# Patient Record
Sex: FEMALE | Race: WHITE | NOT HISPANIC OR LATINO | Employment: OTHER | URBAN - METROPOLITAN AREA
[De-identification: names, ages, dates, MRNs, and addresses within clinical notes are randomized per-mention and may not be internally consistent; named-entity substitution may affect disease eponyms.]

---

## 2017-01-31 ENCOUNTER — ALLSCRIPTS OFFICE VISIT (OUTPATIENT)
Dept: OTHER | Facility: OTHER | Age: 82
End: 2017-01-31

## 2017-02-27 ENCOUNTER — ALLSCRIPTS OFFICE VISIT (OUTPATIENT)
Dept: OTHER | Facility: OTHER | Age: 82
End: 2017-02-27

## 2017-02-27 LAB
FLUAV AG SPEC QL IA: NEGATIVE
INFLUENZA B AG (HISTORICAL): NEGATIVE

## 2017-07-24 DIAGNOSIS — R93.89 ABNORMAL FINDINGS ON DIAGNOSTIC IMAGING OF OTHER SPECIFIED BODY STRUCTURES: ICD-10-CM

## 2017-07-24 DIAGNOSIS — E55.9 VITAMIN D DEFICIENCY: ICD-10-CM

## 2017-07-24 DIAGNOSIS — Z12.31 ENCOUNTER FOR SCREENING MAMMOGRAM FOR MALIGNANT NEOPLASM OF BREAST: ICD-10-CM

## 2017-07-24 DIAGNOSIS — E78.5 HYPERLIPIDEMIA: ICD-10-CM

## 2017-07-24 DIAGNOSIS — E03.9 HYPOTHYROIDISM: ICD-10-CM

## 2017-07-24 DIAGNOSIS — N63.0 BREAST LUMP: ICD-10-CM

## 2017-07-24 DIAGNOSIS — R60.9 EDEMA: ICD-10-CM

## 2017-08-03 ENCOUNTER — ALLSCRIPTS OFFICE VISIT (OUTPATIENT)
Dept: OTHER | Facility: OTHER | Age: 82
End: 2017-08-03

## 2017-08-14 ENCOUNTER — HOSPITAL ENCOUNTER (OUTPATIENT)
Dept: ULTRASOUND IMAGING | Facility: CLINIC | Age: 82
Discharge: HOME | End: 2017-08-14
Payer: MEDICARE

## 2017-08-14 ENCOUNTER — HOSPITAL ENCOUNTER (OUTPATIENT)
Dept: NON INVASIVE DIAGNOSTICS | Facility: CLINIC | Age: 82
Discharge: HOME | End: 2017-08-14
Payer: MEDICARE

## 2017-08-14 ENCOUNTER — HOSPITAL ENCOUNTER (OUTPATIENT)
Dept: MAMMOGRAPHY | Facility: CLINIC | Age: 82
Discharge: HOME | End: 2017-08-14
Payer: MEDICARE

## 2017-08-14 VITALS — DIASTOLIC BLOOD PRESSURE: 80 MMHG | SYSTOLIC BLOOD PRESSURE: 120 MMHG | HEART RATE: 72 BPM

## 2017-08-14 DIAGNOSIS — Z12.31 ENCOUNTER FOR SCREENING MAMMOGRAM FOR MALIGNANT NEOPLASM OF BREAST: ICD-10-CM

## 2017-08-14 DIAGNOSIS — R60.9 EDEMA: ICD-10-CM

## 2017-08-14 DIAGNOSIS — R92.8 ABNORMAL FINDINGS ON DIAGNOSTIC IMAGING OF BREAST: ICD-10-CM

## 2017-08-14 DIAGNOSIS — N63.0 LUMP OR MASS IN BREAST: ICD-10-CM

## 2017-08-14 DIAGNOSIS — N63.0 BREAST LUMP: ICD-10-CM

## 2017-08-14 DIAGNOSIS — R93.89 ABNORMAL FINDINGS ON DIAGNOSTIC IMAGING OF OTHER SPECIFIED BODY STRUCTURES: ICD-10-CM

## 2017-08-14 PROCEDURE — 76642 ULTRASOUND BREAST LIMITED: CPT

## 2017-08-14 PROCEDURE — 93971 EXTREMITY STUDY: CPT

## 2017-08-14 PROCEDURE — G0204 DX MAMMO INCL CAD BI: HCPCS

## 2017-08-14 PROCEDURE — 88361 TUMOR IMMUNOHISTOCHEM/COMPUT: CPT | Performed by: INTERNAL MEDICINE

## 2017-08-14 PROCEDURE — G0279 TOMOSYNTHESIS, MAMMO: HCPCS

## 2017-08-14 PROCEDURE — 88305 TISSUE EXAM BY PATHOLOGIST: CPT | Performed by: INTERNAL MEDICINE

## 2017-08-14 PROCEDURE — 19083 BX BREAST 1ST LESION US IMAG: CPT

## 2017-08-14 RX ORDER — LIDOCAINE HYDROCHLORIDE 10 MG/ML
4 INJECTION, SOLUTION INFILTRATION; PERINEURAL ONCE
Status: COMPLETED | OUTPATIENT
Start: 2017-08-14 | End: 2017-08-14

## 2017-08-14 RX ADMIN — LIDOCAINE HYDROCHLORIDE 4 ML: 10 INJECTION, SOLUTION INFILTRATION; PERINEURAL at 11:26

## 2017-08-16 ENCOUNTER — GENERIC CONVERSION - ENCOUNTER (OUTPATIENT)
Dept: OTHER | Facility: OTHER | Age: 82
End: 2017-08-16

## 2017-08-21 ENCOUNTER — APPOINTMENT (OUTPATIENT)
Dept: LAB | Facility: CLINIC | Age: 82
End: 2017-08-21
Payer: MEDICARE

## 2017-08-21 ENCOUNTER — APPOINTMENT (OUTPATIENT)
Dept: RADIOLOGY | Facility: CLINIC | Age: 82
End: 2017-08-21
Payer: MEDICARE

## 2017-08-21 ENCOUNTER — TRANSCRIBE ORDERS (OUTPATIENT)
Dept: LAB | Facility: CLINIC | Age: 82
End: 2017-08-21

## 2017-08-21 ENCOUNTER — ALLSCRIPTS OFFICE VISIT (OUTPATIENT)
Dept: OTHER | Facility: OTHER | Age: 82
End: 2017-08-21

## 2017-08-21 DIAGNOSIS — C50.412 MALIGNANT NEOPLASM OF UPPER-OUTER QUADRANT OF LEFT FEMALE BREAST (HCC): Primary | ICD-10-CM

## 2017-08-21 DIAGNOSIS — C50.412 MALIGNANT NEOPLASM OF UPPER-OUTER QUADRANT OF LEFT FEMALE BREAST (HCC): ICD-10-CM

## 2017-08-21 LAB
ALBUMIN SERPL BCP-MCNC: 3.3 G/DL (ref 3.5–5)
ALP SERPL-CCNC: 86 U/L (ref 46–116)
ALT SERPL W P-5'-P-CCNC: 27 U/L (ref 12–78)
ANION GAP SERPL CALCULATED.3IONS-SCNC: 13 MMOL/L (ref 4–13)
AST SERPL W P-5'-P-CCNC: 14 U/L (ref 5–45)
BASOPHILS # BLD AUTO: 0.03 THOUSANDS/ΜL (ref 0–0.1)
BASOPHILS NFR BLD AUTO: 0 % (ref 0–1)
BILIRUB SERPL-MCNC: 0.4 MG/DL (ref 0.2–1)
BUN SERPL-MCNC: 17 MG/DL (ref 5–25)
CALCIUM SERPL-MCNC: 8.9 MG/DL (ref 8.3–10.1)
CHLORIDE SERPL-SCNC: 102 MMOL/L (ref 100–108)
CO2 SERPL-SCNC: 26 MMOL/L (ref 21–32)
CREAT SERPL-MCNC: 0.8 MG/DL (ref 0.6–1.3)
EOSINOPHIL # BLD AUTO: 0.37 THOUSAND/ΜL (ref 0–0.61)
EOSINOPHIL NFR BLD AUTO: 3 % (ref 0–6)
ERYTHROCYTE [DISTWIDTH] IN BLOOD BY AUTOMATED COUNT: 14.1 % (ref 11.6–15.1)
GFR SERPL CREATININE-BSD FRML MDRD: 64 ML/MIN/1.73SQ M
GLUCOSE SERPL-MCNC: 93 MG/DL (ref 65–140)
HCT VFR BLD AUTO: 40.5 % (ref 34.8–46.1)
HGB BLD-MCNC: 13.2 G/DL (ref 11.5–15.4)
LYMPHOCYTES # BLD AUTO: 3.98 THOUSANDS/ΜL (ref 0.6–4.47)
LYMPHOCYTES NFR BLD AUTO: 31 % (ref 14–44)
MCH RBC QN AUTO: 29.5 PG (ref 26.8–34.3)
MCHC RBC AUTO-ENTMCNC: 32.6 G/DL (ref 31.4–37.4)
MCV RBC AUTO: 90 FL (ref 82–98)
MONOCYTES # BLD AUTO: 0.88 THOUSAND/ΜL (ref 0.17–1.22)
MONOCYTES NFR BLD AUTO: 7 % (ref 4–12)
NEUTROPHILS # BLD AUTO: 7.41 THOUSANDS/ΜL (ref 1.85–7.62)
NEUTS SEG NFR BLD AUTO: 59 % (ref 43–75)
PLATELET # BLD AUTO: 271 THOUSANDS/UL (ref 149–390)
PMV BLD AUTO: 10.7 FL (ref 8.9–12.7)
POTASSIUM SERPL-SCNC: 4 MMOL/L (ref 3.5–5.3)
PROT SERPL-MCNC: 7.7 G/DL (ref 6.4–8.2)
RBC # BLD AUTO: 4.48 MILLION/UL (ref 3.81–5.12)
SODIUM SERPL-SCNC: 141 MMOL/L (ref 136–145)
WBC # BLD AUTO: 12.67 THOUSAND/UL (ref 4.31–10.16)

## 2017-08-21 PROCEDURE — 71020 HB CHEST X-RAY 2VW FRONTAL&LATL: CPT

## 2017-08-21 PROCEDURE — 85025 COMPLETE CBC W/AUTO DIFF WBC: CPT

## 2017-08-21 PROCEDURE — 36415 COLL VENOUS BLD VENIPUNCTURE: CPT

## 2017-08-21 PROCEDURE — 93005 ELECTROCARDIOGRAM TRACING: CPT

## 2017-08-21 PROCEDURE — 80053 COMPREHEN METABOLIC PANEL: CPT

## 2017-08-23 LAB
ATRIAL RATE: 66 BPM
P AXIS: 55 DEGREES
PR INTERVAL: 248 MS
QRS AXIS: -42 DEGREES
QRSD INTERVAL: 100 MS
QT INTERVAL: 444 MS
QTC INTERVAL: 465 MS
T WAVE AXIS: 12 DEGREES
VENTRICULAR RATE: 66 BPM

## 2017-09-05 ENCOUNTER — CONVERSION ENCOUNTER (OUTPATIENT)
Dept: MAMMOGRAPHY | Facility: HOSPITAL | Age: 82
End: 2017-09-05

## 2017-09-05 ENCOUNTER — HOSPITAL ENCOUNTER (OUTPATIENT)
Facility: HOSPITAL | Age: 82
Setting detail: OUTPATIENT SURGERY
Discharge: HOME | End: 2017-09-07
Attending: SURGERY | Admitting: SURGERY
Payer: MEDICARE

## 2017-09-05 ENCOUNTER — ANESTHESIA EVENT (OUTPATIENT)
Dept: PERIOP | Facility: HOSPITAL | Age: 82
End: 2017-09-05
Payer: MEDICARE

## 2017-09-05 ENCOUNTER — ANESTHESIA (OUTPATIENT)
Dept: PERIOP | Facility: HOSPITAL | Age: 82
End: 2017-09-05
Payer: MEDICARE

## 2017-09-05 ENCOUNTER — GENERIC CONVERSION - ENCOUNTER (OUTPATIENT)
Dept: OTHER | Facility: OTHER | Age: 82
End: 2017-09-05

## 2017-09-05 DIAGNOSIS — Z17.1 MALIGNANT NEOPLASM OF UPPER-OUTER QUADRANT OF LEFT BREAST IN FEMALE, ESTROGEN RECEPTOR NEGATIVE (HCC): ICD-10-CM

## 2017-09-05 DIAGNOSIS — I48.91 ATRIAL FIBRILLATION, UNSPECIFIED TYPE (HCC): Primary | ICD-10-CM

## 2017-09-05 DIAGNOSIS — Z90.12 S/P LEFT MASTECTOMY: ICD-10-CM

## 2017-09-05 DIAGNOSIS — C50.412 MALIGNANT NEOPLASM OF UPPER-OUTER QUADRANT OF LEFT BREAST IN FEMALE, ESTROGEN RECEPTOR NEGATIVE (HCC): ICD-10-CM

## 2017-09-05 PROBLEM — J96.01 ACUTE RESPIRATORY FAILURE WITH HYPOXIA (HCC): Status: ACTIVE | Noted: 2017-09-05

## 2017-09-05 PROBLEM — I48.0 PAROXYSMAL ATRIAL FIBRILLATION (HCC): Status: ACTIVE | Noted: 2017-09-05

## 2017-09-05 LAB
ATRIAL RATE: 89 BPM
QRS AXIS: -51 DEGREES
QRSD INTERVAL: 96 MS
QT INTERVAL: 330 MS
QTC INTERVAL: 395 MS
T WAVE AXIS: 78 DEGREES
VENTRICULAR RATE: 86 BPM

## 2017-09-05 PROCEDURE — 88342 IMHCHEM/IMCYTCHM 1ST ANTB: CPT | Performed by: SURGERY

## 2017-09-05 PROCEDURE — 88341 IMHCHEM/IMCYTCHM EA ADD ANTB: CPT | Performed by: SURGERY

## 2017-09-05 PROCEDURE — 93005 ELECTROCARDIOGRAM TRACING: CPT

## 2017-09-05 PROCEDURE — 93005 ELECTROCARDIOGRAM TRACING: CPT | Performed by: PHYSICIAN ASSISTANT

## 2017-09-05 PROCEDURE — 88307 TISSUE EXAM BY PATHOLOGIST: CPT | Performed by: SURGERY

## 2017-09-05 RX ORDER — CEFAZOLIN SODIUM 1 G/3ML
INJECTION, POWDER, FOR SOLUTION INTRAMUSCULAR; INTRAVENOUS AS NEEDED
Status: DISCONTINUED | OUTPATIENT
Start: 2017-09-05 | End: 2017-09-05 | Stop reason: SURG

## 2017-09-05 RX ORDER — FENTANYL CITRATE/PF 50 MCG/ML
25 SYRINGE (ML) INJECTION
Status: DISCONTINUED | OUTPATIENT
Start: 2017-09-05 | End: 2017-09-05

## 2017-09-05 RX ORDER — MAGNESIUM HYDROXIDE/ALUMINUM HYDROXICE/SIMETHICONE 120; 1200; 1200 MG/30ML; MG/30ML; MG/30ML
30 SUSPENSION ORAL EVERY 6 HOURS PRN
Status: DISCONTINUED | OUTPATIENT
Start: 2017-09-05 | End: 2017-09-07 | Stop reason: HOSPADM

## 2017-09-05 RX ORDER — SODIUM CHLORIDE, SODIUM LACTATE, POTASSIUM CHLORIDE, CALCIUM CHLORIDE 600; 310; 30; 20 MG/100ML; MG/100ML; MG/100ML; MG/100ML
INJECTION, SOLUTION INTRAVENOUS CONTINUOUS PRN
Status: DISCONTINUED | OUTPATIENT
Start: 2017-09-05 | End: 2017-09-05

## 2017-09-05 RX ORDER — LIDOCAINE HYDROCHLORIDE 10 MG/ML
INJECTION, SOLUTION INFILTRATION; PERINEURAL AS NEEDED
Status: DISCONTINUED | OUTPATIENT
Start: 2017-09-05 | End: 2017-09-05 | Stop reason: SURG

## 2017-09-05 RX ORDER — OXYCODONE HYDROCHLORIDE 10 MG/1
10 TABLET ORAL EVERY 4 HOURS PRN
Status: DISCONTINUED | OUTPATIENT
Start: 2017-09-05 | End: 2017-09-07 | Stop reason: HOSPADM

## 2017-09-05 RX ORDER — EPHEDRINE SULFATE 50 MG/ML
INJECTION, SOLUTION INTRAVENOUS AS NEEDED
Status: DISCONTINUED | OUTPATIENT
Start: 2017-09-05 | End: 2017-09-05 | Stop reason: SURG

## 2017-09-05 RX ORDER — FENTANYL CITRATE 50 UG/ML
INJECTION, SOLUTION INTRAMUSCULAR; INTRAVENOUS AS NEEDED
Status: DISCONTINUED | OUTPATIENT
Start: 2017-09-05 | End: 2017-09-05 | Stop reason: SURG

## 2017-09-05 RX ORDER — METOPROLOL TARTRATE 5 MG/5ML
INJECTION INTRAVENOUS AS NEEDED
Status: DISCONTINUED | OUTPATIENT
Start: 2017-09-05 | End: 2017-09-05 | Stop reason: SURG

## 2017-09-05 RX ORDER — PANTOPRAZOLE SODIUM 40 MG/1
40 TABLET, DELAYED RELEASE ORAL
Status: DISCONTINUED | OUTPATIENT
Start: 2017-09-06 | End: 2017-09-07 | Stop reason: HOSPADM

## 2017-09-05 RX ORDER — SODIUM CHLORIDE, SODIUM LACTATE, POTASSIUM CHLORIDE, CALCIUM CHLORIDE 600; 310; 30; 20 MG/100ML; MG/100ML; MG/100ML; MG/100ML
100 INJECTION, SOLUTION INTRAVENOUS CONTINUOUS
Status: DISCONTINUED | OUTPATIENT
Start: 2017-09-05 | End: 2017-09-07 | Stop reason: HOSPADM

## 2017-09-05 RX ORDER — OXYCODONE HYDROCHLORIDE 5 MG/1
5 TABLET ORAL EVERY 4 HOURS PRN
Status: DISCONTINUED | OUTPATIENT
Start: 2017-09-05 | End: 2017-09-07 | Stop reason: HOSPADM

## 2017-09-05 RX ORDER — ONDANSETRON 2 MG/ML
4 INJECTION INTRAMUSCULAR; INTRAVENOUS ONCE AS NEEDED
Status: DISCONTINUED | OUTPATIENT
Start: 2017-09-05 | End: 2017-09-05

## 2017-09-05 RX ORDER — MAGNESIUM HYDROXIDE 1200 MG/15ML
LIQUID ORAL AS NEEDED
Status: DISCONTINUED | OUTPATIENT
Start: 2017-09-05 | End: 2017-09-05 | Stop reason: HOSPADM

## 2017-09-05 RX ORDER — PROPOFOL 10 MG/ML
INJECTION, EMULSION INTRAVENOUS AS NEEDED
Status: DISCONTINUED | OUTPATIENT
Start: 2017-09-05 | End: 2017-09-05 | Stop reason: SURG

## 2017-09-05 RX ORDER — BUPIVACAINE HYDROCHLORIDE AND EPINEPHRINE 2.5; 5 MG/ML; UG/ML
INJECTION, SOLUTION EPIDURAL; INFILTRATION; INTRACAUDAL; PERINEURAL AS NEEDED
Status: DISCONTINUED | OUTPATIENT
Start: 2017-09-05 | End: 2017-09-05 | Stop reason: HOSPADM

## 2017-09-05 RX ORDER — LEVOTHYROXINE SODIUM 0.15 MG/1
150 TABLET ORAL WEEKLY
Status: DISCONTINUED | OUTPATIENT
Start: 2017-09-06 | End: 2017-09-07

## 2017-09-05 RX ORDER — AMLODIPINE BESYLATE 10 MG/1
10 TABLET ORAL DAILY
Status: DISCONTINUED | OUTPATIENT
Start: 2017-09-06 | End: 2017-09-07 | Stop reason: HOSPADM

## 2017-09-05 RX ADMIN — EPHEDRINE SULFATE 10 MG: 50 INJECTION, SOLUTION INTRAMUSCULAR; INTRAVENOUS; SUBCUTANEOUS at 14:47

## 2017-09-05 RX ADMIN — FENTANYL CITRATE 50 MCG: 50 INJECTION INTRAMUSCULAR; INTRAVENOUS at 14:15

## 2017-09-05 RX ADMIN — CEFAZOLIN SODIUM 1000 MG: 1 INJECTION, POWDER, FOR SOLUTION INTRAMUSCULAR; INTRAVENOUS at 14:07

## 2017-09-05 RX ADMIN — SODIUM CHLORIDE, SODIUM LACTATE, POTASSIUM CHLORIDE, AND CALCIUM CHLORIDE 100 ML/HR: .6; .31; .03; .02 INJECTION, SOLUTION INTRAVENOUS at 18:38

## 2017-09-05 RX ADMIN — FENTANYL CITRATE 25 MCG: 50 INJECTION INTRAMUSCULAR; INTRAVENOUS at 16:21

## 2017-09-05 RX ADMIN — OXYCODONE HYDROCHLORIDE 10 MG: 10 TABLET ORAL at 20:29

## 2017-09-05 RX ADMIN — HYDROMORPHONE HYDROCHLORIDE 0.5 MG: 1 INJECTION, SOLUTION INTRAMUSCULAR; INTRAVENOUS; SUBCUTANEOUS at 17:35

## 2017-09-05 RX ADMIN — FENTANYL CITRATE 25 MCG: 50 INJECTION INTRAMUSCULAR; INTRAVENOUS at 15:50

## 2017-09-05 RX ADMIN — SODIUM CHLORIDE, SODIUM LACTATE, POTASSIUM CHLORIDE, AND CALCIUM CHLORIDE: .6; .31; .03; .02 INJECTION, SOLUTION INTRAVENOUS at 13:52

## 2017-09-05 RX ADMIN — LIDOCAINE HYDROCHLORIDE 50 MG: 10 INJECTION, SOLUTION INFILTRATION; PERINEURAL at 14:03

## 2017-09-05 RX ADMIN — METOPROLOL TARTRATE 2 MG: 1 INJECTION, SOLUTION INTRAVENOUS at 15:06

## 2017-09-05 RX ADMIN — PROPOFOL 150 MG: 10 INJECTION, EMULSION INTRAVENOUS at 14:03

## 2017-09-05 RX ADMIN — FENTANYL CITRATE 50 MCG: 50 INJECTION INTRAMUSCULAR; INTRAVENOUS at 14:28

## 2017-09-06 PROBLEM — Z90.12 S/P LEFT MASTECTOMY: Status: ACTIVE | Noted: 2017-09-06

## 2017-09-06 LAB
ANION GAP SERPL CALCULATED.3IONS-SCNC: 8 MMOL/L (ref 4–13)
APTT PPP: 41 SECONDS (ref 23–35)
ATRIAL RATE: 71 BPM
BUN SERPL-MCNC: 15 MG/DL (ref 5–25)
CALCIUM SERPL-MCNC: 8.4 MG/DL (ref 8.3–10.1)
CHLORIDE SERPL-SCNC: 103 MMOL/L (ref 100–108)
CO2 SERPL-SCNC: 26 MMOL/L (ref 21–32)
CREAT SERPL-MCNC: 0.77 MG/DL (ref 0.6–1.3)
ERYTHROCYTE [DISTWIDTH] IN BLOOD BY AUTOMATED COUNT: 13.9 % (ref 11.6–15.1)
GFR SERPL CREATININE-BSD FRML MDRD: 67 ML/MIN/1.73SQ M
GLUCOSE P FAST SERPL-MCNC: 112 MG/DL (ref 65–99)
GLUCOSE SERPL-MCNC: 112 MG/DL (ref 65–140)
HCT VFR BLD AUTO: 38 % (ref 34.8–46.1)
HGB BLD-MCNC: 11.9 G/DL (ref 11.5–15.4)
INR PPP: 1.06 (ref 0.86–1.16)
MAGNESIUM SERPL-MCNC: 1.5 MG/DL (ref 1.6–2.6)
MCH RBC QN AUTO: 28.7 PG (ref 26.8–34.3)
MCHC RBC AUTO-ENTMCNC: 31.3 G/DL (ref 31.4–37.4)
MCV RBC AUTO: 92 FL (ref 82–98)
P AXIS: 75 DEGREES
PLATELET # BLD AUTO: 266 THOUSANDS/UL (ref 149–390)
PMV BLD AUTO: 10.8 FL (ref 8.9–12.7)
POTASSIUM SERPL-SCNC: 3.9 MMOL/L (ref 3.5–5.3)
PR INTERVAL: 256 MS
PROTHROMBIN TIME: 14.1 SECONDS (ref 12.1–14.4)
QRS AXIS: -52 DEGREES
QRSD INTERVAL: 100 MS
QT INTERVAL: 444 MS
QTC INTERVAL: 482 MS
RBC # BLD AUTO: 4.15 MILLION/UL (ref 3.81–5.12)
SODIUM SERPL-SCNC: 137 MMOL/L (ref 136–145)
T WAVE AXIS: 6 DEGREES
TSH SERPL DL<=0.05 MIU/L-ACNC: 2.42 UIU/ML (ref 0.36–3.74)
VENTRICULAR RATE: 71 BPM
WBC # BLD AUTO: 17.04 THOUSAND/UL (ref 4.31–10.16)

## 2017-09-06 PROCEDURE — 85730 THROMBOPLASTIN TIME PARTIAL: CPT | Performed by: PHYSICIAN ASSISTANT

## 2017-09-06 PROCEDURE — 83735 ASSAY OF MAGNESIUM: CPT | Performed by: PHYSICIAN ASSISTANT

## 2017-09-06 PROCEDURE — 85610 PROTHROMBIN TIME: CPT | Performed by: PHYSICIAN ASSISTANT

## 2017-09-06 PROCEDURE — 80048 BASIC METABOLIC PNL TOTAL CA: CPT | Performed by: SURGERY

## 2017-09-06 PROCEDURE — 84443 ASSAY THYROID STIM HORMONE: CPT | Performed by: PHYSICIAN ASSISTANT

## 2017-09-06 PROCEDURE — 85027 COMPLETE CBC AUTOMATED: CPT | Performed by: SURGERY

## 2017-09-06 RX ORDER — ACETAMINOPHEN 325 MG/1
650 TABLET ORAL EVERY 6 HOURS PRN
Status: DISCONTINUED | OUTPATIENT
Start: 2017-09-06 | End: 2017-09-07 | Stop reason: HOSPADM

## 2017-09-06 RX ORDER — OMEPRAZOLE 20 MG/1
20 CAPSULE, DELAYED RELEASE ORAL DAILY
Refills: 0
Start: 2017-09-06 | End: 2018-02-13 | Stop reason: SDUPTHER

## 2017-09-06 RX ADMIN — ENOXAPARIN SODIUM 70 MG: 80 INJECTION SUBCUTANEOUS at 00:04

## 2017-09-06 RX ADMIN — LEVOTHYROXINE SODIUM 150 MCG: 150 TABLET ORAL at 06:11

## 2017-09-06 RX ADMIN — ENOXAPARIN SODIUM 70 MG: 80 INJECTION SUBCUTANEOUS at 11:51

## 2017-09-06 RX ADMIN — METOPROLOL TARTRATE 25 MG: 25 TABLET ORAL at 00:02

## 2017-09-06 RX ADMIN — AMLODIPINE BESYLATE 10 MG: 10 TABLET ORAL at 08:15

## 2017-09-06 RX ADMIN — ACETAMINOPHEN 650 MG: 325 TABLET ORAL at 14:30

## 2017-09-06 RX ADMIN — SODIUM CHLORIDE, SODIUM LACTATE, POTASSIUM CHLORIDE, AND CALCIUM CHLORIDE 100 ML/HR: .6; .31; .03; .02 INJECTION, SOLUTION INTRAVENOUS at 04:21

## 2017-09-06 RX ADMIN — SODIUM CHLORIDE, SODIUM LACTATE, POTASSIUM CHLORIDE, AND CALCIUM CHLORIDE 100 ML/HR: .6; .31; .03; .02 INJECTION, SOLUTION INTRAVENOUS at 15:54

## 2017-09-06 RX ADMIN — SODIUM CHLORIDE, SODIUM LACTATE, POTASSIUM CHLORIDE, AND CALCIUM CHLORIDE 100 ML/HR: .6; .31; .03; .02 INJECTION, SOLUTION INTRAVENOUS at 19:24

## 2017-09-06 RX ADMIN — METOPROLOL TARTRATE 25 MG: 25 TABLET ORAL at 17:21

## 2017-09-06 RX ADMIN — PANTOPRAZOLE SODIUM 40 MG: 40 TABLET, DELAYED RELEASE ORAL at 06:11

## 2017-09-06 RX ADMIN — METOPROLOL TARTRATE 25 MG: 25 TABLET ORAL at 08:15

## 2017-09-06 RX ADMIN — ACETAMINOPHEN 650 MG: 325 TABLET ORAL at 08:15

## 2017-09-07 VITALS
WEIGHT: 164.24 LBS | OXYGEN SATURATION: 90 % | RESPIRATION RATE: 18 BRPM | DIASTOLIC BLOOD PRESSURE: 59 MMHG | HEART RATE: 68 BPM | SYSTOLIC BLOOD PRESSURE: 123 MMHG | HEIGHT: 61 IN | TEMPERATURE: 99.1 F | BODY MASS INDEX: 31.01 KG/M2

## 2017-09-07 LAB
HCT VFR BLD AUTO: 36.7 % (ref 34.8–46.1)
HGB BLD-MCNC: 11.5 G/DL (ref 11.5–15.4)
PLATELET # BLD AUTO: 232 THOUSANDS/UL (ref 149–390)
PMV BLD AUTO: 10.3 FL (ref 8.9–12.7)

## 2017-09-07 PROCEDURE — 85049 AUTOMATED PLATELET COUNT: CPT | Performed by: SURGERY

## 2017-09-07 PROCEDURE — 85014 HEMATOCRIT: CPT | Performed by: SURGERY

## 2017-09-07 PROCEDURE — 85018 HEMOGLOBIN: CPT | Performed by: SURGERY

## 2017-09-07 RX ORDER — LEVOTHYROXINE SODIUM 0.15 MG/1
150 TABLET ORAL
Status: DISCONTINUED | OUTPATIENT
Start: 2017-09-07 | End: 2017-09-07 | Stop reason: HOSPADM

## 2017-09-07 RX ADMIN — PANTOPRAZOLE SODIUM 40 MG: 40 TABLET, DELAYED RELEASE ORAL at 06:12

## 2017-09-07 RX ADMIN — SODIUM CHLORIDE, SODIUM LACTATE, POTASSIUM CHLORIDE, AND CALCIUM CHLORIDE 100 ML/HR: .6; .31; .03; .02 INJECTION, SOLUTION INTRAVENOUS at 04:27

## 2017-09-07 RX ADMIN — AMLODIPINE BESYLATE 10 MG: 10 TABLET ORAL at 10:07

## 2017-09-07 RX ADMIN — LEVOTHYROXINE SODIUM 150 MCG: 150 TABLET ORAL at 07:51

## 2017-09-07 RX ADMIN — METOPROLOL TARTRATE 25 MG: 25 TABLET ORAL at 10:07

## 2017-09-14 ENCOUNTER — ALLSCRIPTS OFFICE VISIT (OUTPATIENT)
Dept: OTHER | Facility: OTHER | Age: 82
End: 2017-09-14

## 2017-09-14 DIAGNOSIS — I48.0 PAROXYSMAL ATRIAL FIBRILLATION (HCC): ICD-10-CM

## 2017-09-14 DIAGNOSIS — R11.10 VOMITING: ICD-10-CM

## 2017-09-14 DIAGNOSIS — I10 ESSENTIAL (PRIMARY) HYPERTENSION: ICD-10-CM
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
## 2017-09-14 DIAGNOSIS — D72.829 ELEVATED WHITE BLOOD CELL COUNT: ICD-10-CM

## 2017-09-18 ENCOUNTER — GENERIC CONVERSION - ENCOUNTER (OUTPATIENT)
Dept: OTHER | Facility: OTHER | Age: 82
End: 2017-09-18

## 2017-09-20 ENCOUNTER — GENERIC CONVERSION - ENCOUNTER (OUTPATIENT)
Dept: OTHER | Facility: OTHER | Age: 82
End: 2017-09-20

## 2017-09-25 ENCOUNTER — HOSPITAL ENCOUNTER (OUTPATIENT)
Dept: NON INVASIVE DIAGNOSTICS | Facility: CLINIC | Age: 82
Discharge: HOME | End: 2017-09-25
Payer: MEDICARE

## 2017-09-25 DIAGNOSIS — I48.0 PAROXYSMAL ATRIAL FIBRILLATION (HCC): ICD-10-CM

## 2017-09-25 PROCEDURE — 93226 XTRNL ECG REC<48 HR SCAN A/R: CPT

## 2017-09-25 PROCEDURE — 93225 XTRNL ECG REC<48 HRS REC: CPT

## 2017-10-12 ENCOUNTER — TRANSCRIBE ORDERS (OUTPATIENT)
Dept: LAB | Facility: CLINIC | Age: 82
End: 2017-10-12

## 2017-10-12 ENCOUNTER — GENERIC CONVERSION - ENCOUNTER (OUTPATIENT)
Dept: OTHER | Facility: OTHER | Age: 82
End: 2017-10-12

## 2017-10-12 ENCOUNTER — APPOINTMENT (OUTPATIENT)
Dept: RADIOLOGY | Age: 82
End: 2017-10-12
Payer: MEDICARE

## 2017-10-12 ENCOUNTER — APPOINTMENT (OUTPATIENT)
Dept: LAB | Age: 82
End: 2017-10-12
Payer: MEDICARE

## 2017-10-12 ENCOUNTER — APPOINTMENT (OUTPATIENT)
Dept: LAB | Facility: CLINIC | Age: 82
End: 2017-10-12
Payer: MEDICARE

## 2017-10-12 ENCOUNTER — HOSPITAL ENCOUNTER (OUTPATIENT)
Dept: CT IMAGING | Facility: HOSPITAL | Age: 82
Discharge: HOME | End: 2017-10-12
Payer: MEDICARE

## 2017-10-12 ENCOUNTER — TRANSCRIBE ORDERS (OUTPATIENT)
Dept: ADMINISTRATIVE | Age: 82
End: 2017-10-12

## 2017-10-12 DIAGNOSIS — I10 ESSENTIAL (PRIMARY) HYPERTENSION: ICD-10-CM
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
## 2017-10-12 DIAGNOSIS — R11.10 VOMITING: ICD-10-CM

## 2017-10-12 DIAGNOSIS — D72.829 ELEVATED WHITE BLOOD CELL COUNT: ICD-10-CM

## 2017-10-12 LAB
ALBUMIN SERPL BCP-MCNC: 3 G/DL (ref 3.5–5)
ALP SERPL-CCNC: 85 U/L (ref 46–116)
ALT SERPL W P-5'-P-CCNC: 30 U/L (ref 12–78)
ANION GAP SERPL CALCULATED.3IONS-SCNC: 9 MMOL/L (ref 4–13)
AST SERPL W P-5'-P-CCNC: 24 U/L (ref 5–45)
BACTERIA UR QL AUTO: ABNORMAL /HPF
BASOPHILS # BLD AUTO: 0.03 THOUSANDS/ΜL (ref 0–0.1)
BASOPHILS NFR BLD AUTO: 0 % (ref 0–1)
BILIRUB SERPL-MCNC: 0.5 MG/DL (ref 0.2–1)
BILIRUB UR QL STRIP: NEGATIVE
BUN SERPL-MCNC: 18 MG/DL (ref 5–25)
CALCIUM SERPL-MCNC: 8.3 MG/DL (ref 8.3–10.1)
CHLORIDE SERPL-SCNC: 103 MMOL/L (ref 100–108)
CLARITY UR: ABNORMAL
CO2 SERPL-SCNC: 26 MMOL/L (ref 21–32)
COLOR UR: YELLOW
CREAT SERPL-MCNC: 1.06 MG/DL (ref 0.6–1.3)
EOSINOPHIL # BLD AUTO: 0.02 THOUSAND/ΜL (ref 0–0.61)
EOSINOPHIL NFR BLD AUTO: 0 % (ref 0–6)
ERYTHROCYTE [DISTWIDTH] IN BLOOD BY AUTOMATED COUNT: 14.1 % (ref 11.6–15.1)
GFR SERPL CREATININE-BSD FRML MDRD: 46 ML/MIN/1.73SQ M
GLUCOSE SERPL-MCNC: 148 MG/DL (ref 65–140)
GLUCOSE UR STRIP-MCNC: NEGATIVE MG/DL
HCT VFR BLD AUTO: 40.1 % (ref 34.8–46.1)
HGB BLD-MCNC: 12.9 G/DL (ref 11.5–15.4)
HGB UR QL STRIP.AUTO: NEGATIVE
HYALINE CASTS #/AREA URNS LPF: ABNORMAL /LPF
KETONES UR STRIP-MCNC: ABNORMAL MG/DL
LEUKOCYTE ESTERASE UR QL STRIP: ABNORMAL
LYMPHOCYTES # BLD AUTO: 2.23 THOUSANDS/ΜL (ref 0.6–4.47)
LYMPHOCYTES NFR BLD AUTO: 9 % (ref 14–44)
MCH RBC QN AUTO: 29.8 PG (ref 26.8–34.3)
MCHC RBC AUTO-ENTMCNC: 32.2 G/DL (ref 31.4–37.4)
MCV RBC AUTO: 93 FL (ref 82–98)
MONOCYTES # BLD AUTO: 1.9 THOUSAND/ΜL (ref 0.17–1.22)
MONOCYTES NFR BLD AUTO: 7 % (ref 4–12)
NEUTROPHILS # BLD AUTO: 22.16 THOUSANDS/ΜL (ref 1.85–7.62)
NEUTS SEG NFR BLD AUTO: 84 % (ref 43–75)
NITRITE UR QL STRIP: NEGATIVE
NON-SQ EPI CELLS URNS QL MICRO: ABNORMAL /HPF
PH UR STRIP.AUTO: 6 [PH] (ref 4.5–8)
PLATELET # BLD AUTO: 292 THOUSANDS/UL (ref 149–390)
PMV BLD AUTO: 10.5 FL (ref 8.9–12.7)
POTASSIUM SERPL-SCNC: 4.4 MMOL/L (ref 3.5–5.3)
PROT SERPL-MCNC: 6.9 G/DL (ref 6.4–8.2)
PROT UR STRIP-MCNC: NEGATIVE MG/DL
RBC # BLD AUTO: 4.33 MILLION/UL (ref 3.81–5.12)
RBC #/AREA URNS AUTO: ABNORMAL /HPF
SODIUM SERPL-SCNC: 138 MMOL/L (ref 136–145)
SP GR UR STRIP.AUTO: 1.02 (ref 1–1.03)
UROBILINOGEN UR QL STRIP.AUTO: 0.2 E.U./DL
WBC # BLD AUTO: 26.34 THOUSAND/UL (ref 4.31–10.16)
WBC #/AREA URNS AUTO: ABNORMAL /HPF

## 2017-10-12 PROCEDURE — 87086 URINE CULTURE/COLONY COUNT: CPT

## 2017-10-12 PROCEDURE — 36415 COLL VENOUS BLD VENIPUNCTURE: CPT

## 2017-10-12 PROCEDURE — 70450 CT HEAD/BRAIN W/O DYE: CPT

## 2017-10-12 PROCEDURE — 71020 HB CHEST X-RAY 2VW FRONTAL&LATL: CPT

## 2017-10-12 PROCEDURE — 87040 BLOOD CULTURE FOR BACTERIA: CPT

## 2017-10-12 PROCEDURE — 80053 COMPREHEN METABOLIC PANEL: CPT

## 2017-10-12 PROCEDURE — 85025 COMPLETE CBC W/AUTO DIFF WBC: CPT

## 2017-10-12 PROCEDURE — 81001 URINALYSIS AUTO W/SCOPE: CPT

## 2017-10-13 ENCOUNTER — GENERIC CONVERSION - ENCOUNTER (OUTPATIENT)
Dept: OTHER | Facility: OTHER | Age: 82
End: 2017-10-13

## 2017-10-14 LAB — BACTERIA UR CULT: NORMAL

## 2017-10-17 LAB — BACTERIA BLD CULT: NORMAL

## 2017-10-23 DIAGNOSIS — J18.9 PNEUMONIA: ICD-10-CM

## 2017-10-23 DIAGNOSIS — I10 ESSENTIAL (PRIMARY) HYPERTENSION: ICD-10-CM

## 2017-10-23 DIAGNOSIS — C50.412 MALIGNANT NEOPLASM OF UPPER-OUTER QUADRANT OF LEFT FEMALE BREAST (HCC): ICD-10-CM

## 2017-10-25 ENCOUNTER — ALLSCRIPTS OFFICE VISIT (OUTPATIENT)
Dept: OTHER | Facility: OTHER | Age: 82
End: 2017-10-25

## 2017-10-25 ENCOUNTER — APPOINTMENT (OUTPATIENT)
Dept: LAB | Facility: CLINIC | Age: 82
End: 2017-10-25
Payer: MEDICARE

## 2017-10-25 DIAGNOSIS — J18.9 PNEUMONIA: ICD-10-CM

## 2017-10-25 LAB
BASOPHILS # BLD AUTO: 0.03 THOUSANDS/ΜL (ref 0–0.1)
BASOPHILS NFR BLD AUTO: 0 % (ref 0–1)
EOSINOPHIL # BLD AUTO: 0.19 THOUSAND/ΜL (ref 0–0.61)
EOSINOPHIL NFR BLD AUTO: 2 % (ref 0–6)
ERYTHROCYTE [DISTWIDTH] IN BLOOD BY AUTOMATED COUNT: 13.8 % (ref 11.6–15.1)
HCT VFR BLD AUTO: 39.9 % (ref 34.8–46.1)
HGB BLD-MCNC: 12.8 G/DL (ref 11.5–15.4)
LYMPHOCYTES # BLD AUTO: 3.41 THOUSANDS/ΜL (ref 0.6–4.47)
LYMPHOCYTES NFR BLD AUTO: 32 % (ref 14–44)
MCH RBC QN AUTO: 29.5 PG (ref 26.8–34.3)
MCHC RBC AUTO-ENTMCNC: 32.1 G/DL (ref 31.4–37.4)
MCV RBC AUTO: 92 FL (ref 82–98)
MONOCYTES # BLD AUTO: 1.15 THOUSAND/ΜL (ref 0.17–1.22)
MONOCYTES NFR BLD AUTO: 11 % (ref 4–12)
NEUTROPHILS # BLD AUTO: 5.94 THOUSANDS/ΜL (ref 1.85–7.62)
NEUTS SEG NFR BLD AUTO: 55 % (ref 43–75)
PLATELET # BLD AUTO: 288 THOUSANDS/UL (ref 149–390)
PMV BLD AUTO: 10.4 FL (ref 8.9–12.7)
RBC # BLD AUTO: 4.34 MILLION/UL (ref 3.81–5.12)
WBC # BLD AUTO: 10.72 THOUSAND/UL (ref 4.31–10.16)

## 2017-10-25 PROCEDURE — 85025 COMPLETE CBC W/AUTO DIFF WBC: CPT

## 2017-10-25 PROCEDURE — 36415 COLL VENOUS BLD VENIPUNCTURE: CPT

## 2017-10-26 NOTE — CONSULTS
Assessment  1  Malignant neoplasm of upper-outer quadrant of left breast in female, estrogen receptor   negative (174 4,V86 1) (C50 412,Z17 1)    Discussion/Summary    A 80year old postmenopausal woman with newly diagnosed stage IIA left breast cancer, grade 3, ER/TX negative, HER-2 3+ disease  She underwent left mastectomy with sentinel lymph node biopsy, resulting in [place]  She has otherwise good health  She presented today with her son and daughter to discuss adjuvant treatment options  We had extensive discussion regarding the diagnosis, staging information, tumor phenotype, relatively high risk disease, due to the aggressive nature of disease, prognosis and treatment options  Despite her advanced age, she has good health  Therefore, adjuvant trastuzumab monotherapy can be considered  I would not recommend adjuvant chemotherapy  Side effects of trastuzumab was thoroughly discussed, including but not limited to 5% of cardiac toxicity  We discussed the statistics with or without trastuzumab adjuvant monotherapy  She and her family member would like to have further discussion among them before she made a final decision  She is going to give us a call with her decision within 10 days  If she decided to have trastuzumab, she does not wish to have port placement  Peripheral IV infusion of trastuzumab can be done  If she decided to have trastuzumab, I would order echocardiogram as the initial cardiac monitoring  All the patient and her family members questions were answered to their satisfaction  Chief Complaint  Left breast cancer, stage IIA(pT2, pN0,M0) grade 3, ER/TX negative, HER-2 3+ disease  Diagnosed in September 2017  History of Present Illness  A 77-year-old postmenopausal woman who recently noticed a lump and pain in the left breast which she brought to medical attention  She was found to have abnormality, radiographically in the left breast which was biopsied in August 14, 2017   She had invasive ductal carcinoma, grade 2, ER/AR negative, HER-2 negative disease  She subsequently underwent left mastectomy with sentinel node biopsy by Dr Gerson Waggoner and September 5, 2017  She had 3 0 cm of invasive ductal carcinoma, grade 3  Lymphovascular invasion was indeterminate  2 sentinel lymph nodes were negative for metastatic disease  She did not have reconstruction  She presents today with her son and daughter to discuss adjuvant treatment options  Despite her age, she has relatively good health  She only has hypertension, hypothyroidism and GERD as a past medical history  She currently feels well  She has no complaint of pain  She denied any respiratory symptoms  Her weight has been stable  She has no family history of breast or ovarian cancer  She is a lifetime never smoker  Her performance status is normal       Review of Systems    Constitutional: No fever, no chills, feels well, no tiredness, no recent weight gain or weight loss  Eyes: No complaints of eye pain, no red eyes, no eyesight problems, no discharge, no dry eyes, no itching of eyes  ENT: no complaints of earache, no loss of hearing, no nose bleeds, no nasal discharge, no sore throat, no hoarseness  Cardiovascular: No complaints of slow heart rate, no fast heart rate, no chest pain, no palpitations, no leg claudication, no lower extremity edema  Respiratory: No complaints of shortness of breath, no wheezing, no cough, no SOB on exertion, no orthopnea, no PND  Gastrointestinal: No complaints of abdominal pain, no constipation, no nausea or vomiting, no diarrhea, no bloody stools  Genitourinary: No complaints of dysuria, no incontinence, no pelvic pain, no dysmenorrhea, no vaginal discharge or bleeding  Musculoskeletal: No complaints of arthralgias, no myalgias, no joint swelling or stiffness, no limb pain or swelling  Integumentary: No complaints of skin rash or lesions, no itching, no skin wounds, no breast pain or lump     Neurological: No complaints of headache, no confusion, no convulsions, no numbness, no dizziness or fainting, no tingling, no limb weakness, no difficulty walking  Psychiatric: Not suicidal, no sleep disturbance, no anxiety or depression, no change in personality, no emotional problems  Endocrine: No complaints of proptosis, no hot flashes, no muscle weakness, no deepening of the voice, no feelings of weakness  Hematologic/Lymphatic: No complaints of swollen glands, no swollen glands in the neck, does not bleed easily, does not bruise easily  ROS reviewed  Active Problems  1  Age related osteoporosis (733 01) (M81 0)   2  Aneurysm of ascending aorta (441 2) (I71 2)   3  Benign paroxysmal vertigo, unspecified laterality (386 11) (H81 10)   4  Bilateral carpal tunnel syndrome (354 0) (G56 03)   5  Community acquired pneumonia (5) (J18 9)   6  Constipation (564 00) (K59 00)   7  Dyslipidemia (272 4) (E78 5)   8  Edema (782 3) (R60 9)   9  Fall at home (C926 2,S708 6) (Y84  VAZA,L85 579)   10  Fatigue (780 79) (R53 83)   11  First degree AV block (426 11) (I44 0)   12  GERD (gastroesophageal reflux disease) (530 81) (K21 9)   13  History of aspiration pneumonia (V12 61) (Z87 01)   14  History of chest pain (V13 89) (Z87 898)   15  History of influenza vaccination (V49 89) (Z92 29)   16  Hypertension (401 9) (I10)   17  Hypothyroidism (244 9) (E03 9)   18  Impacted cerumen of right ear (380 4) (H61 21)   19  Leukocytosis (288 60) (D72 829)   20  Lymphadenopathy, mediastinal (785 6) (R59 0)   21  Macular degeneration (362 50) (H35 30)   22  Malignant neoplasm of upper-outer quadrant of left breast in female, estrogen receptor    negative (174 4,V86 1) (C50 412,Z17 1)   23  Paroxysmal atrial fibrillation (427 31) (I48 0)   24  Right ankle pain (719 47) (M25 571)   25  UTI (urinary tract infection) (599 0) (N39 0)   26  Vertigo (780 4) (R42)   27  Vitamin D deficiency (268 9) (E55 9)   28   Vomiting (787 03) (R11 10)    Past Medical History  1  History of Acute Medial Meniscus Tear (836 0)   2  History of Depression (311) (F32 9)   3  History of Encounter for screening for malignant neoplasm of colon (V76 51) (Z12 11)   4  History of Fainting (780 2) (R55)   5  History of aspiration pneumonia (V12 61) (Z87 01)   6  History of bronchitis (V12 69) (Z87 09)   7  History of chest pain (V13 89) (Z87 898)   8  History of cough   9  History of dermatitis (V13 3) (Z87 2)   10  History of influenza vaccination (V49 89) (Z92 29)   11  History of pneumococcal vaccination (V49 89) (Z92 29)   12  History of screening mammography (V15 89) (Z92 89)   13  History of Incomplete defecation (787 61) (R15 0)   14  History of Screening for depression (V79 0) (Z13 89)   15  History of Screening for genitourinary condition (V81 6) (Z13 89)   16  History of Screening for neurological condition (V80 09) (Z13 89)    The active problems and past medical history were reviewed and updated today  Surgical History  1  History of Appendectomy   2  History of Cholecystectomy   3  History of [place] Lymph Node Biopsy   4  History of Simple Mastectomy Left Breast    The surgical history was reviewed and updated today  Family History  Mother    1  Denied: Family history of Alcoholism and drug addiction in family   2  Family history of Angina Pectoris   3  Denied: Family history of Anxiety and depression   4  Family history of Family Health Status Of Mother -   Father    11  Denied: Family history of Alcoholism and drug addiction in family   6  Denied: Family history of Anxiety and depression  Child    7  Denied: Family history of Alcoholism and drug addiction in family   6  Denied: Family history of Anxiety and depression  Sibling    9  Denied: Family history of Alcoholism and drug addiction in family   8  Denied: Family history of Anxiety and depression    The family history was reviewed and updated today         Social History   · Denied: Alcohol use   · Denied: Drug use (305 90) (F19 90)   · Lives independently   ·    · Never A Smoker  The social history was reviewed and updated today  The social history was reviewed and is unchanged  Current Meds   1  AmLODIPine Besylate 5 MG Oral Tablet; TAKE 1 TABLET TWICE DAILY; Therapy: 09SPS9018 to (Evaluate:2018)  Requested for: 44UZO0797 Recorded   2  Centrum Silver Oral Tablet; TAKE 1 TABLET DAILY; Therapy: 56ESA4412 to Recorded   3  Fiber TABS; USE AS DIRECTED; Therapy: (Yesica Giles) to Recorded   4  Levothyroxine Sodium 150 MCG Oral Tablet; Take 1 tablet daily; Therapy: 80YFJ9949 to (Last Rx:33Hmx2828)  Requested for: 50Ard0866 Ordered   5  Metoprolol Tartrate 25 MG Oral Tablet; TAKE 1/2 TABLET TWICE DAILY; Therapy: (Recorded:2017) to  Requested for: 53WRM6539 Recorded   6  Omeprazole 40 MG Oral Capsule Delayed Release; take 1 capsule daily; Therapy: 14QSZ1258 to ([id number])  Requested for: 12Cgk2620; Last   Rx:64Waa7831 Ordered   7  PreserVision AREDS CAPS; TAKE AS DIRECTED; Therapy: (Yesica Giles) to Recorded   8  Vitamin D 2000 UNIT Oral Capsule; TAKE 1 CAPSULE Daily; Therapy: 76WXS3977 to (Evaluate:75Ytd4693); Last Rx:31Rnz2834 Ordered    The medication list was reviewed and updated today  Allergies  1  No Known Drug Allergies    Vitals  Vital Signs    Recorded: 51VIU3031 03:00PM   Temperature 97 3 F   Heart Rate 84   Respiration 16   Systolic 574   Diastolic 72   Height 4 ft 11 5 in   Weight 153 lb    BMI Calculated 30 39   BSA Calculated 1 66   O2 Saturation 99     Physical Exam    Constitutional   General appearance: No acute distress, well appearing and well nourished  Eyes   Conjunctiva and lids: No swelling, erythema or discharge  Pupils and irises: Equal, round and reactive to light      Ears, Nose, Mouth, and Throat   External inspection of ears and nose: Normal     Otoscopic examination: Tympanic membranes translucent with normal light reflex  Canals patent without erythema  Nasal mucosa, septum, and turbinates: Normal without edema or erythema  Oropharynx: Normal with no erythema, edema, exudate or lesions  Pulmonary   Respiratory effort: No increased work of breathing or signs of respiratory distress  Auscultation of lungs: Clear to auscultation  Cardiovascular   Palpation of heart: Normal PMI, no thrills  Auscultation of heart: Normal rate and rhythm, normal S1 and S2, without murmurs  Examination of extremities for edema and/or varicosities: Normal     Carotid pulses: Normal     Abdomen   Abdomen: Non-tender, no masses  Liver and spleen: No hepatomegaly or splenomegaly  Lymphatic   Palpation of lymph nodes in neck: No lymphadenopathy  Musculoskeletal   Gait and station: Normal     Digits and nails: Normal without clubbing or cyanosis  Inspection/palpation of joints, bones, and muscles: Normal     Skin   Skin and subcutaneous tissue: Normal without rashes or lesions  Neurologic   Cranial nerves: Cranial nerves 2-12 intact  Reflexes: 2+ and symmetric  Sensation: No sensory loss  Psychiatric   Orientation to person, place, and time: Normal     Mood and affect: Normal     Additional Exam:  Breast exam; status post left mastectomy without reconstruction  No palpable abnormality in her left chest wall  Right breast exam is negative  ECOG 0       Results/Data    Results   Pathology 3 cm of invasive ductal carcinoma, grade 3  Lymphovascular invasion indeterminate  2 sentinel lymph node were negative for metastatic disease  ER/DC negative, HER-2 3+ disease  IIA)pT2, pN0,M0)  Radiology Chest x-ray showed right lower lobe opacity  scan of head was negative  Lab Results WBC 26  Hemoglobin 12 9, platelet count 937  CMP are within normal limits        Future Appointments    Date/Time Provider Specialty Site   02/08/2018 02:00 PM Portia Berger MD Internal Medicine [address] [place] [address] MED   10/30/2017 01:00 PM ORION Gupta   Cardiology [facility] CARDIOLOGY [place]   12/04/2017 08:30 AM Marlene Caba MD Surgical Oncology [facility] SURGICAL ONCOL[facility]Y   10/25/2017 04:00 PM Giovanny Santamaria, Nurse Schedule  [facility] INTERNAL MED     Signatures   Electronically signed by : ORION Falcon ; Oct 25 2017  3:48PM EST                       (Author)

## 2017-10-30 ENCOUNTER — TRANSCRIBE ORDERS (OUTPATIENT)
Dept: ADMINISTRATIVE | Age: 82
End: 2017-10-30

## 2017-10-30 ENCOUNTER — TRANSCRIBE ORDERS (OUTPATIENT)
Dept: ADMINISTRATIVE | Facility: HOSPITAL | Age: 82
End: 2017-10-30

## 2017-10-30 ENCOUNTER — HOSPITAL ENCOUNTER (OUTPATIENT)
Dept: NON INVASIVE DIAGNOSTICS | Facility: CLINIC | Age: 82
Discharge: HOME | End: 2017-10-30
Payer: MEDICARE

## 2017-10-30 ENCOUNTER — APPOINTMENT (OUTPATIENT)
Dept: RADIOLOGY | Age: 82
End: 2017-10-30
Payer: MEDICARE

## 2017-10-30 ENCOUNTER — GENERIC CONVERSION - ENCOUNTER (OUTPATIENT)
Dept: OTHER | Facility: OTHER | Age: 82
End: 2017-10-30

## 2017-10-30 ENCOUNTER — ALLSCRIPTS OFFICE VISIT (OUTPATIENT)
Dept: OTHER | Facility: OTHER | Age: 82
End: 2017-10-30

## 2017-10-30 DIAGNOSIS — I10 ESSENTIAL (PRIMARY) HYPERTENSION: ICD-10-CM

## 2017-10-30 DIAGNOSIS — C50.412 MALIGNANT NEOPLASM OF UPPER-OUTER QUADRANT OF LEFT FEMALE BREAST (HCC): ICD-10-CM

## 2017-10-30 PROCEDURE — 71020 HB CHEST X-RAY 2VW FRONTAL&LATL: CPT

## 2017-10-30 PROCEDURE — 93306 TTE W/DOPPLER COMPLETE: CPT

## 2017-10-31 NOTE — PROGRESS NOTES
Assessment  Assessed    1  Hypertension (401 9) (I10)    Plan  Hypertension    · (1) CBC/PLT/DIFF; Status:Active; Requested for:30Apr2018; Perform:[facility]; Due:30Apr2019;Ordered;For:Hypertension; Ordered By:Michael Auguste;   · (1) COMPREHENSIVE METABOLIC PANEL; Status:Active; Requested for:30Apr2018; Perform:[facility]; Due:02Rnj7566;Ordered;For:Hypertension; Ordered By:Michael Auguste;   · * XR CHEST PA & LATERAL; Status:Active; Requested LVF:99OUC5664;    Perform:[facility]; [phone]; Ordered;For:Hypertension; Ordered By:Richie Auguste;   · Follow Up After Tests Complete Evaluation and Treatment  Follow-up  Status: Hold For -  Scheduling  Requested for: 45JSI8891   Ordered; For: Hypertension; Ordered By: Mark Mathias Performed:  Due: 34NSY1770    Discussion/Summary  Cardiology Discussion Summary Free Text Note Form [facility]:   Patient seen October 3, 2017  Holter is fine  No evidence of any supraventricular arrhythmia  Echo to be done today  is remarkable 80year-old 1 both physically and mentally  Recently she had a syncopal episode which I am sure was secondary to sepsis  White count was over 26,000 she had pneumonia on chest x-ray  Her blood pressure on present medications fine  [place] on [address] [address] in [place]  Chief Complaint  Chief Complaint Free Text Note Form: Patient presents for a follow up with test results  History of Present Illness  Cardiology HPI Free Text Note Form [facility]: Patient seen September 14, 2017  seeing me because she had atrial fibrillation with breast surgery  She also has significant hypertension  She's gone Metroprolol 50 mg a day and amlodipine 10 mg which she takes once a day  Around noon time she's getting lightheaded  Blood pressures less than 216 systolic recently  would decrease the Norvasc to 5 mg daily  Her going to get 10 blood pressures  would do a 48 hour Holter to see she has any further atrial fib   She is not on any anticoagulation  has a LDL in her record of 104  is 80years old and physically and mentally extraordinarily good  seen October 30, 2000 17  Her Holter counter shows no significant atrial irritability  has syncopal episode with what sounds like sepsis  White count over 26,000  Chest x-ray is abnormal for pneumonia  She did have a repeat chest x-ray today  blood pressure is fine  She is basically asymptomatic  Mentally she is extraordinary  Review of Systems  Cardiology Female ROS:     Cardiac: rhythm problems  Skin: No complaints of nonhealing sores or skin rash  Genitourinary: No complaints of recurrent urinary tract infections, frequent urination at night, difficult urination, blood in urine, kidney stones, loss of bladder control, kidney problems, denies any birth control or hormone replacement, is not post menopausal, not currently pregnant  Respiratory: No complaints of shortness of breath, cough with sputum, or wheezing  Active Problems  Problems    1  Age related osteoporosis (733 01) (M81 0)   2  Aneurysm of ascending aorta (441 2) (I71 2)   3  Benign paroxysmal vertigo, unspecified laterality (386 11) (H81 10)   4  Bilateral carpal tunnel syndrome (354 0) (G56 03)   5  Community acquired pneumonia (5) (J18 9)   6  Constipation (564 00) (K59 00)   7  Dyslipidemia (272 4) (E78 5)   8  Edema (782 3) (R60 9)   9  Fall at home (H192 4,O717 1) (C88  MVSO,J11 617)   10  Fatigue (780 79) (R53 83)   11  First degree AV block (426 11) (I44 0)   12  GERD (gastroesophageal reflux disease) (530 81) (K21 9)   13  History of aspiration pneumonia (V12 61) (Z87 01)   14  History of chest pain (V13 89) (Z87 898)   15  History of influenza vaccination (V49 89) (Z92 29)   16  Hypertension (401 9) (I10)   17  Hypothyroidism (244 9) (E03 9)   18  Impacted cerumen of right ear (380 4) (H61 21)   19  Leukocytosis (288 60) (D72 829)   20  Lymphadenopathy, mediastinal (785 6) (R59 0)   21   Macular degeneration (362 50) (H35 30)   22  Malignant neoplasm of upper-outer quadrant of left breast in female, estrogen receptor    negative (174 4,V86 1) (C50 412,Z17 1)   23  Need for influenza vaccination (V04 81) (Z23)   24  Paroxysmal atrial fibrillation (427 31) (I48 0)   25  Right ankle pain (719 47) (M25 571)   26  UTI (urinary tract infection) (599 0) (N39 0)   27  Vertigo (780 4) (R42)   28  Vitamin D deficiency (268 9) (E55 9)   29  Vomiting (787 03) (R11 10)    Past Medical History  Problems    1  History of Acute Medial Meniscus Tear (836 0)   2  History of Depression (311) (F32 9)   3  History of Encounter for screening for malignant neoplasm of colon (V76 51) (Z12 11)   4  History of Fainting (780 2) (R55)   5  History of aspiration pneumonia (V12 61) (Z87 01)   6  History of bronchitis (V12 69) (Z87 09)   7  History of chest pain (V13 89) (Z87 898)   8  History of cough   9  History of dermatitis (V13 3) (Z87 2)   10  History of influenza vaccination (V49 89) (Z92 29)   11  History of pneumococcal vaccination (V49 89) (Z92 29)   12  History of screening mammography (V15 89) (Z92 89)   13  History of Incomplete defecation (787 61) (R15 0)   14  History of Screening for depression (V79 0) (Z13 89)   15  History of Screening for genitourinary condition (V81 6) (Z13 89)   16  History of Screening for neurological condition (V80 09) (Z13 89)    Surgical History  Problems    1  History of Appendectomy   2  History of Cholecystectomy   3  History of [place] Lymph Node Biopsy   4  History of Simple Mastectomy Left Breast    Family History  Mother    1  Denied: Family history of Alcoholism and drug addiction in family   2  Family history of Angina Pectoris   3  Denied: Family history of Anxiety and depression   4  Family history of Family Health Status Of Mother -   Father    11  Denied: Family history of Alcoholism and drug addiction in family   6  Denied: Family history of Anxiety and depression  Child    7   Denied: Family history of Alcoholism and drug addiction in family   6  Denied: Family history of Anxiety and depression  Sibling    9  Denied: Family history of Alcoholism and drug addiction in family   8  Denied: Family history of Anxiety and depression    Social History  Problems    · Denied: Alcohol use   · Denied: Drug use (305 90) (F19 90)   · Lives independently   ·    · Never A Smoker    Current Meds   1  AmLODIPine Besylate 5 MG Oral Tablet; TAKE 1 TABLET TWICE DAILY; Therapy: 45ORQ1452 to (798 660 361)  Requested for: 12AVL9474; Last   Rx:30Oct2017; Status: ACTIVE - Retrospective Authorization Ordered   2  Centrum Silver Oral Tablet; TAKE 1 TABLET DAILY; Therapy: 63NRL0131 to Recorded   3  Fiber TABS; USE AS DIRECTED; Therapy: (Rachel ) to Recorded   4  Levothyroxine Sodium 150 MCG Oral Tablet; Take 1 tablet daily; Therapy: 58ILS5509 to (Last Rx:49Jwr9565)  Requested for: 10Rup7395 Ordered   5  Metoprolol Tartrate 25 MG Oral Tablet; TAKE 1/2 TABLET TWICE DAILY; Therapy: (Recorded:12Oct2017) to  Requested for: 91GXI4341 Recorded   6  Omeprazole 40 MG Oral Capsule Delayed Release; take 1 capsule daily; Therapy: 13SPN9803 to (YYGVWXGE:76CVS7950)  Requested for: 52Uyx1896; Last   Rx:09Aim8961 Ordered   7  PreserVision AREDS CAPS; TAKE AS DIRECTED; Therapy: ([place] Minshandra) to Recorded   8  Vitamin D 2000 UNIT Oral Capsule; TAKE 1 CAPSULE Daily; Therapy: 70LAJ1687 to (Evaluate:85Rsm2051); Last Rx:52Vlw5455 Ordered  Medication List Reviewed: The medication list was reviewed and updated today  Allergies  Medication    1   No Known Drug Allergies    Vitals  Vital Signs    Recorded: 64FRY3300 01:08PM   Heart Rate 65   Systolic 319, RUE, Sitting   Diastolic 66, RUE, Sitting   Height 4 ft 11 5 in   Weight 153 lb 8 oz   BMI Calculated 30 48   BSA Calculated 1 66   O2 Saturation 97     Physical Exam    Constitutional   General appearance: No acute distress, well appearing and well nourished  Pulmonary   Auscultation of lungs: Clear to auscultation, no rales, no rhonchi, no wheezing, good air movement  Cardiovascular   Auscultation of heart: Normal rate and rhythm, normal S1 and S2, no murmurs      Psychiatric - Orientation to person, place, and time: Normal -- Mood and affect: Normal       Future Appointments    Date/Time Provider Specialty Site   02/08/2018 02:00 PM Protia Berger MD Internal Medicine [address] [place] [address] INTERNAL MED   12/04/2017 08:30 AM Ede Jeffers MD Surgical Oncology [facility] SURG[facility] ONCOL[facility]Y     Signatures   Electronically signed by : ORION Madsen ; Oct 30 2017  1:38PM EST                       (Author)

## 2017-12-05 RX ORDER — SODIUM CHLORIDE 9 MG/ML
20 INJECTION, SOLUTION INTRAVENOUS CONTINUOUS
Status: DISCONTINUED | OUTPATIENT
Start: 2017-12-07 | End: 2017-12-10 | Stop reason: HOSPADM

## 2017-12-07 ENCOUNTER — HOSPITAL ENCOUNTER (OUTPATIENT)
Dept: INFUSION CENTER | Facility: CLINIC | Age: 82
Discharge: HOME | End: 2017-12-07
Payer: MEDICARE

## 2017-12-07 VITALS
HEART RATE: 81 BPM | BODY MASS INDEX: 30.82 KG/M2 | DIASTOLIC BLOOD PRESSURE: 70 MMHG | SYSTOLIC BLOOD PRESSURE: 158 MMHG | WEIGHT: 157 LBS | TEMPERATURE: 98.5 F | OXYGEN SATURATION: 94 % | RESPIRATION RATE: 20 BRPM | HEIGHT: 60 IN

## 2017-12-07 PROCEDURE — 96413 CHEMO IV INFUSION 1 HR: CPT

## 2017-12-07 RX ADMIN — SODIUM CHLORIDE 20 ML/HR: 0.9 INJECTION, SOLUTION INTRAVENOUS at 13:00

## 2017-12-07 RX ADMIN — TRASTUZUMAB 555 MG: 150 INJECTION, POWDER, LYOPHILIZED, FOR SOLUTION INTRAVENOUS at 13:41

## 2017-12-07 NOTE — PLAN OF CARE
Problem: Potential for Falls  Goal: Patient will remain free of falls  INTERVENTIONS:  - Assess patient frequently for physical needs  -  Identify cognitive and physical deficits and behaviors that affect risk of falls    -  [place] fall precautions as indicated by assessment   - Educate patient/family on patient safety including physical limitations  - Instruct patient to call for assistance with activity based on assessment  - Modify environment to reduce risk of injury  - Consider OT/PT consult to assist with strengthening/mobility   Outcome: Progressing

## 2017-12-07 NOTE — PROGRESS NOTES
Nurse spoke with Yaneth Christian RN & informed her that Herceptin stopped 10 min prior to infusion completed secondary to pt  Shivering and reporting chills  Pt  Was in a very cold room in infusion ctr for treatment and then moved to a warmer room  Pt  Was stable and reported not feeling cold and pt  Went to bathroom and when returning from bathroom pt  Was shivering  Herceptin turned off at that time  VSS  Pt  Denied chills and pt  Not shivering after herceptin turned off  Kelly Bustamante stated she would inform Dr Lanie Paul of the same and call nurse back

## 2017-12-07 NOTE — PLAN OF CARE
Problem: Potential for Falls  Goal: Patient will remain free of falls  INTERVENTIONS:  - Assess patient frequently for physical needs  -  Identify cognitive and physical deficits and behaviors that affect risk of falls    -  [place] fall precautions as indicated by assessment   - Educate patient/family on patient safety including physical limitations  - Instruct patient to call for assistance with activity based on assessment  - Modify environment to reduce risk of injury  - Consider OT/PT consult to assist with strengthening/mobility   Outcome: Progressing

## 2017-12-07 NOTE — PROGRESS NOTES
Nurse spoke w/Nanette [address]  RN & she stated she spoke with Dr Misael Hodges and she discussed the same with him regarding shivering and chills  Dr Misael Hodges stated it was o k  To d/c pt  Home with her son at this time  Nurse informed the pt  & pts  Son of the same, both agreeable & verbalized understanding  Confirmed pts  Next appt   Pt  Declined AVS

## 2017-12-07 NOTE — PROGRESS NOTES
Nurse spoke with Joann Conner RN & she stated pt  Does not need labs done & to disregard treatment parameters noted on treatment orders

## 2017-12-26 RX ORDER — SODIUM CHLORIDE 9 MG/ML
20 INJECTION, SOLUTION INTRAVENOUS CONTINUOUS
Status: DISCONTINUED | OUTPATIENT
Start: 2017-12-28 | End: 2017-12-31 | Stop reason: HOSPADM

## 2017-12-28 ENCOUNTER — HOSPITAL ENCOUNTER (OUTPATIENT)
Dept: INFUSION CENTER | Facility: CLINIC | Age: 82
Discharge: HOME | End: 2017-12-30
Payer: MEDICARE

## 2017-12-28 VITALS
WEIGHT: 156.97 LBS | SYSTOLIC BLOOD PRESSURE: 136 MMHG | HEART RATE: 70 BPM | TEMPERATURE: 97.6 F | RESPIRATION RATE: 18 BRPM | HEIGHT: 60 IN | BODY MASS INDEX: 30.82 KG/M2 | DIASTOLIC BLOOD PRESSURE: 63 MMHG

## 2017-12-28 PROCEDURE — 96413 CHEMO IV INFUSION 1 HR: CPT

## 2017-12-28 RX ADMIN — TRASTUZUMAB 418 MG: 150 INJECTION, POWDER, LYOPHILIZED, FOR SOLUTION INTRAVENOUS at 13:09

## 2017-12-28 RX ADMIN — SODIUM CHLORIDE 20 ML/HR: 0.9 INJECTION, SOLUTION INTRAVENOUS at 12:37

## 2017-12-28 NOTE — PROGRESS NOTES
Pt tolerated treatment well without complications  Pt discharged in stable condition with son and aware of next appointment  Information given to patient regarding [facility] transport system  AVS provided

## 2017-12-28 NOTE — PROGRESS NOTES
Pt arrived to infusion center for second herceptin treatment  Pt and son state that towards the end of her previous treatment, she began to get chills  Pt's son states that it was "almost like rigors " Pt was moved to another room (a warmer temperature room), yet still did not receive full dose of herceptin at that time  Currently, pt offers no complaints and is resting comfortably  Call bell within reach

## 2018-01-05 ENCOUNTER — GENERIC CONVERSION - ENCOUNTER (OUTPATIENT)
Dept: OTHER | Facility: OTHER | Age: 83
End: 2018-01-05

## 2018-01-05 ENCOUNTER — TRANSCRIBE ORDERS (OUTPATIENT)
Dept: ADMINISTRATIVE | Facility: HOSPITAL | Age: 83
End: 2018-01-05

## 2018-01-05 DIAGNOSIS — C50.412 MALIGNANT NEOPLASM OF UPPER-OUTER QUADRANT OF LEFT FEMALE BREAST, UNSPECIFIED ESTROGEN RECEPTOR STATUS (HCC): Primary | ICD-10-CM

## 2018-01-10 NOTE — RESULT NOTES
Verified Results  (1) CBC/PLT/DIFF 25Oct2017 04:14PM Ronnie Rivera Order Number: [id number]     Test Name Result Flag Reference   WBC COUNT 10 72 Thousand/uL H 4 31-10 16   RBC COUNT 4 34 Million/uL  3 81-5 12   HEMOGLOBIN 12 8 g/dL  11 5-15 4   HEMATOCRIT 39 9 %  34 8-46  1   MCV 92 fL  82-98   MCH 29 5 pg  26 8-34 3   MCHC 32 1 g/dL  31 4-37 4   RDW 13 8 %  11 6-15 1   MPV 10 4 fL  8 9-12 7   PLATELET COUNT 640 Thousands/uL  149-390   NEUTROPHILS RELATIVE PERCENT 55 %  43-75   LYMPHOCYTES RELATIVE PERCENT 32 %  14-44   MONOCYTES RELATIVE PERCENT 11 %  4-12   EOSINOPHILS RELATIVE PERCENT 2 %  0-6   BASOPHILS RELATIVE PERCENT 0 %  0-1   NEUTROPHILS ABSOLUTE COUNT 5 94 Thousands/? ??L  1 85-7 62   LYMPHOCYTES ABSOLUTE COUNT 3 41 Thousands/? ??L  0 60-4 47   MONOCYTES ABSOLUTE COUNT 1 15 Thousand/? ??L  0 17-1 22   EOSINOPHILS ABSOLUTE COUNT 0 19 Thousand/? ??L  0 00-0 61   BASOPHILS ABSOLUTE COUNT 0 03 Thousands/? ??L  0 00-0 10       Plan  Community acquired pneumonia    · (1) CBC/PLT/DIFF; Status:Complete;   Done: 40OTF2560 04:14PM  Need for influenza vaccination    · Fluzone High-Dose 0 5 ML Intramuscular Suspension Prefilled Syringe

## 2018-01-12 VITALS
WEIGHT: 153.5 LBS | BODY MASS INDEX: 30.14 KG/M2 | HEART RATE: 65 BPM | DIASTOLIC BLOOD PRESSURE: 66 MMHG | OXYGEN SATURATION: 97 % | SYSTOLIC BLOOD PRESSURE: 140 MMHG | HEIGHT: 60 IN

## 2018-01-12 NOTE — RESULT NOTES
Verified Results  (1) CBC/PLT/DIFF [id number] 11:34AM Fredis Replaced by [facility] Order Number: [id number]     Test Name Result Flag Reference   WBC COUNT 26 34 Thousand/uL H 4 31-10 16   RBC COUNT 4 33 Million/uL  3 81-5 12   HEMOGLOBIN 12 9 g/dL  11 5-15 4   HEMATOCRIT 40 1 %  34 8-46  1   MCV 93 fL  82-98   MCH 29 8 pg  26 8-34 3   MCHC 32 2 g/dL  31 4-37 4   RDW 14 1 %  11 6-15 1   MPV 10 5 fL  8 9-12 7   PLATELET COUNT 549 Thousands/uL  149-390   NEUTROPHILS RELATIVE PERCENT 84 % H 43-75   LYMPHOCYTES RELATIVE PERCENT 9 % L 14-44   MONOCYTES RELATIVE PERCENT 7 %  4-12   EOSINOPHILS RELATIVE PERCENT 0 %  0-6   BASOPHILS RELATIVE PERCENT 0 %  0-1   NEUTROPHILS ABSOLUTE COUNT 22 16 Thousands/? ??L H 1 85-7 62   LYMPHOCYTES ABSOLUTE COUNT 2 23 Thousands/? ??L  0 60-4 47   MONOCYTES ABSOLUTE COUNT 1 90 Thousand/? ??L H 0 17-1 22   EOSINOPHILS ABSOLUTE COUNT 0 02 Thousand/? ??L  0 00-0 61   BASOPHILS ABSOLUTE COUNT 0 03 Thousands/? ??L  0 00-0 10     (1) COMPREHENSIVE METABOLIC PANEL 80OET5141 60:55XM Fredis Replaced by [facility] Order Number: [id number]     Test Name Result Flag Reference   GLUCOSE,RANDM 148 mg/dL H    If the patient is fasting, the ADA then defines impaired fasting glucose as > 100 mg/dL and diabetes as > or equal to 123 mg/dL  Specimen collection should occur prior to Sulfasalazine administration due to the potential for falsely depressed results  Specimen collection should occur prior to Sulfapyridine administration due to the potential for falsely elevated results     SODIUM 138 mmol/L  136-145   POTASSIUM 4 4 mmol/L  3 5-5 3   CHLORIDE 103 mmol/L  100-108   CARBON DIOXIDE 26 mmol/L  21-32   ANION GAP (CALC) 9 mmol/L  4-13   BLOOD UREA NITROGEN 18 mg/dL  5-25   CREATININE 1 06 mg/dL  0 60-1 30   Standardized to IDMS reference method   CALCIUM 8 3 mg/dL  8 3-10 1   BILI, TOTAL 0 50 mg/dL  0 20-1 00   ALK PHOSPHATAS 85 U/L     ALT (SGPT) 30 U/L  12-78   Specimen collection should occur prior to Sulfasalazine administration due to the potential for falsely depressed results  AST(SGOT) 24 U/L  5-45   Specimen collection should occur prior to Sulfasalazine administration due to the potential for falsely depressed results  ALBUMIN 3 0 g/dL L 3 5-5 0   TOTAL PROTEIN 6 9 g/dL  6 4-8 2   eGFR 46 ml/min/1 73sq m     [facility] [employer] Disease Education Program recommendations are as follows:  GFR calculation is accurate only with a steady state creatinine  Chronic Kidney disease less than 60 ml/min/1 73 sq  meters  Kidney failure less than 15 ml/min/1 73 sq  meters

## 2018-01-12 NOTE — CONSULTS
Chief Complaint  Left breast cancer, stage IIA(pT2, pN0,M0) grade 3, ER/VT negative, HER-2 3+ disease  Diagnosed in September 2017  History of Present Illness  A 30-year-old postmenopausal woman who recently noticed a lump and pain in the left breast which she brought to medical attention  She was found to have abnormality, radiographically in the left breast which was biopsied in August 14, 2017  She had invasive ductal carcinoma, grade 2, ER/VT negative, HER-2 negative disease  She subsequently underwent left mastectomy with sentinel node biopsy by Dr Ambrocio Oneal and September 5, 2017  She had 3 0 cm of invasive ductal carcinoma, grade 3  Lymphovascular invasion was indeterminate  2 sentinel lymph nodes were negative for metastatic disease  She did not have reconstruction  She presents today with her son and daughter to discuss adjuvant treatment options  Despite her age, she has relatively good health  She only has hypertension, hypothyroidism and GERD as a past medical history  She currently feels well  She has no complaint of pain  She denied any respiratory symptoms  Her weight has been stable  She has no family history of breast or ovarian cancer  She is a lifetime never smoker  Her performance status is normal       Review of Systems    Constitutional: No fever, no chills, feels well, no tiredness, no recent weight gain or weight loss  Eyes: No complaints of eye pain, no red eyes, no eyesight problems, no discharge, no dry eyes, no itching of eyes  ENT: no complaints of earache, no loss of hearing, no nose bleeds, no nasal discharge, no sore throat, no hoarseness  Cardiovascular: No complaints of slow heart rate, no fast heart rate, no chest pain, no palpitations, no leg claudication, no lower extremity edema  Respiratory: No complaints of shortness of breath, no wheezing, no cough, no SOB on exertion, no orthopnea, no PND     Gastrointestinal: No complaints of abdominal pain, no constipation, no nausea or vomiting, no diarrhea, no bloody stools  Genitourinary: No complaints of dysuria, no incontinence, no pelvic pain, no dysmenorrhea, no vaginal discharge or bleeding  Musculoskeletal: No complaints of arthralgias, no myalgias, no joint swelling or stiffness, no limb pain or swelling  Integumentary: No complaints of skin rash or lesions, no itching, no skin wounds, no breast pain or lump  Neurological: No complaints of headache, no confusion, no convulsions, no numbness, no dizziness or fainting, no tingling, no limb weakness, no difficulty walking  Psychiatric: Not suicidal, no sleep disturbance, no anxiety or depression, no change in personality, no emotional problems  Endocrine: No complaints of proptosis, no hot flashes, no muscle weakness, no deepening of the voice, no feelings of weakness  Hematologic/Lymphatic: No complaints of swollen glands, no swollen glands in the neck, does not bleed easily, does not bruise easily  ROS reviewed  Active Problems    1  Age related osteoporosis (733 01) (M81 0)   2  Aneurysm of ascending aorta (441 2) (I71 2)   3  Benign paroxysmal vertigo, unspecified laterality (386 11) (H81 10)   4  Bilateral carpal tunnel syndrome (354 0) (G56 03)   5  Community acquired pneumonia (5) (J18 9)   6  Constipation (564 00) (K59 00)   7  Dyslipidemia (272 4) (E78 5)   8  Edema (782 3) (R60 9)   9  Fall at home (L905 4,N708 0) (Z47  [facility],I14 046)   10  Fatigue (780 79) (R53 83)   11  First degree AV block (426 11) (I44 0)   12  GERD (gastroesophageal reflux disease) (530 81) (K21 9)   13  History of aspiration pneumonia (V12 61) (Z87 01)   14  History of chest pain (V13 89) (Z87 898)   15  History of influenza vaccination (V49 89) (Z92 29)   16  Hypertension (401 9) (I10)   17  Hypothyroidism (244 9) (E03 9)   18  Impacted cerumen of right ear (380 4) (H61 21)   19  Leukocytosis (288 60) (D72 829)   20  Lymphadenopathy, mediastinal (785 6) (R59 0)   21   Macular degeneration (362 50) (H35 30)   22  Malignant neoplasm of upper-outer quadrant of left breast in female, estrogen receptor    negative (174 4,V86 1) (C50 412,Z17 1)   23  Paroxysmal atrial fibrillation (427 31) (I48 0)   24  Right ankle pain (719 47) (M25 571)   25  UTI (urinary tract infection) (599 0) (N39 0)   26  Vertigo (780 4) (R42)   27  Vitamin D deficiency (268 9) (E55 9)   28  Vomiting (787 03) (R11 10)    Past Medical History    · History of Acute Medial Meniscus Tear (836 0)   · History of Depression (311) (F32 9)   · History of Encounter for screening for malignant neoplasm of colon (V76 51) (Z12 11)   · History of Fainting (780 2) (R55)   · History of aspiration pneumonia (V12 61) (Z87 01)   · History of bronchitis (V12 69) (Z87 09)   · History of chest pain (V13 89) (F11 798)   · History of cough   · History of dermatitis (V13 3) (Z87 2)   · History of influenza vaccination (V49 89) (Z92 29)   · History of pneumococcal vaccination (V49 89) (Z92 29)   · History of screening mammography (V15 89) (Z92 89)   · History of Incomplete defecation (787 61) (R15 0)   · History of Screening for depression (V79 0) (Z13 89)   · History of Screening for genitourinary condition (V81 6) (Z13 89)   · History of Screening for neurological condition (V80 09) (Z13 89)    The active problems and past medical history were reviewed and updated today  Surgical History    · History of Appendectomy   · History of Cholecystectomy   · History of [place] Lymph Node Biopsy   · History of Simple Mastectomy Left Breast    The surgical history was reviewed and updated today         Family History    · Denied: Family history of Alcoholism and drug addiction in family   · Family history of Angina Pectoris   · Denied: Family history of Anxiety and depression   · Family history of Family Health Status Of Mother -     · Denied: Family history of Alcoholism and drug addiction in family   · Denied: Family history of Anxiety and depression    · Denied: Family history of Alcoholism and drug addiction in family   · Denied: Family history of Anxiety and depression    · Denied: Family history of Alcoholism and drug addiction in family   · Denied: Family history of Anxiety and depression    The family history was reviewed and updated today  Social History    · Denied: Alcohol use   · Denied: Drug use (305 90) (F19 90)   · Lives independently   · independent/assisted living  [employer]   ·    · Never A Smoker  The social history was reviewed and updated today  The social history was reviewed and is unchanged  Current Meds   1  AmLODIPine Besylate 5 MG Oral Tablet; TAKE 1 TABLET TWICE DAILY; Therapy: 09SGA0881 to (Evaluate:04Oct2018)  Requested for: 37MXX3079 Recorded   2  Centrum Silver Oral Tablet; TAKE 1 TABLET DAILY; Therapy: 15HIV0877 to Recorded   3  Fiber TABS; USE AS DIRECTED; Therapy: (Jasmeet Red) to Recorded   4  Levothyroxine Sodium 150 MCG Oral Tablet; Take 1 tablet daily; Therapy: 13BOP5440 to (Last Rx:92Okd0371)  Requested for: 17Nid1905 Ordered   5  Metoprolol Tartrate 25 MG Oral Tablet; TAKE 1/2 TABLET TWICE DAILY; Therapy: (Recorded:12Oct2017) to  Requested for: 33NHT1950 Recorded   6  Omeprazole 40 MG Oral Capsule Delayed Release; take 1 capsule daily; Therapy: 26SYR8143 to (OVSJKDAN:04JRJ7145)  Requested for: 76Qzi4162; Last   Rx:88Usr8439 Ordered   7  PreserVision AREDS CAPS; TAKE AS DIRECTED; Therapy: (Diana Cowden) to Recorded   8  Vitamin D 2000 UNIT Oral Capsule; TAKE 1 CAPSULE Daily; Therapy: 97TYW3783 to (Evaluate:55Wda4553); Last Rx:51Kre2293 Ordered    The medication list was reviewed and updated today  Allergies    1   No Known Drug Allergies    Vitals   Recorded: 82LMX1396 03:00PM   Temperature 97 3 F   Heart Rate 84   Respiration 16   Systolic 515   Diastolic 72   Height 4 ft 11 5 in   Weight 153 lb    BMI Calculated 30 39   BSA Calculated 1 66   O2 Saturation 99     Physical Exam    Constitutional   General appearance: No acute distress, well appearing and well nourished  Eyes   Conjunctiva and lids: No swelling, erythema or discharge  Pupils and irises: Equal, round and reactive to light  Ears, Nose, Mouth, and Throat   External inspection of ears and nose: Normal     Otoscopic examination: Tympanic membranes translucent with normal light reflex  Canals patent without erythema  Nasal mucosa, septum, and turbinates: Normal without edema or erythema  Oropharynx: Normal with no erythema, edema, exudate or lesions  Pulmonary   Respiratory effort: No increased work of breathing or signs of respiratory distress  Auscultation of lungs: Clear to auscultation  Cardiovascular   Palpation of heart: Normal PMI, no thrills  Auscultation of heart: Normal rate and rhythm, normal S1 and S2, without murmurs  Examination of extremities for edema and/or varicosities: Normal     Carotid pulses: Normal     Abdomen   Abdomen: Non-tender, no masses  Liver and spleen: No hepatomegaly or splenomegaly  Lymphatic   Palpation of lymph nodes in neck: No lymphadenopathy  Musculoskeletal   Gait and station: Normal     Digits and nails: Normal without clubbing or cyanosis  Inspection/palpation of joints, bones, and muscles: Normal     Skin   Skin and subcutaneous tissue: Normal without rashes or lesions  Neurologic   Cranial nerves: Cranial nerves 2-12 intact  Reflexes: 2+ and symmetric  Sensation: No sensory loss  Psychiatric   Orientation to person, place, and time: Normal     Mood and affect: Normal     Additional Exam:  Breast exam; status post left mastectomy without reconstruction  No palpable abnormality in her left chest wall  Right breast exam is negative  ECOG 0       Results/Data    Results   Pathology 3 cm of invasive ductal carcinoma, grade 3  Lymphovascular invasion indeterminate   2 sentinel lymph node were negative for metastatic disease  ER/RI negative, HER-2 3+ disease  Stage IIA)pT2, pN0,M0)  Radiology Chest x-ray showed right lower lobe opacity  CT scan of head was negative  Lab Results WBC 26  Hemoglobin 12 9, platelet count 673  CMP are within normal limits  Assessment    1  Malignant neoplasm of upper-outer quadrant of left breast in female, estrogen receptor   negative (174 4,V86 1) (C50 412,Z17 1)    Discussion/Summary    A 80year old postmenopausal woman with newly diagnosed stage IIA left breast cancer, grade 3, ER/RI negative, HER-2 3+ disease  She underwent left mastectomy with sentinel lymph node biopsy, resulting in [place]  She has otherwise good health  She presented today with her son and daughter to discuss adjuvant treatment options  We had extensive discussion regarding the diagnosis, staging information, tumor phenotype, relatively high risk disease, due to the aggressive nature of disease, prognosis and treatment options  Despite her advanced age, she has good health  Therefore, adjuvant trastuzumab monotherapy can be considered  I would not recommend adjuvant chemotherapy  Side effects of trastuzumab was thoroughly discussed, including but not limited to 5% of cardiac toxicity  We discussed the statistics with or without trastuzumab adjuvant monotherapy  She and her family member would like to have further discussion among them before she made a final decision  She is going to give us a call with her decision within 10 days  If she decided to have trastuzumab, she does not wish to have port placement  Peripheral IV infusion of trastuzumab can be done  If she decided to have trastuzumab, I would order echocardiogram as the initial cardiac monitoring  All the patient and her family members questions were answered to their satisfaction        Signatures   Electronically signed by : ORION Lee ; Oct 25 2017  3:48PM EST                       (Author)

## 2018-01-13 VITALS
DIASTOLIC BLOOD PRESSURE: 80 MMHG | BODY MASS INDEX: 29.66 KG/M2 | RESPIRATION RATE: 16 BRPM | HEART RATE: 80 BPM | HEIGHT: 61 IN | TEMPERATURE: 98.2 F | SYSTOLIC BLOOD PRESSURE: 138 MMHG | WEIGHT: 157.13 LBS

## 2018-01-13 VITALS
BODY MASS INDEX: 28.17 KG/M2 | DIASTOLIC BLOOD PRESSURE: 60 MMHG | WEIGHT: 149.19 LBS | HEART RATE: 76 BPM | HEIGHT: 61 IN | SYSTOLIC BLOOD PRESSURE: 114 MMHG

## 2018-01-13 NOTE — RESULT NOTES
Verified Results  (1) TISSUE EXAM [id number] 11:29AM Howard Shen     Test Name Result Flag Reference   LAB AP CASE REPORT (Report)     Surgical Pathology Report             Case: [id number]                   Authorizing Provider: Radha Skelton MD  Collected:      08/14/2017 1129        Ordering Location:   [address] [place] [address] Breast Received:      08/14/2017 [address] [address] [facility] [address]:      Helen Holman MD                              Specimen:  Breast, Left, US BX Left breast 200 4cmfn 4 passes 12g marquee   LAB AP FINAL DIAGNOSIS (Report)     A  Left breast, 2:00, 4 cm FN, ultrasound guided biopsy:  - Invasive breast carcinoma of no special type (ductal NST/invasive ductal   carcinoma)  * [place] grade 2 of 3 (total score: 7 of 9)   -- tubule formation 10-75%, score 2   -- nuclear grade 3 of 3, score 3   -- mitoses 4-7 mm2, (15 mitoses/10HPF), score 2   * Invasive carcinoma involves 4 of 4 submitted core biopsies, max  dimension = 13 mm   * Estrogen, progesterone and HER2 receptor studies pending, block A2, to   be described in a separate receptor report  * Ductal carcinoma in situ (DCIS): No definitive DCIS identified   * Lymph-vascular invasion: Not identified    * Calcifications: Present, measuring up to 1 3 mm    Electronically signed by Helen Holman MD on 8/15/2017 at 4:39 PM   LAB AP NOTE      Intradepartmental consultation is in agreement  LAB AP SURGICAL ADDITIONAL INFORMATION (Report)     All controls performed with the immunohistochemical stains reported above   reacted appropriately  These tests were developed and their performance   characteristics determined by [facility] or   [address]  They may not be cleared or approved by the [place]  Food and Drug Administration  The FDA has determined that such clearance   or approval is not necessary   These tests are used for clinical purposes  They should not be regarded as investigational or for research  This   laboratory has been approved by CLIA 88, designated as a high-complexity   laboratory and is qualified to perform these tests  - Interpretation performed at [facility]   LAB AP GROSS DESCRIPTION (Report)     A  The specimen is received in formalin, labeled with the patient's name   and hospital number, and is designated left breast ultrasound biopsy, 2   o'clock, 4 cm from nipple, 4 passes  The specimen consists of 2 white tan   yellow-pink fibrofatty tissue cores ranging in diameter from 0 2 cm to 0 3   cm, and having lengths of 1 0 cm, 1 4 cm, 1 7 cm, 1 9 cm  Entirely   submitted  Two cassettes, with 2 cores in each cassette  Note: The estimated total formalin fixation time based upon information   provided by the submitting clinician and the standard processing schedule   is 9 0 hours    MAS   LAB AP CLINICAL INFORMATION Fixed in formalin     Fixed in formalin

## 2018-01-14 VITALS
SYSTOLIC BLOOD PRESSURE: 116 MMHG | RESPIRATION RATE: 16 BRPM | TEMPERATURE: 97.3 F | HEART RATE: 84 BPM | OXYGEN SATURATION: 99 % | HEIGHT: 60 IN | BODY MASS INDEX: 30.04 KG/M2 | DIASTOLIC BLOOD PRESSURE: 72 MMHG | WEIGHT: 153 LBS

## 2018-01-14 VITALS
HEIGHT: 61 IN | TEMPERATURE: 97.6 F | RESPIRATION RATE: 18 BRPM | DIASTOLIC BLOOD PRESSURE: 78 MMHG | OXYGEN SATURATION: 96 % | SYSTOLIC BLOOD PRESSURE: 126 MMHG | HEART RATE: 96 BPM | BODY MASS INDEX: 31.06 KG/M2 | WEIGHT: 164.5 LBS

## 2018-01-14 VITALS
HEIGHT: 61 IN | DIASTOLIC BLOOD PRESSURE: 78 MMHG | RESPIRATION RATE: 18 BRPM | OXYGEN SATURATION: 99 % | BODY MASS INDEX: 31.53 KG/M2 | SYSTOLIC BLOOD PRESSURE: 148 MMHG | WEIGHT: 167 LBS | HEART RATE: 98 BPM

## 2018-01-14 VITALS
DIASTOLIC BLOOD PRESSURE: 80 MMHG | HEART RATE: 82 BPM | BODY MASS INDEX: 29.86 KG/M2 | RESPIRATION RATE: 18 BRPM | TEMPERATURE: 97.4 F | WEIGHT: 158.13 LBS | HEIGHT: 61 IN | SYSTOLIC BLOOD PRESSURE: 134 MMHG | OXYGEN SATURATION: 96 %

## 2018-01-16 NOTE — PROGRESS NOTES
Chief Complaint  Patient is here with her daughter to have one post surgical GLADYS drain removed  Drain #1 draining less than 30 cc's over the last several days  Verbal instructions regarding removal given to patient and daughter  Drain removed without difficulty  Post GLADYS drain removal instructions given to patient  Drain insertion site covered with DSD  Appointment with Dr Annette Conner on 9/20  Active Problems    1  Age related osteoporosis (733 01) (M81 0)   2  Aneurysm of ascending aorta (441 2) (I71 2)   3  Benign paroxysmal vertigo, unspecified laterality (386 11) (H81 10)   4  Bilateral carpal tunnel syndrome (354 0) (G56 03)   5  Constipation (564 00) (K59 00)   6  Dyslipidemia (272 4) (E78 5)   7  Edema (782 3) (R60 9)   8  Fatigue (780 79) (R53 83)   9  First degree AV block (426 11) (I44 0)   10  GERD (gastroesophageal reflux disease) (530 81) (K21 9)   11  History of aspiration pneumonia (V12 61) (Z87 01)   12  History of chest pain (V13 89) (Z87 898)   13  History of influenza vaccination (V49 89) (Z92 29)   14  Hypertension (401 9) (I10)   15  Hypothyroidism (244 9) (E03 9)   16  Impacted cerumen of right ear (380 4) (H61 21)   17  Lymphadenopathy, mediastinal (785 6) (R59 0)   18  Macular degeneration (362 50) (H35 30)   19  Malignant neoplasm of upper-outer quadrant of left breast in female, estrogen receptor    negative (174 4,V86 1) (C50 412,Z17 1)   20  Paroxysmal atrial fibrillation (427 31) (I48 0)   21  Right ankle pain (719 47) (M25 571)   22  Vertigo (780 4) (R42)   23  Vitamin D deficiency (268 9) (E55 9)    Current Meds   1  AmLODIPine Besylate 10 MG Oral Tablet; take 1/2 tablet daily; Therapy: 62ROX7592 to (Evaluate:13Mar2018)  Requested for: 25Old4709 Recorded   2  Centrum Silver Oral Tablet; TAKE 1 TABLET DAILY; Therapy: 57CPD2796 to Recorded   3  Fiber TABS; USE AS DIRECTED; Therapy: (Warner Davidson) to Recorded   4  Levothyroxine Sodium 150 MCG Oral Tablet;  Take 1 tablet daily; Therapy: 65HFV4465 to (Last Rx:43Jqx2887)  Requested for: 57Rcz5627 Ordered   5  Metoprolol Tartrate 25 MG Oral Tablet; TAKE 1 TABLET TWICE DAILY; Therapy: (Recorded:77Qyv9468) to Recorded   6  Omeprazole 40 MG Oral Capsule Delayed Release; take 1 capsule daily; Therapy: 60MYI2922 to (FUUHMDTE:49XKL9382)  Requested for: 14Bar0562; Last   Rx:31Bhy8479 Ordered   7  PreserVision AREDS CAPS; TAKE AS DIRECTED; Therapy: (Denice Nigh) to Recorded   8  Vitamin D 2000 UNIT Oral Capsule; TAKE 1 CAPSULE Daily; Therapy: 67NWO5735 to (Evaluate:10Ssy4753); Last Rx:72Pdu2251 Ordered    Allergies    1  No Known Drug Allergies    Future Appointments    Date/Time Provider Specialty Site   02/08/2018 02:00 PM Mona Jordan MD Internal Medicine [address] [place] [address] INTERNAL MED   10/30/2017 01:00 PM ORION Culver   Cardiology [facility]   09/20/2017 09:15 AM Domenic Denise MD Surgical Oncology [facility]     Signatures   Electronically signed by : Easton Orozco, ; Sep 18 2017 12:04PM EST                       (Author)    Electronically signed by : Yoana Frost MD; Sep 21 2017  9:03AM EST

## 2018-01-16 NOTE — CONSULTS
Chief Complaint  Patient presents for a follow up with test results  History of Present Illness  Patient seen September 14, 2017  Patient seeing me because she had atrial fibrillation with breast surgery  She also has significant hypertension  She's gone Metroprolol 50 mg a day and amlodipine 10 mg which she takes once a day  Around noon time she's getting lightheaded  Blood pressures less than 250 systolic recently  I would decrease the Norvasc to 5 mg daily  Her going to get 10 blood pressures  I would do a 48 hour Holter to see she has any further atrial fib  She is not on any anticoagulation  She has a LDL in her record of 104  She is 80years old and physically and mentally extraordinarily good  Patient seen October 30, 2000 17  Her Holter counter shows no significant atrial irritability  She has syncopal episode with what sounds like sepsis  White count over 26,000  Chest x-ray is abnormal for pneumonia  She did have a repeat chest x-ray today  Her blood pressure is fine  She is basically asymptomatic  Mentally she is extraordinary  Review of Systems      Cardiac: rhythm problems  Skin: No complaints of nonhealing sores or skin rash  Genitourinary: No complaints of recurrent urinary tract infections, frequent urination at night, difficult urination, blood in urine, kidney stones, loss of bladder control, kidney problems, denies any birth control or hormone replacement, is not post menopausal, not currently pregnant  Respiratory: No complaints of shortness of breath, cough with sputum, or wheezing  Active Problems    1  Age related osteoporosis (733 01) (M81 0)   2  Aneurysm of ascending aorta (441 2) (I71 2)   3  Benign paroxysmal vertigo, unspecified laterality (386 11) (H81 10)   4  Bilateral carpal tunnel syndrome (354 0) (G56 03)   5  Community acquired pneumonia (5) (J18 9)   6  Constipation (564 00) (K59 00)   7  Dyslipidemia (272 4) (E78 5)   8   Edema (782 3) (R60 9) 9  Fall at home (J917 8,B020 0) ([address]  BHKV,W69 628)   10  Fatigue (780 79) (R53 83)   11  First degree AV block (426 11) (I44 0)   12  GERD (gastroesophageal reflux disease) (530 81) (K21 9)   13  History of aspiration pneumonia (V12 61) (Z87 01)   14  History of chest pain (V13 89) (Z87 898)   15  History of influenza vaccination (V49 89) (Z92 29)   16  Hypertension (401 9) (I10)   17  Hypothyroidism (244 9) (E03 9)   18  Impacted cerumen of right ear (380 4) (H61 21)   19  Leukocytosis (288 60) (D72 829)   20  Lymphadenopathy, mediastinal (785 6) (R59 0)   21  Macular degeneration (362 50) (H35 30)   22  Malignant neoplasm of upper-outer quadrant of left breast in female, estrogen receptor    negative (174 4,V86 1) (C50 412,Z17 1)   23  Need for influenza vaccination (V04 81) (Z23)   24  Paroxysmal atrial fibrillation (427 31) (I48 0)   25  Right ankle pain (719 47) (M25 571)   26  UTI (urinary tract infection) (599 0) (N39 0)   27  Vertigo (780 4) (R42)   28  Vitamin D deficiency (268 9) (E55 9)   29   Vomiting (787 03) (R11 10)    Past Medical History    · History of Acute Medial Meniscus Tear (836 0)   · History of Depression (311) (F32 9)   · History of Encounter for screening for malignant neoplasm of colon (V76 51) (Z12 11)   · History of Fainting (780 2) (R55)   · History of aspiration pneumonia (V12 61) (Z87 01)   · History of bronchitis (V12 69) (Z87 09)   · History of chest pain (V13 89) (L48 294)   · History of cough   · History of dermatitis (V13 3) (Z87 2)   · History of influenza vaccination (V49 89) (Z92 29)   · History of pneumococcal vaccination (V49 89) (Z92 29)   · History of screening mammography (V15 89) (Z92 89)   · History of Incomplete defecation (787 61) (R15 0)   · History of Screening for depression (V79 0) (Z13 89)   · History of Screening for genitourinary condition (V81 6) (Z13 89)   · History of Screening for neurological condition (V80 09) (Z13 89)    Surgical History    · History of Appendectomy   · History of Cholecystectomy   · History of [place] Lymph Node Biopsy   · History of Simple Mastectomy Left Breast    Family History    · Denied: Family history of Alcoholism and drug addiction in family   · Family history of Angina Pectoris   · Denied: Family history of Anxiety and depression   · Family history of Family Health Status Of Mother -     · Denied: Family history of Alcoholism and drug addiction in family   · Denied: Family history of Anxiety and depression    · Denied: Family history of Alcoholism and drug addiction in family   · Denied: Family history of Anxiety and depression    · Denied: Family history of Alcoholism and drug addiction in family   · Denied: Family history of Anxiety and depression    Social History    · Denied: Alcohol use   · Denied: Drug use (305 90) (F19 90)   · Lives independently   ·    · Never A Smoker    Current Meds   1  AmLODIPine Besylate 5 MG Oral Tablet; TAKE 1 TABLET TWICE DAILY; Therapy: 84UIY2239 to (Jaja Hu)  Requested for: 02KCO8430; Last   Rx:2017; Status: ACTIVE - Retrospective Authorization Ordered   2  Centrum Silver Oral Tablet; TAKE 1 TABLET DAILY; Therapy: 73BVD4530 to Recorded   3  Fiber TABS; USE AS DIRECTED; Therapy: (Pooja Jackson) to Recorded   4  Levothyroxine Sodium 150 MCG Oral Tablet; Take 1 tablet daily; Therapy: 53GMT4351 to (Last Rx:11Izj1260)  Requested for: 27Joy1191 Ordered   5  Metoprolol Tartrate 25 MG Oral Tablet; TAKE 1/2 TABLET TWICE DAILY; Therapy: (Recorded:2017) to  Requested for: 70KXP8777 Recorded   6  Omeprazole 40 MG Oral Capsule Delayed Release; take 1 capsule daily; Therapy: 11LNJ0043 to (CHRBXEEZ:96UNE8723)  Requested for: 74Rdr7125; Last   Rx:15Zdw1955 Ordered   7  PreserVision AREDS CAPS; TAKE AS DIRECTED; Therapy: (Pooja Jackson) to Recorded   8  Vitamin D 2000 UNIT Oral Capsule; TAKE 1 CAPSULE Daily; Therapy: 11MBI2067 to (Evaluate:57Rxd0316);  Last Rx: 79GGY9132 Ordered    The medication list was reviewed and updated today  Allergies    1  No Known Drug Allergies    Vitals   Recorded: 77XRG8260 01:08PM   Heart Rate 65   Systolic 349, RUE, Sitting   Diastolic 66, RUE, Sitting   Height 4 ft 11 5 in   Weight 153 lb 8 oz   BMI Calculated 30 48   BSA Calculated 1 66   O2 Saturation 97     Physical Exam    Constitutional   General appearance: No acute distress, well appearing and well nourished  Pulmonary   Auscultation of lungs: Clear to auscultation, no rales, no rhonchi, no wheezing, good air movement  Cardiovascular   Auscultation of heart: Normal rate and rhythm, normal S1 and S2, no murmurs  Psychiatric - Orientation to person, place, and time: Normal  Mood and affect: Normal       Assessment    1  Hypertension (401 9) (I10)    Plan  Hypertension    · (1) CBC/PLT/DIFF; Status:Active; Requested for:30Apr2018; Perform:[facility]; Due:30Apr2019;Ordered;  For:Hypertension; Ordered By:Cassi Auguste;   · (1) COMPREHENSIVE METABOLIC PANEL; Status:Active; Requested for:30Apr2018; Perform:[facility]; Due:30Apr2019;Ordered;  For:Hypertension; Ordered By:Cassi Auguste;   · * XR CHEST PA & LATERAL; Status:Active; Requested EDC:64FCN7209;    Perform:[facility]; [phone]; Ordered;  For:Hypertension; Ordered By:Richie Auguste;   · Follow Up After Tests Complete Evaluation and Treatment  Follow-up  Status: Hold For -  Scheduling  Requested for: 13JHU8224   Ordered; For: Hypertension; Ordered By: Kait Baird Performed:  Due: 12PJG9873    Discussion/Summary    Patient seen October 3, 2017  Holter is fine  No evidence of any supraventricular arrhythmia  Echo to be done today  She is remarkable 80year-old 1 both physically and mentally  Recently she had a syncopal episode which I am sure was secondary to sepsis  White count was over 26,000 she had pneumonia on chest x-ray   Her blood pressure on present medications fine  She lives on [address] [place]        Future Appointments    Signatures   Electronically signed by : ORION Mahajan ; Oct 30 2017  1:38PM EST                       (Author)

## 2018-01-17 RX ORDER — SODIUM CHLORIDE 9 MG/ML
20 INJECTION, SOLUTION INTRAVENOUS CONTINUOUS
Status: DISCONTINUED | OUTPATIENT
Start: 2018-01-18 | End: 2018-01-21 | Stop reason: HOSPADM

## 2018-01-17 NOTE — RESULT NOTES
Verified Results  (1) URINALYSIS w URINE C/S REFLEX (will reflex a microscopy if leukocytes, occult blood, or nitrites are not within normal limits) 12Oct2017 04:07PM Aislinn Cardona    Order Number: [id number]     Test Name Result Flag Reference   COLOR Yellow     CLARITY Cloudy     PH UA 6 0  4 5-8 0   LEUKOCYTE ESTERASE UA Large A Negative   NITRITE UA Negative  Negative   PROTEIN UA Negative mg/dl  Negative   GLUCOSE UA Negative mg/dl  Negative   KETONES UA Trace mg/dl A Negative   UROBILINOGEN UA 0 2 E U /dl  0 2, 1 0 E U /dl   BILIRUBIN UA Negative  Negative   BLOOD UA Negative  Negative   SPECIFIC GRAVITY UA 1 019  1 003-1 030   BACTERIA Occasional /hpf  None Seen, Occasional   EPITHELIAL CELLS None Seen /hpf  None Seen, Occasional   HYALINE CASTS None Seen /lpf  None Seen   RBC UA 2-4 /hpf A None Seen, 0-5   WBC UA Innumerable /hpf A None Seen, 0-5, 5-55, 5-65     * CT HEAD WO CONTRAST 12Oct2017 10:54AM Aislinn Cardona    Order Number: [id number]    - Patient Instructions: To schedule this appointment, please contact Central Scheduling at [phone]  Test Name Result Flag Reference   CT HEAD WO CONTRAST (Report)     CT BRAIN - WITHOUT CONTRAST     INDICATION: Weakness     COMPARISON: February 26, 2013     TECHNIQUE: CT examination of the brain was performed  In addition to axial images, coronal reformatted images were created and submitted for interpretation  Radiation dose length product (DLP) for this visit: 21  mGy-cm   This examination, like all CT scans performed in the [facility], was performed utilizing techniques to minimize radiation dose exposure, including the use of iterative    reconstruction and automated exposure control  IMAGE QUALITY: Diagnostic  FINDINGS:      PARENCHYMA: Mild central atrophy   Decreased attenuation is noted in the supratentorial white matter demonstrating an appearance most consistent with mild microangiopathic change  Small lacunar infarct in the periventricular white matter right frontal    lobe, new from prior exam  Stable lacunar infarcts in the basal ganglia  No intracranial mass, mass effect or midline shift  No CT signs of acute infarction  There is no parenchymal hemorrhage  VENTRICLES AND EXTRA-AXIAL SPACES: Normal for patient's age  VISUALIZED ORBITS AND PARANASAL SINUSES: Orbits appear normal  Mild scattered sinus mucosal thickening is noted  No fluid levels are seen  CALVARIUM AND EXTRACRANIAL SOFT TISSUES:  Normal          IMPRESSION:   No acute intracranial abnormality  There is a lacunar infarct in the periventricular white matter of right frontal lobe which is new from 2013 but appears chronic  Workstation performed: [id number]     Signed by:    Olga Lidia Dempsey DO   10/12/17

## 2018-01-17 NOTE — PROGRESS NOTES
Chief Complaint  pt is here for flu vaccine      Active Problems    1  Age related osteoporosis (733 01) (M81 0)   2  Aneurysm of ascending aorta (441 2) (I71 2)   3  Benign paroxysmal vertigo, unspecified laterality (386 11) (H81 10)   4  Bilateral carpal tunnel syndrome (354 0) (G56 03)   5  Community acquired pneumonia (5) (J18 9)   6  Constipation (564 00) (K59 00)   7  Dyslipidemia (272 4) (E78 5)   8  Edema (782 3) (R60 9)   9  Fall at home (K349 8,Q676 9) (I36  [facility],Q62 806)   10  Fatigue (780 79) (R53 83)   11  First degree AV block (426 11) (I44 0)   12  GERD (gastroesophageal reflux disease) (530 81) (K21 9)   13  History of aspiration pneumonia (V12 61) (Z87 01)   14  History of chest pain (V13 89) (Z87 898)   15  History of influenza vaccination (V49 89) (Z92 29)   16  Hypertension (401 9) (I10)   17  Hypothyroidism (244 9) (E03 9)   18  Impacted cerumen of right ear (380 4) (H61 21)   19  Leukocytosis (288 60) (D72 829)   20  Lymphadenopathy, mediastinal (785 6) (R59 0)   21  Macular degeneration (362 50) (H35 30)   22  Malignant neoplasm of upper-outer quadrant of left breast in female, estrogen receptor    negative (174 4,V86 1) (C50 412,Z17 1)   23  Paroxysmal atrial fibrillation (427 31) (I48 0)   24  Right ankle pain (719 47) (M25 571)   25  UTI (urinary tract infection) (599 0) (N39 0)   26  Vertigo (780 4) (R42)   27  Vitamin D deficiency (268 9) (E55 9)   28  Vomiting (787 03) (R11 10)    Current Meds   1  AmLODIPine Besylate 5 MG Oral Tablet; TAKE 1 TABLET TWICE DAILY; Therapy: 64LNX1068 to (Evaluate:04Oct2018)  Requested for: 76WET6497 Recorded   2  Centrum Silver Oral Tablet; TAKE 1 TABLET DAILY; Therapy: 44GQW0145 to Recorded   3  Fiber TABS; USE AS DIRECTED; Therapy: (Daniel Henry) to Recorded   4  Levothyroxine Sodium 150 MCG Oral Tablet; Take 1 tablet daily; Therapy: 62IZE2809 to (Last Rx:03Aug2017)  Requested for: 03Aug2017 Ordered   5   Metoprolol Tartrate 25 MG Oral Tablet; TAKE 1/2 TABLET TWICE DAILY; Therapy: (Recorded:12Oct2017) to  Requested for: 30TRJ2952 Recorded   6  Omeprazole 40 MG Oral Capsule Delayed Release; take 1 capsule daily; Therapy: 75APX4511 to (THVSNVBQ:21YGO9240)  Requested for: 00Tzl7506; Last   Rx:28Evj1158 Ordered   7  PreserVision AREDS CAPS; TAKE AS DIRECTED; Therapy: (Patti Weir) to Recorded   8  Vitamin D 2000 UNIT Oral Capsule; TAKE 1 CAPSULE Daily; Therapy: 21RDJ9160 to (Evaluate:57Bty6438); Last Rx:87Yvk0363 Ordered    Allergies    1  No Known Drug Allergies    Results/Data  (1) CBC/PLT/DIFF 25Oct2017 04:14PM Deysi Malcolm Order Number: [id number]     Test Name Result Flag Reference   WBC COUNT 10 72 Thousand/uL H 4 31-10 16   RBC COUNT 4 34 Million/uL  3 81-5 12   HEMOGLOBIN 12 8 g/dL  11 5-15 4   HEMATOCRIT 39 9 %  34 8-46  1   MCV 92 fL  82-98   MCH 29 5 pg  26 8-34 3   MCHC 32 1 g/dL  31 4-37 4   RDW 13 8 %  11 6-15 1   MPV 10 4 fL  8 9-12 7   PLATELET COUNT 151 Thousands/uL  149-390   NEUTROPHILS RELATIVE PERCENT 55 %  43-75   LYMPHOCYTES RELATIVE PERCENT 32 %  14-44   MONOCYTES RELATIVE PERCENT 11 %  4-12   EOSINOPHILS RELATIVE PERCENT 2 %  0-6   BASOPHILS RELATIVE PERCENT 0 %  0-1   NEUTROPHILS ABSOLUTE COUNT 5 94 Thousands/? ??L  1 85-7 62   LYMPHOCYTES ABSOLUTE COUNT 3 41 Thousands/? ??L  0 60-4 47   MONOCYTES ABSOLUTE COUNT 1 15 Thousand/? ??L  0 17-1 22   EOSINOPHILS ABSOLUTE COUNT 0 19 Thousand/? ??L  0 00-0 61   BASOPHILS ABSOLUTE COUNT 0 03 Thousands/? ??L  0 00-0 10       Plan  Community acquired pneumonia    · (1) CBC/PLT/DIFF; Status:Resulted - Requires Verification;   Done: 04CCZ2283 04:14PM  Need for influenza vaccination    · Fluzone High-Dose 0 5 ML Intramuscular Suspension Prefilled Syringe    Future Appointments    Date/Time Provider Specialty Site   02/08/2018 02:00 PM Gómez Dawn MD Internal Medicine [facility] INTERNAL MED   10/30/2017 01:00 PM ORION Banks  Cardiology [facility] CARDIOLOGY [place]   12/04/2017 08:30 AM Jagdish Malone MD Surgical Oncology CANCER [facility] SURGICAL ONCOLOGY     Signatures   Electronically signed by : Bárbara Montero, ; Oct 25 2017  4:01PM EST                       (Co-author)    Electronically signed by : Vinita Bolden MD; Oct 25 2017  6:13PM EST                       (Author)

## 2018-01-18 ENCOUNTER — HOSPITAL ENCOUNTER (OUTPATIENT)
Dept: INFUSION CENTER | Facility: CLINIC | Age: 83
Discharge: HOME | End: 2018-01-18
Payer: MEDICARE

## 2018-01-18 VITALS
SYSTOLIC BLOOD PRESSURE: 128 MMHG | OXYGEN SATURATION: 96 % | DIASTOLIC BLOOD PRESSURE: 76 MMHG | WEIGHT: 159.5 LBS | TEMPERATURE: 97.7 F | HEART RATE: 78 BPM | RESPIRATION RATE: 20 BRPM | BODY MASS INDEX: 31.11 KG/M2

## 2018-01-18 PROCEDURE — 96413 CHEMO IV INFUSION 1 HR: CPT

## 2018-01-18 RX ADMIN — SODIUM CHLORIDE 20 ML/HR: 0.9 INJECTION, SOLUTION INTRAVENOUS at 11:16

## 2018-01-18 RX ADMIN — TRASTUZUMAB 418 MG: 150 INJECTION, POWDER, LYOPHILIZED, FOR SOLUTION INTRAVENOUS at 11:40

## 2018-01-18 NOTE — PROGRESS NOTES
Pt to clinic for cycle #3 of single agent herceptin treatment for breast cancer  She has been tolerating treatment well and offers no c/o today  She specifically denies any issues related to fluid build up or edema  No lab parameters  MUGA results from 10/30/17 reviewed

## 2018-01-18 NOTE — MISCELLANEOUS
Message   Recorded as Task   Date: 10/30/2017 09:17 AM, Created By: Annabelle Cosby   Task Name: Call Back   Assigned To: Milka Dial   Regarding Patient: Gery Gottron, Status: Active   CommentElsworth Mandril - 30 Oct 2017 9:17 AM     TASK CREATED  Caller: Dexter Smith, Adult Child; Other; [phone] (Mobile Phone)  WOULD LIKE TO DISCUSS HER MOM'S TREATMENT   ALSO, SHE IS TRYING TO SCHEDULE AN ECHO AND WOULD NEED AN ORDER PUT IN ALLSCRIPTS FOR THIS  PLEASE CALL TO DISCUSS (HER BROTHER IS A DOCTOR AND IS GIVING HER INSTRUCTIONS)  Natasha Gomez - 30 Oct 2017 10:00 AM     TASK EDITED  Left message for patient's daughter Luca Heller  Milka Dial - 30 Oct 2017 10:57 AM     TASK EDITED  Spoke with patient's daughter  She is going to call to schedule an echo for her and speak with the infusion center regarding a schedule for the herceptin  Active Problems    1  Age related osteoporosis (733 01) (M81 0)   2  Aneurysm of ascending aorta (441 2) (I71 2)   3  Benign paroxysmal vertigo, unspecified laterality (386 11) (H81 10)   4  Bilateral carpal tunnel syndrome (354 0) (G56 03)   5  Community acquired pneumonia (5) (J18 9)   6  Constipation (564 00) (K59 00)   7  Dyslipidemia (272 4) (E78 5)   8  Edema (782 3) (R60 9)   9  Fall at home (R141 0,S509 4) (P00  RHUX,W56 297)   10  Fatigue (780 79) (R53 83)   11  First degree AV block (426 11) (I44 0)   12  GERD (gastroesophageal reflux disease) (530 81) (K21 9)   13  History of aspiration pneumonia (V12 61) (Z87 01)   14  History of chest pain (V13 89) (Z87 898)   15  History of influenza vaccination (V49 89) (Z92 29)   16  Hypertension (401 9) (I10)   17  Hypothyroidism (244 9) (E03 9)   18  Impacted cerumen of right ear (380 4) (H61 21)   19  Leukocytosis (288 60) (D72 829)   20  Lymphadenopathy, mediastinal (785 6) (R59 0)   21  Macular degeneration (362 50) (H35 30)   22   Malignant neoplasm of upper-outer quadrant of left breast in female, estrogen receptor    negative (174 4,V86 1) (C50 412,Z17 1)   23  Need for influenza vaccination (V04 81) (Z23)   24  Paroxysmal atrial fibrillation (427 31) (I48 0)   25  Right ankle pain (719 47) (M25 571)   26  UTI (urinary tract infection) (599 0) (N39 0)   27  Vertigo (780 4) (R42)   28  Vitamin D deficiency (268 9) (E55 9)   29  Vomiting (787 03) (R11 10)    Current Meds   1  AmLODIPine Besylate 5 MG Oral Tablet; TAKE 1 TABLET TWICE DAILY; Therapy: 18DNQ1934 to (Evaluate:04Oct2018)  Requested for: 66XAQ9991 Recorded   2  Centrum Silver Oral Tablet; TAKE 1 TABLET DAILY; Therapy: 65EVK7297 to Recorded   3  Fiber TABS; USE AS DIRECTED; Therapy: (Marci Eddy) to Recorded   4  Levothyroxine Sodium 150 MCG Oral Tablet; Take 1 tablet daily; Therapy: 50AIS9045 to (Last Rx:70Jik4487)  Requested for: 20Phe1185 Ordered   5  Metoprolol Tartrate 25 MG Oral Tablet; TAKE 1/2 TABLET TWICE DAILY; Therapy: (Recorded:12Oct2017) to  Requested for: 09SAI7828 Recorded   6  Omeprazole 40 MG Oral Capsule Delayed Release; take 1 capsule daily; Therapy: 26DMG7359 to (JDAIFDHQ:65SYN8138)  Requested for: 93Ugt0640; Last   Rx:82Cdn5229 Ordered   7  PreserVision AREDS CAPS; TAKE AS DIRECTED; Therapy: (Pat Melgoza) to Recorded   8  Vitamin D 2000 UNIT Oral Capsule; TAKE 1 CAPSULE Daily; Therapy: 62GRG6527 to (Evaluate:95Usy8802); Last Rx:59Vkn7252 Ordered    Allergies    1   No Known Drug Allergies    Signatures   Electronically signed by : Honey Alatorre RN; Oct 30 2017 10:57AM EST                       (Author)

## 2018-01-18 NOTE — PLAN OF CARE
Problem: Potential for Falls  Goal: Patient will remain free of falls  INTERVENTIONS:  - Assess patient frequently for physical needs  -  Identify cognitive and physical deficits and behaviors that affect risk of falls    -  [place] fall precautions as indicated by assessment   - Educate patient/family on patient safety including physical limitations  - Instruct patient to call for assistance with activity based on assessment  - Modify environment to reduce risk of injury  - Consider OT/PT consult to assist with strengthening/mobility   Outcome: Progressing

## 2018-01-22 VITALS
BODY MASS INDEX: 29.11 KG/M2 | DIASTOLIC BLOOD PRESSURE: 70 MMHG | SYSTOLIC BLOOD PRESSURE: 142 MMHG | RESPIRATION RATE: 14 BRPM | TEMPERATURE: 98.2 F | WEIGHT: 154.19 LBS | HEART RATE: 70 BPM | HEIGHT: 61 IN

## 2018-01-22 VITALS
WEIGHT: 152.5 LBS | OXYGEN SATURATION: 95 % | HEIGHT: 61 IN | BODY MASS INDEX: 28.79 KG/M2 | DIASTOLIC BLOOD PRESSURE: 66 MMHG | SYSTOLIC BLOOD PRESSURE: 128 MMHG | HEART RATE: 74 BPM | TEMPERATURE: 97.7 F | RESPIRATION RATE: 16 BRPM

## 2018-01-24 VITALS
DIASTOLIC BLOOD PRESSURE: 70 MMHG | HEART RATE: 82 BPM | BODY MASS INDEX: 31.12 KG/M2 | HEIGHT: 60 IN | WEIGHT: 158.5 LBS | TEMPERATURE: 97.3 F | OXYGEN SATURATION: 95 % | SYSTOLIC BLOOD PRESSURE: 110 MMHG | RESPIRATION RATE: 16 BRPM

## 2018-01-29 DIAGNOSIS — E55.9 VITAMIN D DEFICIENCY: ICD-10-CM

## 2018-01-29 DIAGNOSIS — E03.9 HYPOTHYROIDISM: ICD-10-CM

## 2018-01-29 DIAGNOSIS — E78.5 HYPERLIPIDEMIA: ICD-10-CM

## 2018-01-30 ENCOUNTER — OFFICE VISIT (OUTPATIENT)
Dept: HEMATOLOGY ONCOLOGY | Facility: CLINIC | Age: 83
End: 2018-01-30
Payer: MEDICARE

## 2018-01-30 VITALS
WEIGHT: 161 LBS | DIASTOLIC BLOOD PRESSURE: 82 MMHG | HEART RATE: 83 BPM | RESPIRATION RATE: 18 BRPM | SYSTOLIC BLOOD PRESSURE: 122 MMHG | BODY MASS INDEX: 31.61 KG/M2 | TEMPERATURE: 96.9 F | HEIGHT: 60 IN

## 2018-01-30 DIAGNOSIS — Z17.1 MALIGNANT NEOPLASM OF LEFT BREAST IN FEMALE, ESTROGEN RECEPTOR NEGATIVE, UNSPECIFIED SITE OF BREAST (HCC): Primary | ICD-10-CM

## 2018-01-30 DIAGNOSIS — C50.912 MALIGNANT NEOPLASM OF LEFT BREAST IN FEMALE, ESTROGEN RECEPTOR NEGATIVE, UNSPECIFIED SITE OF BREAST (HCC): Primary | ICD-10-CM

## 2018-01-30 PROCEDURE — 99214 OFFICE O/P EST MOD 30 MIN: CPT | Performed by: INTERNAL MEDICINE

## 2018-01-30 NOTE — PROGRESS NOTES
Hematology / Oncology Outpatient Follow Up Note  Deisy Styles 80 y o  female MRN: @ Encounter: [id number]        Date:  1/30/2018        Assessment / Plan:    A 80year old postmenopausal woman with stage IIA left breast cancer, grade 3, ER/LA negative, HER-2 3+ disease  She underwent left mastectomy with sentinel lymph node biopsy, resulting in DAKOTA  She has otherwise good health  She is currently on adjuvant trastuzumab monotherapy with no toxicity  Clinically, she has no evidence recurrent disease  I recommended her to continue with trastuzumab 6 milligram/kilogram every 3 weeks  I am going to schedule echocardiogram in the beginning of March 2018 for cardiac monitoring  I will see her again in 3 months for routine follow-up  She and her daughter are in agreement with my recommendations  Subjective:         HPI:  A 19-year-old postmenopausal woman who recently noticed a lump and pain in the left breast which she brought to medical attention  She was found to have abnormality, radiographically in the left breast which was biopsied in August 14, 2017  She had invasive ductal carcinoma, grade 2, ER/LA negative, HER-2 negative disease  She subsequently underwent left mastectomy with sentinel node biopsy by Dr Talib Ortiz and September 5, 2017  She had 3 0 cm of invasive ductal carcinoma, grade 3  Lymphovascular invasion was indeterminate  2 sentinel lymph nodes were negative for metastatic disease  She did not have reconstruction  She presents today with her son and daughter to discuss adjuvant treatment options  Despite her age, she has relatively good health  She only has hypertension, hypothyroidism and GERD as a past medical history  She currently feels well  She has no complaint of pain  She denied any respiratory symptoms  Her weight has been stable  She has no family history of breast or ovarian cancer  She is a lifetime never smoker   Her performance status is normal      Interval History:  A 80year old postmenopausal woman with stage IIA left breast cancer, grade 3, ER/OR negative, HER-2 3+ disease  She underwent left mastectomy with sentinel lymph node biopsy, resulting in DAKOTA  She has good health  We previously had extensive discussion regarding adjuvant trastuzumab monotherapy  She was agreeable to be on trastuzumab  Her echocardiogram showed ejection fraction 65%  Since the beginning of December 2017, she has been on trastuzumab every 3 weeks with no side effects  She presents today for follow-up  She feels well  She has no respiratory symptoms such as cough, sputum production or shortness of breast   She has minor rash in the bilateral lower extremities  She has no complaint of pain  She has good appetite resulting in stable weight  Her performance status is normal       ROS: Review of Systems   All other systems reviewed and are negative  Objective:     Primary Diagnosis:    Left breast cancer, stage IIA(pT2, pN0,M0) grade 3, ER/OR negative, HER-2 3+ disease  Diagnosed in September 2017  Cancer Staging:  No matching staging information was found for the patient  Previous Hematologic/ Oncologic Treatment:         Current Hematologic/ Oncologic Treatment:      Adjuvant trastuzumab monotherapy since December 2017  Disease Status:     DAKOTA status post mastectomy with sentinel lymph node biopsy  Test Results:    Pathology:    3 cm of invasive ductal carcinoma, grade 3  Lymphovascular invasion indeterminate  2 sentinel lymph node were negative for metastatic disease  ER/OR negative, HER-2 3+ disease  IIA)pT2, pN0,M0)  Radiology:    Echocardiogram in October 2017 showed ejection fraction 65%  Imaging: No results found      Labs:   Lab Results   Component Value Date    WBC 10 72 (H) 10/25/2017    HGB 12 8 10/25/2017    HCT 39 9 10/25/2017    MCV 92 10/25/2017     10/25/2017     Lab Results   Component Value Date     10/12/2017    K 4 4 10/12/2017     10/12/2017 CO2 26 10/12/2017    ANIONGAP 9 10/12/2017    BUN 18 10/12/2017    CREATININE 1 06 10/12/2017    GLUCOSE 148 (H) 10/12/2017    GLUF 112 (H) 09/06/2017    CALCIUM 8 3 10/12/2017    AST 24 10/12/2017    ALT 30 10/12/2017    ALKPHOS 85 10/12/2017    PROT 6 9 10/12/2017    BILITOT 0 50 10/12/2017    EGFR 46 10/12/2017           Current Medications: Reviewed  Allergies: Reviewed  PMH/FH/SH:  Reviewed      Physical Exam:        Body surface area is 1 69 meters squared  Wt Readings from Last 3 Encounters:   01/30/18 73 kg (161 lb)   01/18/18 72 3 kg (159 lb 8 oz)   01/05/18 71 9 kg (158 lb 8 oz)        Temp Readings from Last 3 Encounters:   01/30/18 (!) 96 9 °F (36 1 °C) (Tympanic)   01/18/18 97 7 °F (36 5 °C) (Oral)   01/05/18 (!) 97 3 °F (36 3 °C)        BP Readings from Last 3 Encounters:   01/30/18 122/82   01/18/18 128/76   01/05/18 110/70         Pulse Readings from Last 3 Encounters:   01/30/18 83   01/18/18 78   01/05/18 82     @LASTSAO2(3)@    General Appearance:    Alert, oriented        Eyes:    PERRL   Ears:    Normal external ear canals, both ears   Nose:   Nares normal, septum midline   Throat:   Mucosa moist  Pharynx without injection  Neck:   Supple       Lungs:     Clear to auscultation bilaterally   Chest Wall:    No tenderness or deformity    Heart:    Regular rate and rhythm       Abdomen:     Soft, non-tender, bowel sounds +, no organomegaly           Extremities:   Extremities no cyanosis or edema       Skin:   no rash or icterus  Lymph nodes:   Cervical, supraclavicular, and axillary nodes normal   Neurologic:   CNII-XII intact, normal strength, sensation and reflexes     Throughout          Breast exam:   status post left mastectomy without reconstruction  No palpable abnormality in her left chest wall  Right breast exam is negative  Goals and Barriers:  Current Goal:  Cure  Barriers: None  Patient's Capacity to Self Care:  Patient is able to self care      Code Status: [unfilled]  Advance Directive and Living Will: Yes    Power of :

## 2018-02-06 RX ORDER — SODIUM CHLORIDE 9 MG/ML
20 INJECTION, SOLUTION INTRAVENOUS CONTINUOUS
Status: DISCONTINUED | OUTPATIENT
Start: 2018-02-08 | End: 2018-02-11 | Stop reason: HOSPADM

## 2018-02-08 ENCOUNTER — HOSPITAL ENCOUNTER (OUTPATIENT)
Dept: INFUSION CENTER | Facility: CLINIC | Age: 83
Discharge: HOME | End: 2018-02-08
Payer: MEDICARE

## 2018-02-08 VITALS
WEIGHT: 161 LBS | HEART RATE: 73 BPM | RESPIRATION RATE: 20 BRPM | SYSTOLIC BLOOD PRESSURE: 130 MMHG | DIASTOLIC BLOOD PRESSURE: 68 MMHG | BODY MASS INDEX: 31.97 KG/M2 | TEMPERATURE: 97.7 F | OXYGEN SATURATION: 97 %

## 2018-02-08 PROCEDURE — 96413 CHEMO IV INFUSION 1 HR: CPT

## 2018-02-08 RX ADMIN — TRASTUZUMAB 438 MG: 150 INJECTION, POWDER, LYOPHILIZED, FOR SOLUTION INTRAVENOUS at 11:37

## 2018-02-08 RX ADMIN — SODIUM CHLORIDE 20 ML/HR: 0.9 INJECTION, SOLUTION INTRAVENOUS at 10:57

## 2018-02-08 NOTE — PLAN OF CARE
Problem: Potential for Falls  Goal: Patient will remain free of falls  INTERVENTIONS:  - Assess patient frequently for physical needs  -  Identify cognitive and physical deficits and behaviors that affect risk of falls    -  [place] fall precautions as indicated by assessment   - Educate patient/family on patient safety including physical limitations  - Instruct patient to call for assistance with activity based on assessment  - Modify environment to reduce risk of injury  - Consider OT/PT consult to assist with strengthening/mobility   Outcome: Progressing

## 2018-02-08 NOTE — PROGRESS NOTES
Mrs Knight Jose Alejandro presents to clinic for single agent herceptin for breast cancer  She offers no c/o and has tolerated previous therapy well

## 2018-02-13 ENCOUNTER — OFFICE VISIT (OUTPATIENT)
Dept: INTERNAL MEDICINE CLINIC | Facility: CLINIC | Age: 83
End: 2018-02-13
Payer: MEDICARE

## 2018-02-13 VITALS
DIASTOLIC BLOOD PRESSURE: 70 MMHG | HEART RATE: 68 BPM | SYSTOLIC BLOOD PRESSURE: 122 MMHG | WEIGHT: 160.8 LBS | BODY MASS INDEX: 31.57 KG/M2 | OXYGEN SATURATION: 97 % | HEIGHT: 60 IN | RESPIRATION RATE: 18 BRPM | TEMPERATURE: 97.9 F

## 2018-02-13 DIAGNOSIS — E03.9 ACQUIRED HYPOTHYROIDISM: ICD-10-CM

## 2018-02-13 DIAGNOSIS — Z17.1 MALIGNANT NEOPLASM OF LEFT BREAST IN FEMALE, ESTROGEN RECEPTOR NEGATIVE, UNSPECIFIED SITE OF BREAST (HCC): ICD-10-CM

## 2018-02-13 DIAGNOSIS — I10 ESSENTIAL HYPERTENSION: ICD-10-CM

## 2018-02-13 DIAGNOSIS — K21.9 GASTROESOPHAGEAL REFLUX DISEASE WITHOUT ESOPHAGITIS: Primary | ICD-10-CM

## 2018-02-13 DIAGNOSIS — I48.0 PAROXYSMAL ATRIAL FIBRILLATION (HCC): ICD-10-CM

## 2018-02-13 DIAGNOSIS — C50.912 MALIGNANT NEOPLASM OF LEFT BREAST IN FEMALE, ESTROGEN RECEPTOR NEGATIVE, UNSPECIFIED SITE OF BREAST (HCC): ICD-10-CM

## 2018-02-13 PROCEDURE — 99214 OFFICE O/P EST MOD 30 MIN: CPT | Performed by: INTERNAL MEDICINE

## 2018-02-13 RX ORDER — AMLODIPINE BESYLATE 5 MG/1
5 TABLET ORAL 2 TIMES DAILY
Qty: 180 TABLET | Refills: 1
Start: 2018-02-13 | End: 2018-04-24

## 2018-02-13 RX ORDER — OMEPRAZOLE 20 MG/1
40 CAPSULE, DELAYED RELEASE ORAL DAILY
Qty: 90 CAPSULE | Refills: 1 | Status: SHIPPED | OUTPATIENT
Start: 2018-02-13 | End: 2018-04-09 | Stop reason: SDUPTHER

## 2018-02-13 RX ORDER — ANTIOX #8/OM3/DHA/EPA/LUT/ZEAX 250-2.5 MG
1 CAPSULE ORAL DAILY
Qty: 90 CAPSULE | Refills: 0
Start: 2018-02-13

## 2018-02-13 RX ORDER — LEVOTHYROXINE SODIUM 0.15 MG/1
150 TABLET ORAL DAILY
Qty: 90 TABLET | Refills: 1 | Status: SHIPPED | OUTPATIENT
Start: 2018-02-13 | End: 2018-05-16 | Stop reason: SDUPTHER
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
## 2018-02-20 ENCOUNTER — TELEPHONE (OUTPATIENT)
Dept: INTERNAL MEDICINE CLINIC | Facility: CLINIC | Age: 83
End: 2018-02-20

## 2018-02-20 NOTE — TELEPHONE ENCOUNTER
----- Message from Christo Berger MD sent at 2/20/2018  5:09 PM EST -----  Labs ok - thyroid and vitamin D normal, rest of labs ok

## 2018-02-28 RX ORDER — SODIUM CHLORIDE 9 MG/ML
20 INJECTION, SOLUTION INTRAVENOUS CONTINUOUS
Status: DISCONTINUED | OUTPATIENT
Start: 2018-03-01 | End: 2018-03-04 | Stop reason: HOSPADM

## 2018-03-01 ENCOUNTER — HOSPITAL ENCOUNTER (OUTPATIENT)
Dept: INFUSION CENTER | Facility: CLINIC | Age: 83
Discharge: HOME | End: 2018-03-01
Payer: MEDICARE

## 2018-03-01 VITALS
TEMPERATURE: 97 F | BODY MASS INDEX: 31.41 KG/M2 | DIASTOLIC BLOOD PRESSURE: 79 MMHG | HEIGHT: 60 IN | HEART RATE: 76 BPM | WEIGHT: 160 LBS | RESPIRATION RATE: 20 BRPM | SYSTOLIC BLOOD PRESSURE: 152 MMHG

## 2018-03-01 PROCEDURE — 96413 CHEMO IV INFUSION 1 HR: CPT

## 2018-03-01 RX ADMIN — TRASTUZUMAB 438 MG: 150 INJECTION, POWDER, LYOPHILIZED, FOR SOLUTION INTRAVENOUS at 11:41

## 2018-03-01 RX ADMIN — SODIUM CHLORIDE 20 ML/HR: 0.9 INJECTION, SOLUTION INTRAVENOUS at 11:20

## 2018-03-14 ENCOUNTER — HOSPITAL ENCOUNTER (OUTPATIENT)
Dept: NON INVASIVE DIAGNOSTICS | Facility: CLINIC | Age: 83
Discharge: HOME | End: 2018-03-14
Attending: INTERNAL MEDICINE
Payer: MEDICARE

## 2018-03-14 DIAGNOSIS — Z17.1 MALIGNANT NEOPLASM OF LEFT BREAST IN FEMALE, ESTROGEN RECEPTOR NEGATIVE, UNSPECIFIED SITE OF BREAST (HCC): ICD-10-CM

## 2018-03-14 DIAGNOSIS — C50.912 MALIGNANT NEOPLASM OF LEFT BREAST IN FEMALE, ESTROGEN RECEPTOR NEGATIVE, UNSPECIFIED SITE OF BREAST (HCC): ICD-10-CM

## 2018-03-14 DIAGNOSIS — I48.0 PAROXYSMAL ATRIAL FIBRILLATION (HCC): Primary | ICD-10-CM

## 2018-03-14 PROCEDURE — 93306 TTE W/DOPPLER COMPLETE: CPT

## 2018-03-14 PROCEDURE — 93306 TTE W/DOPPLER COMPLETE: CPT | Performed by: INTERNAL MEDICINE

## 2018-03-20 RX ORDER — SODIUM CHLORIDE 9 MG/ML
20 INJECTION, SOLUTION INTRAVENOUS CONTINUOUS
Status: DISCONTINUED | OUTPATIENT
Start: 2018-03-22 | End: 2018-03-25 | Stop reason: HOSPADM

## 2018-03-22 ENCOUNTER — HOSPITAL ENCOUNTER (OUTPATIENT)
Dept: INFUSION CENTER | Facility: CLINIC | Age: 83
Discharge: HOME | End: 2018-03-22
Payer: MEDICARE

## 2018-03-22 VITALS
BODY MASS INDEX: 30.11 KG/M2 | OXYGEN SATURATION: 96 % | HEIGHT: 61 IN | RESPIRATION RATE: 18 BRPM | WEIGHT: 159.5 LBS | TEMPERATURE: 97.7 F | SYSTOLIC BLOOD PRESSURE: 130 MMHG | HEART RATE: 77 BPM | DIASTOLIC BLOOD PRESSURE: 70 MMHG

## 2018-03-22 PROCEDURE — 96413 CHEMO IV INFUSION 1 HR: CPT

## 2018-03-22 RX ADMIN — TRASTUZUMAB 438 MG: 150 INJECTION, POWDER, LYOPHILIZED, FOR SOLUTION INTRAVENOUS at 11:58

## 2018-03-22 RX ADMIN — SODIUM CHLORIDE 20 ML/HR: 0.9 INJECTION, SOLUTION INTRAVENOUS at 11:22

## 2018-03-22 NOTE — PROGRESS NOTES
Pt to clinic for herceptin, pt offers no complaints t this time, aware of next appointment, declines avs

## 2018-04-05 ENCOUNTER — TELEPHONE (OUTPATIENT)
Dept: HEMATOLOGY ONCOLOGY | Facility: CLINIC | Age: 83
End: 2018-04-05

## 2018-04-05 NOTE — TELEPHONE ENCOUNTER
Pts daughter, Chloe Martinez called and would like a call back because she wants to talk to you about her mothers scheduled infusions   Please call her back

## 2018-04-05 NOTE — TELEPHONE ENCOUNTER
Spoke with patient's daughter   I gave her the number for the infusion center to get her infusions scheduled through the end of the summer  (has them scheduled until end of May)

## 2018-04-09 DIAGNOSIS — K21.9 GASTROESOPHAGEAL REFLUX DISEASE WITHOUT ESOPHAGITIS: ICD-10-CM

## 2018-04-09 RX ORDER — OMEPRAZOLE 40 MG/1
40 CAPSULE, DELAYED RELEASE ORAL DAILY
Qty: 90 CAPSULE | Refills: 1 | Status: SHIPPED | OUTPATIENT
Start: 2018-04-09 | End: 2018-04-10 | Stop reason: SDUPTHER

## 2018-04-09 NOTE — TELEPHONE ENCOUNTER
Pt daughter called and request for Omeprazole refill states this is Ordered at 20mg 2x a day, but it use to be 40mg daily  States it was easier to take at 40mg daily  Also states it is notrmally ordered as a 90 day supply but this previous time it was not   Please advise

## 2018-04-10 RX ORDER — OMEPRAZOLE 40 MG/1
40 CAPSULE, DELAYED RELEASE ORAL DAILY
Qty: 90 CAPSULE | Refills: 1 | Status: SHIPPED | OUTPATIENT
Start: 2018-04-10 | End: 2018-08-14 | Stop reason: SDUPTHER

## 2018-04-11 RX ORDER — SODIUM CHLORIDE 9 MG/ML
20 INJECTION, SOLUTION INTRAVENOUS CONTINUOUS
Status: DISCONTINUED | OUTPATIENT
Start: 2018-04-12 | End: 2018-04-15 | Stop reason: HOSPADM

## 2018-04-12 ENCOUNTER — HOSPITAL ENCOUNTER (OUTPATIENT)
Dept: INFUSION CENTER | Facility: CLINIC | Age: 83
Discharge: HOME | End: 2018-04-12
Payer: MEDICARE

## 2018-04-12 VITALS
BODY MASS INDEX: 30.42 KG/M2 | DIASTOLIC BLOOD PRESSURE: 60 MMHG | SYSTOLIC BLOOD PRESSURE: 132 MMHG | TEMPERATURE: 97.6 F | HEART RATE: 76 BPM | RESPIRATION RATE: 18 BRPM | WEIGHT: 161 LBS | OXYGEN SATURATION: 96 %

## 2018-04-12 PROCEDURE — 96413 CHEMO IV INFUSION 1 HR: CPT

## 2018-04-12 RX ADMIN — TRASTUZUMAB 436 MG: 150 INJECTION, POWDER, LYOPHILIZED, FOR SOLUTION INTRAVENOUS at 11:30

## 2018-04-12 RX ADMIN — SODIUM CHLORIDE 20 ML/HR: 0.9 INJECTION, SOLUTION INTRAVENOUS at 11:05

## 2018-04-24 ENCOUNTER — OFFICE VISIT (OUTPATIENT)
Dept: CARDIOLOGY CLINIC | Facility: CLINIC | Age: 83
End: 2018-04-24
Payer: MEDICARE

## 2018-04-24 VITALS
OXYGEN SATURATION: 95 % | SYSTOLIC BLOOD PRESSURE: 124 MMHG | HEART RATE: 73 BPM | HEIGHT: 60 IN | DIASTOLIC BLOOD PRESSURE: 60 MMHG | BODY MASS INDEX: 31.71 KG/M2 | WEIGHT: 161.5 LBS

## 2018-04-24 DIAGNOSIS — I10 ESSENTIAL HYPERTENSION: Primary | ICD-10-CM

## 2018-04-24 DIAGNOSIS — E78.5 DYSLIPIDEMIA: ICD-10-CM

## 2018-04-24 DIAGNOSIS — I44.0 FIRST DEGREE AV BLOCK: ICD-10-CM

## 2018-04-24 PROCEDURE — 99214 OFFICE O/P EST MOD 30 MIN: CPT | Performed by: INTERNAL MEDICINE

## 2018-04-24 RX ORDER — AMLODIPINE BESYLATE 5 MG/1
TABLET ORAL
COMMUNITY
End: 2018-08-14 | Stop reason: SDUPTHER
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
## 2018-04-30 DIAGNOSIS — I10 ESSENTIAL (PRIMARY) HYPERTENSION: ICD-10-CM

## 2018-05-01 ENCOUNTER — OFFICE VISIT (OUTPATIENT)
Dept: HEMATOLOGY ONCOLOGY | Facility: CLINIC | Age: 83
End: 2018-05-01
Payer: MEDICARE

## 2018-05-01 VITALS
WEIGHT: 158 LBS | HEART RATE: 74 BPM | BODY MASS INDEX: 31.02 KG/M2 | DIASTOLIC BLOOD PRESSURE: 58 MMHG | TEMPERATURE: 97.4 F | SYSTOLIC BLOOD PRESSURE: 138 MMHG | RESPIRATION RATE: 18 BRPM | OXYGEN SATURATION: 97 % | HEIGHT: 60 IN

## 2018-05-01 DIAGNOSIS — C50.912 MALIGNANT NEOPLASM OF LEFT BREAST IN FEMALE, ESTROGEN RECEPTOR NEGATIVE, UNSPECIFIED SITE OF BREAST (HCC): Primary | ICD-10-CM

## 2018-05-01 DIAGNOSIS — Z17.1 MALIGNANT NEOPLASM OF LEFT BREAST IN FEMALE, ESTROGEN RECEPTOR NEGATIVE, UNSPECIFIED SITE OF BREAST (HCC): Primary | ICD-10-CM

## 2018-05-01 PROCEDURE — 99214 OFFICE O/P EST MOD 30 MIN: CPT | Performed by: INTERNAL MEDICINE

## 2018-05-01 NOTE — PROGRESS NOTES
Hematology / Oncology Outpatient Follow Up Note    Gin Norwood 80 y o  female :1925 [id number]         Date:  2018    Assessment / Plan:  A 80year old postmenopausal woman with stage IIA left breast cancer, grade 3, ER/MT negative, HER-2 3+ disease  She underwent left mastectomy with sentinel lymph node biopsy, resulting in DAKOTA  She has otherwise good health  She is currently on adjuvant trastuzumab monotherapy with no toxicity  she has no cardiac toxicity  Clinically, she has no evidence recurrent disease  I recommended her to continue with trastuzumab every 3 weeks until 2018  I am going to schedule echocardiogram in late 2018 for cardiac monitoring  I will see her again in 3 months for routine follow-up  She is in agreement with my recommendations  Subjective:     HPI:  A 75-year-old postmenopausal woman who recently noticed a lump and pain in the left breast which she brought to medical attention  She was found to have abnormality, radiographically in the left breast which was biopsied in 2017  She had invasive ductal carcinoma, grade 2, ER/MT negative, HER-2 negative disease  She subsequently underwent left mastectomy with sentinel node biopsy by Dr Regina Pulliam and 2017  She had 3 0 cm of invasive ductal carcinoma, grade 3  Lymphovascular invasion was indeterminate  2 sentinel lymph nodes were negative for metastatic disease  She did not have reconstruction  She presents today with her son and daughter to discuss adjuvant treatment options  Despite her age, she has relatively good health  She only has hypertension, hypothyroidism and GERD as a past medical history  She currently feels well  She has no complaint of pain  She denied any respiratory symptoms  Her weight has been stable  She has no family history of breast or ovarian cancer  She is a lifetime never smoker   Her performance status is normal          Interval History:  A 80year old postmenopausal woman with stage IIA left breast cancer, grade 3, ER/DC negative, HER-2 3+ disease  She underwent left mastectomy with sentinel lymph node biopsy, resulting in DAKOTA  She has good health  We previously had extensive discussion regarding adjuvant trastuzumab monotherapy  She was agreeable to be on trastuzumab  Her echocardiogram showed ejection fraction 65%  Since the beginning of December 2017, she has been on trastuzumab every 3 weeks with no side effects  her recent echocardiogram showed ejection fraction 65%  Last week, she had a episode of indigestion, lightheadedness as well as loose stool which has resolved  Currently, she feels well  She is afebrile  She denied any pain  She is maintaining her weight  She has no respiratory symptoms  Objective:     Primary Diagnosis:    Left breast cancer, stage IIA(pT2, pN0,M0) grade 3, ER/DC negative, HER-2 3+ disease  Diagnosed in September 2017  Cancer Staging:  Cancer Staging  No matching staging information was found for the patient  Previous Hematologic/ Oncologic Treatment:         Current Hematologic/ Oncologic Treatment:      Adjuvant trastuzumab monotherapy since December 2017  Disease Status:     DAKOTA status post mastectomy with sentinel lymph node biopsy  Test Results:    Pathology:    3 cm of invasive ductal carcinoma, grade 3  Lymphovascular invasion indeterminate  2 sentinel lymph node were negative for metastatic disease  ER/DC negative, HER-2 3+ disease  IIA)pT2, pN0,M0)  Radiology:    Echocardiogram in March 2018 showed ejection fraction 65%  Laboratory:        Physical Exam:      General Appearance:    Alert, oriented        Eyes:    PERRL   Ears:    Normal external ear canals, both ears   Nose:   Nares normal, septum midline   Throat:   Mucosa moist  Pharynx without injection      Neck:   Supple       Lungs:     Clear to auscultation bilaterally   Chest Wall:    No tenderness or deformity    Heart: Regular rate and rhythm       Abdomen:     Soft, non-tender, bowel sounds +, no organomegaly           Extremities:   Extremities no cyanosis or edema       Skin:   no rash or icterus  Lymph nodes:   Cervical, supraclavicular, and axillary nodes normal   Neurologic:   CNII-XII intact, normal strength, sensation and reflexes     Throughout          Breast exam:    status post left mastectomy without reconstruction  No palpable abnormality in her left chest wall  Right breast exam is negative  ROS: Review of Systems   All other systems reviewed and are negative  Imaging: No results found  Labs:   Lab Results   Component Value Date    WBC 10 72 (H) 10/25/2017    HGB 12 8 10/25/2017    HCT 39 9 10/25/2017    MCV 92 10/25/2017     10/25/2017     Lab Results   Component Value Date     10/12/2017    K 4 4 10/12/2017     10/12/2017    CO2 26 10/12/2017    ANIONGAP 9 10/12/2017    BUN 18 10/12/2017    CREATININE 1 06 10/12/2017    GLUCOSE 148 (H) 10/12/2017    GLUF 112 (H) 09/06/2017    CALCIUM 8 3 10/12/2017    AST 24 10/12/2017    ALT 30 10/12/2017    ALKPHOS 85 10/12/2017    PROT 6 9 10/12/2017    BILITOT 0 50 10/12/2017    EGFR 46 10/12/2017         Current Medications: Reviewed  Allergies: Reviewed  PMH/FH/SH:  Reviewed      Vital Sign:    Body surface area is 1 68 meters squared      Wt Readings from Last 3 Encounters:   05/01/18 71 7 kg (158 lb)   04/24/18 73 3 kg (161 lb 8 oz)   04/12/18 73 kg (161 lb)        Temp Readings from Last 3 Encounters:   05/01/18 (!) 97 4 °F (36 3 °C) (Tympanic)   04/12/18 97 6 °F (36 4 °C) (Oral)   03/22/18 97 7 °F (36 5 °C) (Oral)        BP Readings from Last 3 Encounters:   05/01/18 138/58   04/24/18 124/60   04/12/18 132/60         Pulse Readings from Last 3 Encounters:   05/01/18 74   04/24/18 73   04/12/18 76     @LASTSAO2(3)@

## 2018-05-02 RX ORDER — SODIUM CHLORIDE 9 MG/ML
20 INJECTION, SOLUTION INTRAVENOUS CONTINUOUS
Status: DISCONTINUED | OUTPATIENT
Start: 2018-05-03 | End: 2018-05-06 | Stop reason: HOSPADM

## 2018-05-03 ENCOUNTER — HOSPITAL ENCOUNTER (OUTPATIENT)
Dept: INFUSION CENTER | Facility: CLINIC | Age: 83
Discharge: HOME | End: 2018-05-03
Payer: MEDICARE

## 2018-05-03 VITALS
HEART RATE: 74 BPM | RESPIRATION RATE: 20 BRPM | TEMPERATURE: 97.6 F | WEIGHT: 160 LBS | DIASTOLIC BLOOD PRESSURE: 75 MMHG | OXYGEN SATURATION: 98 % | BODY MASS INDEX: 31.78 KG/M2 | SYSTOLIC BLOOD PRESSURE: 118 MMHG

## 2018-05-03 PROCEDURE — 96413 CHEMO IV INFUSION 1 HR: CPT

## 2018-05-03 RX ADMIN — TRASTUZUMAB 450 MG: 150 INJECTION, POWDER, LYOPHILIZED, FOR SOLUTION INTRAVENOUS at 11:41

## 2018-05-03 RX ADMIN — SODIUM CHLORIDE 20 ML/HR: 0.9 INJECTION, SOLUTION INTRAVENOUS at 10:40

## 2018-05-16 DIAGNOSIS — I48.0 PAROXYSMAL ATRIAL FIBRILLATION (HCC): ICD-10-CM

## 2018-05-16 DIAGNOSIS — I10 ESSENTIAL HYPERTENSION: ICD-10-CM

## 2018-05-16 DIAGNOSIS — E03.9 ACQUIRED HYPOTHYROIDISM: ICD-10-CM

## 2018-05-16 RX ORDER — LEVOTHYROXINE SODIUM 0.15 MG/1
150 TABLET ORAL DAILY
Qty: 90 TABLET | Refills: 1 | Status: SHIPPED | OUTPATIENT
Start: 2018-05-16 | End: 2018-11-02 | Stop reason: SDUPTHER

## 2018-05-23 RX ORDER — SODIUM CHLORIDE 9 MG/ML
20 INJECTION, SOLUTION INTRAVENOUS CONTINUOUS
Status: DISCONTINUED | OUTPATIENT
Start: 2018-05-24 | End: 2018-05-27 | Stop reason: HOSPADM

## 2018-05-24 ENCOUNTER — HOSPITAL ENCOUNTER (OUTPATIENT)
Dept: INFUSION CENTER | Facility: CLINIC | Age: 83
Discharge: HOME | End: 2018-05-24
Payer: MEDICARE

## 2018-05-24 VITALS
SYSTOLIC BLOOD PRESSURE: 132 MMHG | DIASTOLIC BLOOD PRESSURE: 70 MMHG | RESPIRATION RATE: 18 BRPM | WEIGHT: 156.97 LBS | BODY MASS INDEX: 31.17 KG/M2 | TEMPERATURE: 98 F | OXYGEN SATURATION: 93 % | HEART RATE: 68 BPM

## 2018-05-24 PROCEDURE — 96413 CHEMO IV INFUSION 1 HR: CPT

## 2018-05-24 RX ADMIN — TRASTUZUMAB 450 MG: 150 INJECTION, POWDER, LYOPHILIZED, FOR SOLUTION INTRAVENOUS at 12:01

## 2018-05-24 RX ADMIN — SODIUM CHLORIDE 20 ML/HR: 0.9 INJECTION, SOLUTION INTRAVENOUS at 11:25

## 2018-05-24 NOTE — PLAN OF CARE
Problem: Potential for Falls  Goal: Patient will remain free of falls  INTERVENTIONS:  - Assess patient frequently for physical needs  -  Identify cognitive and physical deficits and behaviors that affect risk of falls    -  [place] fall precautions as indicated by assessment   - Educate patient/family on patient safety including physical limitations  - Instruct patient to call for assistance with activity based on assessment  - Modify environment to reduce risk of injury  - Consider OT/PT consult to assist with strengthening/mobility   Outcome: Progressing

## 2018-06-13 RX ORDER — SODIUM CHLORIDE 9 MG/ML
20 INJECTION, SOLUTION INTRAVENOUS CONTINUOUS
Status: DISCONTINUED | OUTPATIENT
Start: 2018-06-14 | End: 2018-06-17 | Stop reason: HOSPADM

## 2018-06-14 ENCOUNTER — HOSPITAL ENCOUNTER (OUTPATIENT)
Dept: INFUSION CENTER | Facility: CLINIC | Age: 83
Discharge: HOME | End: 2018-06-14
Payer: MEDICARE

## 2018-06-14 VITALS
RESPIRATION RATE: 18 BRPM | WEIGHT: 157.41 LBS | BODY MASS INDEX: 31.26 KG/M2 | DIASTOLIC BLOOD PRESSURE: 60 MMHG | OXYGEN SATURATION: 93 % | SYSTOLIC BLOOD PRESSURE: 130 MMHG | HEART RATE: 64 BPM | TEMPERATURE: 97.8 F

## 2018-06-14 PROCEDURE — 96413 CHEMO IV INFUSION 1 HR: CPT

## 2018-06-14 RX ADMIN — SODIUM CHLORIDE 20 ML/HR: 0.9 INJECTION, SOLUTION INTRAVENOUS at 10:40

## 2018-06-14 RX ADMIN — TRASTUZUMAB 427 MG: 150 INJECTION, POWDER, LYOPHILIZED, FOR SOLUTION INTRAVENOUS at 11:21

## 2018-06-14 NOTE — PLAN OF CARE
Problem: Potential for Falls  Goal: Patient will remain free of falls  INTERVENTIONS:  - Assess patient frequently for physical needs  -  Identify cognitive and physical deficits and behaviors that affect risk of falls    -  [place] fall precautions as indicated by assessment   - Educate patient/family on patient safety including physical limitations  - Instruct patient to call for assistance with activity based on assessment  - Modify environment to reduce risk of injury  - Consider OT/PT consult to assist with strengthening/mobility   Outcome: Progressing

## 2018-06-29 ENCOUNTER — HOSPITAL ENCOUNTER (OUTPATIENT)
Dept: NON INVASIVE DIAGNOSTICS | Facility: CLINIC | Age: 83
Discharge: HOME | End: 2018-06-29
Payer: MEDICARE

## 2018-06-29 DIAGNOSIS — Z17.1 MALIGNANT NEOPLASM OF LEFT BREAST IN FEMALE, ESTROGEN RECEPTOR NEGATIVE, UNSPECIFIED SITE OF BREAST (HCC): ICD-10-CM

## 2018-06-29 DIAGNOSIS — C50.912 MALIGNANT NEOPLASM OF LEFT BREAST IN FEMALE, ESTROGEN RECEPTOR NEGATIVE, UNSPECIFIED SITE OF BREAST (HCC): ICD-10-CM

## 2018-06-29 PROCEDURE — 93308 TTE F-UP OR LMTD: CPT | Performed by: INTERNAL MEDICINE

## 2018-06-29 PROCEDURE — 93321 DOPPLER ECHO F-UP/LMTD STD: CPT | Performed by: INTERNAL MEDICINE

## 2018-06-29 PROCEDURE — 93325 DOPPLER ECHO COLOR FLOW MAPG: CPT | Performed by: INTERNAL MEDICINE

## 2018-06-29 PROCEDURE — 93308 TTE F-UP OR LMTD: CPT

## 2018-07-03 RX ORDER — SODIUM CHLORIDE 9 MG/ML
20 INJECTION, SOLUTION INTRAVENOUS CONTINUOUS
Status: DISCONTINUED | OUTPATIENT
Start: 2018-07-05 | End: 2018-07-08 | Stop reason: HOSPADM

## 2018-07-05 ENCOUNTER — HOSPITAL ENCOUNTER (OUTPATIENT)
Dept: INFUSION CENTER | Facility: CLINIC | Age: 83
Discharge: HOME | End: 2018-07-05
Payer: MEDICARE

## 2018-07-05 VITALS
DIASTOLIC BLOOD PRESSURE: 68 MMHG | TEMPERATURE: 97.6 F | BODY MASS INDEX: 31.08 KG/M2 | SYSTOLIC BLOOD PRESSURE: 132 MMHG | RESPIRATION RATE: 16 BRPM | HEART RATE: 68 BPM | WEIGHT: 156.5 LBS

## 2018-07-05 PROCEDURE — 96413 CHEMO IV INFUSION 1 HR: CPT

## 2018-07-05 RX ADMIN — TRASTUZUMAB 427 MG: 150 INJECTION, POWDER, LYOPHILIZED, FOR SOLUTION INTRAVENOUS at 11:38

## 2018-07-05 RX ADMIN — SODIUM CHLORIDE 20 ML/HR: 0.9 INJECTION, SOLUTION INTRAVENOUS at 11:06

## 2018-07-05 NOTE — PLAN OF CARE
Problem: Potential for Falls  Goal: Patient will remain free of falls  INTERVENTIONS:  - Assess patient frequently for physical needs  -  Identify cognitive and physical deficits and behaviors that affect risk of falls    -  [place] fall precautions as indicated by assessment   - Educate patient/family on patient safety including physical limitations  - Instruct patient to call for assistance with activity based on assessment  - Modify environment to reduce risk of injury  - Consider OT/PT consult to assist with strengthening/mobility   Outcome: Progressing

## 2018-07-05 NOTE — PROGRESS NOTES
To clinic for single agent herceptin  Pt tolerating therapy well and offers no complaints today  Recent ECHO shows LVEF of 65% at pt denies any s/s of CHF

## 2018-07-25 RX ORDER — SODIUM CHLORIDE 9 MG/ML
20 INJECTION, SOLUTION INTRAVENOUS CONTINUOUS
Status: DISCONTINUED | OUTPATIENT
Start: 2018-07-26 | End: 2018-07-29 | Stop reason: HOSPADM

## 2018-07-26 ENCOUNTER — HOSPITAL ENCOUNTER (OUTPATIENT)
Dept: INFUSION CENTER | Facility: CLINIC | Age: 83
Discharge: HOME | End: 2018-07-26
Payer: MEDICARE

## 2018-07-26 VITALS
BODY MASS INDEX: 31.38 KG/M2 | RESPIRATION RATE: 18 BRPM | OXYGEN SATURATION: 94 % | HEART RATE: 66 BPM | TEMPERATURE: 97.8 F | SYSTOLIC BLOOD PRESSURE: 141 MMHG | DIASTOLIC BLOOD PRESSURE: 71 MMHG | WEIGHT: 158 LBS

## 2018-07-26 PROCEDURE — 96413 CHEMO IV INFUSION 1 HR: CPT

## 2018-07-26 RX ADMIN — TRASTUZUMAB 426 MG: 150 INJECTION, POWDER, LYOPHILIZED, FOR SOLUTION INTRAVENOUS at 12:05

## 2018-07-26 RX ADMIN — SODIUM CHLORIDE 20 ML/HR: 900 INJECTION, SOLUTION INTRAVENOUS at 11:42

## 2018-07-26 NOTE — PROGRESS NOTES
Pt is here for chemotherapy infusion  Pt c/o abdominal fold rash, pt made aware of need to contact medical doctor if worsens and to keep area dry  Also need for antifungal for area  Pt and son who is a medical doctor verbalized understanding

## 2018-08-14 ENCOUNTER — OFFICE VISIT (OUTPATIENT)
Dept: INTERNAL MEDICINE CLINIC | Facility: CLINIC | Age: 83
End: 2018-08-14
Payer: MEDICARE

## 2018-08-14 ENCOUNTER — OFFICE VISIT (OUTPATIENT)
Dept: HEMATOLOGY ONCOLOGY | Facility: CLINIC | Age: 83
End: 2018-08-14
Payer: MEDICARE

## 2018-08-14 VITALS
SYSTOLIC BLOOD PRESSURE: 124 MMHG | OXYGEN SATURATION: 95 % | WEIGHT: 158 LBS | DIASTOLIC BLOOD PRESSURE: 70 MMHG | HEART RATE: 68 BPM | RESPIRATION RATE: 18 BRPM | BODY MASS INDEX: 29.83 KG/M2 | HEIGHT: 61 IN | TEMPERATURE: 98.6 F

## 2018-08-14 VITALS
TEMPERATURE: 97.9 F | HEIGHT: 61 IN | WEIGHT: 158 LBS | DIASTOLIC BLOOD PRESSURE: 72 MMHG | HEART RATE: 71 BPM | OXYGEN SATURATION: 96 % | RESPIRATION RATE: 18 BRPM | BODY MASS INDEX: 29.83 KG/M2 | SYSTOLIC BLOOD PRESSURE: 138 MMHG

## 2018-08-14 DIAGNOSIS — E78.5 DYSLIPIDEMIA: ICD-10-CM

## 2018-08-14 DIAGNOSIS — Z17.1 MALIGNANT NEOPLASM OF LEFT BREAST IN FEMALE, ESTROGEN RECEPTOR NEGATIVE, UNSPECIFIED SITE OF BREAST (HCC): ICD-10-CM

## 2018-08-14 DIAGNOSIS — I10 ESSENTIAL HYPERTENSION: ICD-10-CM

## 2018-08-14 DIAGNOSIS — Z17.1 MALIGNANT NEOPLASM OF LEFT BREAST IN FEMALE, ESTROGEN RECEPTOR NEGATIVE, UNSPECIFIED SITE OF BREAST (HCC): Primary | ICD-10-CM

## 2018-08-14 DIAGNOSIS — C50.912 MALIGNANT NEOPLASM OF LEFT BREAST IN FEMALE, ESTROGEN RECEPTOR NEGATIVE, UNSPECIFIED SITE OF BREAST (HCC): ICD-10-CM

## 2018-08-14 DIAGNOSIS — Z23 NEED FOR SHINGLES VACCINE: ICD-10-CM

## 2018-08-14 DIAGNOSIS — E03.9 ACQUIRED HYPOTHYROIDISM: ICD-10-CM

## 2018-08-14 DIAGNOSIS — E55.9 VITAMIN D DEFICIENCY: ICD-10-CM

## 2018-08-14 DIAGNOSIS — Z00.00 HEALTH MAINTENANCE EXAMINATION: ICD-10-CM

## 2018-08-14 DIAGNOSIS — K21.9 GASTROESOPHAGEAL REFLUX DISEASE WITHOUT ESOPHAGITIS: Primary | ICD-10-CM

## 2018-08-14 DIAGNOSIS — C50.912 MALIGNANT NEOPLASM OF LEFT BREAST IN FEMALE, ESTROGEN RECEPTOR NEGATIVE, UNSPECIFIED SITE OF BREAST (HCC): Primary | ICD-10-CM

## 2018-08-14 PROBLEM — K29.70 GASTRITIS AND GASTRODUODENITIS: Status: ACTIVE | Noted: 2018-08-14

## 2018-08-14 PROBLEM — J96.01 ACUTE RESPIRATORY FAILURE WITH HYPOXIA (HCC): Status: RESOLVED | Noted: 2017-09-05 | Resolved: 2018-08-14

## 2018-08-14 PROBLEM — J18.9 COMMUNITY ACQUIRED PNEUMONIA: Status: ACTIVE | Noted: 2017-10-13

## 2018-08-14 PROBLEM — K29.90 GASTRITIS AND GASTRODUODENITIS: Status: ACTIVE | Noted: 2018-08-14

## 2018-08-14 PROBLEM — K64.9 HEMORRHOIDS: Status: ACTIVE | Noted: 2018-08-14

## 2018-08-14 PROBLEM — J18.9 COMMUNITY ACQUIRED PNEUMONIA: Status: RESOLVED | Noted: 2017-10-13 | Resolved: 2018-08-14

## 2018-08-14 PROCEDURE — 99214 OFFICE O/P EST MOD 30 MIN: CPT | Performed by: INTERNAL MEDICINE

## 2018-08-14 PROCEDURE — G0439 PPPS, SUBSEQ VISIT: HCPCS | Performed by: INTERNAL MEDICINE

## 2018-08-14 RX ORDER — OMEPRAZOLE 40 MG/1
40 CAPSULE, DELAYED RELEASE ORAL
Qty: 90 CAPSULE | Refills: 0
Start: 2018-08-14 | End: 2018-08-14 | Stop reason: SDUPTHER

## 2018-08-14 RX ORDER — AMLODIPINE BESYLATE 2.5 MG/1
2.5 TABLET ORAL 2 TIMES DAILY
Qty: 180 TABLET | Refills: 1 | Status: SHIPPED | OUTPATIENT
Start: 2018-08-14 | End: 2018-11-15 | Stop reason: SDUPTHER

## 2018-08-14 RX ORDER — OMEPRAZOLE 40 MG/1
40 CAPSULE, DELAYED RELEASE ORAL
Qty: 90 CAPSULE | Refills: 1 | Status: SHIPPED | OUTPATIENT
Start: 2018-08-14 | End: 2018-12-20 | Stop reason: SDUPTHER

## 2018-08-14 NOTE — ASSESSMENT & PLAN NOTE
Instructed to take omeprazole in the evening  Discussed diet, already using 2 pillows for sleep  May take antacid as needed
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
## 2018-08-14 NOTE — PATIENT INSTRUCTIONS
Diet for Stomach Ulcers and Gastritis   WHAT YOU NEED TO KNOW:   A diet for stomach ulcers and gastritis is a meal plan that limits foods that irritate your stomach  Certain foods may worsen symptoms such as stomach pain, bloating, heartburn, or indigestion  DISCHARGE INSTRUCTIONS:   Foods to limit or avoid:  You may need to avoid acidic, spicy, or high-fat foods  Not all foods affect everyone the same way  You will need to learn which foods worsen your symptoms and limit those foods  The following are some foods that may worsen ulcer or gastritis symptoms:  · Beverages:      ¨ Whole milk and chocolate milk    ¨ Hot cocoa and cola    ¨ Any beverage with caffeine    ¨ Regular and decaffeinated coffee    ¨ Peppermint and spearmint tea    ¨ Green and black tea, with or without caffeine    ¨ Orange and grapefruit juices    ¨ Drinks that contain alcohol    · Spices and seasonings:      ¨ Black and red pepper    ¨ Chili powder    ¨ Mustard seed and nutmeg    · Other foods:      ¨ Dairy foods made from whole milk or cream    ¨ Chocolate    ¨ Spicy or strongly flavored cheeses, such as jalapeno or black pepper    ¨ Highly seasoned, high-fat meats, such as sausage, salami, ulloa, ham, and cold cuts    ¨ Hot chiles and peppers    ¨ Tomato products, such as tomato paste, tomato sauce, or tomato juice  Foods to include:  Eat a variety of healthy foods from all the food groups  Eat fruits, vegetables, whole grains, and fat-free or low-fat dairy foods  Whole grains include whole-wheat breads, cereals, pasta, and brown rice  Choose lean meats, poultry (chicken and turkey), fish, beans, eggs, and nuts  A healthy meal plan is low in unhealthy fats, salt, and added sugar  Healthy fats include olive oil and canola oil  Ask your dietitian for more information about a healthy diet  Other helpful guidelines:   · Do not eat right before bedtime  Stop eating at least 2 hours before bedtime  · Eat small, frequent meals    Your stomach may tolerate small, frequent meals better than large meals  © 2017 [facility] Information is for End User's use only and may not be sold, redistributed or otherwise used for commercial purposes  All illustrations and images included in CareNotes® are the copyrighted property of A D [employer] M [employer]  or Fredi Desai  The above information is an  only  It is not intended as medical advice for individual conditions or treatments  Talk to your doctor, nurse or pharmacist before following any medical regimen to see if it is safe and effective for you

## 2018-08-14 NOTE — PROGRESS NOTES
Hematology / Oncology Outpatient Follow Up Note    Odalys Robles 80 y o  female :1925 [facility]:[id number]         Date:  2018    Assessment / Plan:  A 80year old postmenopausal woman with stage IIA left breast cancer, grade 3, ER/VT negative, HER-2 3+ disease  She underwent left mastectomy with sentinel lymph node biopsy, resulting in DAKOTA  She has otherwise good health  Carol Morales is currently on adjuvant trastuzumab monotherapy with no cardiac toxicity  Clinically, she has no evidence recurrent disease  I recommended her to continue with trastuzumab 6 milligram/kilograms every 3 weeks until 2018 to complete 1 years of adjuvant therapy  I am going to obtain echocardiogram in the end of 2018 for cardiac monitoring  She is in agreement with my recommendation  I will see her again in 6 months for routine follow-up         Subjective:      HPI:  A 77-year-old postmenopausal woman who recently noticed a lump and pain in the left breast which she brought to medical attention  She was found to have abnormality, radiographically in the left breast which was biopsied in 2017  She had invasive ductal carcinoma, grade 2, ER/VT negative, HER-2 negative disease  She subsequently underwent left mastectomy with sentinel node biopsy by Dr Gerson Waggoner and 2017  She had 3 0 cm of invasive ductal carcinoma, grade 3  Lymphovascular invasion was indeterminate  2 sentinel lymph nodes were negative for metastatic disease  She did not have reconstruction  She presents today with her son and daughter to discuss adjuvant treatment options  Despite her age, she has relatively good health  She only has hypertension, hypothyroidism and GERD as a past medical history  She currently feels well  She has no complaint of pain  She denied any respiratory symptoms  Her weight has been stable  She has no family history of breast or ovarian cancer  She is a lifetime never smoker   Her performance status is normal             Interval History:  A 80year old postmenopausal woman with stage IIA left breast cancer, grade 3, ER/ID negative, HER-2 3+ disease  She underwent left mastectomy with sentinel lymph node biopsy, resulting in DAKOTA   She has good health   We previously had extensive discussion regarding adjuvant trastuzumab monotherapy   She was agreeable to be on trastuzumab   Since the beginning of December 2017, she has been on trastuzumab every 3 weeks with no cardiac toxicity  She presents today for follow-up  She feels well  She denied any pain  She has no shortness of breath, cough or sputum production  Her weight is stable  Her most recent echocardiogram in the end of June 2018 showed ejection fraction 65%  Her performance status is normal        Objective:      Primary Diagnosis:     Left breast cancer, stage IIA(pT2, pN0,M0) grade 3, ER/ID negative, HER-2 3+ disease  Diagnosed in September 2017       Cancer Staging:  Cancer Staging  No matching staging information was found for the patient         Previous Hematologic/ Oncologic Treatment:            Current Hematologic/ Oncologic Treatment:       Adjuvant trastuzumab monotherapy since December 2017      Disease Status:      DAKOTA status post mastectomy with sentinel lymph node biopsy      Test Results:     Pathology:     3 cm of invasive ductal carcinoma, grade 3  Lymphovascular invasion indeterminate  2 sentinel lymph node were negative for metastatic disease  ER/ID negative, HER-2 3+ disease  Stage IIA(pT2, pN0,M0)     Radiology:     Echocardiogram in June 2018 showed ejection fraction 65%      Laboratory:           Physical Exam:        General Appearance:    Alert, oriented          Eyes:    PERRL   Ears:    Normal external ear canals, both ears   Nose:   Nares normal, septum midline   Throat:   Mucosa moist  Pharynx without injection      Neck:   Supple         Lungs:     Clear to auscultation bilaterally   Chest Wall:    No tenderness or deformity    Heart:    Regular rate and rhythm         Abdomen:     Soft, non-tender, bowel sounds +, no organomegaly               Extremities:   Extremities no cyanosis or edema         Skin:   no rash or icterus  Lymph nodes:   Cervical, supraclavicular, and axillary nodes normal   Neurologic:   CNII-XII intact, normal strength, sensation and reflexes     Throughout             Breast exam:    status post left mastectomy without reconstruction  No palpable abnormality in her left chest wall  Right breast exam is negative  ROS: Review of Systems   All other systems reviewed and are negative  Imaging: No results found  Labs:   Lab Results   Component Value Date    WBC 10 72 (H) 10/25/2017    HGB 12 8 10/25/2017    HCT 39 9 10/25/2017    MCV 92 10/25/2017     10/25/2017     Lab Results   Component Value Date     10/12/2017    K 4 4 10/12/2017     10/12/2017    CO2 26 10/12/2017    ANIONGAP 9 10/12/2017    BUN 18 10/12/2017    CREATININE 1 06 10/12/2017    GLUCOSE 148 (H) 10/12/2017    GLUF 112 (H) 09/06/2017    CALCIUM 8 3 10/12/2017    AST 24 10/12/2017    ALT 30 10/12/2017    ALKPHOS 85 10/12/2017    PROT 6 9 10/12/2017    BILITOT 0 50 10/12/2017    EGFR 46 10/12/2017         Current Medications: Reviewed  Allergies: Reviewed  PMH/FH/SH:  Reviewed      Vital Sign:    Body surface area is 1 71 meters squared      Wt Readings from Last 3 Encounters:   08/14/18 71 7 kg (158 lb)   08/14/18 71 7 kg (158 lb)   07/26/18 71 7 kg (158 lb)        Temp Readings from Last 3 Encounters:   08/14/18 98 6 °F (37 °C) (Tympanic)   08/14/18 97 9 °F (36 6 °C)   07/26/18 97 8 °F (36 6 °C) (Oral)        BP Readings from Last 3 Encounters:   08/14/18 124/70   08/14/18 138/72   07/26/18 141/71         Pulse Readings from Last 3 Encounters:   08/14/18 68   08/14/18 71   07/26/18 66     @LASTSAO2(3)@

## 2018-08-14 NOTE — PROGRESS NOTES
Assessment/Plan:    Essential hypertension  Stable, on amlodipine and metoprolol bid  Followed by cardiology  Acquired hypothyroidism  Due for TFTs  Constipation  May start taking fiber supplement every other day  Benign paroxysmal vertigo  Intermittent, no falls  Malignant neoplasm of left breast in female, estrogen receptor negative ([facility] [address] )  Follow up with Oncology later today  GERD (gastroesophageal reflux disease)  Instructed to take omeprazole in the evening  Discussed diet, already using 2 pillows for sleep  May take antacid as needed  Diagnoses and all orders for this visit:    Gastroesophageal reflux disease without esophagitis  -     Discontinue: omeprazole (PriLOSEC) 40 MG capsule; Take 1 capsule (40 mg total) by mouth daily at bedtime  -     omeprazole (PriLOSEC) 40 MG capsule; Take 1 capsule (40 mg total) by mouth daily at bedtime    Malignant neoplasm of left breast in female, estrogen receptor negative, unspecified site of breast (HCC)    Essential hypertension  -     amLODIPine (NORVASC) 2 5 mg tablet; Take 1 tablet (2 5 mg total) by mouth 2 (two) times a day Take 1/2 tablet twice daily  -     Magnesium; Future    Vitamin D deficiency  -     Vitamin D 25 hydroxy; Future    Acquired hypothyroidism  -     TSH, 3rd generation with Free T4 reflex; Future    Dyslipidemia    Need for shingles vaccine  -     Zoster Vac Recomb Adjuvanted 50 MCG SUSR; Inject 1 mL into a muscle once for 1 dose Repeat in 2-6 months IM    Health maintenance examination  Comments:  Mammogram scheduled  Follow up in 6 months or as needed  Subjective:      Patient ID: Te Mathias is a 80 y o  female  Ms Teddy Sidhu is here with her daughter  She reports feeling lightheaded sometimes, feels this occurs if she does not drink enough water that day  Denies any blurring of vision, chest pain, shortness breasts breath or other symptoms  No syncope      She also complains of more frequent reflux symptoms in the evening, before sleep  She feels symptoms worse when she lays down at night  She uses 2 pillows  She usually takes her omeprazole first thing in the morning  She also complains of more frequent postnasal drip and occasional cough, symptoms worse at night  Her blood pressure has been better controlled since she has been taking amlodipine twice a day instead of once in the morning  The following portions of the patient's history were reviewed and updated as appropriate: allergies, current medications, past medical history, past social history, past surgical history and problem list     Review of Systems   Constitutional: Negative for appetite change and fatigue  HENT: Negative for congestion, ear pain, hearing loss and postnasal drip  Eyes: Negative for visual disturbance  Respiratory: Negative for cough and shortness of breath  Cardiovascular: Negative for chest pain and leg swelling  Gastrointestinal: Positive for constipation  Negative for abdominal pain and diarrhea  Genitourinary: Negative for dysuria, frequency and urgency  Musculoskeletal: Negative for arthralgias and myalgias  Skin: Negative for rash and wound  Neurological: Positive for light-headedness  Negative for dizziness, syncope, weakness, numbness and headaches  Hematological: Does not bruise/bleed easily  Psychiatric/Behavioral: Negative for confusion  The patient is not nervous/anxious  Objective:      /72   Pulse 71   Temp 97 9 °F (36 6 °C)   Resp 18   Ht 5' 1" (1 549 m)   Wt 71 7 kg (158 lb)   LMP  (LMP Unknown)   SpO2 96%   BMI 29 85 kg/m²          Physical Exam   Constitutional: She is oriented to person, place, and time  She appears well-developed and well-nourished  HENT:   Head: Normocephalic and atraumatic  Right Ear: Tympanic membrane, external ear and ear canal normal  No decreased hearing is noted     Left Ear: Tympanic membrane, external ear and ear canal normal  No decreased hearing is noted  Nose: Nose normal    Mouth/Throat: Mucous membranes are normal    Eyes: Conjunctivae are normal  Pupils are equal, round, and reactive to light  Neck: Neck supple  Cardiovascular: Normal rate, regular rhythm and normal heart sounds  No edema  Pulmonary/Chest: Effort normal and breath sounds normal  She has no wheezes  She has no rales  Left breast prosthesis in place   Abdominal: Soft  Bowel sounds are normal    Neurological: She is alert and oriented to person, place, and time  Skin: Skin is warm  No rash noted  Psychiatric: She has a normal mood and affect  Her behavior is normal    Nursing note and vitals reviewed  Lab results reviewed with patient

## 2018-08-15 ENCOUNTER — HOSPITAL ENCOUNTER (OUTPATIENT)
Dept: MAMMOGRAPHY | Facility: CLINIC | Age: 83
Discharge: HOME | End: 2018-08-15
Payer: MEDICARE

## 2018-08-15 DIAGNOSIS — C50.412 MALIGNANT NEOPLASM OF UPPER-OUTER QUADRANT OF LEFT FEMALE BREAST (HCC): ICD-10-CM

## 2018-08-15 PROCEDURE — 77065 DX MAMMO INCL CAD UNI: CPT

## 2018-08-15 PROCEDURE — G0279 TOMOSYNTHESIS, MAMMO: HCPCS

## 2018-08-15 RX ORDER — SODIUM CHLORIDE 9 MG/ML
20 INJECTION, SOLUTION INTRAVENOUS CONTINUOUS
Status: DISCONTINUED | OUTPATIENT
Start: 2018-08-16 | End: 2018-08-19 | Stop reason: HOSPADM

## 2018-08-16 ENCOUNTER — HOSPITAL ENCOUNTER (OUTPATIENT)
Dept: INFUSION CENTER | Facility: CLINIC | Age: 83
Discharge: HOME | End: 2018-08-16
Payer: MEDICARE

## 2018-08-16 VITALS
DIASTOLIC BLOOD PRESSURE: 72 MMHG | HEART RATE: 62 BPM | HEIGHT: 59 IN | WEIGHT: 158 LBS | TEMPERATURE: 98.1 F | SYSTOLIC BLOOD PRESSURE: 126 MMHG | RESPIRATION RATE: 18 BRPM | BODY MASS INDEX: 31.85 KG/M2

## 2018-08-16 PROCEDURE — 96413 CHEMO IV INFUSION 1 HR: CPT

## 2018-08-16 RX ADMIN — TRASTUZUMAB 426 MG: 150 INJECTION, POWDER, LYOPHILIZED, FOR SOLUTION INTRAVENOUS at 11:58

## 2018-08-16 RX ADMIN — SODIUM CHLORIDE 20 ML/HR: 0.9 INJECTION, SOLUTION INTRAVENOUS at 11:10

## 2018-08-16 NOTE — PROGRESS NOTES
Patient here for Herceptin infusion  Patient presents with no complaints  EF 65% June 2018  VSS  Peripheral IV inserted without incident

## 2018-08-16 NOTE — PROGRESS NOTES
Patient tolerated Herceptin infusion without incident  Peripheral IV removed  Patient aware of next appointment  AVS printed and given to patient

## 2018-08-21 ENCOUNTER — OFFICE VISIT (OUTPATIENT)
Dept: SURGICAL ONCOLOGY | Facility: CLINIC | Age: 83
End: 2018-08-21
Payer: MEDICARE

## 2018-08-21 VITALS
TEMPERATURE: 97.8 F | BODY MASS INDEX: 31.85 KG/M2 | HEART RATE: 70 BPM | RESPIRATION RATE: 12 BRPM | DIASTOLIC BLOOD PRESSURE: 74 MMHG | SYSTOLIC BLOOD PRESSURE: 160 MMHG | HEIGHT: 59 IN | WEIGHT: 158 LBS

## 2018-08-21 DIAGNOSIS — C50.912 MALIGNANT NEOPLASM OF LEFT BREAST IN FEMALE, ESTROGEN RECEPTOR NEGATIVE, UNSPECIFIED SITE OF BREAST (HCC): Primary | ICD-10-CM

## 2018-08-21 DIAGNOSIS — Z17.1 MALIGNANT NEOPLASM OF LEFT BREAST IN FEMALE, ESTROGEN RECEPTOR NEGATIVE, UNSPECIFIED SITE OF BREAST (HCC): Primary | ICD-10-CM

## 2018-08-21 PROCEDURE — 99213 OFFICE O/P EST LOW 20 MIN: CPT | Performed by: NURSE PRACTITIONER
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
## 2018-09-05 RX ORDER — SODIUM CHLORIDE 9 MG/ML
20 INJECTION, SOLUTION INTRAVENOUS CONTINUOUS
Status: DISCONTINUED | OUTPATIENT
Start: 2018-09-06 | End: 2018-09-09 | Stop reason: HOSPADM

## 2018-09-06 ENCOUNTER — HOSPITAL ENCOUNTER (OUTPATIENT)
Dept: INFUSION CENTER | Facility: CLINIC | Age: 83
Discharge: HOME | End: 2018-09-06
Payer: MEDICARE

## 2018-09-06 VITALS
RESPIRATION RATE: 18 BRPM | HEART RATE: 64 BPM | OXYGEN SATURATION: 95 % | BODY MASS INDEX: 31.09 KG/M2 | WEIGHT: 156.5 LBS | DIASTOLIC BLOOD PRESSURE: 72 MMHG | SYSTOLIC BLOOD PRESSURE: 116 MMHG | TEMPERATURE: 97.5 F

## 2018-09-06 PROCEDURE — 96413 CHEMO IV INFUSION 1 HR: CPT

## 2018-09-06 RX ADMIN — SODIUM CHLORIDE 20 ML/HR: 0.9 INJECTION, SOLUTION INTRAVENOUS at 11:09

## 2018-09-06 RX ADMIN — TRASTUZUMAB 450 MG: 150 INJECTION, POWDER, LYOPHILIZED, FOR SOLUTION INTRAVENOUS at 12:07

## 2018-09-26 RX ORDER — SODIUM CHLORIDE 9 MG/ML
20 INJECTION, SOLUTION INTRAVENOUS CONTINUOUS
Status: DISCONTINUED | OUTPATIENT
Start: 2018-09-27 | End: 2018-09-30 | Stop reason: HOSPADM

## 2018-09-27 ENCOUNTER — HOSPITAL ENCOUNTER (OUTPATIENT)
Dept: INFUSION CENTER | Facility: CLINIC | Age: 83
Discharge: HOME | End: 2018-09-27
Payer: MEDICARE

## 2018-09-27 VITALS
RESPIRATION RATE: 18 BRPM | OXYGEN SATURATION: 96 % | BODY MASS INDEX: 32.05 KG/M2 | DIASTOLIC BLOOD PRESSURE: 72 MMHG | TEMPERATURE: 97.2 F | HEART RATE: 74 BPM | WEIGHT: 159 LBS | HEIGHT: 59 IN | SYSTOLIC BLOOD PRESSURE: 132 MMHG

## 2018-09-27 PROCEDURE — 96413 CHEMO IV INFUSION 1 HR: CPT

## 2018-09-27 RX ADMIN — SODIUM CHLORIDE 20 ML/HR: 0.9 INJECTION, SOLUTION INTRAVENOUS at 11:00

## 2018-09-27 RX ADMIN — TRASTUZUMAB 450 MG: 150 INJECTION, POWDER, LYOPHILIZED, FOR SOLUTION INTRAVENOUS at 11:38

## 2018-09-27 NOTE — PLAN OF CARE
Problem: Potential for Falls  Goal: Patient will remain free of falls  INTERVENTIONS:  - Assess patient frequently for physical needs  -  Identify cognitive and physical deficits and behaviors that affect risk of falls    -  [place] fall precautions as indicated by assessment   - Educate patient/family on patient safety including physical limitations  - Instruct patient to call for assistance with activity based on assessment  - Modify environment to reduce risk of injury  - Consider OT/PT consult to assist with strengthening/mobility   Outcome: Progressing

## 2018-10-01 ENCOUNTER — HOSPITAL ENCOUNTER (OUTPATIENT)
Dept: NON INVASIVE DIAGNOSTICS | Facility: CLINIC | Age: 83
Discharge: HOME | End: 2018-10-01
Payer: MEDICARE

## 2018-10-01 DIAGNOSIS — C50.912 MALIGNANT NEOPLASM OF LEFT BREAST IN FEMALE, ESTROGEN RECEPTOR NEGATIVE, UNSPECIFIED SITE OF BREAST (HCC): ICD-10-CM

## 2018-10-01 DIAGNOSIS — Z17.1 MALIGNANT NEOPLASM OF LEFT BREAST IN FEMALE, ESTROGEN RECEPTOR NEGATIVE, UNSPECIFIED SITE OF BREAST (HCC): ICD-10-CM

## 2018-10-01 PROCEDURE — 93306 TTE W/DOPPLER COMPLETE: CPT

## 2018-10-02 PROCEDURE — 93306 TTE W/DOPPLER COMPLETE: CPT | Performed by: INTERNAL MEDICINE

## 2018-10-17 RX ORDER — SODIUM CHLORIDE 9 MG/ML
20 INJECTION, SOLUTION INTRAVENOUS CONTINUOUS
Status: DISCONTINUED | OUTPATIENT
Start: 2018-10-18 | End: 2018-10-21 | Stop reason: HOSPADM

## 2018-10-18 ENCOUNTER — HOSPITAL ENCOUNTER (OUTPATIENT)
Dept: INFUSION CENTER | Facility: CLINIC | Age: 83
Discharge: HOME | End: 2018-10-18
Payer: MEDICARE

## 2018-10-18 VITALS
BODY MASS INDEX: 30.02 KG/M2 | TEMPERATURE: 97.5 F | WEIGHT: 159 LBS | HEART RATE: 68 BPM | DIASTOLIC BLOOD PRESSURE: 70 MMHG | HEIGHT: 61 IN | RESPIRATION RATE: 20 BRPM | SYSTOLIC BLOOD PRESSURE: 152 MMHG

## 2018-10-18 PROCEDURE — 96413 CHEMO IV INFUSION 1 HR: CPT

## 2018-10-18 RX ADMIN — TRASTUZUMAB 450 MG: 150 INJECTION, POWDER, LYOPHILIZED, FOR SOLUTION INTRAVENOUS at 11:28

## 2018-10-18 RX ADMIN — SODIUM CHLORIDE 20 ML/HR: 0.9 INJECTION, SOLUTION INTRAVENOUS at 11:05

## 2018-10-18 NOTE — PLAN OF CARE
Problem: Potential for Falls  Goal: Patient will remain free of falls  INTERVENTIONS:  - Assess patient frequently for physical needs  -  Identify cognitive and physical deficits and behaviors that affect risk of falls    -  [place] fall precautions as indicated by assessment   - Educate patient/family on patient safety including physical limitations  - Instruct patient to call for assistance with activity based on assessment  - Modify environment to reduce risk of injury  - Consider OT/PT consult to assist with strengthening/mobility   Outcome: Progressing

## 2018-10-22 ENCOUNTER — TELEPHONE (OUTPATIENT)
Dept: INTERNAL MEDICINE CLINIC | Facility: CLINIC | Age: 83
End: 2018-10-22

## 2018-10-29 ENCOUNTER — OFFICE VISIT (OUTPATIENT)
Dept: CARDIOLOGY CLINIC | Facility: CLINIC | Age: 83
End: 2018-10-29
Payer: MEDICARE

## 2018-10-29 VITALS
DIASTOLIC BLOOD PRESSURE: 74 MMHG | WEIGHT: 159.2 LBS | HEIGHT: 60 IN | BODY MASS INDEX: 31.25 KG/M2 | HEART RATE: 69 BPM | OXYGEN SATURATION: 95 % | SYSTOLIC BLOOD PRESSURE: 132 MMHG

## 2018-10-29 DIAGNOSIS — I10 ESSENTIAL HYPERTENSION: Primary | ICD-10-CM

## 2018-10-29 DIAGNOSIS — I48.0 PAROXYSMAL ATRIAL FIBRILLATION (HCC): ICD-10-CM

## 2018-10-29 DIAGNOSIS — I44.0 FIRST DEGREE AV BLOCK: ICD-10-CM

## 2018-10-29 PROCEDURE — 99214 OFFICE O/P EST MOD 30 MIN: CPT | Performed by: INTERNAL MEDICINE
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
## 2018-11-02 DIAGNOSIS — I48.0 PAROXYSMAL ATRIAL FIBRILLATION (HCC): ICD-10-CM

## 2018-11-02 DIAGNOSIS — I10 ESSENTIAL HYPERTENSION: ICD-10-CM

## 2018-11-02 DIAGNOSIS — E03.9 ACQUIRED HYPOTHYROIDISM: ICD-10-CM

## 2018-11-02 RX ORDER — LEVOTHYROXINE SODIUM 0.15 MG/1
150 TABLET ORAL DAILY
Qty: 90 TABLET | Refills: 1 | Status: SHIPPED | OUTPATIENT
Start: 2018-11-02 | End: 2019-04-18 | Stop reason: SDUPTHER

## 2018-11-07 RX ORDER — SODIUM CHLORIDE 9 MG/ML
20 INJECTION, SOLUTION INTRAVENOUS CONTINUOUS
Status: DISCONTINUED | OUTPATIENT
Start: 2018-11-08 | End: 2018-11-11 | Stop reason: HOSPADM

## 2018-11-08 ENCOUNTER — HOSPITAL ENCOUNTER (OUTPATIENT)
Dept: INFUSION CENTER | Facility: CLINIC | Age: 83
Discharge: HOME | End: 2018-11-08
Payer: MEDICARE

## 2018-11-08 VITALS
TEMPERATURE: 97.2 F | DIASTOLIC BLOOD PRESSURE: 70 MMHG | RESPIRATION RATE: 18 BRPM | HEART RATE: 78 BPM | BODY MASS INDEX: 31.38 KG/M2 | SYSTOLIC BLOOD PRESSURE: 140 MMHG | WEIGHT: 158 LBS

## 2018-11-08 PROCEDURE — 96413 CHEMO IV INFUSION 1 HR: CPT

## 2018-11-08 RX ADMIN — TRASTUZUMAB 450 MG: 150 INJECTION, POWDER, LYOPHILIZED, FOR SOLUTION INTRAVENOUS at 11:47

## 2018-11-08 RX ADMIN — SODIUM CHLORIDE 20 ML/HR: 0.9 INJECTION, SOLUTION INTRAVENOUS at 11:20

## 2018-11-08 RX ADMIN — HEPARIN 300 UNITS: 100 SYRINGE at 12:50

## 2018-11-08 NOTE — PROGRESS NOTES
Patient tolerated treatment today without complications  Verified patients upcoming appointments   Patient declined AVS

## 2018-11-08 NOTE — PLAN OF CARE
Problem: Potential for Falls  Goal: Patient will remain free of falls  INTERVENTIONS:  - Assess patient frequently for physical needs  -  Identify cognitive and physical deficits and behaviors that affect risk of falls  -  [place] fall precautions as indicated by assessment   - Educate patient/family on patient safety including physical limitations  - Instruct patient to call for assistance with activity based on assessment  - Modify environment to reduce risk of injury  - Consider OT/PT consult to assist with strengthening/mobility   Outcome: Progressing      Problem: SAFETY ADULT  Goal: Patient will remain free of falls  INTERVENTIONS:  - Assess patient frequently for physical needs  -  Identify cognitive and physical deficits and behaviors that affect risk of falls  -  [place] fall precautions as indicated by assessment   - Educate patient/family on patient safety including physical limitations  - Instruct patient to call for assistance with activity based on assessment  - Modify environment to reduce risk of injury  - Consider OT/PT consult to assist with strengthening/mobility   Outcome: Progressing      Problem: Knowledge Deficit  Goal: Patient/family/caregiver demonstrates understanding of disease process, treatment plan, medications, and discharge instructions  Complete learning assessment and assess knowledge base    Interventions:  - Provide teaching at level of understanding  - Provide teaching via preferred learning methods  Outcome: Progressing

## 2018-11-15 DIAGNOSIS — I10 ESSENTIAL HYPERTENSION: ICD-10-CM

## 2018-11-15 RX ORDER — AMLODIPINE BESYLATE 2.5 MG/1
2.5 TABLET ORAL 2 TIMES DAILY
Qty: 180 TABLET | Refills: 1 | Status: SHIPPED | OUTPATIENT
Start: 2018-11-15 | End: 2019-04-28 | Stop reason: SDUPTHER

## 2018-11-15 NOTE — TELEPHONE ENCOUNTER
From: Ann Marie Lopez  Sent: 11/15/2018 3:04 PM EST  Subject: Medication Renewal Request    Ann Marie Lopez would like a refill of the following medications:     amLODIPine (NORVASC) 2 5 mg tablet Ruy Khan MD]   Patient Comment: [facility] has no refills remaining  Please fax a refill  Thank you  Preferred pharmacy: [facility]  : [id number]    This message is being sent by Rehan Bailon on behalf of Ann Marie Lopez

## 2018-12-20 DIAGNOSIS — K21.9 GASTROESOPHAGEAL REFLUX DISEASE WITHOUT ESOPHAGITIS: ICD-10-CM

## 2018-12-20 RX ORDER — OMEPRAZOLE 40 MG/1
40 CAPSULE, DELAYED RELEASE ORAL DAILY
Qty: 90 CAPSULE | Refills: 1 | Status: SHIPPED | OUTPATIENT
Start: 2018-12-20 | End: 2019-06-10 | Stop reason: SDUPTHER

## 2019-03-12 ENCOUNTER — OFFICE VISIT (OUTPATIENT)
Dept: SURGICAL ONCOLOGY | Facility: CLINIC | Age: 84
End: 2019-03-12
Payer: MEDICARE

## 2019-03-12 ENCOUNTER — OFFICE VISIT (OUTPATIENT)
Dept: HEMATOLOGY ONCOLOGY | Facility: CLINIC | Age: 84
End: 2019-03-12
Payer: MEDICARE

## 2019-03-12 VITALS
HEIGHT: 60 IN | BODY MASS INDEX: 30.63 KG/M2 | WEIGHT: 156 LBS | TEMPERATURE: 98.4 F | DIASTOLIC BLOOD PRESSURE: 68 MMHG | SYSTOLIC BLOOD PRESSURE: 132 MMHG | HEART RATE: 81 BPM | OXYGEN SATURATION: 95 % | RESPIRATION RATE: 18 BRPM

## 2019-03-12 VITALS
WEIGHT: 158 LBS | HEART RATE: 81 BPM | SYSTOLIC BLOOD PRESSURE: 130 MMHG | RESPIRATION RATE: 14 BRPM | DIASTOLIC BLOOD PRESSURE: 70 MMHG | BODY MASS INDEX: 31.02 KG/M2 | HEIGHT: 60 IN | TEMPERATURE: 98.3 F

## 2019-03-12 DIAGNOSIS — C50.912 MALIGNANT NEOPLASM OF LEFT BREAST IN FEMALE, ESTROGEN RECEPTOR NEGATIVE, UNSPECIFIED SITE OF BREAST (HCC): Primary | ICD-10-CM

## 2019-03-12 DIAGNOSIS — Z08 ENCOUNTER FOR FOLLOW-UP EXAMINATION AFTER COMPLETED TREATMENT FOR MALIGNANT NEOPLASM: Primary | ICD-10-CM

## 2019-03-12 DIAGNOSIS — Z17.1 MALIGNANT NEOPLASM OF LEFT BREAST IN FEMALE, ESTROGEN RECEPTOR NEGATIVE, UNSPECIFIED SITE OF BREAST (HCC): Primary | ICD-10-CM

## 2019-03-12 DIAGNOSIS — Z85.3 HISTORY OF LEFT BREAST CANCER: ICD-10-CM

## 2019-03-12 PROCEDURE — 99213 OFFICE O/P EST LOW 20 MIN: CPT | Performed by: NURSE PRACTITIONER

## 2019-03-12 PROCEDURE — 99214 OFFICE O/P EST MOD 30 MIN: CPT | Performed by: INTERNAL MEDICINE

## 2019-03-12 NOTE — PROGRESS NOTES
Hematology / Oncology Outpatient Follow Up Note    Angela Bhatia 80 y o  female :1925 [id number]         Date:  3/12/2019    Assessment / Plan:  A 80year old postmenopausal woman with stage IIA left breast cancer, grade 3, ER/LA negative, HER-2 3+ disease  She underwent left mastectomy with sentinel lymph node biopsy, resulting in DAKOTA  She has otherwise good health  She completed 1 year of adjuvant trastuzumab monotherapy without cardiac toxicity in 2018  She is currently on observation  Clinically, she has no evidence recurrent disease  I recommended her to continue with surveillance  Will see her again in a year for routine follow-up  She and her daughter are in agreement with my recommendations                             Subjective:      HPI:  A 77-year-old postmenopausal woman who recently noticed a lump and pain in the left breast which she brought to medical attention  She was found to have abnormality, radiographically in the left breast which was biopsied in 2017  She had invasive ductal carcinoma, grade 2, ER/LA negative, HER-2 negative disease  She subsequently underwent left mastectomy with sentinel node biopsy by Dr Sharona Epps and 2017  She had 3 0 cm of invasive ductal carcinoma, grade 3  Lymphovascular invasion was indeterminate  2 sentinel lymph nodes were negative for metastatic disease  She did not have reconstruction  She presents today with her son and daughter to discuss adjuvant treatment options  Despite her age, she has relatively good health  She only has hypertension, hypothyroidism and GERD as a past medical history  She currently feels well  She has no complaint of pain  She denied any respiratory symptoms  Her weight has been stable  She has no family history of breast or ovarian cancer  She is a lifetime never smoker   Her performance status is normal             Interval History:  A 80year old postmenopausal woman with stage IIA left breast cancer, grade 3, ER/UT negative, HER-2 3+ disease  She underwent left mastectomy with sentinel lymph node biopsy, resulting in DAKOTA  She was treated with 1 year of adjuvant trastuzumab monotherapy which was completed in November 2018 without cardiac toxicity  She is currently on observation  She presents today with her daughter for routine follow-up  She has absolutely no new complaint  Her last echocardiogram in October 2018 showed ejection fraction 60 %  She has no cough, sputum production or shortness of breast   Her weight is stable  She has no complaint of pain  Her performance status is normal                                       Objective:      Primary Diagnosis:     Left breast cancer, stage IIA(pT2, pN0,M0) grade 3, ER/UT negative, HER-2 3+ disease  Diagnosed in September 2017       Cancer Staging:  Cancer Staging  No matching staging information was found for the patient         Previous Hematologic/ Oncologic Treatment:       Adjuvant trastuzumab monotherapy for 1 year completed in November 2018      Current Hematologic/ Oncologic Treatment:       Observation      Disease Status:      DAKOTA status post mastectomy with sentinel lymph node biopsy      Test Results:     Pathology:     3 cm of invasive ductal carcinoma, grade 3  Lymphovascular invasion indeterminate  2 sentinel lymph node were negative for metastatic disease  ER/UT negative, HER-2 3+ disease  Stage IIA(pT2, pN0,M0)     Radiology:     Echocardiogram in  October 2018 showed ejection fraction 60%     Laboratory:           Physical Exam:        General Appearance:    Alert, oriented          Eyes:    PERRL   Ears:    Normal external ear canals, both ears   Nose:   Nares normal, septum midline   Throat:   Mucosa moist  Pharynx without injection      Neck:   Supple         Lungs:     Clear to auscultation bilaterally   Chest Wall:    No tenderness or deformity    Heart:    Regular rate and rhythm         Abdomen:     Soft, non-tender, bowel sounds +, no organomegaly               Extremities:   Extremities no cyanosis or edema         Skin:   no rash or icterus  Lymph nodes:   Cervical, supraclavicular, and axillary nodes normal   Neurologic:   CNII-XII intact, normal strength, sensation and reflexes     Throughout             Breast exam:    status post left mastectomy without reconstruction  No palpable abnormality in her left chest wall  Right breast exam is negative  ROS: Review of Systems   All other systems reviewed and are negative  Imaging: No results found  Labs:   Lab Results   Component Value Date    WBC 10 72 (H) 10/25/2017    HGB 12 8 10/25/2017    HCT 39 9 10/25/2017    MCV 92 10/25/2017     10/25/2017     Lab Results   Component Value Date     07/27/2015    K 4 4 10/12/2017     10/12/2017    CO2 26 10/12/2017    ANIONGAP 10 07/27/2015    BUN 18 10/12/2017    CREATININE 1 06 10/12/2017    GLUCOSE 186 (H) 07/27/2015    GLUF 112 (H) 09/06/2017    CALCIUM 8 3 10/12/2017    AST 24 10/12/2017    ALT 30 10/12/2017    ALKPHOS 85 10/12/2017    PROT 7 2 07/27/2015    BILITOT 0 54 07/27/2015    EGFR 46 10/12/2017         Current Medications: Reviewed  Allergies: Reviewed  PMH/FH/SH:  Reviewed      Vital Sign:    Body surface area is 1 67 meters squared      Wt Readings from Last 3 Encounters:   03/12/19 70 8 kg (156 lb)   11/08/18 71 7 kg (158 lb)   10/29/18 72 2 kg (159 lb 3 2 oz)        Temp Readings from Last 3 Encounters:   03/12/19 98 4 °F (36 9 °C) (Tympanic)   11/08/18 (!) 97 2 °F (36 2 °C) (Oral)   10/18/18 97 5 °F (36 4 °C) (Oral)        BP Readings from Last 3 Encounters:   03/12/19 132/68   11/08/18 140/70   10/29/18 132/74         Pulse Readings from Last 3 Encounters:   03/12/19 81   11/08/18 78   10/29/18 69     @LASTSAO2(3)@
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
## 2019-04-11 ENCOUNTER — APPOINTMENT (OUTPATIENT)
Dept: LAB | Facility: CLINIC | Age: 84
End: 2019-04-11
Payer: MEDICARE

## 2019-04-11 ENCOUNTER — OFFICE VISIT (OUTPATIENT)
Dept: INTERNAL MEDICINE CLINIC | Facility: CLINIC | Age: 84
End: 2019-04-11
Payer: MEDICARE

## 2019-04-11 VITALS
OXYGEN SATURATION: 98 % | HEART RATE: 106 BPM | TEMPERATURE: 98 F | DIASTOLIC BLOOD PRESSURE: 72 MMHG | SYSTOLIC BLOOD PRESSURE: 134 MMHG | WEIGHT: 158.6 LBS | RESPIRATION RATE: 18 BRPM | HEIGHT: 60 IN | BODY MASS INDEX: 31.14 KG/M2

## 2019-04-11 DIAGNOSIS — R00.2 PALPITATIONS: ICD-10-CM

## 2019-04-11 DIAGNOSIS — I82.622 DEEP VEIN THROMBOSIS (DVT) OF LEFT UPPER EXTREMITY, UNSPECIFIED CHRONICITY, UNSPECIFIED VEIN (HCC): ICD-10-CM

## 2019-04-11 DIAGNOSIS — I44.0 FIRST DEGREE AV BLOCK: ICD-10-CM

## 2019-04-11 DIAGNOSIS — H61.21 IMPACTED CERUMEN OF RIGHT EAR: ICD-10-CM

## 2019-04-11 DIAGNOSIS — I48.0 PAROXYSMAL ATRIAL FIBRILLATION (HCC): ICD-10-CM

## 2019-04-11 DIAGNOSIS — E03.9 ACQUIRED HYPOTHYROIDISM: ICD-10-CM

## 2019-04-11 DIAGNOSIS — E55.9 VITAMIN D DEFICIENCY: ICD-10-CM

## 2019-04-11 DIAGNOSIS — R26.2 AMBULATORY DYSFUNCTION: ICD-10-CM

## 2019-04-11 DIAGNOSIS — I48.0 PAROXYSMAL ATRIAL FIBRILLATION (HCC): Primary | ICD-10-CM

## 2019-04-11 DIAGNOSIS — Z79.899 ENCOUNTER FOR LONG-TERM CURRENT USE OF MEDICATION: ICD-10-CM

## 2019-04-11 DIAGNOSIS — K59.09 OTHER CONSTIPATION: ICD-10-CM

## 2019-04-11 DIAGNOSIS — I10 ESSENTIAL HYPERTENSION: ICD-10-CM

## 2019-04-11 DIAGNOSIS — K21.9 GASTROESOPHAGEAL REFLUX DISEASE WITHOUT ESOPHAGITIS: ICD-10-CM

## 2019-04-11 PROBLEM — K29.70 GASTRITIS AND GASTRODUODENITIS: Status: RESOLVED | Noted: 2018-08-14 | Resolved: 2019-04-11

## 2019-04-11 PROBLEM — K29.90 GASTRITIS AND GASTRODUODENITIS: Status: RESOLVED | Noted: 2018-08-14 | Resolved: 2019-04-11

## 2019-04-11 LAB
25(OH)D3 SERPL-MCNC: 41.9 NG/ML (ref 30–100)
ALBUMIN SERPL BCP-MCNC: 3.3 G/DL (ref 3.5–5)
ALP SERPL-CCNC: 79 U/L (ref 46–116)
ALT SERPL W P-5'-P-CCNC: 20 U/L (ref 12–78)
ANION GAP SERPL CALCULATED.3IONS-SCNC: 13 MMOL/L (ref 4–13)
AST SERPL W P-5'-P-CCNC: 32 U/L (ref 5–45)
BASOPHILS # BLD AUTO: 0.05 THOUSANDS/ΜL (ref 0–0.1)
BASOPHILS NFR BLD AUTO: 1 % (ref 0–1)
BILIRUB SERPL-MCNC: 0.3 MG/DL (ref 0.2–1)
BUN SERPL-MCNC: 18 MG/DL (ref 5–25)
CALCIUM SERPL-MCNC: 8.7 MG/DL (ref 8.3–10.1)
CHLORIDE SERPL-SCNC: 103 MMOL/L (ref 100–108)
CK SERPL-CCNC: 123 U/L (ref 26–192)
CO2 SERPL-SCNC: 23 MMOL/L (ref 21–32)
CREAT SERPL-MCNC: 1.12 MG/DL (ref 0.6–1.3)
ECG INTERP DURING EX: NORMAL MS
EOSINOPHIL # BLD AUTO: 0.17 THOUSAND/ΜL (ref 0–0.61)
EOSINOPHIL NFR BLD AUTO: 2 % (ref 0–6)
ERYTHROCYTE [DISTWIDTH] IN BLOOD BY AUTOMATED COUNT: 13.2 % (ref 11.6–15.1)
GFR SERPL CREATININE-BSD FRML MDRD: 42 ML/MIN/1.73SQ M
GLUCOSE SERPL-MCNC: 149 MG/DL (ref 65–140)
HCT VFR BLD AUTO: 41.7 % (ref 34.8–46.1)
HGB BLD-MCNC: 13.5 G/DL (ref 11.5–15.4)
IMM GRANULOCYTES # BLD AUTO: 0.04 THOUSAND/UL (ref 0–0.2)
IMM GRANULOCYTES NFR BLD AUTO: 0 % (ref 0–2)
LYMPHOCYTES # BLD AUTO: 3.42 THOUSANDS/ΜL (ref 0.6–4.47)
LYMPHOCYTES NFR BLD AUTO: 35 % (ref 14–44)
MCH RBC QN AUTO: 30.6 PG (ref 26.8–34.3)
MCHC RBC AUTO-ENTMCNC: 32.4 G/DL (ref 31.4–37.4)
MCV RBC AUTO: 95 FL (ref 82–98)
MONOCYTES # BLD AUTO: 1.05 THOUSAND/ΜL (ref 0.17–1.22)
MONOCYTES NFR BLD AUTO: 11 % (ref 4–12)
NEUTROPHILS # BLD AUTO: 5.18 THOUSANDS/ΜL (ref 1.85–7.62)
NEUTS SEG NFR BLD AUTO: 51 % (ref 43–75)
NRBC BLD AUTO-RTO: 0 /100 WBCS
PLATELET # BLD AUTO: 244 THOUSANDS/UL (ref 149–390)
PMV BLD AUTO: 10.6 FL (ref 8.9–12.7)
POTASSIUM SERPL-SCNC: 3.8 MMOL/L (ref 3.5–5.3)
PROT SERPL-MCNC: 7.6 G/DL (ref 6.4–8.2)
RBC # BLD AUTO: 4.41 MILLION/UL (ref 3.81–5.12)
SODIUM SERPL-SCNC: 139 MMOL/L (ref 136–145)
TSH SERPL DL<=0.05 MIU/L-ACNC: 2.59 UIU/ML (ref 0.36–3.74)
VIT B12 SERPL-MCNC: 479 PG/ML (ref 100–900)
WBC # BLD AUTO: 9.91 THOUSAND/UL (ref 4.31–10.16)

## 2019-04-11 PROCEDURE — 99214 OFFICE O/P EST MOD 30 MIN: CPT | Performed by: INTERNAL MEDICINE

## 2019-04-11 PROCEDURE — 82607 VITAMIN B-12: CPT | Performed by: INTERNAL MEDICINE

## 2019-04-11 PROCEDURE — 69210 REMOVE IMPACTED EAR WAX UNI: CPT | Performed by: INTERNAL MEDICINE

## 2019-04-11 PROCEDURE — 85025 COMPLETE CBC W/AUTO DIFF WBC: CPT

## 2019-04-11 PROCEDURE — 82550 ASSAY OF CK (CPK): CPT

## 2019-04-11 PROCEDURE — 82306 VITAMIN D 25 HYDROXY: CPT | Performed by: INTERNAL MEDICINE

## 2019-04-11 PROCEDURE — 80053 COMPREHEN METABOLIC PANEL: CPT

## 2019-04-11 PROCEDURE — 36415 COLL VENOUS BLD VENIPUNCTURE: CPT

## 2019-04-11 PROCEDURE — 93000 ELECTROCARDIOGRAM COMPLETE: CPT | Performed by: INTERNAL MEDICINE

## 2019-04-11 PROCEDURE — 84443 ASSAY THYROID STIM HORMONE: CPT | Performed by: INTERNAL MEDICINE

## 2019-04-12 ENCOUNTER — TELEPHONE (OUTPATIENT)
Dept: INTERNAL MEDICINE CLINIC | Facility: CLINIC | Age: 84
End: 2019-04-12

## 2019-04-18 DIAGNOSIS — E03.9 ACQUIRED HYPOTHYROIDISM: ICD-10-CM

## 2019-04-18 RX ORDER — LEVOTHYROXINE SODIUM 0.15 MG/1
150 TABLET ORAL DAILY
Qty: 90 TABLET | Refills: 1 | Status: SHIPPED | OUTPATIENT
Start: 2019-04-18 | End: 2019-09-23 | Stop reason: SDUPTHER

## 2019-04-23 ENCOUNTER — HOSPITAL ENCOUNTER (EMERGENCY)
Facility: HOSPITAL | Age: 84
Discharge: HOME | End: 2019-04-23
Attending: EMERGENCY MEDICINE
Payer: MEDICARE

## 2019-04-23 ENCOUNTER — APPOINTMENT (EMERGENCY)
Dept: CT IMAGING | Facility: HOSPITAL | Age: 84
End: 2019-04-23
Payer: MEDICARE

## 2019-04-23 VITALS
RESPIRATION RATE: 18 BRPM | HEART RATE: 72 BPM | SYSTOLIC BLOOD PRESSURE: 131 MMHG | DIASTOLIC BLOOD PRESSURE: 62 MMHG | TEMPERATURE: 98.4 F | HEIGHT: 60 IN | WEIGHT: 157.19 LBS | OXYGEN SATURATION: 94 % | BODY MASS INDEX: 30.86 KG/M2

## 2019-04-23 DIAGNOSIS — K52.9 ENTERITIS: ICD-10-CM

## 2019-04-23 DIAGNOSIS — R19.7 DIARRHEA: Primary | ICD-10-CM

## 2019-04-23 LAB
ALBUMIN SERPL BCP-MCNC: 3.1 G/DL (ref 3.5–5)
ALP SERPL-CCNC: 72 U/L (ref 46–116)
ALT SERPL W P-5'-P-CCNC: 19 U/L (ref 12–78)
ANION GAP SERPL CALCULATED.3IONS-SCNC: 13 MMOL/L (ref 4–13)
AST SERPL W P-5'-P-CCNC: 23 U/L (ref 5–45)
BASOPHILS # BLD AUTO: 0.02 THOUSANDS/ΜL (ref 0–0.1)
BASOPHILS NFR BLD AUTO: 0 % (ref 0–1)
BILIRUB SERPL-MCNC: 0.8 MG/DL (ref 0.2–1)
BUN SERPL-MCNC: 26 MG/DL (ref 5–25)
CALCIUM SERPL-MCNC: 8.4 MG/DL (ref 8.3–10.1)
CHLORIDE SERPL-SCNC: 103 MMOL/L (ref 100–108)
CO2 SERPL-SCNC: 22 MMOL/L (ref 21–32)
CREAT SERPL-MCNC: 0.93 MG/DL (ref 0.6–1.3)
EOSINOPHIL # BLD AUTO: 0.04 THOUSAND/ΜL (ref 0–0.61)
EOSINOPHIL NFR BLD AUTO: 0 % (ref 0–6)
ERYTHROCYTE [DISTWIDTH] IN BLOOD BY AUTOMATED COUNT: 13.7 % (ref 11.6–15.1)
GFR SERPL CREATININE-BSD FRML MDRD: 53 ML/MIN/1.73SQ M
GLUCOSE SERPL-MCNC: 104 MG/DL (ref 65–140)
HCT VFR BLD AUTO: 42.1 % (ref 34.8–46.1)
HGB BLD-MCNC: 13.7 G/DL (ref 11.5–15.4)
IMM GRANULOCYTES # BLD AUTO: 0.04 THOUSAND/UL (ref 0–0.2)
IMM GRANULOCYTES NFR BLD AUTO: 0 % (ref 0–2)
LYMPHOCYTES # BLD AUTO: 1.64 THOUSANDS/ΜL (ref 0.6–4.47)
LYMPHOCYTES NFR BLD AUTO: 16 % (ref 14–44)
MCH RBC QN AUTO: 30.6 PG (ref 26.8–34.3)
MCHC RBC AUTO-ENTMCNC: 32.5 G/DL (ref 31.4–37.4)
MCV RBC AUTO: 94 FL (ref 82–98)
MONOCYTES # BLD AUTO: 0.5 THOUSAND/ΜL (ref 0.17–1.22)
MONOCYTES NFR BLD AUTO: 5 % (ref 4–12)
NEUTROPHILS # BLD AUTO: 8.15 THOUSANDS/ΜL (ref 1.85–7.62)
NEUTS SEG NFR BLD AUTO: 79 % (ref 43–75)
NRBC BLD AUTO-RTO: 0 /100 WBCS
PLATELET # BLD AUTO: 249 THOUSANDS/UL (ref 149–390)
PMV BLD AUTO: 11 FL (ref 8.9–12.7)
POTASSIUM SERPL-SCNC: 4 MMOL/L (ref 3.5–5.3)
PROT SERPL-MCNC: 7.1 G/DL (ref 6.4–8.2)
RBC # BLD AUTO: 4.47 MILLION/UL (ref 3.81–5.12)
SODIUM SERPL-SCNC: 138 MMOL/L (ref 136–145)
WBC # BLD AUTO: 10.39 THOUSAND/UL (ref 4.31–10.16)

## 2019-04-23 PROCEDURE — 36415 COLL VENOUS BLD VENIPUNCTURE: CPT | Performed by: EMERGENCY MEDICINE

## 2019-04-23 PROCEDURE — 80053 COMPREHEN METABOLIC PANEL: CPT | Performed by: EMERGENCY MEDICINE

## 2019-04-23 PROCEDURE — 85025 COMPLETE CBC W/AUTO DIFF WBC: CPT | Performed by: EMERGENCY MEDICINE

## 2019-04-23 PROCEDURE — 99284 EMERGENCY DEPT VISIT MOD MDM: CPT

## 2019-04-23 PROCEDURE — 99283 EMERGENCY DEPT VISIT LOW MDM: CPT | Performed by: EMERGENCY MEDICINE

## 2019-04-23 PROCEDURE — 96361 HYDRATE IV INFUSION ADD-ON: CPT

## 2019-04-23 PROCEDURE — 74177 CT ABD & PELVIS W/CONTRAST: CPT

## 2019-04-23 PROCEDURE — 96360 HYDRATION IV INFUSION INIT: CPT

## 2019-04-23 RX ORDER — ASPIRIN 325 MG
325 TABLET ORAL DAILY
COMMUNITY
End: 2019-09-10 | Stop reason: ALTCHOICE

## 2019-04-23 RX ADMIN — IOHEXOL 50 ML: 240 INJECTION, SOLUTION INTRATHECAL; INTRAVASCULAR; INTRAVENOUS; ORAL at 13:16

## 2019-04-23 RX ADMIN — SODIUM CHLORIDE 1000 ML: 0.9 INJECTION, SOLUTION INTRAVENOUS at 13:16

## 2019-04-23 RX ADMIN — IOHEXOL 100 ML: 350 INJECTION, SOLUTION INTRAVENOUS at 15:04

## 2019-04-24 ENCOUNTER — TELEPHONE (OUTPATIENT)
Dept: INTERNAL MEDICINE CLINIC | Facility: CLINIC | Age: 84
End: 2019-04-24

## 2019-04-24 DIAGNOSIS — K59.09 OTHER CONSTIPATION: Primary | ICD-10-CM

## 2019-04-24 DIAGNOSIS — A04.8 H. PYLORI INFECTION: ICD-10-CM

## 2019-04-28 DIAGNOSIS — I10 ESSENTIAL HYPERTENSION: ICD-10-CM

## 2019-04-29 ENCOUNTER — APPOINTMENT (EMERGENCY)
Dept: CT IMAGING | Facility: HOSPITAL | Age: 84
DRG: 394 | End: 2019-04-29
Payer: MEDICARE

## 2019-04-29 ENCOUNTER — HOSPITAL ENCOUNTER (INPATIENT)
Facility: HOSPITAL | Age: 84
LOS: 1 days | Discharge: HOME | DRG: 394 | End: 2019-05-01
Attending: EMERGENCY MEDICINE | Admitting: INTERNAL MEDICINE
Payer: MEDICARE

## 2019-04-29 DIAGNOSIS — K62.5 RECTAL BLEEDING: Primary | ICD-10-CM

## 2019-04-29 DIAGNOSIS — K59.09 OTHER CONSTIPATION: ICD-10-CM

## 2019-04-29 LAB
ALBUMIN SERPL BCP-MCNC: 3 G/DL (ref 3.5–5)
ALP SERPL-CCNC: 60 U/L (ref 46–116)
ALT SERPL W P-5'-P-CCNC: 26 U/L (ref 12–78)
ANION GAP SERPL CALCULATED.3IONS-SCNC: 11 MMOL/L (ref 4–13)
AST SERPL W P-5'-P-CCNC: 24 U/L (ref 5–45)
BASOPHILS # BLD AUTO: 0.03 THOUSANDS/ΜL (ref 0–0.1)
BASOPHILS NFR BLD AUTO: 0 % (ref 0–1)
BILIRUB SERPL-MCNC: 0.4 MG/DL (ref 0.2–1)
BUN SERPL-MCNC: 20 MG/DL (ref 5–25)
CALCIUM SERPL-MCNC: 9 MG/DL (ref 8.3–10.1)
CHLORIDE SERPL-SCNC: 108 MMOL/L (ref 100–108)
CO2 SERPL-SCNC: 24 MMOL/L (ref 21–32)
CREAT SERPL-MCNC: 0.89 MG/DL (ref 0.6–1.3)
EOSINOPHIL # BLD AUTO: 0.13 THOUSAND/ΜL (ref 0–0.61)
EOSINOPHIL NFR BLD AUTO: 1 % (ref 0–6)
ERYTHROCYTE [DISTWIDTH] IN BLOOD BY AUTOMATED COUNT: 13.5 % (ref 11.6–15.1)
GFR SERPL CREATININE-BSD FRML MDRD: 56 ML/MIN/1.73SQ M
GLUCOSE SERPL-MCNC: 114 MG/DL (ref 65–140)
HCT VFR BLD AUTO: 37.1 % (ref 34.8–46.1)
HGB BLD-MCNC: 12.2 G/DL (ref 11.5–15.4)
IMM GRANULOCYTES # BLD AUTO: 0.03 THOUSAND/UL (ref 0–0.2)
IMM GRANULOCYTES NFR BLD AUTO: 0 % (ref 0–2)
INR PPP: 1.07 (ref 0.86–1.17)
LYMPHOCYTES # BLD AUTO: 3.18 THOUSANDS/ΜL (ref 0.6–4.47)
LYMPHOCYTES NFR BLD AUTO: 34 % (ref 14–44)
MCH RBC QN AUTO: 30.7 PG (ref 26.8–34.3)
MCHC RBC AUTO-ENTMCNC: 32.9 G/DL (ref 31.4–37.4)
MCV RBC AUTO: 93 FL (ref 82–98)
MONOCYTES # BLD AUTO: 0.95 THOUSAND/ΜL (ref 0.17–1.22)
MONOCYTES NFR BLD AUTO: 10 % (ref 4–12)
NEUTROPHILS # BLD AUTO: 4.92 THOUSANDS/ΜL (ref 1.85–7.62)
NEUTS SEG NFR BLD AUTO: 55 % (ref 43–75)
NRBC BLD AUTO-RTO: 0 /100 WBCS
PLATELET # BLD AUTO: 249 THOUSANDS/UL (ref 149–390)
PMV BLD AUTO: 10.5 FL (ref 8.9–12.7)
POTASSIUM SERPL-SCNC: 3.9 MMOL/L (ref 3.5–5.3)
PROT SERPL-MCNC: 6.6 G/DL (ref 6.4–8.2)
PROTHROMBIN TIME: 13.6 SECONDS (ref 11.8–14.2)
RBC # BLD AUTO: 3.98 MILLION/UL (ref 3.81–5.12)
SODIUM SERPL-SCNC: 143 MMOL/L (ref 136–145)
WBC # BLD AUTO: 9.24 THOUSAND/UL (ref 4.31–10.16)

## 2019-04-29 PROCEDURE — 85025 COMPLETE CBC W/AUTO DIFF WBC: CPT | Performed by: EMERGENCY MEDICINE

## 2019-04-29 PROCEDURE — 1123F ACP DISCUSS/DSCN MKR DOCD: CPT | Performed by: INTERNAL MEDICINE

## 2019-04-29 PROCEDURE — 86850 RBC ANTIBODY SCREEN: CPT | Performed by: EMERGENCY MEDICINE

## 2019-04-29 PROCEDURE — 99285 EMERGENCY DEPT VISIT HI MDM: CPT

## 2019-04-29 PROCEDURE — 83605 ASSAY OF LACTIC ACID: CPT | Performed by: EMERGENCY MEDICINE

## 2019-04-29 PROCEDURE — 86900 BLOOD TYPING SEROLOGIC ABO: CPT | Performed by: EMERGENCY MEDICINE

## 2019-04-29 PROCEDURE — 74174 CTA ABD&PLVS W/CONTRAST: CPT

## 2019-04-29 PROCEDURE — 80053 COMPREHEN METABOLIC PANEL: CPT | Performed by: EMERGENCY MEDICINE

## 2019-04-29 PROCEDURE — 36415 COLL VENOUS BLD VENIPUNCTURE: CPT | Performed by: EMERGENCY MEDICINE

## 2019-04-29 PROCEDURE — 99284 EMERGENCY DEPT VISIT MOD MDM: CPT | Performed by: EMERGENCY MEDICINE

## 2019-04-29 PROCEDURE — 86901 BLOOD TYPING SEROLOGIC RH(D): CPT | Performed by: EMERGENCY MEDICINE

## 2019-04-29 PROCEDURE — 85610 PROTHROMBIN TIME: CPT | Performed by: EMERGENCY MEDICINE

## 2019-04-29 RX ORDER — AMLODIPINE BESYLATE 2.5 MG/1
2.5 TABLET ORAL 2 TIMES DAILY
Qty: 180 TABLET | Refills: 1 | Status: SHIPPED | OUTPATIENT
Start: 2019-04-29 | End: 2019-06-10 | Stop reason: SDUPTHER

## 2019-04-30 PROBLEM — I50.32 CHRONIC DIASTOLIC HEART FAILURE (HCC): Chronic | Status: ACTIVE | Noted: 2019-04-30

## 2019-04-30 PROBLEM — E87.20 LACTIC ACIDOSIS: Status: ACTIVE | Noted: 2019-04-30

## 2019-04-30 PROBLEM — E87.2 LACTIC ACIDOSIS: Status: ACTIVE | Noted: 2019-04-30

## 2019-04-30 PROBLEM — K62.5 RECTAL BLEEDING: Status: ACTIVE | Noted: 2019-04-30

## 2019-04-30 LAB
ABO GROUP BLD: NORMAL
ANION GAP SERPL CALCULATED.3IONS-SCNC: 10 MMOL/L (ref 4–13)
BLD GP AB SCN SERPL QL: NEGATIVE
BUN SERPL-MCNC: 16 MG/DL (ref 5–25)
CALCIUM SERPL-MCNC: 8.9 MG/DL (ref 8.3–10.1)
CHLORIDE SERPL-SCNC: 107 MMOL/L (ref 100–108)
CO2 SERPL-SCNC: 24 MMOL/L (ref 21–32)
CREAT SERPL-MCNC: 0.76 MG/DL (ref 0.6–1.3)
GFR SERPL CREATININE-BSD FRML MDRD: 68 ML/MIN/1.73SQ M
GLUCOSE P FAST SERPL-MCNC: 96 MG/DL (ref 65–99)
GLUCOSE SERPL-MCNC: 96 MG/DL (ref 65–140)
HGB BLD-MCNC: 12.9 G/DL (ref 11.5–15.4)
LACTATE SERPL-SCNC: 1.2 MMOL/L (ref 0.5–2)
LACTATE SERPL-SCNC: 2.4 MMOL/L (ref 0.5–2)
POTASSIUM SERPL-SCNC: 4.1 MMOL/L (ref 3.5–5.3)
RH BLD: POSITIVE
SODIUM SERPL-SCNC: 141 MMOL/L (ref 136–145)
SPECIMEN EXPIRATION DATE: NORMAL

## 2019-04-30 PROCEDURE — 36415 COLL VENOUS BLD VENIPUNCTURE: CPT | Performed by: EMERGENCY MEDICINE

## 2019-04-30 PROCEDURE — 96360 HYDRATION IV INFUSION INIT: CPT

## 2019-04-30 PROCEDURE — 99220 PR INITIAL OBSERVATION CARE/DAY 70 MINUTES: CPT | Performed by: INTERNAL MEDICINE

## 2019-04-30 PROCEDURE — 80048 BASIC METABOLIC PNL TOTAL CA: CPT | Performed by: PHYSICIAN ASSISTANT

## 2019-04-30 PROCEDURE — 85018 HEMOGLOBIN: CPT | Performed by: PHYSICIAN ASSISTANT

## 2019-04-30 PROCEDURE — 99214 OFFICE O/P EST MOD 30 MIN: CPT | Performed by: INTERNAL MEDICINE

## 2019-04-30 PROCEDURE — 83605 ASSAY OF LACTIC ACID: CPT | Performed by: EMERGENCY MEDICINE

## 2019-04-30 RX ORDER — LEVOTHYROXINE SODIUM 0.15 MG/1
150 TABLET ORAL
Status: DISCONTINUED | OUTPATIENT
Start: 2019-04-30 | End: 2019-05-01 | Stop reason: HOSPADM

## 2019-04-30 RX ORDER — ACETAMINOPHEN 325 MG/1
650 TABLET ORAL EVERY 6 HOURS PRN
Status: DISCONTINUED | OUTPATIENT
Start: 2019-04-30 | End: 2019-05-01 | Stop reason: HOSPADM

## 2019-04-30 RX ORDER — AMLODIPINE BESYLATE 2.5 MG/1
2.5 TABLET ORAL 2 TIMES DAILY
Status: DISCONTINUED | OUTPATIENT
Start: 2019-04-30 | End: 2019-05-01 | Stop reason: HOSPADM

## 2019-04-30 RX ORDER — PANTOPRAZOLE SODIUM 40 MG/1
40 TABLET, DELAYED RELEASE ORAL
Status: DISCONTINUED | OUTPATIENT
Start: 2019-04-30 | End: 2019-05-01 | Stop reason: HOSPADM

## 2019-04-30 RX ORDER — SODIUM CHLORIDE 9 MG/ML
50 INJECTION, SOLUTION INTRAVENOUS CONTINUOUS
Status: DISCONTINUED | OUTPATIENT
Start: 2019-04-30 | End: 2019-05-01

## 2019-04-30 RX ORDER — ONDANSETRON 2 MG/ML
4 INJECTION INTRAMUSCULAR; INTRAVENOUS EVERY 6 HOURS PRN
Status: DISCONTINUED | OUTPATIENT
Start: 2019-04-30 | End: 2019-05-01 | Stop reason: HOSPADM

## 2019-04-30 RX ORDER — DOCUSATE SODIUM 100 MG/1
100 CAPSULE, LIQUID FILLED ORAL 2 TIMES DAILY
Status: DISCONTINUED | OUTPATIENT
Start: 2019-04-30 | End: 2019-05-01 | Stop reason: HOSPADM

## 2019-04-30 RX ORDER — POLYVINYL ALCOHOL 14 MG/ML
1 SOLUTION/ DROPS OPHTHALMIC AS NEEDED
Status: DISCONTINUED | OUTPATIENT
Start: 2019-04-30 | End: 2019-05-01 | Stop reason: HOSPADM

## 2019-04-30 RX ORDER — MELATONIN
2000 DAILY
Status: DISCONTINUED | OUTPATIENT
Start: 2019-04-30 | End: 2019-05-01 | Stop reason: HOSPADM

## 2019-04-30 RX ADMIN — PANTOPRAZOLE SODIUM 40 MG: 40 TABLET, DELAYED RELEASE ORAL at 17:30

## 2019-04-30 RX ADMIN — AMLODIPINE BESYLATE 2.5 MG: 2.5 TABLET ORAL at 08:01

## 2019-04-30 RX ADMIN — METOPROLOL TARTRATE 12.5 MG: 25 TABLET ORAL at 08:01

## 2019-04-30 RX ADMIN — AMLODIPINE BESYLATE 2.5 MG: 2.5 TABLET ORAL at 17:31

## 2019-04-30 RX ADMIN — DOCUSATE SODIUM 100 MG: 100 CAPSULE, LIQUID FILLED ORAL at 08:01

## 2019-04-30 RX ADMIN — PANTOPRAZOLE SODIUM 40 MG: 40 TABLET, DELAYED RELEASE ORAL at 06:03

## 2019-04-30 RX ADMIN — SODIUM CHLORIDE 500 ML: 0.9 INJECTION, SOLUTION INTRAVENOUS at 00:03

## 2019-04-30 RX ADMIN — SODIUM CHLORIDE 50 ML/HR: 0.9 INJECTION, SOLUTION INTRAVENOUS at 19:50

## 2019-04-30 RX ADMIN — LEVOTHYROXINE SODIUM 150 MCG: 150 TABLET ORAL at 06:03

## 2019-04-30 RX ADMIN — SODIUM CHLORIDE 50 ML/HR: 0.9 INJECTION, SOLUTION INTRAVENOUS at 05:38

## 2019-04-30 RX ADMIN — VITAMIN D, TAB 1000IU (100/BT) 2000 UNITS: 25 TAB at 08:01

## 2019-04-30 RX ADMIN — METOPROLOL TARTRATE 12.5 MG: 25 TABLET ORAL at 17:31

## 2019-04-30 RX ADMIN — IOHEXOL 100 ML: 350 INJECTION, SOLUTION INTRAVENOUS at 00:38

## 2019-05-01 VITALS
OXYGEN SATURATION: 95 % | SYSTOLIC BLOOD PRESSURE: 149 MMHG | DIASTOLIC BLOOD PRESSURE: 98 MMHG | WEIGHT: 166.67 LBS | RESPIRATION RATE: 18 BRPM | HEART RATE: 69 BPM | TEMPERATURE: 98 F | HEIGHT: 61 IN | BODY MASS INDEX: 31.47 KG/M2

## 2019-05-01 PROBLEM — E87.2 LACTIC ACIDOSIS: Status: RESOLVED | Noted: 2019-04-30 | Resolved: 2019-05-01

## 2019-05-01 PROBLEM — E87.20 LACTIC ACIDOSIS: Status: RESOLVED | Noted: 2019-04-30 | Resolved: 2019-05-01

## 2019-05-01 LAB
HCT VFR BLD AUTO: 35.7 % (ref 34.8–46.1)
HGB BLD-MCNC: 11.4 G/DL (ref 11.5–15.4)

## 2019-05-01 PROCEDURE — 99239 HOSP IP/OBS DSCHRG MGMT >30: CPT | Performed by: PHYSICIAN ASSISTANT

## 2019-05-01 PROCEDURE — 85018 HEMOGLOBIN: CPT | Performed by: PHYSICIAN ASSISTANT

## 2019-05-01 PROCEDURE — 85014 HEMATOCRIT: CPT | Performed by: PHYSICIAN ASSISTANT

## 2019-05-01 RX ORDER — SENNOSIDES 8.6 MG
2 TABLET ORAL 2 TIMES DAILY
Status: DISCONTINUED | OUTPATIENT
Start: 2019-05-01 | End: 2019-05-01 | Stop reason: HOSPADM

## 2019-05-01 RX ORDER — DOCUSATE SODIUM 100 MG/1
100 CAPSULE, LIQUID FILLED ORAL 2 TIMES DAILY
Qty: 10 CAPSULE | Refills: 0 | Status: SHIPPED | OUTPATIENT
Start: 2019-05-01 | End: 2019-05-09 | Stop reason: SDUPTHER

## 2019-05-01 RX ORDER — SENNOSIDES 8.6 MG
2 TABLET ORAL 2 TIMES DAILY
Qty: 120 EACH | Refills: 0 | Status: SHIPPED | OUTPATIENT
Start: 2019-05-01 | End: 2019-05-09

## 2019-05-01 RX ADMIN — METOPROLOL TARTRATE 12.5 MG: 25 TABLET ORAL at 08:25

## 2019-05-01 RX ADMIN — LEVOTHYROXINE SODIUM 150 MCG: 150 TABLET ORAL at 06:08

## 2019-05-01 RX ADMIN — DOCUSATE SODIUM 100 MG: 100 CAPSULE, LIQUID FILLED ORAL at 08:25

## 2019-05-01 RX ADMIN — PANTOPRAZOLE SODIUM 40 MG: 40 TABLET, DELAYED RELEASE ORAL at 06:08

## 2019-05-01 RX ADMIN — AMLODIPINE BESYLATE 2.5 MG: 2.5 TABLET ORAL at 08:25

## 2019-05-01 RX ADMIN — VITAMIN D, TAB 1000IU (100/BT) 2000 UNITS: 25 TAB at 08:25

## 2019-05-02 ENCOUNTER — TRANSITIONAL CARE MANAGEMENT (OUTPATIENT)
Dept: INTERNAL MEDICINE CLINIC | Facility: CLINIC | Age: 84
End: 2019-05-02

## 2019-05-09 ENCOUNTER — APPOINTMENT (OUTPATIENT)
Dept: LAB | Facility: CLINIC | Age: 84
End: 2019-05-09
Payer: MEDICARE

## 2019-05-09 ENCOUNTER — OFFICE VISIT (OUTPATIENT)
Dept: INTERNAL MEDICINE CLINIC | Facility: CLINIC | Age: 84
End: 2019-05-09
Payer: MEDICARE

## 2019-05-09 VITALS
WEIGHT: 156.6 LBS | OXYGEN SATURATION: 98 % | SYSTOLIC BLOOD PRESSURE: 132 MMHG | HEIGHT: 61 IN | HEART RATE: 64 BPM | TEMPERATURE: 98.1 F | DIASTOLIC BLOOD PRESSURE: 76 MMHG | RESPIRATION RATE: 18 BRPM | BODY MASS INDEX: 29.57 KG/M2

## 2019-05-09 DIAGNOSIS — I48.0 PAROXYSMAL ATRIAL FIBRILLATION (HCC): ICD-10-CM

## 2019-05-09 DIAGNOSIS — K44.9 HIATAL HERNIA: ICD-10-CM

## 2019-05-09 DIAGNOSIS — K59.09 OTHER CONSTIPATION: Primary | ICD-10-CM

## 2019-05-09 DIAGNOSIS — K62.5 RECTAL BLEEDING: ICD-10-CM

## 2019-05-09 LAB
HCT VFR BLD AUTO: 40.8 % (ref 34.8–46.1)
HGB BLD-MCNC: 13.3 G/DL (ref 11.5–15.4)

## 2019-05-09 PROCEDURE — 85018 HEMOGLOBIN: CPT

## 2019-05-09 PROCEDURE — 36415 COLL VENOUS BLD VENIPUNCTURE: CPT

## 2019-05-09 PROCEDURE — 85014 HEMATOCRIT: CPT

## 2019-05-09 PROCEDURE — 99495 TRANSJ CARE MGMT MOD F2F 14D: CPT | Performed by: INTERNAL MEDICINE

## 2019-05-09 RX ORDER — DOCUSATE SODIUM 100 MG/1
100 CAPSULE, LIQUID FILLED ORAL DAILY
Qty: 90 CAPSULE | Refills: 1 | Status: SHIPPED | OUTPATIENT
Start: 2019-05-09 | End: 2019-09-26

## 2019-05-13 ENCOUNTER — OFFICE VISIT (OUTPATIENT)
Dept: CARDIOLOGY CLINIC | Facility: CLINIC | Age: 84
End: 2019-05-13
Payer: MEDICARE

## 2019-05-13 VITALS
SYSTOLIC BLOOD PRESSURE: 130 MMHG | DIASTOLIC BLOOD PRESSURE: 70 MMHG | WEIGHT: 158.2 LBS | BODY MASS INDEX: 31.06 KG/M2 | HEIGHT: 60 IN | HEART RATE: 64 BPM | OXYGEN SATURATION: 96 %

## 2019-05-13 DIAGNOSIS — I48.0 PAROXYSMAL ATRIAL FIBRILLATION (HCC): ICD-10-CM

## 2019-05-13 DIAGNOSIS — E78.5 DYSLIPIDEMIA: ICD-10-CM

## 2019-05-13 DIAGNOSIS — I44.0 FIRST DEGREE AV BLOCK: ICD-10-CM

## 2019-05-13 DIAGNOSIS — I10 ESSENTIAL HYPERTENSION: Primary | ICD-10-CM

## 2019-05-13 PROCEDURE — 99214 OFFICE O/P EST MOD 30 MIN: CPT | Performed by: INTERNAL MEDICINE

## 2019-06-10 DIAGNOSIS — I10 ESSENTIAL HYPERTENSION: ICD-10-CM

## 2019-06-10 DIAGNOSIS — K21.9 GASTROESOPHAGEAL REFLUX DISEASE WITHOUT ESOPHAGITIS: ICD-10-CM

## 2019-06-10 RX ORDER — AMLODIPINE BESYLATE 2.5 MG/1
2.5 TABLET ORAL 2 TIMES DAILY
Qty: 180 TABLET | Refills: 1 | Status: SHIPPED | OUTPATIENT
Start: 2019-06-10 | End: 2019-09-23 | Stop reason: SDUPTHER

## 2019-06-10 RX ORDER — OMEPRAZOLE 40 MG/1
40 CAPSULE, DELAYED RELEASE ORAL DAILY
Qty: 90 CAPSULE | Refills: 1 | Status: SHIPPED | OUTPATIENT
Start: 2019-06-10 | End: 2019-12-05 | Stop reason: SDUPTHER

## 2019-07-17 ENCOUNTER — OFFICE VISIT (OUTPATIENT)
Dept: GASTROENTEROLOGY | Facility: CLINIC | Age: 84
End: 2019-07-17
Payer: MEDICARE

## 2019-07-17 VITALS
BODY MASS INDEX: 30.82 KG/M2 | DIASTOLIC BLOOD PRESSURE: 62 MMHG | TEMPERATURE: 97.4 F | HEIGHT: 60 IN | RESPIRATION RATE: 16 BRPM | WEIGHT: 157 LBS | SYSTOLIC BLOOD PRESSURE: 120 MMHG | HEART RATE: 72 BPM

## 2019-07-17 DIAGNOSIS — K21.9 GASTROESOPHAGEAL REFLUX DISEASE, ESOPHAGITIS PRESENCE NOT SPECIFIED: ICD-10-CM

## 2019-07-17 DIAGNOSIS — K62.5 RECTAL BLEEDING: Primary | ICD-10-CM

## 2019-07-17 PROCEDURE — 99213 OFFICE O/P EST LOW 20 MIN: CPT | Performed by: INTERNAL MEDICINE

## 2019-07-17 PROCEDURE — 1124F ACP DISCUSS-NO DSCNMKR DOCD: CPT | Performed by: INTERNAL MEDICINE

## 2019-07-17 NOTE — PROGRESS NOTES
[facility]  Alex Bloom 80 y o  female MRN: [id number]            Assessment & Plan:    Very pleasant 80-year-old female here for follow-up from recent hospital today with episode of rectal bleeding, history of reflux  1  Rectal bleeding:  Self-limited in the setting of constipation, no further evidence of bleeding  -patient would like to avoid any invasive procedures at this time  -given her age and relative stability, stable hemoglobin and resolution of symptoms with a daily stool softener I think this is reasonable  -she was instructed to give us a call for any recurrent rectal bleeding which would then prompt colonoscopy    2  GERD with belching:  Relatively well controlled symptoms with daily PPI therapy  -patient does not want to pursue any invasive testing unless absolutely necessary  -offered her the option of switching to an H2 blocker, she prefers to remain on PPI therapy given that her symptoms have been very well controlled with this  -discussed with a very small and unlikely potential side effects of these medications and I am in agreement plan to continue with daily PPI therapy  -patient was instructed to give us call with any changes symptoms                _____________________________________________________________        CC: Follow-up    HPI:  Alex Bloom is a 80 y  o female who is here for follow-up  As you know this is a pleasant 80year-old female, she just turned 80 today here for follow-up of recent hospital stay  At that time she presented with rectal bleeding setting of constipation  She notes that since discharge she has had no further rectal bleeding, she reports that as long she takes a stool softener at least every other day she has regular, formed soft bowel movements with no further bleeding  Her appetite is good, denies any abdominal pain, nausea, vomiting  She does have some belching    She has been on PPI therapy for many years and has a known large hiatal hernia seen on her CT scan  Denies any dysphagia  She presents today with her daughter  Overall reports she is doing very well      ROS:  The remainder of the ROS was negative except for the pertinent positives mentioned in HPI  Allergies: Atorvastatin and Bisphosphonates    Medications:   Current Outpatient Medications:     amLODIPine (NORVASC) 2 5 mg tablet, Take 1 tablet (2 5 mg total) by mouth 2 (two) times a day, Disp: 180 tablet, Rfl: 1    Carboxymethylcellul-Glycerin (REFRESH OPTIVE) 0 5-0 9 % SOLN, Apply to eye Drops to b/l eyes, Disp: , Rfl:     Cholecalciferol (D 1000) 1000 units capsule, Take by mouth, Disp: , Rfl:     Cholecalciferol (VITAMIN D-3) 1000 UNITS CAPS, Take 2,000 Units by mouth daily  , Disp: , Rfl:     docusate sodium (COLACE) 100 mg capsule, Take 1 capsule (100 mg total) by mouth daily, Disp: 90 capsule, Rfl: 1    levothyroxine (SYNTHROID) 150 mcg tablet, Take 1 tablet (150 mcg total) by mouth daily, Disp: 90 tablet, Rfl: 1    metoprolol tartrate (LOPRESSOR) 25 mg tablet, Take 0 5 tablets (12 5 mg total) by mouth 2 (two) times a day, Disp: 90 tablet, Rfl: 1    Multiple Vitamins-Minerals (CENTRUM SILVER PO), Take 1 tablet by mouth daily  , Disp: , Rfl:     Multiple Vitamins-Minerals (PRESERVISION AREDS 2) CAPS, Take 1 capsule by mouth daily, Disp: 90 capsule, Rfl: 0    omeprazole (PriLOSEC) 40 MG capsule, Take 1 capsule (40 mg total) by mouth daily, Disp: 90 capsule, Rfl: 1    aspirin 325 mg tablet, Take 325 mg by mouth daily, Disp: , Rfl:     Past Medical History:   Diagnosis Date    AAA (abdominal aortic aneurysm) (HCC)     Acute medial meniscus tear     Aspiration pneumonia (HCC)     LAST ASSESSED: 7/30/14    Breast CA ([facility] [address] )     Chest pain     RESOLVED: 1/31/17    CTS (carpal tunnel syndrome)     Depression     Dermatitis     LAST ASSESSED: 7/25/13    Disease of thyroid gland     Fainting     LAST ASSESSED: 3/15/13    GERD (gastroesophageal reflux disease)     H  pylori infection 3/17/2009    Hypertension     Incomplete defecation     LAST ASSESSED: 7/25/13    Macular degeneration     Osteoporosis     Pneumonia     Vertigo 2012       Past Surgical History:   Procedure Laterality Date    APPENDECTOMY      CHOLECYSTECTOMY      COLONOSCOPY      MASTECTOMY Left     SIMPLE    [place] INTRAOPERATIVE SENTINEL LYMPH NODE ID W DYE INJECTION Left 9/5/2017    Procedure: INTRAOPERATIVE LYMPHATIC MAPPING; BLUE DYE ONLY; [address] LYMPH NODE BIOPSY;  Surgeon: Rola Santo MD;  [facility];  Service: Surgical Oncology    [place] MASTECTOMY, SIMPLE, COMPLETE Left 9/5/2017    Procedure: BREAST MASTECTOMY;  Surgeon: Rola Santo MD;  [facility];  Service: Surgical Oncology    SENTINEL LYMPH NODE BIOPSY      US GUIDED BREAST BIOPSY LEFT COMPLETE Left 8/14/2017       Family History   Problem Relation Age of Onset    Angina Mother         PECTORIS    Alcohol abuse Neg Hx     Depression Neg Hx     Drug abuse Neg Hx     Substance Abuse Neg Hx         reports that she has never smoked  She has never used smokeless tobacco  She reports that she does not drink alcohol or use drugs        Physical Exam:    /62 (BP Location: Left arm, Patient Position: Sitting, Cuff Size: Standard)   Pulse 72   Temp (!) 97 4 °F (36 3 °C) (Tympanic)   Resp 16   Ht 4' 11 5" (1 511 m)   Wt 71 2 kg (157 lb)   LMP  (LMP Unknown)   BMI 31 18 kg/m²     Gen: wn/wd, NAD, very pleasant elderly female  HEENT: anicteric, MMM, no cervical LAD  CVS: RRR, no m/r/g  CHEST: CTA b/l  ABD: +BS, soft, NT,ND, no hepatosplenomegaly  EXT: no c/c/e  NEURO: aaox3  SKIN: NO rashes

## 2019-07-17 NOTE — LETTER
July 17, 2019     Ralph Rice MD  [address]  [id number] [place] [address]    Patient: Guillermo Choudhary   YOB: 1925   Date of Visit: 7/17/2019       Dear Dr Rowdy Hollingsworth: Thank you for referring Guillermo Choudhary to me for evaluation  Below are my notes for this consultation  If you have questions, please do not hesitate to call me  I look forward to following your patient along with you  Sincerely,        Ethel Dunham MD        CC: No Recipients  Ethel Dunham MD  7/17/2019 12:55 PM  Sign at close encounter  [facility]  Guillermo Choudhary 80 y o  female MRN: [id number]            Assessment & Plan:    Very pleasant 49-year-old female here for follow-up from recent hospital today with episode of rectal bleeding, history of reflux  1  Rectal bleeding:  Self-limited in the setting of constipation, no further evidence of bleeding  -patient would like to avoid any invasive procedures at this time  -given her age and relative stability, stable hemoglobin and resolution of symptoms with a daily stool softener I think this is reasonable  -she was instructed to give us a call for any recurrent rectal bleeding which would then prompt colonoscopy    2  GERD with belching:  Relatively well controlled symptoms with daily PPI therapy  -patient does not want to pursue any invasive testing unless absolutely necessary  -offered her the option of switching to an H2 blocker, she prefers to remain on PPI therapy given that her symptoms have been very well controlled with this  -discussed with a very small and unlikely potential side effects of these medications and I am in agreement plan to continue with daily PPI therapy  -patient was instructed to give us call with any changes symptoms                _____________________________________________________________        CC: Follow-up    HPI:  Guillermo Choudhary is a 80 y  o female who is here for follow-up    As you know this is a pleasant 80year-old female, she just turned 80 today here for follow-up of recent hospital stay  At that time she presented with rectal bleeding setting of constipation  She notes that since discharge she has had no further rectal bleeding, she reports that as long she takes a stool softener at least every other day she has regular, formed soft bowel movements with no further bleeding  Her appetite is good, denies any abdominal pain, nausea, vomiting  She does have some belching  She has been on PPI therapy for many years and has a known large hiatal hernia seen on her CT scan  Denies any dysphagia  She presents today with her daughter  Overall reports she is doing very well      ROS:  The remainder of the ROS was negative except for the pertinent positives mentioned in HPI  Allergies: Atorvastatin and Bisphosphonates    Medications:   Current Outpatient Medications:     amLODIPine (NORVASC) 2 5 mg tablet, Take 1 tablet (2 5 mg total) by mouth 2 (two) times a day, Disp: 180 tablet, Rfl: 1    Carboxymethylcellul-Glycerin (REFRESH OPTIVE) 0 5-0 9 % SOLN, Apply to eye Drops to b/l eyes, Disp: , Rfl:     Cholecalciferol (D 1000) 1000 units capsule, Take by mouth, Disp: , Rfl:     Cholecalciferol (VITAMIN D-3) 1000 UNITS CAPS, Take 2,000 Units by mouth daily  , Disp: , Rfl:     docusate sodium (COLACE) 100 mg capsule, Take 1 capsule (100 mg total) by mouth daily, Disp: 90 capsule, Rfl: 1    levothyroxine (SYNTHROID) 150 mcg tablet, Take 1 tablet (150 mcg total) by mouth daily, Disp: 90 tablet, Rfl: 1    metoprolol tartrate (LOPRESSOR) 25 mg tablet, Take 0 5 tablets (12 5 mg total) by mouth 2 (two) times a day, Disp: 90 tablet, Rfl: 1    Multiple Vitamins-Minerals (CENTRUM SILVER PO), Take 1 tablet by mouth daily  , Disp: , Rfl:     Multiple Vitamins-Minerals (PRESERVISION AREDS 2) CAPS, Take 1 capsule by mouth daily, Disp: 90 capsule, Rfl: 0    omeprazole (PriLOSEC) 40 MG capsule, Take 1 capsule (40 mg total) by mouth daily, Disp: 90 capsule, Rfl: 1    aspirin 325 mg tablet, Take 325 mg by mouth daily, Disp: , Rfl:     Past Medical History:   Diagnosis Date    AAA (abdominal aortic aneurysm) (HCC)     Acute medial meniscus tear     Aspiration pneumonia ([facility] [address] )     LAST ASSESSED: 7/30/14    Breast CA ([facility] [address] )     Chest pain     RESOLVED: 1/31/17    CTS (carpal tunnel syndrome)     Depression     Dermatitis     LAST ASSESSED: 7/25/13    Disease of thyroid gland     Fainting     LAST ASSESSED: 3/15/13    GERD (gastroesophageal reflux disease)     H  pylori infection 3/17/2009    Hypertension     Incomplete defecation     LAST ASSESSED: 7/25/13    Macular degeneration     Osteoporosis     Pneumonia     Vertigo 2012       Past Surgical History:   Procedure Laterality Date    APPENDECTOMY      CHOLECYSTECTOMY      COLONOSCOPY      MASTECTOMY Left     SIMPLE    [place] INTRAOPERATIVE SENTINEL LYMPH NODE ID W DYE INJECTION Left 9/5/2017    Procedure: INTRAOPERATIVE LYMPHATIC MAPPING; BLUE DYE ONLY; [address] LYMPH NODE BIOPSY;  Surgeon: Lorri Marsh MD;  [facility];  Service: Surgical Oncology    [place] MASTECTOMY, SIMPLE, COMPLETE Left 9/5/2017    Procedure: BREAST MASTECTOMY;  Surgeon: Lorri Marsh MD;  [facility];  Service: Surgical Oncology    SENTINEL LYMPH NODE BIOPSY      US GUIDED BREAST BIOPSY LEFT COMPLETE Left 8/14/2017       Family History   Problem Relation Age of Onset    Angina Mother         PECTORIS    Alcohol abuse Neg Hx     Depression Neg Hx     Drug abuse Neg Hx     Substance Abuse Neg Hx         reports that she has never smoked  She has never used smokeless tobacco  She reports that she does not drink alcohol or use drugs        Physical Exam:    /62 (BP Location: Left arm, Patient Position: Sitting, Cuff Size: Standard)   Pulse 72   Temp (!) 97 4 °F (36 3 °C) (Tympanic)   Resp 16   Ht 4' 11 5" (1 511 m)   Wt 71 2 kg (157 lb)   LMP  (LMP Unknown) BMI 31 18 kg/m²      Gen: wn/wd, NAD, very pleasant elderly female  HEENT: anicteric, MMM, no cervical LAD  CVS: RRR, no m/r/g  CHEST: CTA b/l  ABD: +BS, soft, NT,ND, no hepatosplenomegaly  EXT: no c/c/e  NEURO: aaox3  SKIN: NO rashes

## 2019-08-26 ENCOUNTER — HOSPITAL ENCOUNTER (OUTPATIENT)
Dept: MAMMOGRAPHY | Facility: CLINIC | Age: 84
Discharge: HOME | End: 2019-08-26
Payer: MEDICARE

## 2019-08-26 VITALS — HEIGHT: 60 IN | WEIGHT: 157 LBS | BODY MASS INDEX: 30.82 KG/M2

## 2019-08-26 DIAGNOSIS — Z85.3 HISTORY OF LEFT BREAST CANCER: ICD-10-CM

## 2019-08-26 PROCEDURE — 77065 DX MAMMO INCL CAD UNI: CPT

## 2019-08-26 PROCEDURE — G0279 TOMOSYNTHESIS, MAMMO: HCPCS

## 2019-09-10 ENCOUNTER — OFFICE VISIT (OUTPATIENT)
Dept: INTERNAL MEDICINE CLINIC | Facility: CLINIC | Age: 84
End: 2019-09-10
Payer: MEDICARE

## 2019-09-10 VITALS
HEIGHT: 60 IN | OXYGEN SATURATION: 96 % | BODY MASS INDEX: 31.22 KG/M2 | WEIGHT: 159 LBS | TEMPERATURE: 97.8 F | HEART RATE: 74 BPM | SYSTOLIC BLOOD PRESSURE: 128 MMHG | RESPIRATION RATE: 18 BRPM | DIASTOLIC BLOOD PRESSURE: 70 MMHG

## 2019-09-10 DIAGNOSIS — E53.8 VITAMIN B12 DEFICIENCY: ICD-10-CM

## 2019-09-10 DIAGNOSIS — Z90.12 S/P LEFT MASTECTOMY: ICD-10-CM

## 2019-09-10 DIAGNOSIS — R42 VERTIGO: Primary | ICD-10-CM

## 2019-09-10 DIAGNOSIS — K59.09 OTHER CONSTIPATION: ICD-10-CM

## 2019-09-10 DIAGNOSIS — E55.9 VITAMIN D DEFICIENCY: ICD-10-CM

## 2019-09-10 DIAGNOSIS — R60.0 LOCALIZED EDEMA: ICD-10-CM

## 2019-09-10 DIAGNOSIS — Z00.00 HEALTH MAINTENANCE EXAMINATION: ICD-10-CM

## 2019-09-10 DIAGNOSIS — I48.0 PAROXYSMAL ATRIAL FIBRILLATION (HCC): ICD-10-CM

## 2019-09-10 DIAGNOSIS — K21.9 GASTROESOPHAGEAL REFLUX DISEASE, ESOPHAGITIS PRESENCE NOT SPECIFIED: ICD-10-CM

## 2019-09-10 DIAGNOSIS — E03.9 ACQUIRED HYPOTHYROIDISM: ICD-10-CM

## 2019-09-10 PROBLEM — N18.2 STAGE 2 CHRONIC KIDNEY DISEASE: Status: ACTIVE | Noted: 2019-09-10

## 2019-09-10 PROCEDURE — G0439 PPPS, SUBSEQ VISIT: HCPCS | Performed by: INTERNAL MEDICINE

## 2019-09-10 PROCEDURE — 99214 OFFICE O/P EST MOD 30 MIN: CPT | Performed by: INTERNAL MEDICINE

## 2019-09-10 RX ORDER — MECLIZINE HYDROCHLORIDE 25 MG/1
25 TABLET ORAL DAILY PRN
Qty: 90 TABLET | Refills: 0 | Status: SHIPPED | OUTPATIENT
Start: 2019-09-10 | End: 2021-03-25 | Stop reason: SDUPTHER

## 2019-09-10 NOTE — PROGRESS NOTES
Assessment/Plan:    Localized edema  Intermittent ankle edema R>L  Elevated, increase daily activity  Symptoms may be due to amlodipine  Acquired hypothyroidism  Adequately replaced  Constipation  Improved, taking stool softeners prn only  Stage 2 chronic kidney disease  Increase daily fluid intake  Avoid NSAIDs  Benign paroxysmal vertigo  Takes meclizine prn  Paroxysmal atrial fibrillation (HCC)  No symptoms, not taking ASA  On metoprolol  Sees cardiology  Essential hypertension  BP stable, takes amlodipine and metoprolol bid  GERD (gastroesophageal reflux disease)  Takes PPI daily  History of left breast cancer  Mammogram updated  Follow up with oncology later this month  Diagnoses and all orders for this visit:    Vertigo  -     meclizine (ANTIVERT) 25 mg tablet; Take 1 tablet (25 mg total) by mouth daily as needed for dizziness    Paroxysmal atrial fibrillation (HCC)    Gastroesophageal reflux disease, esophagitis presence not specified    S/P left mastectomy    Vitamin D deficiency    Vitamin B12 deficiency  -     Vitamin B12    Localized edema    Acquired hypothyroidism    Other constipation    Health maintenance examination  Comments:  Updated  Follow up in 6 months or as needed  Subjective:      Patient ID: Monica Lara is a 80 y o  female  Ms Reji Noel is here with her daughter  She reports feeling a bit dizzy when she got out of bed this morning  This would occur occasionally, no syncope or falls  She reports waking around 4:00 a m  to go to the bathroom  She would wake up a few hours later to take her thyroid medication  She admits not drinking for about 12 hours until she takes her thyroid medication  She took  meclizine this morning  She reports that she has not been taking the stool softener for the past 5 days  Stools are soft, she moved her bowels about 3 or 4 times today  She reports occasional pain at her incision site    This would usually occur with certain activities  She also complains of intermittent right ankle pain  Denies any injury or falls  She reports right knee pain as well when this happens  She saw the foot doctor, was told she had a benign mass on her left ankle  The following portions of the patient's history were reviewed and updated as appropriate: allergies, current medications, past medical history, past social history and problem list     Review of Systems   Constitutional: Negative for appetite change and fatigue  HENT: Negative for congestion and ear pain  Eyes: Negative for visual disturbance  Respiratory: Negative for cough and shortness of breath  Cardiovascular: Positive for leg swelling  Negative for chest pain and palpitations  Gastrointestinal: Negative for abdominal pain, blood in stool, constipation and diarrhea  Genitourinary: Negative for dysuria, frequency and urgency  Musculoskeletal: Positive for gait problem  Negative for arthralgias and myalgias  Skin: Negative for rash and wound  Neurological: Negative for dizziness, weakness and headaches  Hematological: Does not bruise/bleed easily  Psychiatric/Behavioral: Negative for sleep disturbance  The patient is not nervous/anxious  Objective:      /70   Pulse 74   Temp 97 8 °F (36 6 °C)   Resp 18   Ht 4' 11 5" (1 511 m)   Wt 72 1 kg (159 lb)   LMP  (LMP Unknown)   SpO2 96%   BMI 31 58 kg/m²          Physical Exam   Constitutional: She is oriented to person, place, and time  She appears well-developed and well-nourished  HENT:   Head: Normocephalic and atraumatic  Nose: Nose normal    Eyes: Pupils are equal, round, and reactive to light  Conjunctivae are normal    Neck: Neck supple  Cardiovascular: Normal rate, regular rhythm and normal heart sounds  Mild ankle edema R>L, non pitting   Pulmonary/Chest: Effort normal and breath sounds normal  She has no wheezes  She has no rales  Abdominal: Soft  Bowel sounds are normal    Musculoskeletal: She exhibits no edema  Neurological: She is alert and oriented to person, place, and time  Skin: Skin is warm  No rash noted  Psychiatric: She has a normal mood and affect  Her behavior is normal    Nursing note and vitals reviewed  Lab &/or imaging results reviewed with patient  BMI Counseling: Body mass index is 31 58 kg/m²  The BMI is above normal  No BMI follow-up plan is appropriate  Patient is 72 years old and weight reduction/weight gain would further complicate their underlying physical disability

## 2019-09-10 NOTE — PATIENT INSTRUCTIONS
Try to drink more fluids during the day  Take Tylenol for pain, avoid NSAIDs such as ibuprofen, naproxen, Advil, Aleve or Motrin
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
## 2019-09-11 ENCOUNTER — APPOINTMENT (OUTPATIENT)
Dept: LAB | Age: 84
End: 2019-09-11
Payer: MEDICARE

## 2019-09-11 ENCOUNTER — TELEPHONE (OUTPATIENT)
Dept: INTERNAL MEDICINE CLINIC | Facility: CLINIC | Age: 84
End: 2019-09-11

## 2019-09-11 DIAGNOSIS — I10 ESSENTIAL HYPERTENSION: ICD-10-CM

## 2019-09-11 LAB
ALBUMIN SERPL BCP-MCNC: 3.3 G/DL (ref 3.5–5)
ALP SERPL-CCNC: 67 U/L (ref 46–116)
ALT SERPL W P-5'-P-CCNC: 22 U/L (ref 12–78)
ANION GAP SERPL CALCULATED.3IONS-SCNC: 6 MMOL/L (ref 4–13)
AST SERPL W P-5'-P-CCNC: 24 U/L (ref 5–45)
BASOPHILS # BLD AUTO: 0.04 THOUSANDS/ΜL (ref 0–0.1)
BASOPHILS NFR BLD AUTO: 1 % (ref 0–1)
BILIRUB SERPL-MCNC: 0.66 MG/DL (ref 0.2–1)
BUN SERPL-MCNC: 18 MG/DL (ref 5–25)
CALCIUM SERPL-MCNC: 8.7 MG/DL (ref 8.3–10.1)
CHLORIDE SERPL-SCNC: 104 MMOL/L (ref 100–108)
CO2 SERPL-SCNC: 27 MMOL/L (ref 21–32)
CREAT SERPL-MCNC: 0.83 MG/DL (ref 0.6–1.3)
EOSINOPHIL # BLD AUTO: 0.17 THOUSAND/ΜL (ref 0–0.61)
EOSINOPHIL NFR BLD AUTO: 2 % (ref 0–6)
ERYTHROCYTE [DISTWIDTH] IN BLOOD BY AUTOMATED COUNT: 13.3 % (ref 11.6–15.1)
GFR SERPL CREATININE-BSD FRML MDRD: 61 ML/MIN/1.73SQ M
GLUCOSE P FAST SERPL-MCNC: 104 MG/DL (ref 65–99)
HCT VFR BLD AUTO: 40.1 % (ref 34.8–46.1)
HGB BLD-MCNC: 12.5 G/DL (ref 11.5–15.4)
IMM GRANULOCYTES # BLD AUTO: 0.05 THOUSAND/UL (ref 0–0.2)
IMM GRANULOCYTES NFR BLD AUTO: 1 % (ref 0–2)
LYMPHOCYTES # BLD AUTO: 2.55 THOUSANDS/ΜL (ref 0.6–4.47)
LYMPHOCYTES NFR BLD AUTO: 30 % (ref 14–44)
MCH RBC QN AUTO: 30.6 PG (ref 26.8–34.3)
MCHC RBC AUTO-ENTMCNC: 31.2 G/DL (ref 31.4–37.4)
MCV RBC AUTO: 98 FL (ref 82–98)
MONOCYTES # BLD AUTO: 0.85 THOUSAND/ΜL (ref 0.17–1.22)
MONOCYTES NFR BLD AUTO: 10 % (ref 4–12)
NEUTROPHILS # BLD AUTO: 4.86 THOUSANDS/ΜL (ref 1.85–7.62)
NEUTS SEG NFR BLD AUTO: 56 % (ref 43–75)
NRBC BLD AUTO-RTO: 0 /100 WBCS
PLATELET # BLD AUTO: 222 THOUSANDS/UL (ref 149–390)
PMV BLD AUTO: 11.1 FL (ref 8.9–12.7)
POTASSIUM SERPL-SCNC: 3.9 MMOL/L (ref 3.5–5.3)
PROT SERPL-MCNC: 7.3 G/DL (ref 6.4–8.2)
RBC # BLD AUTO: 4.08 MILLION/UL (ref 3.81–5.12)
SODIUM SERPL-SCNC: 137 MMOL/L (ref 136–145)
VIT B12 SERPL-MCNC: 403 PG/ML (ref 100–900)
WBC # BLD AUTO: 8.52 THOUSAND/UL (ref 4.31–10.16)

## 2019-09-11 PROCEDURE — 36415 COLL VENOUS BLD VENIPUNCTURE: CPT | Performed by: INTERNAL MEDICINE

## 2019-09-11 PROCEDURE — 85025 COMPLETE CBC W/AUTO DIFF WBC: CPT

## 2019-09-11 PROCEDURE — 80053 COMPREHEN METABOLIC PANEL: CPT

## 2019-09-11 PROCEDURE — 82607 VITAMIN B-12: CPT | Performed by: INTERNAL MEDICINE

## 2019-09-12 ENCOUNTER — OFFICE VISIT (OUTPATIENT)
Dept: SURGICAL ONCOLOGY | Facility: CLINIC | Age: 84
End: 2019-09-12
Payer: MEDICARE

## 2019-09-12 VITALS
BODY MASS INDEX: 31.04 KG/M2 | HEART RATE: 88 BPM | WEIGHT: 154 LBS | HEIGHT: 59 IN | RESPIRATION RATE: 16 BRPM | SYSTOLIC BLOOD PRESSURE: 140 MMHG | DIASTOLIC BLOOD PRESSURE: 80 MMHG | TEMPERATURE: 98.1 F

## 2019-09-12 DIAGNOSIS — Z85.3 HISTORY OF LEFT BREAST CANCER: ICD-10-CM

## 2019-09-12 DIAGNOSIS — Z08 ENCOUNTER FOR FOLLOW-UP EXAMINATION AFTER COMPLETED TREATMENT FOR MALIGNANT NEOPLASM: Primary | ICD-10-CM

## 2019-09-12 DIAGNOSIS — Z90.12 STATUS POST LEFT MASTECTOMY: ICD-10-CM

## 2019-09-12 PROCEDURE — 99213 OFFICE O/P EST LOW 20 MIN: CPT | Performed by: NURSE PRACTITIONER
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
## 2019-09-23 DIAGNOSIS — I10 ESSENTIAL HYPERTENSION: ICD-10-CM

## 2019-09-23 DIAGNOSIS — E03.9 ACQUIRED HYPOTHYROIDISM: ICD-10-CM

## 2019-09-23 DIAGNOSIS — I48.0 PAROXYSMAL ATRIAL FIBRILLATION (HCC): ICD-10-CM

## 2019-09-23 RX ORDER — LEVOTHYROXINE SODIUM 0.15 MG/1
150 TABLET ORAL DAILY
Qty: 90 TABLET | Refills: 1 | Status: SHIPPED | OUTPATIENT
Start: 2019-09-23 | End: 2020-03-11 | Stop reason: SDUPTHER

## 2019-09-23 RX ORDER — AMLODIPINE BESYLATE 2.5 MG/1
2.5 TABLET ORAL 2 TIMES DAILY
Qty: 180 TABLET | Refills: 1 | Status: SHIPPED | OUTPATIENT
Start: 2019-09-23 | End: 2020-01-08 | Stop reason: SDUPTHER

## 2019-09-26 ENCOUNTER — OFFICE VISIT (OUTPATIENT)
Dept: CARDIOLOGY CLINIC | Facility: CLINIC | Age: 84
End: 2019-09-26
Payer: MEDICARE

## 2019-09-26 VITALS
OXYGEN SATURATION: 96 % | HEART RATE: 70 BPM | HEIGHT: 60 IN | BODY MASS INDEX: 31.53 KG/M2 | SYSTOLIC BLOOD PRESSURE: 160 MMHG | WEIGHT: 160.6 LBS | DIASTOLIC BLOOD PRESSURE: 72 MMHG

## 2019-09-26 DIAGNOSIS — I10 ESSENTIAL HYPERTENSION: Primary | ICD-10-CM

## 2019-09-26 DIAGNOSIS — I44.0 FIRST DEGREE AV BLOCK: ICD-10-CM

## 2019-09-26 DIAGNOSIS — I48.0 PAROXYSMAL ATRIAL FIBRILLATION (HCC): ICD-10-CM

## 2019-09-26 PROCEDURE — 99213 OFFICE O/P EST LOW 20 MIN: CPT | Performed by: INTERNAL MEDICINE
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
## 2019-12-05 DIAGNOSIS — K21.9 GASTROESOPHAGEAL REFLUX DISEASE WITHOUT ESOPHAGITIS: ICD-10-CM

## 2019-12-05 RX ORDER — OMEPRAZOLE 40 MG/1
40 CAPSULE, DELAYED RELEASE ORAL DAILY
Qty: 90 CAPSULE | Refills: 1 | Status: SHIPPED | OUTPATIENT
Start: 2019-12-05 | End: 2020-05-06 | Stop reason: SDUPTHER

## 2019-12-13 DIAGNOSIS — I48.0 PAROXYSMAL ATRIAL FIBRILLATION (HCC): ICD-10-CM

## 2019-12-13 DIAGNOSIS — I10 ESSENTIAL HYPERTENSION: ICD-10-CM

## 2020-01-08 DIAGNOSIS — I10 ESSENTIAL HYPERTENSION: ICD-10-CM

## 2020-01-08 RX ORDER — AMLODIPINE BESYLATE 2.5 MG/1
2.5 TABLET ORAL 2 TIMES DAILY
Qty: 180 TABLET | Refills: 1 | Status: SHIPPED | OUTPATIENT
Start: 2020-01-08 | End: 2020-06-08 | Stop reason: SDUPTHER

## 2020-03-11 DIAGNOSIS — I48.0 PAROXYSMAL ATRIAL FIBRILLATION (HCC): ICD-10-CM

## 2020-03-11 DIAGNOSIS — I10 ESSENTIAL HYPERTENSION: ICD-10-CM

## 2020-03-11 DIAGNOSIS — E03.9 ACQUIRED HYPOTHYROIDISM: ICD-10-CM

## 2020-03-11 RX ORDER — LEVOTHYROXINE SODIUM 0.15 MG/1
150 TABLET ORAL DAILY
Qty: 90 TABLET | Refills: 1 | Status: SHIPPED | OUTPATIENT
Start: 2020-03-11 | End: 2020-09-25 | Stop reason: SDUPTHER

## 2020-05-06 DIAGNOSIS — K21.9 GASTROESOPHAGEAL REFLUX DISEASE WITHOUT ESOPHAGITIS: ICD-10-CM

## 2020-05-06 RX ORDER — OMEPRAZOLE 40 MG/1
40 CAPSULE, DELAYED RELEASE ORAL DAILY
Qty: 90 CAPSULE | Refills: 1 | Status: SHIPPED | OUTPATIENT
Start: 2020-05-06 | End: 2020-11-01 | Stop reason: SDUPTHER

## 2020-06-03 ENCOUNTER — TELEPHONE (OUTPATIENT)
Dept: HEMATOLOGY ONCOLOGY | Facility: CLINIC | Age: 85
End: 2020-06-03

## 2020-06-03 DIAGNOSIS — Z90.12 S/P LEFT MASTECTOMY: Primary | ICD-10-CM

## 2020-06-03 DIAGNOSIS — Z85.3 HISTORY OF LEFT BREAST CANCER: ICD-10-CM

## 2020-06-08 DIAGNOSIS — I48.0 PAROXYSMAL ATRIAL FIBRILLATION (HCC): ICD-10-CM

## 2020-06-08 DIAGNOSIS — I10 ESSENTIAL HYPERTENSION: ICD-10-CM

## 2020-06-08 RX ORDER — AMLODIPINE BESYLATE 2.5 MG/1
2.5 TABLET ORAL 2 TIMES DAILY
Qty: 180 TABLET | Refills: 0 | Status: SHIPPED | OUTPATIENT
Start: 2020-06-08 | End: 2020-09-25 | Stop reason: SDUPTHER

## 2020-08-27 ENCOUNTER — HOSPITAL ENCOUNTER (OUTPATIENT)
Dept: MAMMOGRAPHY | Facility: CLINIC | Age: 85
Discharge: HOME | End: 2020-08-27
Payer: MEDICARE

## 2020-08-27 VITALS — TEMPERATURE: 96.6 F | HEIGHT: 60 IN | BODY MASS INDEX: 31.41 KG/M2 | WEIGHT: 160 LBS

## 2020-08-27 DIAGNOSIS — Z85.3 HISTORY OF LEFT BREAST CANCER: ICD-10-CM

## 2020-08-27 PROCEDURE — 77065 DX MAMMO INCL CAD UNI: CPT

## 2020-08-27 PROCEDURE — G0279 TOMOSYNTHESIS, MAMMO: HCPCS

## 2020-09-08 ENCOUNTER — APPOINTMENT (OUTPATIENT)
Dept: LAB | Age: 85
End: 2020-09-08
Payer: MEDICARE

## 2020-09-08 DIAGNOSIS — I48.0 PAROXYSMAL ATRIAL FIBRILLATION (HCC): ICD-10-CM

## 2020-09-08 DIAGNOSIS — I10 ESSENTIAL HYPERTENSION: ICD-10-CM

## 2020-09-08 LAB
ALBUMIN SERPL BCP-MCNC: 3.4 G/DL (ref 3.5–5)
ALP SERPL-CCNC: 63 U/L (ref 46–116)
ALT SERPL W P-5'-P-CCNC: 23 U/L (ref 12–78)
ANION GAP SERPL CALCULATED.3IONS-SCNC: 8 MMOL/L (ref 4–13)
AST SERPL W P-5'-P-CCNC: 20 U/L (ref 5–45)
BASOPHILS # BLD AUTO: 0.03 THOUSANDS/ΜL (ref 0–0.1)
BASOPHILS NFR BLD AUTO: 0 % (ref 0–1)
BILIRUB SERPL-MCNC: 0.57 MG/DL (ref 0.2–1)
BUN SERPL-MCNC: 23 MG/DL (ref 5–25)
CALCIUM SERPL-MCNC: 8.7 MG/DL (ref 8.3–10.1)
CHLORIDE SERPL-SCNC: 109 MMOL/L (ref 100–108)
CHOLEST SERPL-MCNC: 148 MG/DL (ref 50–200)
CO2 SERPL-SCNC: 24 MMOL/L (ref 21–32)
CREAT SERPL-MCNC: 0.79 MG/DL (ref 0.6–1.3)
EOSINOPHIL # BLD AUTO: 0.11 THOUSAND/ΜL (ref 0–0.61)
EOSINOPHIL NFR BLD AUTO: 1 % (ref 0–6)
ERYTHROCYTE [DISTWIDTH] IN BLOOD BY AUTOMATED COUNT: 13.7 % (ref 11.6–15.1)
GFR SERPL CREATININE-BSD FRML MDRD: 64 ML/MIN/1.73SQ M
GLUCOSE P FAST SERPL-MCNC: 108 MG/DL (ref 65–99)
HCT VFR BLD AUTO: 40.7 % (ref 34.8–46.1)
HDLC SERPL-MCNC: 32 MG/DL
HGB BLD-MCNC: 13.1 G/DL (ref 11.5–15.4)
IMM GRANULOCYTES # BLD AUTO: 0.05 THOUSAND/UL (ref 0–0.2)
IMM GRANULOCYTES NFR BLD AUTO: 1 % (ref 0–2)
LDLC SERPL CALC-MCNC: 85 MG/DL (ref 0–100)
LYMPHOCYTES # BLD AUTO: 2.39 THOUSANDS/ΜL (ref 0.6–4.47)
LYMPHOCYTES NFR BLD AUTO: 27 % (ref 14–44)
MCH RBC QN AUTO: 30.6 PG (ref 26.8–34.3)
MCHC RBC AUTO-ENTMCNC: 32.2 G/DL (ref 31.4–37.4)
MCV RBC AUTO: 95 FL (ref 82–98)
MONOCYTES # BLD AUTO: 0.76 THOUSAND/ΜL (ref 0.17–1.22)
MONOCYTES NFR BLD AUTO: 9 % (ref 4–12)
NEUTROPHILS # BLD AUTO: 5.56 THOUSANDS/ΜL (ref 1.85–7.62)
NEUTS SEG NFR BLD AUTO: 62 % (ref 43–75)
NONHDLC SERPL-MCNC: 116 MG/DL
NRBC BLD AUTO-RTO: 0 /100 WBCS
PLATELET # BLD AUTO: 241 THOUSANDS/UL (ref 149–390)
PMV BLD AUTO: 11.1 FL (ref 8.9–12.7)
POTASSIUM SERPL-SCNC: 4.2 MMOL/L (ref 3.5–5.3)
PROT SERPL-MCNC: 7.7 G/DL (ref 6.4–8.2)
RBC # BLD AUTO: 4.28 MILLION/UL (ref 3.81–5.12)
SODIUM SERPL-SCNC: 141 MMOL/L (ref 136–145)
TRIGL SERPL-MCNC: 156 MG/DL
WBC # BLD AUTO: 8.9 THOUSAND/UL (ref 4.31–10.16)

## 2020-09-08 PROCEDURE — 80053 COMPREHEN METABOLIC PANEL: CPT

## 2020-09-08 PROCEDURE — 85025 COMPLETE CBC W/AUTO DIFF WBC: CPT

## 2020-09-08 PROCEDURE — 36415 COLL VENOUS BLD VENIPUNCTURE: CPT

## 2020-09-08 PROCEDURE — 80061 LIPID PANEL: CPT

## 2020-09-14 ENCOUNTER — OFFICE VISIT (OUTPATIENT)
Dept: CARDIOLOGY CLINIC | Facility: CLINIC | Age: 85
End: 2020-09-14
Payer: MEDICARE

## 2020-09-14 VITALS
BODY MASS INDEX: 30.61 KG/M2 | WEIGHT: 155.9 LBS | DIASTOLIC BLOOD PRESSURE: 60 MMHG | HEART RATE: 75 BPM | HEIGHT: 60 IN | SYSTOLIC BLOOD PRESSURE: 126 MMHG | TEMPERATURE: 97.3 F | OXYGEN SATURATION: 95 %

## 2020-09-14 DIAGNOSIS — I10 ESSENTIAL HYPERTENSION: Primary | ICD-10-CM

## 2020-09-14 DIAGNOSIS — I44.0 FIRST DEGREE AV BLOCK: ICD-10-CM

## 2020-09-14 PROCEDURE — 99213 OFFICE O/P EST LOW 20 MIN: CPT | Performed by: INTERNAL MEDICINE
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
## 2020-09-25 ENCOUNTER — APPOINTMENT (OUTPATIENT)
Dept: LAB | Facility: CLINIC | Age: 85
End: 2020-09-25
Payer: MEDICARE

## 2020-09-25 ENCOUNTER — OFFICE VISIT (OUTPATIENT)
Dept: INTERNAL MEDICINE CLINIC | Facility: CLINIC | Age: 85
End: 2020-09-25
Payer: MEDICARE

## 2020-09-25 VITALS
WEIGHT: 155 LBS | BODY MASS INDEX: 30.43 KG/M2 | TEMPERATURE: 97.4 F | OXYGEN SATURATION: 96 % | HEART RATE: 78 BPM | HEIGHT: 60 IN | SYSTOLIC BLOOD PRESSURE: 136 MMHG | DIASTOLIC BLOOD PRESSURE: 64 MMHG

## 2020-09-25 DIAGNOSIS — I10 ESSENTIAL HYPERTENSION: ICD-10-CM

## 2020-09-25 DIAGNOSIS — Z00.00 HEALTH MAINTENANCE EXAMINATION: ICD-10-CM

## 2020-09-25 DIAGNOSIS — E53.8 VITAMIN B12 DEFICIENCY: ICD-10-CM

## 2020-09-25 DIAGNOSIS — Z23 NEED FOR INFLUENZA VACCINATION: ICD-10-CM

## 2020-09-25 DIAGNOSIS — I10 ESSENTIAL HYPERTENSION: Primary | ICD-10-CM

## 2020-09-25 DIAGNOSIS — R53.83 OTHER FATIGUE: ICD-10-CM

## 2020-09-25 DIAGNOSIS — H61.21 IMPACTED CERUMEN, RIGHT EAR: ICD-10-CM

## 2020-09-25 DIAGNOSIS — R73.01 ABNORMAL FASTING GLUCOSE: ICD-10-CM

## 2020-09-25 DIAGNOSIS — E55.9 VITAMIN D DEFICIENCY: ICD-10-CM

## 2020-09-25 DIAGNOSIS — E78.5 DYSLIPIDEMIA: ICD-10-CM

## 2020-09-25 DIAGNOSIS — I48.0 PAROXYSMAL ATRIAL FIBRILLATION (HCC): ICD-10-CM

## 2020-09-25 DIAGNOSIS — E03.9 ACQUIRED HYPOTHYROIDISM: ICD-10-CM

## 2020-09-25 DIAGNOSIS — N18.2 STAGE 2 CHRONIC KIDNEY DISEASE: ICD-10-CM

## 2020-09-25 LAB
25(OH)D3 SERPL-MCNC: 37.6 NG/ML (ref 30–100)
ALBUMIN SERPL BCP-MCNC: 3.4 G/DL (ref 3.5–5)
ALP SERPL-CCNC: 67 U/L (ref 46–116)
ALT SERPL W P-5'-P-CCNC: 25 U/L (ref 12–78)
ANION GAP SERPL CALCULATED.3IONS-SCNC: 12 MMOL/L (ref 4–13)
AST SERPL W P-5'-P-CCNC: 20 U/L (ref 5–45)
BILIRUB SERPL-MCNC: 0.44 MG/DL (ref 0.2–1)
BUN SERPL-MCNC: 21 MG/DL (ref 5–25)
CALCIUM ALBUM COR SERPL-MCNC: 9 MG/DL (ref 8.3–10.1)
CALCIUM SERPL-MCNC: 8.5 MG/DL (ref 8.3–10.1)
CHLORIDE SERPL-SCNC: 106 MMOL/L (ref 100–108)
CO2 SERPL-SCNC: 26 MMOL/L (ref 21–32)
CREAT SERPL-MCNC: 0.94 MG/DL (ref 0.6–1.3)
EST. AVERAGE GLUCOSE BLD GHB EST-MCNC: 111 MG/DL
GFR SERPL CREATININE-BSD FRML MDRD: 52 ML/MIN/1.73SQ M
GLUCOSE SERPL-MCNC: 107 MG/DL (ref 65–140)
HBA1C MFR BLD: 5.5 %
POTASSIUM SERPL-SCNC: 4.9 MMOL/L (ref 3.5–5.3)
PROT SERPL-MCNC: 7.1 G/DL (ref 6.4–8.2)
SODIUM SERPL-SCNC: 144 MMOL/L (ref 136–145)
TSH SERPL DL<=0.05 MIU/L-ACNC: 2.46 UIU/ML (ref 0.36–3.74)
VIT B12 SERPL-MCNC: 2722 PG/ML (ref 100–900)

## 2020-09-25 PROCEDURE — 80053 COMPREHEN METABOLIC PANEL: CPT

## 2020-09-25 PROCEDURE — 69210 REMOVE IMPACTED EAR WAX UNI: CPT | Performed by: INTERNAL MEDICINE

## 2020-09-25 PROCEDURE — 82306 VITAMIN D 25 HYDROXY: CPT | Performed by: INTERNAL MEDICINE

## 2020-09-25 PROCEDURE — 83036 HEMOGLOBIN GLYCOSYLATED A1C: CPT | Performed by: INTERNAL MEDICINE

## 2020-09-25 PROCEDURE — G0008 ADMIN INFLUENZA VIRUS VAC: HCPCS | Performed by: INTERNAL MEDICINE

## 2020-09-25 PROCEDURE — G0438 PPPS, INITIAL VISIT: HCPCS | Performed by: INTERNAL MEDICINE

## 2020-09-25 PROCEDURE — 99214 OFFICE O/P EST MOD 30 MIN: CPT | Performed by: INTERNAL MEDICINE

## 2020-09-25 PROCEDURE — 36415 COLL VENOUS BLD VENIPUNCTURE: CPT | Performed by: INTERNAL MEDICINE

## 2020-09-25 PROCEDURE — 90662 IIV NO PRSV INCREASED AG IM: CPT | Performed by: INTERNAL MEDICINE

## 2020-09-25 PROCEDURE — 84443 ASSAY THYROID STIM HORMONE: CPT | Performed by: INTERNAL MEDICINE

## 2020-09-25 PROCEDURE — 82607 VITAMIN B-12: CPT | Performed by: INTERNAL MEDICINE

## 2020-09-25 RX ORDER — AMLODIPINE BESYLATE 2.5 MG/1
2.5 TABLET ORAL 2 TIMES DAILY
Qty: 180 TABLET | Refills: 1 | Status: SHIPPED | OUTPATIENT
Start: 2020-09-25 | End: 2021-03-25 | Stop reason: SDUPTHER

## 2020-09-25 RX ORDER — LEVOTHYROXINE SODIUM 0.15 MG/1
150 TABLET ORAL DAILY
Qty: 90 TABLET | Refills: 1 | Status: SHIPPED | OUTPATIENT
Start: 2020-09-25 | End: 2021-03-25 | Stop reason: SDUPTHER

## 2020-09-25 NOTE — PROGRESS NOTES
Assessment/Plan:    Acquired hypothyroidism  Due for TFTs  Stage 2 chronic kidney disease  Renal function stable  Encouraged to increase daily fluid intake  Essential hypertension  BP stable, on amlodipine and metoprolol  GERD (gastroesophageal reflux disease)  Takes PPI daily  History of left breast cancer  Mammogram updated  Prefers no further screening in the future  Constipation  Improved, stopped taking stool softeners  Localized edema  Stable  Benign paroxysmal vertigo  Instructed to increase fluids first before taking meclizine  Paroxysmal atrial fibrillation (HCC)  On metoprolol only  Sees cardiology yearly  Vitamin B12 deficiency  Takes B12 daily  Vitamin D deficiency  Takes D3 daily  Dyslipidemia  TG slightly elevated  Not on medication  Diagnoses and all orders for this visit:    Essential hypertension  -     metoprolol tartrate (LOPRESSOR) 25 mg tablet; Take 0 5 tablets (12 5 mg total) by mouth 2 (two) times a day  -     amLODIPine (NORVASC) 2 5 mg tablet; Take 1 tablet (2 5 mg total) by mouth 2 (two) times a day  -     TSH, 3rd generation with Free T4 reflex  -     Comprehensive metabolic panel; Future    Acquired hypothyroidism  -     levothyroxine (Synthroid) 150 mcg tablet; Take 1 tablet (150 mcg total) by mouth daily    Paroxysmal atrial fibrillation (HCC)  -     metoprolol tartrate (LOPRESSOR) 25 mg tablet; Take 0 5 tablets (12 5 mg total) by mouth 2 (two) times a day    Need for influenza vaccination  -     influenza vaccine, high-dose, PF 0 7 mL (FLUZONE HIGH-DOSE)    Dyslipidemia    Other fatigue    Impacted cerumen, right ear  Comments:  Successful removal   Orders:  -     Ear cerumen removal    Vitamin D deficiency  -     Vitamin D 25 hydroxy    Vitamin B12 deficiency  -     Vitamin B12    Abnormal fasting glucose  -     Hemoglobin A1C    Health maintenance examination  Comments:  Flu vaccine today      Stage 2 chronic kidney disease      Follow up in 6 months or as needed  Subjective:      Patient ID: Gerardo Herron is a 80 y o  female, here for a follow up  She does not feel well today, feels slightly dizzy and tired  She has episodes of nausea, no vomiting or abdominal pain  Her daughter started to give her Gatorade to drink every morning again, he still does not drink enough fluids on a daily basis  She denies any chest pain, shortness of breath, palpitations  She still experiences occasional leg swelling, especially the left one  She denies any falls, uses her rolling walker daily  She is hard of hearing, daughter is concerned about wax buildup  The following portions of the patient's history were reviewed and updated as appropriate: allergies, current medications, past medical history, past social history and problem list     Review of Systems   Constitutional: Positive for fatigue  Negative for activity change and appetite change  HENT: Positive for hearing loss  Negative for congestion and ear pain  Eyes: Negative for visual disturbance  Respiratory: Negative for cough and shortness of breath  Cardiovascular: Positive for leg swelling  Negative for chest pain and palpitations  Gastrointestinal: Negative for abdominal pain, constipation and diarrhea  Genitourinary: Negative for dysuria and frequency  Musculoskeletal: Negative for arthralgias and myalgias  Skin: Negative for rash and wound  Neurological: Positive for dizziness  Negative for numbness and headaches  Psychiatric/Behavioral: Negative for confusion  The patient is not nervous/anxious  Objective:      /64   Pulse 78   Temp (!) 97 4 °F (36 3 °C)   Ht 4' 11 5" (1 511 m)   Wt 70 3 kg (155 lb)   LMP  (LMP Unknown)   SpO2 96%   BMI 30 78 kg/m²            Physical Exam  Vitals signs and nursing note reviewed  Constitutional:       General: She is not in acute distress  Appearance: She is well-developed     HENT:      Head: Normocephalic and atraumatic  Right Ear: Ear canal and external ear normal  There is impacted cerumen  Left Ear: Tympanic membrane, ear canal and external ear normal    Eyes:      Pupils: Pupils are equal, round, and reactive to light  Cardiovascular:      Rate and Rhythm: Normal rate and regular rhythm  Heart sounds: Normal heart sounds  Pulmonary:      Effort: Pulmonary effort is normal       Breath sounds: Normal breath sounds  No wheezing  Abdominal:      General: Bowel sounds are normal       Palpations: Abdomen is soft  Skin:     General: Skin is warm  Findings: No rash  Neurological:      Mental Status: She is alert and oriented to person, place, and time  Psychiatric:         Behavior: Behavior normal            Lab results reviewed with patient  Ear cerumen removal    Date/Time: 9/25/2020 3:32 PM  Performed by: Pablo Eddy MD  Authorized by: Pablo Eddy MD     Patient location:  Clinic  Consent:     Consent obtained:  Verbal  Procedure details:     Local anesthetic:  None    Location:  R ear    Procedure type: irrigation with instrumentation      Instrumentation: curette      Approach:  External  Post-procedure details:     Complication:  None    Hearing quality:  Normal    Patient tolerance of procedure:   Tolerated well, no immediate complications
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
## 2020-09-28 ENCOUNTER — TELEPHONE (OUTPATIENT)
Dept: INTERNAL MEDICINE CLINIC | Facility: CLINIC | Age: 85
End: 2020-09-28

## 2020-09-28 NOTE — TELEPHONE ENCOUNTER
May speak to daughter  Kidney function slight abnormal, remind to drink more water / fluids daily  May take vitamin B12, 3 days a week only, levels are high  Take 500 mcg daily  Rest of labs were normal including thyroid

## 2020-09-29 ENCOUNTER — TELEPHONE (OUTPATIENT)
Dept: SURGICAL ONCOLOGY | Facility: CLINIC | Age: 85
End: 2020-09-29

## 2020-09-30 ENCOUNTER — TELEPHONE (OUTPATIENT)
Dept: INTERNAL MEDICINE CLINIC | Facility: CLINIC | Age: 85
End: 2020-09-30

## 2020-09-30 ENCOUNTER — OFFICE VISIT (OUTPATIENT)
Dept: HEMATOLOGY ONCOLOGY | Facility: CLINIC | Age: 85
End: 2020-09-30
Payer: MEDICARE

## 2020-09-30 ENCOUNTER — OFFICE VISIT (OUTPATIENT)
Dept: SURGICAL ONCOLOGY | Facility: CLINIC | Age: 85
End: 2020-09-30
Payer: MEDICARE

## 2020-09-30 VITALS
TEMPERATURE: 98.8 F | RESPIRATION RATE: 18 BRPM | DIASTOLIC BLOOD PRESSURE: 78 MMHG | SYSTOLIC BLOOD PRESSURE: 126 MMHG | WEIGHT: 158 LBS | HEART RATE: 82 BPM | OXYGEN SATURATION: 95 % | BODY MASS INDEX: 31.38 KG/M2

## 2020-09-30 DIAGNOSIS — Z85.3 HISTORY OF LEFT BREAST CANCER: ICD-10-CM

## 2020-09-30 DIAGNOSIS — Z85.3 HISTORY OF LEFT BREAST CANCER: Primary | ICD-10-CM

## 2020-09-30 DIAGNOSIS — Z08 ENCOUNTER FOR FOLLOW-UP EXAMINATION AFTER COMPLETED TREATMENT FOR MALIGNANT NEOPLASM: Primary | ICD-10-CM

## 2020-09-30 PROCEDURE — 99214 OFFICE O/P EST MOD 30 MIN: CPT | Performed by: INTERNAL MEDICINE

## 2020-09-30 PROCEDURE — 99213 OFFICE O/P EST LOW 20 MIN: CPT | Performed by: NURSE PRACTITIONER
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
## 2020-09-30 NOTE — TELEPHONE ENCOUNTER
Patients daughter states she is unable to find Vitamin B12 500 MCG   She found 1,000 mcg and 5, 000 mcg but no 500    There was a Vitamin B Complex that was 250 mcg      Please advise what she should get and be taking

## 2020-09-30 NOTE — PROGRESS NOTES
Hematology / Oncology Outpatient Follow Up Note    Sam Found 80 y o  female :1925 [facility]:[id number]         Date:  2020    Assessment / Plan:  A 80 year old postmenopausal woman with stage IIA left breast cancer, grade 3, ER/RI negative, HER-2 3+ disease  She underwent left mastectomy with sentinel lymph node biopsy, resulting in DAKOTA  She has otherwise good health  She completed 1 year of adjuvant trastuzumab monotherapy without cardiac toxicity in 2018  She is currently on observation  She has no evidence recurrent disease, based on her symptomatology and physical examination  I recommended her to continue with 2 more years of surveillance  I will see her again in a year for routine follow-up  She and her daughter are in agreement with my recommendations                             Subjective:      HPI:  A 27-year-old postmenopausal woman who recently noticed a lump and pain in the left breast which she brought to medical attention  She was found to have abnormality, radiographically in the left breast which was biopsied in 2017  She had invasive ductal carcinoma, grade 2, ER/RI negative, HER-2 negative disease  She subsequently underwent left mastectomy with sentinel node biopsy by Dr Vernon Blackmon and 2017  She had 3 0 cm of invasive ductal carcinoma, grade 3  Lymphovascular invasion was indeterminate  2 sentinel lymph nodes were negative for metastatic disease  She did not have reconstruction  She presents today with her son and daughter to discuss adjuvant treatment options  Despite her age, she has relatively good health  She only has hypertension, hypothyroidism and GERD as a past medical history  She currently feels well  She has no complaint of pain  She denied any respiratory symptoms  Her weight has been stable  She has no family history of breast or ovarian cancer  She is a lifetime never smoker   Her performance status is normal             Interval History:  A 80 year old postmenopausal woman with stage IIA left breast cancer, grade 3, ER/AR negative, HER-2 3+ disease  She underwent left mastectomy with sentinel lymph node biopsy, resulting in DAKOTA  She was treated with 1 year of adjuvant trastuzumab monotherapy which was completed in November 2018 without cardiac toxicity  She is currently on observation  She presents today with her daughter for routine follow-up  She continued to do well with no new complaint  She has very stable ambulatory dysfunction  She denied any pain  Her weight is stable  She has no respiratory symptoms                                       Objective:      Primary Diagnosis:     Left breast cancer, stage IIA(pT2, pN0,M0) grade 3, ER/AR negative, HER-2 3+ disease  Diagnosed in September 2017       Cancer Staging:  Cancer Staging  No matching staging information was found for the patient         Previous Hematologic/ Oncologic Treatment:       Adjuvant trastuzumab monotherapy for 1 year completed in November 2018      Current Hematologic/ Oncologic Treatment:       Observation      Disease Status:      DAKOTA status post mastectomy with sentinel lymph node biopsy      Test Results:     Pathology:     3 cm of invasive ductal carcinoma, grade 3  Lymphovascular invasion indeterminate  2 sentinel lymph node were negative for metastatic disease  ER/AR negative, HER-2 3+ disease   Stage IIA(pT2, pN0,M0)     Radiology:     Right mammography on August 2020 was benign  BI-RADS 2       Laboratory:           Physical Exam:        General Appearance:    Alert, oriented          Eyes:    PERRL   Ears:    Normal external ear canals, both ears   Nose:   Nares normal, septum midline   Throat:   Mucosa moist  Pharynx without injection      Neck:   Supple         Lungs:     Clear to auscultation bilaterally   Chest Wall:    No tenderness or deformity    Heart:    Regular rate and rhythm         Abdomen:     Soft, non-tender, bowel sounds +, no organomegaly               Extremities:   Extremities no cyanosis or edema         Skin:   no rash or icterus  Lymph nodes:   Cervical, supraclavicular, and axillary nodes normal   Neurologic:   CNII-XII intact, normal strength, sensation and reflexes     Throughout             Breast exam:    status post left mastectomy without reconstruction  No palpable abnormality in her left chest wall  Right breast exam is negative  ROS: Review of Systems   All other systems reviewed and are negative  Imaging: No results found  Labs:   Lab Results   Component Value Date    WBC 8 90 09/08/2020    HGB 13 1 09/08/2020    HCT 40 7 09/08/2020    MCV 95 09/08/2020     09/08/2020     Lab Results   Component Value Date     07/27/2015    K 4 9 09/25/2020     09/25/2020    CO2 26 09/25/2020    ANIONGAP 10 07/27/2015    BUN 21 09/25/2020    CREATININE 0 94 09/25/2020    GLUCOSE 186 (H) 07/27/2015    GLUF 108 (H) 09/08/2020    CALCIUM 8 5 09/25/2020    CORRECTEDCA 9 0 09/25/2020    AST 20 09/25/2020    ALT 25 09/25/2020    ALKPHOS 67 09/25/2020    PROT 7 2 07/27/2015    BILITOT 0 54 07/27/2015    EGFR 52 09/25/2020         Lab Results   Component Value Date    BLNGELPJ71 2,722 (H) 09/25/2020           Current Medications: Reviewed  Allergies: Reviewed  PMH/FH/SH:  Reviewed      Vital Sign:    Body surface area is 1 68 meters squared      Wt Readings from Last 3 Encounters:   09/30/20 71 7 kg (158 lb)   09/25/20 70 3 kg (155 lb)   09/14/20 70 7 kg (155 lb 14 4 oz)        Temp Readings from Last 3 Encounters:   09/30/20 98 8 °F (37 1 °C) (Tympanic Core)   09/25/20 (!) 97 4 °F (36 3 °C)   09/14/20 (!) 97 3 °F (36 3 °C)        BP Readings from Last 3 Encounters:   09/30/20 126/78   09/25/20 136/64   09/14/20 126/60         Pulse Readings from Last 3 Encounters:   09/30/20 82   09/25/20 78   09/14/20 75     @LASTSAO2(3)@

## 2020-11-01 DIAGNOSIS — K21.9 GASTROESOPHAGEAL REFLUX DISEASE WITHOUT ESOPHAGITIS: ICD-10-CM

## 2020-11-02 RX ORDER — OMEPRAZOLE 40 MG/1
40 CAPSULE, DELAYED RELEASE ORAL DAILY
Qty: 90 CAPSULE | Refills: 1 | Status: SHIPPED | OUTPATIENT
Start: 2020-11-02 | End: 2021-04-07 | Stop reason: SDUPTHER

## 2020-12-18 ENCOUNTER — HOSPITAL ENCOUNTER (EMERGENCY)
Facility: HOSPITAL | Age: 85
Discharge: HOME | End: 2020-12-18
Attending: EMERGENCY MEDICINE
Payer: MEDICARE

## 2020-12-18 ENCOUNTER — APPOINTMENT (EMERGENCY)
Dept: CT IMAGING | Facility: HOSPITAL | Age: 85
End: 2020-12-18
Payer: MEDICARE

## 2020-12-18 VITALS
OXYGEN SATURATION: 95 % | HEART RATE: 74 BPM | SYSTOLIC BLOOD PRESSURE: 196 MMHG | DIASTOLIC BLOOD PRESSURE: 98 MMHG | TEMPERATURE: 98 F | RESPIRATION RATE: 20 BRPM

## 2020-12-18 DIAGNOSIS — R42 LIGHTHEADEDNESS: ICD-10-CM
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
## 2020-12-18 DIAGNOSIS — I10 HTN (HYPERTENSION): ICD-10-CM

## 2020-12-18 DIAGNOSIS — R55 SYNCOPE: Primary | ICD-10-CM

## 2020-12-18 LAB
ANION GAP SERPL CALCULATED.3IONS-SCNC: 11 MMOL/L (ref 4–13)
ATRIAL RATE: 76 BPM
BACTERIA UR QL AUTO: NORMAL /HPF
BASOPHILS # BLD AUTO: 0.02 THOUSANDS/ΜL (ref 0–0.1)
BASOPHILS NFR BLD AUTO: 0 % (ref 0–1)
BILIRUB UR QL STRIP: NEGATIVE
BUN SERPL-MCNC: 13 MG/DL (ref 5–25)
CALCIUM SERPL-MCNC: 8.6 MG/DL (ref 8.3–10.1)
CHLORIDE SERPL-SCNC: 106 MMOL/L (ref 100–108)
CLARITY UR: CLEAR
CO2 SERPL-SCNC: 27 MMOL/L (ref 21–32)
COLOR UR: YELLOW
CREAT SERPL-MCNC: 0.79 MG/DL (ref 0.6–1.3)
EOSINOPHIL # BLD AUTO: 0.13 THOUSAND/ΜL (ref 0–0.61)
EOSINOPHIL NFR BLD AUTO: 2 % (ref 0–6)
ERYTHROCYTE [DISTWIDTH] IN BLOOD BY AUTOMATED COUNT: 13.4 % (ref 11.6–15.1)
GFR SERPL CREATININE-BSD FRML MDRD: 64 ML/MIN/1.73SQ M
GLUCOSE SERPL-MCNC: 107 MG/DL (ref 65–140)
GLUCOSE SERPL-MCNC: 107 MG/DL (ref 65–140)
GLUCOSE UR STRIP-MCNC: NEGATIVE MG/DL
HCT VFR BLD AUTO: 41.7 % (ref 34.8–46.1)
HGB BLD-MCNC: 13.3 G/DL (ref 11.5–15.4)
HGB UR QL STRIP.AUTO: ABNORMAL
IMM GRANULOCYTES # BLD AUTO: 0.03 THOUSAND/UL (ref 0–0.2)
IMM GRANULOCYTES NFR BLD AUTO: 0 % (ref 0–2)
KETONES UR STRIP-MCNC: NEGATIVE MG/DL
LEUKOCYTE ESTERASE UR QL STRIP: NEGATIVE
LYMPHOCYTES # BLD AUTO: 2.16 THOUSANDS/ΜL (ref 0.6–4.47)
LYMPHOCYTES NFR BLD AUTO: 27 % (ref 14–44)
MCH RBC QN AUTO: 30.1 PG (ref 26.8–34.3)
MCHC RBC AUTO-ENTMCNC: 31.9 G/DL (ref 31.4–37.4)
MCV RBC AUTO: 94 FL (ref 82–98)
MONOCYTES # BLD AUTO: 0.88 THOUSAND/ΜL (ref 0.17–1.22)
MONOCYTES NFR BLD AUTO: 11 % (ref 4–12)
NEUTROPHILS # BLD AUTO: 4.68 THOUSANDS/ΜL (ref 1.85–7.62)
NEUTS SEG NFR BLD AUTO: 60 % (ref 43–75)
NITRITE UR QL STRIP: NEGATIVE
NON-SQ EPI CELLS URNS QL MICRO: NORMAL /HPF
NRBC BLD AUTO-RTO: 0 /100 WBCS
P AXIS: 86 DEGREES
PH UR STRIP.AUTO: 7 [PH] (ref 4.5–8)
PLATELET # BLD AUTO: 221 THOUSANDS/UL (ref 149–390)
PMV BLD AUTO: 10.4 FL (ref 8.9–12.7)
POTASSIUM SERPL-SCNC: 3.8 MMOL/L (ref 3.5–5.3)
PR INTERVAL: 228 MS
PROT UR STRIP-MCNC: NEGATIVE MG/DL
QRS AXIS: -54 DEGREES
QRSD INTERVAL: 106 MS
QT INTERVAL: 422 MS
QTC INTERVAL: 465 MS
RBC # BLD AUTO: 4.42 MILLION/UL (ref 3.81–5.12)
RBC #/AREA URNS AUTO: NORMAL /HPF
SODIUM SERPL-SCNC: 144 MMOL/L (ref 136–145)
SP GR UR STRIP.AUTO: 1.01 (ref 1–1.03)
T WAVE AXIS: 66 DEGREES
TROPONIN I SERPL-MCNC: <0.02 NG/ML
UROBILINOGEN UR QL STRIP.AUTO: 0.2 E.U./DL
VENTRICULAR RATE: 73 BPM
WBC # BLD AUTO: 7.9 THOUSAND/UL (ref 4.31–10.16)
WBC #/AREA URNS AUTO: NORMAL /HPF

## 2020-12-18 PROCEDURE — G1004 CDSM NDSC: HCPCS

## 2020-12-18 PROCEDURE — 85025 COMPLETE CBC W/AUTO DIFF WBC: CPT | Performed by: INTERNAL MEDICINE

## 2020-12-18 PROCEDURE — 96361 HYDRATE IV INFUSION ADD-ON: CPT

## 2020-12-18 PROCEDURE — 93005 ELECTROCARDIOGRAM TRACING: CPT

## 2020-12-18 PROCEDURE — 84484 ASSAY OF TROPONIN QUANT: CPT | Performed by: INTERNAL MEDICINE

## 2020-12-18 PROCEDURE — 81001 URINALYSIS AUTO W/SCOPE: CPT

## 2020-12-18 PROCEDURE — 36415 COLL VENOUS BLD VENIPUNCTURE: CPT | Performed by: INTERNAL MEDICINE

## 2020-12-18 PROCEDURE — 99285 EMERGENCY DEPT VISIT HI MDM: CPT

## 2020-12-18 PROCEDURE — 82948 REAGENT STRIP/BLOOD GLUCOSE: CPT

## 2020-12-18 PROCEDURE — 96360 HYDRATION IV INFUSION INIT: CPT

## 2020-12-18 PROCEDURE — 80048 BASIC METABOLIC PNL TOTAL CA: CPT | Performed by: INTERNAL MEDICINE

## 2020-12-18 PROCEDURE — 99285 EMERGENCY DEPT VISIT HI MDM: CPT | Performed by: EMERGENCY MEDICINE

## 2020-12-18 PROCEDURE — 70450 CT HEAD/BRAIN W/O DYE: CPT

## 2020-12-18 PROCEDURE — 93010 ELECTROCARDIOGRAM REPORT: CPT | Performed by: INTERNAL MEDICINE

## 2020-12-18 RX ADMIN — SODIUM CHLORIDE 500 ML: 0.9 INJECTION, SOLUTION INTRAVENOUS at 09:52

## 2020-12-24 ENCOUNTER — TELEPHONE (OUTPATIENT)
Dept: INTERNAL MEDICINE CLINIC | Facility: CLINIC | Age: 85
End: 2020-12-24

## 2021-01-16 ENCOUNTER — IMMUNIZATIONS (OUTPATIENT)
Dept: FAMILY MEDICINE CLINIC | Facility: HOSPITAL | Age: 86
End: 2021-01-16

## 2021-01-16 DIAGNOSIS — Z23 ENCOUNTER FOR IMMUNIZATION: Primary | ICD-10-CM

## 2021-01-16 PROCEDURE — 91300 SARS-COV-2 / COVID-19 MRNA VACCINE (PFIZER-BIONTECH) 30 MCG: CPT

## 2021-01-16 PROCEDURE — 0001A SARS-COV-2 / COVID-19 MRNA VACCINE (PFIZER-BIONTECH) 30 MCG: CPT

## 2021-02-05 ENCOUNTER — IMMUNIZATIONS (OUTPATIENT)
Dept: FAMILY MEDICINE CLINIC | Facility: HOSPITAL | Age: 86
End: 2021-02-05

## 2021-02-05 DIAGNOSIS — Z23 ENCOUNTER FOR IMMUNIZATION: Primary | ICD-10-CM

## 2021-02-05 PROCEDURE — 91300 SARS-COV-2 / COVID-19 MRNA VACCINE (PFIZER-BIONTECH) 30 MCG: CPT

## 2021-02-05 PROCEDURE — 0002A SARS-COV-2 / COVID-19 MRNA VACCINE (PFIZER-BIONTECH) 30 MCG: CPT

## 2021-03-23 ENCOUNTER — APPOINTMENT (OUTPATIENT)
Dept: LAB | Facility: AMBULARY SURGERY CENTER | Age: 86
End: 2021-03-23
Payer: MEDICARE

## 2021-03-23 DIAGNOSIS — I10 ESSENTIAL HYPERTENSION: ICD-10-CM

## 2021-03-23 DIAGNOSIS — I44.0 FIRST DEGREE AV BLOCK: ICD-10-CM

## 2021-03-23 LAB
ALBUMIN SERPL BCP-MCNC: 3.2 G/DL (ref 3.5–5)
ALP SERPL-CCNC: 69 U/L (ref 46–116)
ALT SERPL W P-5'-P-CCNC: 21 U/L (ref 12–78)
ANION GAP SERPL CALCULATED.3IONS-SCNC: 4 MMOL/L (ref 4–13)
AST SERPL W P-5'-P-CCNC: 15 U/L (ref 5–45)
BASOPHILS # BLD AUTO: 0.04 THOUSANDS/ΜL (ref 0–0.1)
BASOPHILS NFR BLD AUTO: 0 % (ref 0–1)
BILIRUB SERPL-MCNC: 0.72 MG/DL (ref 0.2–1)
BUN SERPL-MCNC: 19 MG/DL (ref 5–25)
CALCIUM ALBUM COR SERPL-MCNC: 9.2 MG/DL (ref 8.3–10.1)
CALCIUM SERPL-MCNC: 8.6 MG/DL (ref 8.3–10.1)
CHLORIDE SERPL-SCNC: 107 MMOL/L (ref 100–108)
CHOLEST SERPL-MCNC: 143 MG/DL (ref 50–200)
CO2 SERPL-SCNC: 27 MMOL/L (ref 21–32)
CREAT SERPL-MCNC: 0.85 MG/DL (ref 0.6–1.3)
EOSINOPHIL # BLD AUTO: 0.07 THOUSAND/ΜL (ref 0–0.61)
EOSINOPHIL NFR BLD AUTO: 1 % (ref 0–6)
ERYTHROCYTE [DISTWIDTH] IN BLOOD BY AUTOMATED COUNT: 14.2 % (ref 11.6–15.1)
GFR SERPL CREATININE-BSD FRML MDRD: 58 ML/MIN/1.73SQ M
GLUCOSE P FAST SERPL-MCNC: 103 MG/DL (ref 65–99)
HCT VFR BLD AUTO: 41.5 % (ref 34.8–46.1)
HDLC SERPL-MCNC: 37 MG/DL
HGB BLD-MCNC: 13.1 G/DL (ref 11.5–15.4)
IMM GRANULOCYTES # BLD AUTO: 0.03 THOUSAND/UL (ref 0–0.2)
IMM GRANULOCYTES NFR BLD AUTO: 0 % (ref 0–2)
LDLC SERPL CALC-MCNC: 70 MG/DL (ref 0–100)
LYMPHOCYTES # BLD AUTO: 2.5 THOUSANDS/ΜL (ref 0.6–4.47)
LYMPHOCYTES NFR BLD AUTO: 27 % (ref 14–44)
MCH RBC QN AUTO: 29.6 PG (ref 26.8–34.3)
MCHC RBC AUTO-ENTMCNC: 31.6 G/DL (ref 31.4–37.4)
MCV RBC AUTO: 94 FL (ref 82–98)
MONOCYTES # BLD AUTO: 0.92 THOUSAND/ΜL (ref 0.17–1.22)
MONOCYTES NFR BLD AUTO: 10 % (ref 4–12)
NEUTROPHILS # BLD AUTO: 5.76 THOUSANDS/ΜL (ref 1.85–7.62)
NEUTS SEG NFR BLD AUTO: 62 % (ref 43–75)
NONHDLC SERPL-MCNC: 106 MG/DL
NRBC BLD AUTO-RTO: 0 /100 WBCS
PLATELET # BLD AUTO: 254 THOUSANDS/UL (ref 149–390)
PMV BLD AUTO: 10.9 FL (ref 8.9–12.7)
POTASSIUM SERPL-SCNC: 4.1 MMOL/L (ref 3.5–5.3)
PROT SERPL-MCNC: 7.2 G/DL (ref 6.4–8.2)
RBC # BLD AUTO: 4.42 MILLION/UL (ref 3.81–5.12)
SODIUM SERPL-SCNC: 138 MMOL/L (ref 136–145)
TRIGL SERPL-MCNC: 182 MG/DL
WBC # BLD AUTO: 9.32 THOUSAND/UL (ref 4.31–10.16)

## 2021-03-23 PROCEDURE — 36415 COLL VENOUS BLD VENIPUNCTURE: CPT

## 2021-03-23 PROCEDURE — 80061 LIPID PANEL: CPT

## 2021-03-23 PROCEDURE — 80053 COMPREHEN METABOLIC PANEL: CPT

## 2021-03-23 PROCEDURE — 85025 COMPLETE CBC W/AUTO DIFF WBC: CPT

## 2021-03-25 ENCOUNTER — OFFICE VISIT (OUTPATIENT)
Dept: INTERNAL MEDICINE CLINIC | Facility: CLINIC | Age: 86
End: 2021-03-25
Payer: MEDICARE

## 2021-03-25 VITALS
TEMPERATURE: 97.3 F | BODY MASS INDEX: 30.43 KG/M2 | SYSTOLIC BLOOD PRESSURE: 144 MMHG | OXYGEN SATURATION: 98 % | HEIGHT: 60 IN | DIASTOLIC BLOOD PRESSURE: 86 MMHG | WEIGHT: 155 LBS | HEART RATE: 85 BPM

## 2021-03-25 DIAGNOSIS — I48.0 PAROXYSMAL ATRIAL FIBRILLATION (HCC): ICD-10-CM

## 2021-03-25 DIAGNOSIS — R42 VERTIGO: ICD-10-CM

## 2021-03-25 DIAGNOSIS — Z71.9 HEALTH COUNSELING: ICD-10-CM

## 2021-03-25 DIAGNOSIS — E03.9 ACQUIRED HYPOTHYROIDISM: ICD-10-CM

## 2021-03-25 DIAGNOSIS — I10 ESSENTIAL HYPERTENSION: ICD-10-CM

## 2021-03-25 DIAGNOSIS — N18.2 STAGE 2 CHRONIC KIDNEY DISEASE: Primary | ICD-10-CM

## 2021-03-25 DIAGNOSIS — H81.10 BENIGN PAROXYSMAL VERTIGO, UNSPECIFIED LATERALITY: ICD-10-CM

## 2021-03-25 DIAGNOSIS — E78.5 DYSLIPIDEMIA: ICD-10-CM

## 2021-03-25 DIAGNOSIS — E53.8 VITAMIN B12 DEFICIENCY: ICD-10-CM

## 2021-03-25 DIAGNOSIS — E55.9 VITAMIN D DEFICIENCY: ICD-10-CM

## 2021-03-25 PROCEDURE — 99214 OFFICE O/P EST MOD 30 MIN: CPT | Performed by: INTERNAL MEDICINE

## 2021-03-25 RX ORDER — LEVOTHYROXINE SODIUM 0.15 MG/1
150 TABLET ORAL DAILY
Qty: 90 TABLET | Refills: 1 | Status: SHIPPED | OUTPATIENT
Start: 2021-03-25 | End: 2021-08-02 | Stop reason: SDUPTHER

## 2021-03-25 RX ORDER — MECLIZINE HYDROCHLORIDE 25 MG/1
25 TABLET ORAL DAILY PRN
Qty: 90 TABLET | Refills: 0 | Status: SHIPPED | OUTPATIENT
Start: 2021-03-25 | End: 2021-09-04

## 2021-03-25 RX ORDER — AMLODIPINE BESYLATE 2.5 MG/1
2.5 TABLET ORAL 2 TIMES DAILY
Qty: 180 TABLET | Refills: 1 | Status: SHIPPED | OUTPATIENT
Start: 2021-03-25 | End: 2021-08-08 | Stop reason: SDUPTHER

## 2021-03-25 NOTE — ASSESSMENT & PLAN NOTE
Lab Results   Component Value Date    EGFR 58 03/23/2021    EGFR 64 12/18/2020    EGFR 52 09/25/2020    CREATININE 0 85 03/23/2021    CREATININE 0 79 12/18/2020    CREATININE 0 94 09/25/2020     Stable

## 2021-03-25 NOTE — PROGRESS NOTES
Assessment/Plan:    Acquired hypothyroidism  Adequately replaced  Stage 2 chronic kidney disease  Lab Results   Component Value Date    EGFR 58 03/23/2021    EGFR 64 12/18/2020    EGFR 52 09/25/2020    CREATININE 0 85 03/23/2021    CREATININE 0 79 12/18/2020    CREATININE 0 94 09/25/2020     Stable  Paroxysmal atrial fibrillation (HCC)  No symptoms  On metoprolol  Follow up with cardiology in the fall  Essential hypertension  BP stable, on amlodipine and metoprolol  Localized edema  If leg edema worsens, may need to stop amlodipine  Benign paroxysmal vertigo  Again, reminded to keep hydrated  Has meclizine to take prn  Ambulatory dysfunction  Uses walker at nursing home  No recent falls  Dyslipidemia  TG remains slightly elevated  Not on medication  Vitamin D deficiency  Takes D3 daily  Vitamin B12 deficiency  On supplements 3x a week  S/P left mastectomy  Declined further imaging  Osteoporosis  Declined further imaging  Continue daily supplements  Diagnoses and all orders for this visit:    Stage 2 chronic kidney disease    Essential hypertension  -     amLODIPine (NORVASC) 2 5 mg tablet; Take 1 tablet (2 5 mg total) by mouth 2 (two) times a day  -     metoprolol tartrate (LOPRESSOR) 25 mg tablet; Take 0 5 tablets (12 5 mg total) by mouth 2 (two) times a day  -     Comprehensive metabolic panel; Future    Paroxysmal atrial fibrillation (HCC)  -     metoprolol tartrate (LOPRESSOR) 25 mg tablet; Take 0 5 tablets (12 5 mg total) by mouth 2 (two) times a day    Acquired hypothyroidism  -     levothyroxine (Synthroid) 150 mcg tablet; Take 1 tablet (150 mcg total) by mouth daily  -     TSH, 3rd generation with Free T4 reflex; Future    Vertigo  -     meclizine (ANTIVERT) 25 mg tablet; Take 1 tablet (25 mg total) by mouth daily as needed for dizziness    Vitamin D deficiency  -     Vitamin D 25 hydroxy;  Future    Vitamin B12 deficiency  -     Vitamin B12; Future    Dyslipidemia  -     Lipid panel; Future  -     Comprehensive metabolic panel; Future    Benign paroxysmal vertigo, unspecified laterality    Health counseling  Comments:  Recevied COVID booster  Follow up in 6 months or as needed  Subjective:      Patient ID: Maria Ines Enamorado is a 80 y o  female here for follow up, with her daughter  She feels well, no new complaints  She denies any falls, uses her walker all the time  She does get dizzy occasionally, previously taking meclizine daily  She now takes it as needed  She admits she does not drink a lot of fluids regularly  No recent constipation  She denies any chest pain, palpitations or shortness of breath  Daughter is asking if they can stop doing mammograms and bone densities at her age  The following portions of the patient's history were reviewed and updated as appropriate: allergies, current medications, past medical history, past social history and problem list     Review of Systems   Constitutional: Negative for activity change, appetite change and fatigue  HENT: Negative for congestion, ear pain and postnasal drip  Eyes: Negative for visual disturbance  Respiratory: Negative for cough and shortness of breath  Cardiovascular: Negative for chest pain and leg swelling  Gastrointestinal: Negative for abdominal pain, constipation and diarrhea  Genitourinary: Negative for dysuria, frequency and urgency  Musculoskeletal: Positive for arthralgias and gait problem  Negative for myalgias  Skin: Negative for rash and wound  Neurological: Negative for dizziness and headaches  Hematological: Does not bruise/bleed easily  Psychiatric/Behavioral: Negative for confusion  The patient is not nervous/anxious            Objective:      /86   Pulse 85   Temp (!) 97 3 °F (36 3 °C)   Ht 4' 11 5" (1 511 m)   Wt 70 3 kg (155 lb)   LMP  (LMP Unknown)   SpO2 98%   BMI 30 78 kg/m²          Physical Exam  Vitals signs and nursing note reviewed  Constitutional:       General: She is not in acute distress  Appearance: She is well-developed  HENT:      Head: Normocephalic and atraumatic  Right Ear: Tympanic membrane, ear canal and external ear normal       Left Ear: Tympanic membrane, ear canal and external ear normal    Eyes:      Pupils: Pupils are equal, round, and reactive to light  Cardiovascular:      Rate and Rhythm: Normal rate and regular rhythm  Heart sounds: Normal heart sounds  Pulmonary:      Effort: Pulmonary effort is normal       Breath sounds: Normal breath sounds  No wheezing  Abdominal:      General: Bowel sounds are normal       Palpations: Abdomen is soft  Musculoskeletal:      Right lower le+ Edema present  Left lower le+ Edema present  Skin:     General: Skin is warm  Findings: No rash  Neurological:      General: No focal deficit present  Mental Status: She is alert  Psychiatric:         Mood and Affect: Mood normal          Behavior: Behavior normal            Labs & imaging results reviewed with patient

## 2021-04-07 DIAGNOSIS — K21.9 GASTROESOPHAGEAL REFLUX DISEASE WITHOUT ESOPHAGITIS: ICD-10-CM

## 2021-04-07 RX ORDER — OMEPRAZOLE 40 MG/1
40 CAPSULE, DELAYED RELEASE ORAL DAILY
Qty: 90 CAPSULE | Refills: 1 | Status: SHIPPED | OUTPATIENT
Start: 2021-04-07 | End: 2021-07-12 | Stop reason: HOSPADM

## 2021-04-08 ENCOUNTER — TELEPHONE (OUTPATIENT)
Dept: INTERNAL MEDICINE CLINIC | Facility: CLINIC | Age: 86
End: 2021-04-08

## 2021-04-08 ENCOUNTER — APPOINTMENT (OUTPATIENT)
Dept: LAB | Age: 86
End: 2021-04-08
Payer: MEDICARE

## 2021-04-08 DIAGNOSIS — R39.9 URINARY SYMPTOM OR SIGN: Primary | ICD-10-CM

## 2021-04-08 LAB
BACTERIA UR QL AUTO: NORMAL /HPF
BILIRUB UR QL STRIP: NEGATIVE
CLARITY UR: ABNORMAL
COLOR UR: YELLOW
GLUCOSE UR STRIP-MCNC: NEGATIVE MG/DL
HGB UR QL STRIP.AUTO: NEGATIVE
KETONES UR STRIP-MCNC: NEGATIVE MG/DL
LEUKOCYTE ESTERASE UR QL STRIP: ABNORMAL
NITRITE UR QL STRIP: NEGATIVE
NON-SQ EPI CELLS URNS QL MICRO: NORMAL /HPF
PH UR STRIP.AUTO: 6 [PH]
PROT UR STRIP-MCNC: NEGATIVE MG/DL
RBC #/AREA URNS AUTO: NORMAL /HPF
SP GR UR STRIP.AUTO: 1.01 (ref 1–1.03)
UROBILINOGEN UR QL STRIP.AUTO: 1 E.U./DL
WBC #/AREA URNS AUTO: NORMAL /HPF

## 2021-04-08 PROCEDURE — 81001 URINALYSIS AUTO W/SCOPE: CPT | Performed by: INTERNAL MEDICINE

## 2021-04-08 NOTE — TELEPHONE ENCOUNTER
Urine test was normal     You can try applying petroleum jelly thinly around area  Would recommend to see gynecology

## 2021-04-08 NOTE — TELEPHONE ENCOUNTER
Patient has vaginal itching  She just mentioned it today  Patient would like someone to look at it  She did take some tylenol

## 2021-04-08 NOTE — TELEPHONE ENCOUNTER
Any vaginal discharge or bleeding? Any urinary symptoms? I can check the urine to make sure she has no infection  If it is normal, vaginal itching may be due to dryness  We can try some creams on it or I can refer you to gynecology

## 2021-05-06 ENCOUNTER — HOSPITAL ENCOUNTER (OUTPATIENT)
Facility: HOSPITAL | Age: 86
Setting detail: OBSERVATION
Discharge: HOME | End: 2021-05-07
Attending: EMERGENCY MEDICINE | Admitting: INTERNAL MEDICINE
Payer: MEDICARE

## 2021-05-06 ENCOUNTER — APPOINTMENT (EMERGENCY)
Dept: RADIOLOGY | Facility: HOSPITAL | Age: 86
End: 2021-05-06
Payer: MEDICARE

## 2021-05-06 DIAGNOSIS — I48.92 ATRIAL FLUTTER (HCC): ICD-10-CM

## 2021-05-06 DIAGNOSIS — R07.9 CHEST PAIN, UNSPECIFIED TYPE: Primary | ICD-10-CM

## 2021-05-06 LAB
ALBUMIN SERPL BCP-MCNC: 3.5 G/DL (ref 3.5–5)
ALP SERPL-CCNC: 75 U/L (ref 46–116)
ALT SERPL W P-5'-P-CCNC: 22 U/L (ref 12–78)
ANION GAP SERPL CALCULATED.3IONS-SCNC: 10 MMOL/L (ref 4–13)
AST SERPL W P-5'-P-CCNC: 22 U/L (ref 5–45)
ATRIAL RATE: 288 BPM
ATRIAL RATE: 70 BPM
BASOPHILS # BLD AUTO: 0.03 THOUSANDS/ΜL (ref 0–0.1)
BASOPHILS NFR BLD AUTO: 0 % (ref 0–1)
BILIRUB SERPL-MCNC: 0.53 MG/DL (ref 0.2–1)
BUN SERPL-MCNC: 12 MG/DL (ref 5–25)
CALCIUM SERPL-MCNC: 8.5 MG/DL (ref 8.3–10.1)
CHLORIDE SERPL-SCNC: 106 MMOL/L (ref 100–108)
CO2 SERPL-SCNC: 28 MMOL/L (ref 21–32)
CREAT SERPL-MCNC: 0.81 MG/DL (ref 0.6–1.3)
EOSINOPHIL # BLD AUTO: 0.11 THOUSAND/ΜL (ref 0–0.61)
EOSINOPHIL NFR BLD AUTO: 1 % (ref 0–6)
ERYTHROCYTE [DISTWIDTH] IN BLOOD BY AUTOMATED COUNT: 14.2 % (ref 11.6–15.1)
GFR SERPL CREATININE-BSD FRML MDRD: 62 ML/MIN/1.73SQ M
GLUCOSE SERPL-MCNC: 97 MG/DL (ref 65–140)
HCT VFR BLD AUTO: 44 % (ref 34.8–46.1)
HGB BLD-MCNC: 14 G/DL (ref 11.5–15.4)
IMM GRANULOCYTES # BLD AUTO: 0.02 THOUSAND/UL (ref 0–0.2)
IMM GRANULOCYTES NFR BLD AUTO: 0 % (ref 0–2)
LIPASE SERPL-CCNC: 48 U/L (ref 73–393)
LYMPHOCYTES # BLD AUTO: 2.72 THOUSANDS/ΜL (ref 0.6–4.47)
LYMPHOCYTES NFR BLD AUTO: 33 % (ref 14–44)
MCH RBC QN AUTO: 29.6 PG (ref 26.8–34.3)
MCHC RBC AUTO-ENTMCNC: 31.8 G/DL (ref 31.4–37.4)
MCV RBC AUTO: 93 FL (ref 82–98)
MONOCYTES # BLD AUTO: 1.04 THOUSAND/ΜL (ref 0.17–1.22)
MONOCYTES NFR BLD AUTO: 13 % (ref 4–12)
NEUTROPHILS # BLD AUTO: 4.39 THOUSANDS/ΜL (ref 1.85–7.62)
NEUTS SEG NFR BLD AUTO: 53 % (ref 43–75)
NRBC BLD AUTO-RTO: 0 /100 WBCS
P AXIS: 97 DEGREES
P AXIS: 99 DEGREES
PLATELET # BLD AUTO: 249 THOUSANDS/UL (ref 149–390)
PMV BLD AUTO: 10.6 FL (ref 8.9–12.7)
POTASSIUM SERPL-SCNC: 3.7 MMOL/L (ref 3.5–5.3)
PR INTERVAL: 192 MS
PROT SERPL-MCNC: 7.7 G/DL (ref 6.4–8.2)
QRS AXIS: -54 DEGREES
QRS AXIS: -61 DEGREES
QRSD INTERVAL: 104 MS
QRSD INTERVAL: 104 MS
QT INTERVAL: 408 MS
QT INTERVAL: 436 MS
QTC INTERVAL: 440 MS
QTC INTERVAL: 467 MS
RBC # BLD AUTO: 4.73 MILLION/UL (ref 3.81–5.12)
SODIUM SERPL-SCNC: 144 MMOL/L (ref 136–145)
T WAVE AXIS: 31 DEGREES
T WAVE AXIS: 50 DEGREES
TROPONIN I SERPL-MCNC: <0.02 NG/ML
TROPONIN I SERPL-MCNC: <0.02 NG/ML
TSH SERPL DL<=0.05 MIU/L-ACNC: 4.3 UIU/ML (ref 0.36–3.74)
VENTRICULAR RATE: 69 BPM
VENTRICULAR RATE: 70 BPM
WBC # BLD AUTO: 8.31 THOUSAND/UL (ref 4.31–10.16)

## 2021-05-06 PROCEDURE — 71045 X-RAY EXAM CHEST 1 VIEW: CPT

## 2021-05-06 PROCEDURE — 80053 COMPREHEN METABOLIC PANEL: CPT | Performed by: EMERGENCY MEDICINE

## 2021-05-06 PROCEDURE — 36415 COLL VENOUS BLD VENIPUNCTURE: CPT | Performed by: EMERGENCY MEDICINE

## 2021-05-06 PROCEDURE — 99285 EMERGENCY DEPT VISIT HI MDM: CPT | Performed by: EMERGENCY MEDICINE

## 2021-05-06 PROCEDURE — 99220 PR INITIAL OBSERVATION CARE/DAY 70 MINUTES: CPT | Performed by: PHYSICIAN ASSISTANT

## 2021-05-06 PROCEDURE — 99285 EMERGENCY DEPT VISIT HI MDM: CPT

## 2021-05-06 PROCEDURE — 1123F ACP DISCUSS/DSCN MKR DOCD: CPT | Performed by: EMERGENCY MEDICINE

## 2021-05-06 PROCEDURE — 83735 ASSAY OF MAGNESIUM: CPT | Performed by: INTERNAL MEDICINE

## 2021-05-06 PROCEDURE — 93010 ELECTROCARDIOGRAM REPORT: CPT | Performed by: INTERNAL MEDICINE

## 2021-05-06 PROCEDURE — 93005 ELECTROCARDIOGRAM TRACING: CPT

## 2021-05-06 PROCEDURE — 83690 ASSAY OF LIPASE: CPT | Performed by: EMERGENCY MEDICINE

## 2021-05-06 PROCEDURE — 84484 ASSAY OF TROPONIN QUANT: CPT | Performed by: EMERGENCY MEDICINE

## 2021-05-06 PROCEDURE — 84484 ASSAY OF TROPONIN QUANT: CPT | Performed by: PHYSICIAN ASSISTANT

## 2021-05-06 PROCEDURE — 84443 ASSAY THYROID STIM HORMONE: CPT | Performed by: EMERGENCY MEDICINE

## 2021-05-06 PROCEDURE — 85025 COMPLETE CBC W/AUTO DIFF WBC: CPT | Performed by: EMERGENCY MEDICINE

## 2021-05-06 RX ORDER — LEVOTHYROXINE SODIUM 0.15 MG/1
150 TABLET ORAL
Status: DISCONTINUED | OUTPATIENT
Start: 2021-05-07 | End: 2021-05-07 | Stop reason: HOSPADM

## 2021-05-06 RX ORDER — HEPARIN SODIUM 5000 [USP'U]/ML
5000 INJECTION, SOLUTION INTRAVENOUS; SUBCUTANEOUS EVERY 8 HOURS SCHEDULED
Status: DISCONTINUED | OUTPATIENT
Start: 2021-05-06 | End: 2021-05-07 | Stop reason: HOSPADM

## 2021-05-06 RX ORDER — ASPIRIN 81 MG/1
162 TABLET, CHEWABLE ORAL ONCE
Status: COMPLETED | OUTPATIENT
Start: 2021-05-06 | End: 2021-05-06

## 2021-05-06 RX ORDER — SODIUM CHLORIDE 9 MG/ML
75 INJECTION, SOLUTION INTRAVENOUS CONTINUOUS
Status: DISCONTINUED | OUTPATIENT
Start: 2021-05-06 | End: 2021-05-07

## 2021-05-06 RX ORDER — ONDANSETRON 2 MG/ML
4 INJECTION INTRAMUSCULAR; INTRAVENOUS EVERY 6 HOURS PRN
Status: DISCONTINUED | OUTPATIENT
Start: 2021-05-06 | End: 2021-05-07 | Stop reason: HOSPADM

## 2021-05-06 RX ORDER — METOPROLOL TARTRATE 5 MG/5ML
5 INJECTION INTRAVENOUS EVERY 6 HOURS PRN
Status: DISCONTINUED | OUTPATIENT
Start: 2021-05-06 | End: 2021-05-07 | Stop reason: HOSPADM

## 2021-05-06 RX ORDER — AMLODIPINE BESYLATE 2.5 MG/1
2.5 TABLET ORAL 2 TIMES DAILY
Status: DISCONTINUED | OUTPATIENT
Start: 2021-05-07 | End: 2021-05-07

## 2021-05-06 RX ORDER — AMLODIPINE BESYLATE 5 MG/1
5 TABLET ORAL ONCE
Status: COMPLETED | OUTPATIENT
Start: 2021-05-06 | End: 2021-05-06

## 2021-05-06 RX ORDER — MAGNESIUM SULFATE 1 G/100ML
1 INJECTION INTRAVENOUS ONCE
Status: COMPLETED | OUTPATIENT
Start: 2021-05-06 | End: 2021-05-06

## 2021-05-06 RX ORDER — ACETAMINOPHEN 325 MG/1
650 TABLET ORAL EVERY 6 HOURS PRN
Status: DISCONTINUED | OUTPATIENT
Start: 2021-05-06 | End: 2021-05-07 | Stop reason: HOSPADM

## 2021-05-06 RX ORDER — PANTOPRAZOLE SODIUM 20 MG/1
20 TABLET, DELAYED RELEASE ORAL
Status: DISCONTINUED | OUTPATIENT
Start: 2021-05-07 | End: 2021-05-07 | Stop reason: HOSPADM

## 2021-05-06 RX ADMIN — SODIUM CHLORIDE 75 ML/HR: 0.9 INJECTION, SOLUTION INTRAVENOUS at 21:47

## 2021-05-06 RX ADMIN — MAGNESIUM SULFATE HEPTAHYDRATE 1 G: 1 INJECTION, SOLUTION INTRAVENOUS at 21:50

## 2021-05-06 RX ADMIN — AMLODIPINE BESYLATE 5 MG: 5 TABLET ORAL at 20:44

## 2021-05-06 RX ADMIN — ASPIRIN 162 MG: 81 TABLET, CHEWABLE ORAL at 20:44

## 2021-05-06 RX ADMIN — HEPARIN SODIUM 5000 UNITS: 5000 INJECTION INTRAVENOUS; SUBCUTANEOUS at 21:52

## 2021-05-06 RX ADMIN — METOPROLOL TARTRATE 12.5 MG: 25 TABLET, FILM COATED ORAL at 21:52

## 2021-05-06 NOTE — ED PROVIDER NOTES
History  Chief Complaint   Patient presents with    Chest Pain     pt presents via EMS, coming [facility]  Pt reports having indigestion that turned into l sided chest pain with tremors  Pt reports no current CP  81 y/o F w/ PMH of PAF, GERD, HTN, Hypothyroidism, and breast cancer s/p traztuzamab and mastectomy 2019 p/w non-radiating substernal chest pain  Earlier this morning patient started to have some mild abdominal pain and diarrhea, which is typical for her every few weeks  Patient was at rest when she had sudden onset of chest pain associated with diaphoresis and tremors  Patient denies any SOB or RIVERA  Patient's CP lasted until EMS arrived ( 15-30 minutes)  CP had resolved prior to arrival  Patient did not take anything for her CP, no ASA given prior to arrival         Chest Pain  Pain location:  Substernal area  Pain quality: pressure    Pain radiates to:  Does not radiate  Pain radiates to the back: no    Pain severity:  Severe  Onset quality:  Sudden  Timing:  Constant  Progression:  Resolved  Chronicity:  New  Relieved by:  Nothing  Worsened by:  Nothing tried  Ineffective treatments:  None tried  Associated symptoms: abdominal pain, diaphoresis, dizziness and palpitations    Associated symptoms: no shortness of breath, no syncope and not vomiting        Prior to Admission Medications   Prescriptions Last Dose Informant Patient Reported? Taking? Carboxymethylcellul-Glycerin (REFRESH OPTIVE) 0 5-0 9 % SOLN 5/6/2021 at Unknown time Family Member Yes Yes   Sig: Apply to eye Drops to b/l eyes   Cholecalciferol (VITAMIN D-3) 1000 UNITS CAPS 5/6/2021 at Unknown time Family Member Yes Yes   Sig: Take 2,000 Units by mouth daily     Multiple Vitamins-Minerals (CENTRUM SILVER PO) 5/6/2021 at Unknown time Family Member Yes Yes   Sig: Take 1 tablet by mouth daily     Multiple Vitamins-Minerals (PRESERVISION AREDS 2) CAPS 5/6/2021 at Unknown time Family Member No Yes   Sig: Take 1 capsule by mouth daily amLODIPine (NORVASC) 2 5 mg tablet 5/6/2021 at Unknown time  No Yes   Sig: Take 1 tablet (2 5 mg total) by mouth 2 (two) times a day   cyanocobalamin (VITAMIN B-12) 100 MCG tablet Not Taking at Unknown time Family Member Yes No   Sig: Take 100 mcg by mouth daily    levothyroxine (Synthroid) 150 mcg tablet 5/6/2021 at Unknown time  No Yes   Sig: Take 1 tablet (150 mcg total) by mouth daily   meclizine (ANTIVERT) 25 mg tablet Past Week at Unknown time  No Yes   Sig: Take 1 tablet (25 mg total) by mouth daily as needed for dizziness   metoprolol tartrate (LOPRESSOR) 25 mg tablet 5/6/2021 at Unknown time  No Yes   Sig: Take 0 5 tablets (12 5 mg total) by mouth 2 (two) times a day   omeprazole (PriLOSEC) 40 MG capsule 5/6/2021 at Unknown time  No Yes   Sig: Take 1 capsule (40 mg total) by mouth daily      Facility-Administered Medications: None       Past Medical History:   Diagnosis Date    AAA (abdominal aortic aneurysm) (HCC)     Acute medial meniscus tear     Aspiration pneumonia ([facility] [address] )     LAST ASSESSED: 7/30/14    Breast CA ([facility] [address] )     left    Chest pain     RESOLVED: 1/31/17    CTS (carpal tunnel syndrome)     Depression     Dermatitis     LAST ASSESSED: 7/25/13    Disease of thyroid gland     Fainting     LAST ASSESSED: 3/15/13    GERD (gastroesophageal reflux disease)     H  pylori infection 3/17/2009    Hypertension     Incomplete defecation     LAST ASSESSED: 7/25/13    Macular degeneration     Osteoporosis     Pneumonia     Vertigo 2012       Past Surgical History:   Procedure Laterality Date    APPENDECTOMY      CHOLECYSTECTOMY      COLONOSCOPY      MASTECTOMY Left 09/2017    SIMPLE    OH INTRAOPERATIVE SENTINEL LYMPH NODE ID W DYE INJECTION Left 9/5/2017    Procedure: INTRAOPERATIVE LYMPHATIC MAPPING; BLUE DYE ONLY; [facility] 9938 LYMPH NODE BIOPSY;  Surgeon: Sheila Ford MD;  Location: [facility];  Service: Surgical Oncology    OH MASTECTOMY, SIMPLE, COMPLETE Left 9/5/2017    Procedure: BREAST MASTECTOMY;  Surgeon: Hoa Tijerina MD;  [facility];  Service: Surgical Oncology    SENTINEL LYMPH NODE BIOPSY      US GUIDED BREAST BIOPSY LEFT COMPLETE Left 8/14/2017       Family History   Problem Relation Age of Onset    Angina Mother         PECTORIS    Alcohol abuse Neg Hx     Depression Neg Hx     Drug abuse Neg Hx     Substance Abuse Neg Hx      I have reviewed and agree with the history as documented  E-Cigarette/Vaping     E-Cigarette/Vaping Substances     Social History     Tobacco Use    Smoking status: Never Smoker    Smokeless tobacco: Never Used   Substance Use Topics    Alcohol use: No    Drug use: No        Review of Systems   Constitutional: Positive for diaphoresis  Respiratory: Negative for shortness of breath  Cardiovascular: Positive for chest pain and palpitations  Negative for syncope  Gastrointestinal: Positive for abdominal pain  Negative for vomiting  Neurological: Positive for dizziness  All other systems reviewed and are negative  Physical Exam  ED Triage Vitals [05/06/21 1634]   Temperature Pulse Respirations Blood Pressure SpO2   98 3 °F (36 8 °C) 71 18 (!) 205/95 97 %      Temp Source Heart Rate Source Patient Position - Orthostatic VS BP Location FiO2 (%)   Oral Monitor Lying Right arm --      Pain Score       --             Orthostatic Vital Signs  Vitals:    05/06/21 1730 05/06/21 1815 05/06/21 1900 05/06/21 1930   BP: (!) 183/100 (!) 189/83 169/91 168/91   Pulse: 68 70 68 68   Patient Position - Orthostatic VS: Lying Lying Lying Lying       Physical Exam  Vitals signs and nursing note reviewed  Constitutional:       General: She is not in acute distress  Appearance: She is well-developed  HENT:      Head: Normocephalic and atraumatic  Mouth/Throat:      Mouth: Mucous membranes are moist       Pharynx: No oropharyngeal exudate  Eyes:      Extraocular Movements: Extraocular movements intact        Conjunctiva/sclera: Conjunctivae normal       Pupils: Pupils are equal, round, and reactive to light  Neck:      Musculoskeletal: Normal range of motion and neck supple  Cardiovascular:      Rate and Rhythm: Normal rate and regular rhythm  Pulses: Normal pulses  Heart sounds: No murmur  Pulmonary:      Effort: Pulmonary effort is normal  No respiratory distress  Breath sounds: Normal breath sounds  Chest:      Chest wall: No tenderness  Abdominal:      Palpations: Abdomen is soft  Tenderness: There is abdominal tenderness  Musculoskeletal: Normal range of motion  Right lower leg: No edema  Left lower leg: No edema  Skin:     General: Skin is warm and dry  Neurological:      General: No focal deficit present  Mental Status: She is alert and oriented to person, place, and time  Psychiatric:         Mood and Affect: Mood normal          Behavior: Behavior normal          ED Medications  Medications   aspirin chewable tablet 162 mg (has no administration in time range)   amLODIPine (NORVASC) tablet 5 mg (has no administration in time range)       Diagnostic Studies  Results Reviewed     Procedure Component Value Units Date/Time    Lipase [[id number]]  (Abnormal) Collected: 05/06/21 1802    Lab Status: Final result Specimen: Blood from Arm, Right Updated: 05/06/21 1834     Lipase 48 u/L     TSH [[id number]]  (Abnormal) Collected: 05/06/21 1802    Lab Status: Final result Specimen: Blood from Arm, Right Updated: 05/06/21 1834     TSH 3RD GENERATON 4 304 uIU/mL     Narrative:      Patients undergoing fluorescein dye angiography may retain small amounts of fluorescein in the body for 48-72 hours post procedure  Samples containing fluorescein can produce falsely depressed TSH values  If the patient had this procedure,a specimen should be resubmitted post fluorescein clearance        Troponin I [[id number]]  (Normal) Collected: 05/06/21 1802    Lab Status: Final result Specimen: Blood from Arm, Right Updated: 05/06/21 1826     Troponin I <0 02 ng/mL     Comprehensive metabolic panel [[id number]] Collected: 05/06/21 1802    Lab Status: Final result Specimen: Blood from Arm, Right Updated: 05/06/21 1824     Sodium 144 mmol/L      Potassium 3 7 mmol/L      Chloride 106 mmol/L      CO2 28 mmol/L      ANION GAP 10 mmol/L      BUN 12 mg/dL      Creatinine 0 81 mg/dL      Glucose 97 mg/dL      Calcium 8 5 mg/dL      AST 22 U/L      ALT 22 U/L      Alkaline Phosphatase 75 U/L      Total Protein 7 7 g/dL      Albumin 3 5 g/dL      Total Bilirubin 0 53 mg/dL      eGFR 62 ml/min/1 73sq m     Narrative:      [place] guidelines for Chronic Kidney Disease (CKD):     Stage 1 with normal or high GFR (GFR > 90 mL/min/1 73 square meters)    Stage 2 Mild CKD (GFR = 60-89 mL/min/1 73 square meters)    Stage 3A Moderate CKD (GFR = 45-59 mL/min/1 73 square meters)    Stage 3B Moderate CKD (GFR = 30-44 mL/min/1 73 square meters)    Stage 4 Severe CKD (GFR = 15-29 mL/min/1 73 square meters)    Stage 5 End Stage CKD (GFR <15 mL/min/1 73 square meters)  Note: GFR calculation is accurate only with a steady state creatinine    CBC and differential [[id number]]  (Abnormal) Collected: 05/06/21 1802    Lab Status: Final result Specimen: Blood from Arm, Right Updated: 05/06/21 1814     WBC 8 31 Thousand/uL      RBC 4 73 Million/uL      Hemoglobin 14 0 g/dL      Hematocrit 44 0 %      MCV 93 fL      MCH 29 6 pg      MCHC 31 8 g/dL      RDW 14 2 %      MPV 10 6 fL      Platelets 271 Thousands/uL      nRBC 0 /100 WBCs      Neutrophils Relative 53 %      Immat GRANS % 0 %      Lymphocytes Relative 33 %      Monocytes Relative 13 %      Eosinophils Relative 1 %      Basophils Relative 0 %      Neutrophils Absolute 4 39 Thousands/µL      Immature Grans Absolute 0 02 Thousand/uL      Lymphocytes Absolute 2 72 Thousands/µL      Monocytes Absolute 1 04 Thousand/µL      Eosinophils Absolute 0 11 Thousand/µL Basophils Absolute 0 03 Thousands/µL                  XR chest 1 view portable   ED Interpretation by Marissa Cisneros MD (05/06 2226)   NAD            Procedures  Procedures      ED Course             HEART Risk Score      Most Recent Value   Heart Score Risk Calculator   History  1 Filed at: 05/06/2021 2028   ECG  0 Filed at: 05/06/2021 2028   Age  2 Filed at: 05/06/2021 2028   Risk Factors  1 Filed at: 05/06/2021 2028   Troponin  0 Filed at: 05/06/2021 2028   HEART Score  4 Filed at: 05/06/2021 2028                                MDM      Disposition  Final diagnoses:   Chest pain, unspecified type   Atrial flutter ([address] )     Time reflects when diagnosis was documented in both MDM as applicable and the Disposition within this note     Time User Action Codes Description Comment    5/6/2021  8:26 PM Bazzi Racer Add [R07 9] Chest pain, unspecified type     5/6/2021  8:26 PM Bazzi Racer Add [I48 92] Atrial flutter [facility])       ED Disposition     ED Disposition Condition Date/Time Comment    Admit Stable Thu May 6, 2021  8:26 PM Case was discussed with DR Lucrecia Ward and the patient's admission status was agreed to be Admission Status: observation status to the service of Dr Justen Ely   Follow-up Information    None         Patient's Medications   Discharge Prescriptions    No medications on file     No discharge procedures on file  PDMP Review     None           ED Provider  Attending physically available and evaluated Sam Lujan  BRAYAN managed the patient along with the ED Attending      Electronically Signed by         Sharath Vizcaino MD  05/09/21 1376

## 2021-05-06 NOTE — ED PROCEDURE NOTE
PROCEDURE  ECG 12 Lead Documentation Only    Date/Time: 5/6/2021 6:00 PM  Performed by: Pratima Plata MD  Authorized by: Pratima Plata MD     Indications / Diagnosis:  CP  ECG reviewed by me, the ED Provider: yes    Patient location:  ED and bedside  Previous ECG:     Previous ECG:  Compared to current    Comparison ECG info:  12/18/20    Similarity:  No change    Comparison to cardiac monitor: Yes    Interpretation:     Interpretation: non-specific    Rate:     ECG rate:  70    ECG rate assessment: normal    Rhythm:     Rhythm: sinus rhythm    Ectopy:     Ectopy: none    QRS:     QRS axis:  Left    QRS intervals:   Wide  Conduction:     Conduction: abnormal      Abnormal conduction: non-specific intraventricular conduction delay    ST segments:     ST segments:  Normal  T waves:     T waves: flattening      Flattening:  I, aVL, V1 and V2  Q waves:     Q waves:  V1 and V2  Other findings:     Other findings: U wave    Comments:      NO ECG SIGNS OF ISCHEMIA/ INJURY          Pratima Plata MD  05/06/21 4760

## 2021-05-07 VITALS
HEART RATE: 75 BPM | WEIGHT: 148.9 LBS | SYSTOLIC BLOOD PRESSURE: 166 MMHG | DIASTOLIC BLOOD PRESSURE: 76 MMHG | BODY MASS INDEX: 28.11 KG/M2 | RESPIRATION RATE: 18 BRPM | HEIGHT: 61 IN | TEMPERATURE: 97.8 F | OXYGEN SATURATION: 91 %

## 2021-05-07 LAB
ANION GAP SERPL CALCULATED.3IONS-SCNC: 11 MMOL/L (ref 4–13)
BASOPHILS # BLD AUTO: 0.03 THOUSANDS/ΜL (ref 0–0.1)
BASOPHILS NFR BLD AUTO: 0 % (ref 0–1)
BUN SERPL-MCNC: 10 MG/DL (ref 5–25)
CALCIUM SERPL-MCNC: 8.1 MG/DL (ref 8.3–10.1)
CHLORIDE SERPL-SCNC: 107 MMOL/L (ref 100–108)
CO2 SERPL-SCNC: 26 MMOL/L (ref 21–32)
CREAT SERPL-MCNC: 0.75 MG/DL (ref 0.6–1.3)
EOSINOPHIL # BLD AUTO: 0.11 THOUSAND/ΜL (ref 0–0.61)
EOSINOPHIL NFR BLD AUTO: 2 % (ref 0–6)
ERYTHROCYTE [DISTWIDTH] IN BLOOD BY AUTOMATED COUNT: 14.3 % (ref 11.6–15.1)
GFR SERPL CREATININE-BSD FRML MDRD: 68 ML/MIN/1.73SQ M
GLUCOSE P FAST SERPL-MCNC: 113 MG/DL (ref 65–99)
GLUCOSE SERPL-MCNC: 113 MG/DL (ref 65–140)
HCT VFR BLD AUTO: 39.1 % (ref 34.8–46.1)
HGB BLD-MCNC: 12.4 G/DL (ref 11.5–15.4)
IMM GRANULOCYTES # BLD AUTO: 0.02 THOUSAND/UL (ref 0–0.2)
IMM GRANULOCYTES NFR BLD AUTO: 0 % (ref 0–2)
LYMPHOCYTES # BLD AUTO: 2.57 THOUSANDS/ΜL (ref 0.6–4.47)
LYMPHOCYTES NFR BLD AUTO: 35 % (ref 14–44)
MAGNESIUM SERPL-MCNC: 1.6 MG/DL (ref 1.6–2.6)
MCH RBC QN AUTO: 29.5 PG (ref 26.8–34.3)
MCHC RBC AUTO-ENTMCNC: 31.7 G/DL (ref 31.4–37.4)
MCV RBC AUTO: 93 FL (ref 82–98)
MONOCYTES # BLD AUTO: 0.71 THOUSAND/ΜL (ref 0.17–1.22)
MONOCYTES NFR BLD AUTO: 10 % (ref 4–12)
NEUTROPHILS # BLD AUTO: 3.96 THOUSANDS/ΜL (ref 1.85–7.62)
NEUTS SEG NFR BLD AUTO: 53 % (ref 43–75)
NRBC BLD AUTO-RTO: 0 /100 WBCS
PLATELET # BLD AUTO: 212 THOUSANDS/UL (ref 149–390)
PMV BLD AUTO: 10.3 FL (ref 8.9–12.7)
POTASSIUM SERPL-SCNC: 3.2 MMOL/L (ref 3.5–5.3)
RBC # BLD AUTO: 4.21 MILLION/UL (ref 3.81–5.12)
SODIUM SERPL-SCNC: 144 MMOL/L (ref 136–145)
TROPONIN I SERPL-MCNC: <0.02 NG/ML
TROPONIN I SERPL-MCNC: <0.02 NG/ML
TSH SERPL DL<=0.05 MIU/L-ACNC: 2.6 UIU/ML (ref 0.36–3.74)
WBC # BLD AUTO: 7.4 THOUSAND/UL (ref 4.31–10.16)

## 2021-05-07 PROCEDURE — 84484 ASSAY OF TROPONIN QUANT: CPT | Performed by: PHYSICIAN ASSISTANT

## 2021-05-07 PROCEDURE — 84443 ASSAY THYROID STIM HORMONE: CPT | Performed by: INTERNAL MEDICINE

## 2021-05-07 PROCEDURE — 36415 COLL VENOUS BLD VENIPUNCTURE: CPT | Performed by: PHYSICIAN ASSISTANT

## 2021-05-07 PROCEDURE — 85025 COMPLETE CBC W/AUTO DIFF WBC: CPT | Performed by: INTERNAL MEDICINE

## 2021-05-07 PROCEDURE — 80048 BASIC METABOLIC PNL TOTAL CA: CPT | Performed by: INTERNAL MEDICINE

## 2021-05-07 PROCEDURE — 99217 PR OBSERVATION CARE DISCHARGE MANAGEMENT: CPT | Performed by: INTERNAL MEDICINE

## 2021-05-07 RX ORDER — AMLODIPINE BESYLATE 2.5 MG/1
2.5 TABLET ORAL 2 TIMES DAILY
Status: DISCONTINUED | OUTPATIENT
Start: 2021-05-07 | End: 2021-05-07 | Stop reason: HOSPADM

## 2021-05-07 RX ORDER — POTASSIUM CHLORIDE 20 MEQ/1
40 TABLET, EXTENDED RELEASE ORAL 2 TIMES DAILY
Status: DISCONTINUED | OUTPATIENT
Start: 2021-05-07 | End: 2021-05-07

## 2021-05-07 RX ORDER — AMLODIPINE BESYLATE 5 MG/1
5 TABLET ORAL 2 TIMES DAILY
Status: DISCONTINUED | OUTPATIENT
Start: 2021-05-07 | End: 2021-05-07

## 2021-05-07 RX ORDER — POTASSIUM CHLORIDE 20 MEQ/1
40 TABLET, EXTENDED RELEASE ORAL 2 TIMES DAILY
Status: COMPLETED | OUTPATIENT
Start: 2021-05-07 | End: 2021-05-07

## 2021-05-07 RX ADMIN — METOPROLOL TARTRATE 12.5 MG: 25 TABLET, FILM COATED ORAL at 09:44

## 2021-05-07 RX ADMIN — POTASSIUM CHLORIDE 40 MEQ: 1500 TABLET, EXTENDED RELEASE ORAL at 13:55

## 2021-05-07 RX ADMIN — HEPARIN SODIUM 5000 UNITS: 5000 INJECTION INTRAVENOUS; SUBCUTANEOUS at 06:38

## 2021-05-07 RX ADMIN — AMLODIPINE BESYLATE 2.5 MG: 2.5 TABLET ORAL at 09:44

## 2021-05-07 RX ADMIN — PANTOPRAZOLE SODIUM 20 MG: 20 TABLET, DELAYED RELEASE ORAL at 06:38

## 2021-05-07 RX ADMIN — POTASSIUM CHLORIDE 40 MEQ: 1500 TABLET, EXTENDED RELEASE ORAL at 09:44

## 2021-05-07 RX ADMIN — LEVOTHYROXINE SODIUM 150 MCG: 150 TABLET ORAL at 06:38

## 2021-05-07 NOTE — ASSESSMENT & PLAN NOTE
Patient developed sudden left-sided chest pain 5/6 while at rest   Resolved spontaneously  EMILEE score 2  Initial troponin negative  EKG nonischemic, a flutter  ACS rule out protocol  Monitor on telemetry

## 2021-05-07 NOTE — ASSESSMENT & PLAN NOTE
Patient has not received her p m  Dose of metoprolol or Norvasc however she is hypertensive in ED  Resume home medications and reassess    Will also provide IV agent for refractory hypertension

## 2021-05-07 NOTE — ED PROCEDURE NOTE
PROCEDURE  ECG 12 Lead Documentation Only    Date/Time: 5/6/2021 8:21 PM  Performed by: Kenneth Yancey MD  Authorized by: Kenneth Yancey MD     Indications / Diagnosis:  CP  ECG reviewed by me, the ED Provider: yes    Patient location:  ED and bedside  Previous ECG:     Previous ECG:  Compared to current    Comparison ECG info:  COMPARED TO INITAL ER ECG-     Similarity:  No change    Comparison to cardiac monitor: Yes    Interpretation:     Interpretation: non-specific    Rate:     ECG rate:  69    ECG rate assessment: normal    Rhythm:     Rhythm: atrial flutter    Ectopy:     Ectopy: none    QRS:     QRS axis:  Left    QRS intervals:   Wide  Conduction:     Conduction: abnormal      Abnormal conduction: non-specific intraventricular conduction delay    ST segments:     ST segments:  Normal  T waves:     T waves: flattening      Flattening:  AVL and V1  Q waves:     Q waves:  V1 and V2  Other findings:     Other findings: poor R wave progression    Comments:      NO ECG SIGNS OD ISCHEMIA/ INJURY         Kenneth Yancey MD  05/06/21 2025

## 2021-05-07 NOTE — ASSESSMENT & PLAN NOTE
Patient developed sudden left-sided chest pain 5/6 while at rest   Resolved spontaneously  EMILEE score 2  Initial troponin negative  EKG nonischemic, a flutter  Telemetry was benign  Plan:  Chest pain resolved  Follow-up with cardiologist as planned
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
## 2021-05-07 NOTE — ASSESSMENT & PLAN NOTE
Blood Pressure: 166/76    · Continue home meds without any changes  · Follow-up with PCP in 1 week for BP check

## 2021-05-07 NOTE — DISCHARGE INSTR - AVS FIRST PAGE
Dear Daina Alford,     It was our pleasure to care for you here at [facility]  It is our hope that we were always able to exceed the expected standards for your care during your stay  You were hospitalized due to Chest Pain  You were cared for on the [facility] 4th floor by Marinda Kocher, MD under the service of Brayan José MD with the [facility] who covers for your primary care physician (PCP), Tracy Watt MD, while you were hospitalized  If you have any questions or concerns related to this hospitalization, you may contact us at [phone]  For follow up as well as any medication refills, we recommend that you follow up with your primary care physician  A registered nurse will reach out to you by phone within a few days after your discharge to answer any additional questions that you may have after going home  However, at this time we provide for you here, the most important instructions / recommendations at discharge:     · Notable Medication Adjustments -   · None  · Testing Required after Discharge -   · None  · Important follow up information -   · Please follow up with the primary care doctor in 1 week to re-evaluate blood pressure  · Other Instructions -   Practice social distancing  Adequate hand washing hygiene  Wear mask in public at all times  · Please review this entire after visit summary as additional general instructions including medication list, appointments, activity, diet, any pertinent wound care, and other additional recommendations from your care team that may be provided for you        Sincerely,     Marinda Kocher, MD

## 2021-05-07 NOTE — ASSESSMENT & PLAN NOTE
EKG showing atrial flutter  Patient has past medical history of remote AFib which was thought provoked in the setting of mastectomy surgery  Follows yearly with Dr Myesha Moulton electrolytes  Check TSH/T4 reflex  Give gentle rehydration  Patient has not received her p m  dose of metoprolol tartrate; continue this and reassess    Follow telemetry overnight

## 2021-05-07 NOTE — ASSESSMENT & PLAN NOTE
EKG showing atrial flutter  Patient has past medical history of remote AFib which was thought provoked in the setting of mastectomy surgery  Follows yearly with Dr Stacia Davis:  Continue follow-up with Dr Claudia Guo

## 2021-05-07 NOTE — DISCHARGE INSTRUCTIONS
Chest Pain   WHAT YOU NEED TO KNOW:   Chest pain can be caused by a range of conditions, from not serious to life-threatening  Chest pain can be a symptom of a digestive problem, such as acid reflux or a stomach ulcer  An anxiety attack or a strong emotion, such as anger, can also cause chest pain  Infection, inflammation, or a fracture in the bones or cartilage in your chest can cause pain or discomfort  Sometimes chest pain or pressure is caused by poor blood flow to your heart (angina)  Chest pain may also be caused by life-threatening conditions such as a heart attack or blood clot in your lungs  DISCHARGE INSTRUCTIONS:   Call your local emergency number (911 in the [address] [facility]) or have someone call if:   · You have any of the following signs of a heart attack:      ? Squeezing, pressure, or pain in your chest    ? You may  also have any of the following:     § Discomfort or pain in your back, neck, jaw, stomach, or arm    § Shortness of breath    § Nausea or vomiting    § Lightheadedness or a sudden cold sweat      Return to the emergency department if:   · You have chest discomfort that gets worse, even with medicine  · You cough or vomit blood  · Your bowel movements are black or bloody  · You cannot stop vomiting, or it hurts to swallow  Call your doctor if:   · You have questions or concerns about your condition or care  Medicines:   · Medicines  may be given to treat the cause of your chest pain  Examples include pain medicine, anxiety medicine, or medicines to increase blood flow to your heart  · Do not take certain medicines without asking your healthcare provider first   These include NSAIDs, herbal or vitamin supplements, or hormones (estrogen or progestin)  · Take your medicine as directed  Contact your healthcare provider if you think your medicine is not helping or if you have side effects  Tell him or her if you are allergic to any medicine   Keep a list of the medicines, vitamins, and herbs you take  Include the amounts, and when and why you take them  Bring the list or the pill bottles to follow-up visits  Carry your medicine list with you in case of an emergency  Healthy living tips: The following are general healthy guidelines  If the cause of your chest pain is known, your healthcare provider will give you specific guidelines to follow  · Do not smoke  Nicotine and other chemicals in cigarettes and cigars can cause lung and heart damage  Ask your healthcare provider for information if you currently smoke and need help to quit  E-cigarettes or smokeless tobacco still contain nicotine  Talk to your healthcare provider before you use these products  · Choose a variety of healthy foods as often as possible  Include fresh, frozen, or canned fruits and vegetables  Also include low-fat dairy products, fish, chicken (without skin), and lean meats  Your healthcare provider or a dietitian can help you create meal plans  You may need to avoid certain foods or drinks if your pain is caused by a digestion problem  · Lower your sodium (salt) intake  Limit foods that are high in sodium, such as canned foods, salty snacks, and cold cuts  If you add salt when you cook food, do not add more at the table  Choose low-sodium canned foods as much as possible  · Drink plenty of water every day  Water helps your body to control your temperature and blood pressure  Ask your healthcare provider how much water you should drink every day  · Ask about activity  Your healthcare provider will tell you which activities to limit or avoid  Ask when you can drive, return to work, and have sex  Ask about the best exercise plan for you  · Maintain a healthy weight  Ask your healthcare provider what a healthy weight is for you  Ask him or her to help you create a safe weight loss plan if you are overweight  · Ask about vaccines you may need    Get the influenza (flu) vaccine every year as soon as recommended, usually in September or October  You may also need a pneumococcal vaccine to prevent pneumonia  The vaccine is usually given every 5 years, starting at age 72  Your healthcare provider can tell you if should get other vaccines, and when to get them  Follow up with your healthcare provider within 72 hours, or as directed: You may need to return for more tests to find the cause of your chest pain  You may be referred to a specialist, such as a cardiologist or gastroenterologist  Write down your questions so you remember to ask them during your visits  © Copyright [employer] 2021 Information is for End User's use only and may not be sold, redistributed or otherwise used for commercial purposes  All illustrations and images included in CareNotes® are the copyrighted property of [employer]  or Cristhian Natarajan  The above information is an  only  It is not intended as medical advice for individual conditions or treatments  Talk to your doctor, nurse or pharmacist before following any medical regimen to see if it is safe and effective for you

## 2021-05-10 ENCOUNTER — TELEPHONE (OUTPATIENT)
Dept: INTERNAL MEDICINE CLINIC | Facility: CLINIC | Age: 86
End: 2021-05-10

## 2021-05-10 ENCOUNTER — TRANSITIONAL CARE MANAGEMENT (OUTPATIENT)
Dept: INTERNAL MEDICINE CLINIC | Facility: CLINIC | Age: 86
End: 2021-05-10

## 2021-05-10 NOTE — ED ATTENDING ATTESTATION
5/6/2021  I, Teresita Green MD, saw and evaluated the patient  I have discussed the patient with the resident/non-physician practitioner and agree with the resident's/non-physician practitioner's findings, Plan of Care, and MDM as documented in the resident's/non-physician practitioner's note, except where noted  All available labs and Radiology studies were reviewed  I was present for key portions of any procedure(s) performed by the resident/non-physician practitioner and I was immediately available to provide assistance  At this point I agree with the current assessment done in the Emergency Department    I have conducted an independent evaluation of this patient a history and physical is as follows:    ED Course  ED Course as of May 10 1615   Thu May 06, 2021   1806 CXR PORTABLE - COMPARED TO PREVIOUS 10/17- NO SIGN CHANGES- NO CHANGE IN MEDIASTINUM/CARDIAC SILHOUETTE- NO FREE/SQ AIR- NO INFILTRATE/ PTX/ PULM EDEMA/ PLEURAL EFFUSIONS      1807 ER MD NOTE- PREHOSPITAL ECG REVIEWED AND COMPARED BY ER MD- NO SIGN CHANGES-  ? AFLUTTER       2017 - ER MD NOTE- DISCUSSION WITH PT - SON -DAUGHTER -ALL AGREE TO OBS TYPE CP ADMIT- SLIM TIGER TEXTED             Critical Care Time  Procedures

## 2021-05-10 NOTE — TELEPHONE ENCOUNTER
Spoke with daughter Jame Sharma is staying with her son who is a physician for a few weeks , she is having no symptoms or concerns and he is checking BP etc     Will schedule regular follow up when she is home in a few weeks

## 2021-06-02 ENCOUNTER — TELEPHONE (OUTPATIENT)
Dept: INTERNAL MEDICINE CLINIC | Facility: CLINIC | Age: 86
End: 2021-06-02

## 2021-06-03 ENCOUNTER — OFFICE VISIT (OUTPATIENT)
Dept: INTERNAL MEDICINE CLINIC | Facility: CLINIC | Age: 86
End: 2021-06-03
Payer: MEDICARE

## 2021-06-03 VITALS
WEIGHT: 146 LBS | BODY MASS INDEX: 27.56 KG/M2 | OXYGEN SATURATION: 96 % | DIASTOLIC BLOOD PRESSURE: 76 MMHG | HEIGHT: 61 IN | SYSTOLIC BLOOD PRESSURE: 136 MMHG | TEMPERATURE: 97.1 F | HEART RATE: 75 BPM

## 2021-06-03 DIAGNOSIS — I48.92 ATRIAL FLUTTER, UNSPECIFIED TYPE (HCC): Primary | ICD-10-CM

## 2021-06-03 DIAGNOSIS — I48.0 PAROXYSMAL ATRIAL FIBRILLATION (HCC): ICD-10-CM

## 2021-06-03 DIAGNOSIS — F41.9 ANXIETY: ICD-10-CM

## 2021-06-03 DIAGNOSIS — N30.10 INTERSTITIAL CYSTITIS: ICD-10-CM

## 2021-06-03 DIAGNOSIS — K21.9 GASTROESOPHAGEAL REFLUX DISEASE, UNSPECIFIED WHETHER ESOPHAGITIS PRESENT: ICD-10-CM

## 2021-06-03 DIAGNOSIS — I10 ESSENTIAL HYPERTENSION: ICD-10-CM

## 2021-06-03 PROBLEM — R07.9 CHEST PAIN: Status: RESOLVED | Noted: 2021-05-06 | Resolved: 2021-06-03

## 2021-06-03 PROCEDURE — 99214 OFFICE O/P EST MOD 30 MIN: CPT | Performed by: INTERNAL MEDICINE

## 2021-06-03 RX ORDER — ESCITALOPRAM OXALATE 5 MG/1
5 TABLET ORAL DAILY
Qty: 30 TABLET | Refills: 1 | Status: SHIPPED | OUTPATIENT
Start: 2021-06-03 | End: 2021-08-02 | Stop reason: SDUPTHER

## 2021-06-03 NOTE — PATIENT INSTRUCTIONS
Consider starting Lexapro (5 mg) daily for anxiety  May use clobetasol 3 days a week (Monday, Wednesday, Friday)  May continues using calmoseptine 1 to 2 times a day

## 2021-06-03 NOTE — PROGRESS NOTES
Assessment/Plan:    Paroxysmal atrial fibrillation (HCC)  No recent symptoms  Reviewed recent ER visit  On metoprolol  Interstitial cystitis  Saw Dr Alex Oneil, following low acid diet strictly  May try using steroid cream 3 times a week, continue Calmoseptine cream 1 to 2 times a day  GERD (gastroesophageal reflux disease)  Plan to decrease PPI dose  Anxiety  Recommend to start low dose Lexapro  Chest pain  Resolved  Vitamin B12 deficiency  Stopped B12 supplements due to IC  Essential hypertension  BP stable  On amlodipine and metoprolol  Diagnoses and all orders for this visit:    Atrial flutter, unspecified type ([address] )    Gastroesophageal reflux disease, unspecified whether esophagitis present    Essential hypertension    Anxiety  -     escitalopram (LEXAPRO) 5 mg tablet; Take 1 tablet (5 mg total) by mouth daily    Interstitial cystitis  -     Clobetasol Propionate 0 025 % CREA; Apply 1 application topically 3 (three) times a week    Paroxysmal atrial fibrillation (HCC)      Follow up as scheduled or as needed  Subjective:      Patient ID: Luiz Pérez is a 80 y o  female here with her daughter with several concerns  First, she complains of itching around her vaginal area  She has seen Dr Alex Oneil, has started a low acid which she has been following  She reports no vaginal pain or discharge, no urinary pain or frequency  She is asking if she can use a steroid cram instead of Calmoseptine  Second, she reports of 3 loose stools in the past 3 weeks  She would have one episode, have a regular bowel movement the next day or be constipated  She reports stools are water, no blood, no incontinence  Third, she denies any reflux symptoms, asking if she should decreased the dose  She admits feeling very anxious more frequently recently  She feels she is taking too much medication  She was at the ER twice in the past month   She needed sutures for a finger laceration, her son took the sutures out  The following portions of the patient's history were reviewed and updated as appropriate: allergies, current medications, past medical history, past social history and problem list     Review of Systems   Constitutional: Negative for activity change and appetite change  Respiratory: Negative for cough and shortness of breath  Cardiovascular: Negative for chest pain and palpitations  Gastrointestinal: Negative for constipation  Genitourinary: Negative for decreased urine volume, difficulty urinating, dysuria, urgency, vaginal bleeding and vaginal discharge  Skin: Negative for rash  Neurological: Negative for weakness  Psychiatric/Behavioral: Negative for confusion and dysphoric mood  The patient is nervous/anxious  Objective:      /76   Pulse 75   Temp (!) 97 1 °F (36 2 °C)   Ht 5' 1" (1 549 m)   Wt 66 2 kg (146 lb)   LMP  (LMP Unknown)   SpO2 96%   BMI 27 59 kg/m²          Physical Exam  Vitals signs and nursing note reviewed  Constitutional:       General: She is not in acute distress  Appearance: She is well-developed  HENT:      Head: Normocephalic and atraumatic  Eyes:      Pupils: Pupils are equal, round, and reactive to light  Cardiovascular:      Rate and Rhythm: Normal rate and regular rhythm  Heart sounds: Normal heart sounds  Pulmonary:      Effort: Pulmonary effort is normal       Breath sounds: Normal breath sounds  No wheezing  Abdominal:      General: Bowel sounds are normal       Palpations: Abdomen is soft  Skin:     General: Skin is warm  Findings: No rash  Neurological:      Mental Status: She is alert and oriented to person, place, and time  Psychiatric:         Behavior: Behavior normal          Labs & imaging results reviewed with patient

## 2021-06-03 NOTE — ASSESSMENT & PLAN NOTE
Saw Dr Vernon Blackmon, following low acid diet strictly  May try using steroid cream 3 times a week, continue Calmoseptine cream 1 to 2 times a day

## 2021-06-04 ENCOUNTER — TELEPHONE (OUTPATIENT)
Dept: INTERNAL MEDICINE CLINIC | Facility: CLINIC | Age: 86
End: 2021-06-04

## 2021-06-04 DIAGNOSIS — N30.10 INTERSTITIAL CYSTITIS: ICD-10-CM

## 2021-06-04 RX ORDER — CLOBETASOL PROPIONATE 0.5 MG/G
CREAM TOPICAL 3 TIMES WEEKLY
Qty: 30 G | Refills: 0 | Status: SHIPPED | OUTPATIENT
Start: 2021-06-04

## 2021-06-04 NOTE — TELEPHONE ENCOUNTER
Clobetasol is too expensive -she can get it thru her meds by mail at no cost if it is 0 5%  they cannot do 0 025%  Can you give her a new script for 0 5%

## 2021-07-11 ENCOUNTER — APPOINTMENT (EMERGENCY)
Dept: CT IMAGING | Facility: HOSPITAL | Age: 86
End: 2021-07-11
Payer: MEDICARE

## 2021-07-11 ENCOUNTER — APPOINTMENT (EMERGENCY)
Dept: RADIOLOGY | Facility: HOSPITAL | Age: 86
End: 2021-07-11
Payer: MEDICARE

## 2021-07-11 ENCOUNTER — HOSPITAL ENCOUNTER (OUTPATIENT)
Facility: HOSPITAL | Age: 86
Setting detail: OBSERVATION
Discharge: HOME | End: 2021-07-12
Attending: EMERGENCY MEDICINE | Admitting: INTERNAL MEDICINE
Payer: MEDICARE

## 2021-07-11 DIAGNOSIS — R55 SYNCOPE: ICD-10-CM

## 2021-07-11 DIAGNOSIS — R19.7 DIARRHEA: ICD-10-CM

## 2021-07-11 DIAGNOSIS — R07.9 CHEST PAIN: Primary | ICD-10-CM

## 2021-07-11 PROBLEM — E83.42 HYPOMAGNESEMIA: Status: ACTIVE | Noted: 2021-07-11

## 2021-07-11 PROBLEM — R82.71 ASYMPTOMATIC BACTERIURIA: Status: ACTIVE | Noted: 2021-07-11

## 2021-07-11 PROBLEM — I16.0 HYPERTENSIVE URGENCY: Status: ACTIVE | Noted: 2021-07-11

## 2021-07-11 PROBLEM — D72.829 LEUKOCYTOSIS: Status: ACTIVE | Noted: 2021-07-11

## 2021-07-11 LAB
ALBUMIN SERPL BCP-MCNC: 3.1 G/DL (ref 3.5–5)
ALP SERPL-CCNC: 81 U/L (ref 46–116)
ALT SERPL W P-5'-P-CCNC: 20 U/L (ref 12–78)
ANION GAP SERPL CALCULATED.3IONS-SCNC: 11 MMOL/L (ref 4–13)
AST SERPL W P-5'-P-CCNC: 18 U/L (ref 5–45)
ATRIAL RATE: 75 BPM
ATRIAL RATE: 88 BPM
BACTERIA UR QL AUTO: ABNORMAL /HPF
BASOPHILS # BLD AUTO: 0.03 THOUSANDS/ΜL (ref 0–0.1)
BASOPHILS NFR BLD AUTO: 0 % (ref 0–1)
BILIRUB SERPL-MCNC: 0.68 MG/DL (ref 0.2–1)
BILIRUB UR QL STRIP: NEGATIVE
BUN SERPL-MCNC: 21 MG/DL (ref 5–25)
CALCIUM ALBUM COR SERPL-MCNC: 9.1 MG/DL (ref 8.3–10.1)
CALCIUM SERPL-MCNC: 8.4 MG/DL (ref 8.3–10.1)
CHLORIDE SERPL-SCNC: 104 MMOL/L (ref 100–108)
CK SERPL-CCNC: 117 U/L (ref 26–192)
CLARITY UR: CLEAR
CO2 SERPL-SCNC: 25 MMOL/L (ref 21–32)
COLOR UR: YELLOW
CREAT SERPL-MCNC: 0.69 MG/DL (ref 0.6–1.3)
EOSINOPHIL # BLD AUTO: 0.11 THOUSAND/ΜL (ref 0–0.61)
EOSINOPHIL NFR BLD AUTO: 1 % (ref 0–6)
ERYTHROCYTE [DISTWIDTH] IN BLOOD BY AUTOMATED COUNT: 13.9 % (ref 11.6–15.1)
GFR SERPL CREATININE-BSD FRML MDRD: 74 ML/MIN/1.73SQ M
GLUCOSE SERPL-MCNC: 106 MG/DL (ref 65–140)
GLUCOSE SERPL-MCNC: 97 MG/DL (ref 65–140)
GLUCOSE UR STRIP-MCNC: NEGATIVE MG/DL
HCT VFR BLD AUTO: 38.7 % (ref 34.8–46.1)
HGB BLD-MCNC: 12.5 G/DL (ref 11.5–15.4)
HGB UR QL STRIP.AUTO: NEGATIVE
IMM GRANULOCYTES # BLD AUTO: 0.07 THOUSAND/UL (ref 0–0.2)
IMM GRANULOCYTES NFR BLD AUTO: 1 % (ref 0–2)
KETONES UR STRIP-MCNC: NEGATIVE MG/DL
LEUKOCYTE ESTERASE UR QL STRIP: ABNORMAL
LYMPHOCYTES # BLD AUTO: 1.87 THOUSANDS/ΜL (ref 0.6–4.47)
LYMPHOCYTES NFR BLD AUTO: 15 % (ref 14–44)
MAGNESIUM SERPL-MCNC: 1.3 MG/DL (ref 1.6–2.6)
MCH RBC QN AUTO: 29.8 PG (ref 26.8–34.3)
MCHC RBC AUTO-ENTMCNC: 32.3 G/DL (ref 31.4–37.4)
MCV RBC AUTO: 92 FL (ref 82–98)
MONOCYTES # BLD AUTO: 0.92 THOUSAND/ΜL (ref 0.17–1.22)
MONOCYTES NFR BLD AUTO: 7 % (ref 4–12)
NEUTROPHILS # BLD AUTO: 9.94 THOUSANDS/ΜL (ref 1.85–7.62)
NEUTS SEG NFR BLD AUTO: 76 % (ref 43–75)
NITRITE UR QL STRIP: NEGATIVE
NON-SQ EPI CELLS URNS QL MICRO: ABNORMAL /HPF
NRBC BLD AUTO-RTO: 0 /100 WBCS
P AXIS: 79 DEGREES
PH UR STRIP.AUTO: 5.5 [PH] (ref 4.5–8)
PLATELET # BLD AUTO: 226 THOUSANDS/UL (ref 149–390)
PLATELET # BLD AUTO: 246 THOUSANDS/UL (ref 149–390)
PMV BLD AUTO: 10.3 FL (ref 8.9–12.7)
PMV BLD AUTO: 10.6 FL (ref 8.9–12.7)
POTASSIUM SERPL-SCNC: 3.5 MMOL/L (ref 3.5–5.3)
PR INTERVAL: 192 MS
PROT SERPL-MCNC: 7.3 G/DL (ref 6.4–8.2)
PROT UR STRIP-MCNC: NEGATIVE MG/DL
QRS AXIS: -55 DEGREES
QRS AXIS: 233 DEGREES
QRSD INTERVAL: 106 MS
QRSD INTERVAL: 98 MS
QT INTERVAL: 342 MS
QT INTERVAL: 420 MS
QTC INTERVAL: 372 MS
QTC INTERVAL: 473 MS
RBC # BLD AUTO: 4.19 MILLION/UL (ref 3.81–5.12)
RBC #/AREA URNS AUTO: ABNORMAL /HPF
SODIUM SERPL-SCNC: 140 MMOL/L (ref 136–145)
SP GR UR STRIP.AUTO: 1.01 (ref 1–1.03)
T WAVE AXIS: 115 DEGREES
T WAVE AXIS: 57 DEGREES
TROPONIN I SERPL-MCNC: <0.02 NG/ML
TROPONIN I SERPL-MCNC: <0.02 NG/ML
UROBILINOGEN UR QL STRIP.AUTO: 0.2 E.U./DL
VENTRICULAR RATE: 71 BPM
VENTRICULAR RATE: 76 BPM
WBC # BLD AUTO: 12.94 THOUSAND/UL (ref 4.31–10.16)
WBC #/AREA URNS AUTO: ABNORMAL /HPF

## 2021-07-11 PROCEDURE — 71045 X-RAY EXAM CHEST 1 VIEW: CPT

## 2021-07-11 PROCEDURE — 82948 REAGENT STRIP/BLOOD GLUCOSE: CPT

## 2021-07-11 PROCEDURE — 85049 AUTOMATED PLATELET COUNT: CPT | Performed by: INTERNAL MEDICINE

## 2021-07-11 PROCEDURE — 80053 COMPREHEN METABOLIC PANEL: CPT | Performed by: EMERGENCY MEDICINE

## 2021-07-11 PROCEDURE — 99285 EMERGENCY DEPT VISIT HI MDM: CPT

## 2021-07-11 PROCEDURE — 73564 X-RAY EXAM KNEE 4 OR MORE: CPT

## 2021-07-11 PROCEDURE — 93005 ELECTROCARDIOGRAM TRACING: CPT

## 2021-07-11 PROCEDURE — 99285 EMERGENCY DEPT VISIT HI MDM: CPT | Performed by: EMERGENCY MEDICINE

## 2021-07-11 PROCEDURE — 83735 ASSAY OF MAGNESIUM: CPT | Performed by: EMERGENCY MEDICINE

## 2021-07-11 PROCEDURE — 84484 ASSAY OF TROPONIN QUANT: CPT | Performed by: INTERNAL MEDICINE

## 2021-07-11 PROCEDURE — G1004 CDSM NDSC: HCPCS

## 2021-07-11 PROCEDURE — 84484 ASSAY OF TROPONIN QUANT: CPT | Performed by: EMERGENCY MEDICINE

## 2021-07-11 PROCEDURE — 85025 COMPLETE CBC W/AUTO DIFF WBC: CPT | Performed by: EMERGENCY MEDICINE

## 2021-07-11 PROCEDURE — 96365 THER/PROPH/DIAG IV INF INIT: CPT

## 2021-07-11 PROCEDURE — 82550 ASSAY OF CK (CPK): CPT | Performed by: EMERGENCY MEDICINE

## 2021-07-11 PROCEDURE — 70450 CT HEAD/BRAIN W/O DYE: CPT

## 2021-07-11 PROCEDURE — 99220 PR INITIAL OBSERVATION CARE/DAY 70 MINUTES: CPT | Performed by: INTERNAL MEDICINE

## 2021-07-11 PROCEDURE — 93010 ELECTROCARDIOGRAM REPORT: CPT | Performed by: INTERNAL MEDICINE

## 2021-07-11 PROCEDURE — 36415 COLL VENOUS BLD VENIPUNCTURE: CPT | Performed by: EMERGENCY MEDICINE

## 2021-07-11 PROCEDURE — 81001 URINALYSIS AUTO W/SCOPE: CPT

## 2021-07-11 RX ORDER — AMLODIPINE BESYLATE 2.5 MG/1
2.5 TABLET ORAL 2 TIMES DAILY
Status: DISCONTINUED | OUTPATIENT
Start: 2021-07-11 | End: 2021-07-12 | Stop reason: HOSPADM

## 2021-07-11 RX ORDER — AMLODIPINE BESYLATE 2.5 MG/1
2.5 TABLET ORAL ONCE
Status: COMPLETED | OUTPATIENT
Start: 2021-07-11 | End: 2021-07-11

## 2021-07-11 RX ORDER — LEVOTHYROXINE SODIUM 0.15 MG/1
150 TABLET ORAL DAILY
Status: DISCONTINUED | OUTPATIENT
Start: 2021-07-11 | End: 2021-07-12 | Stop reason: HOSPADM

## 2021-07-11 RX ORDER — PANTOPRAZOLE SODIUM 40 MG/1
40 TABLET, DELAYED RELEASE ORAL
Status: DISCONTINUED | OUTPATIENT
Start: 2021-07-12 | End: 2021-07-12 | Stop reason: HOSPADM

## 2021-07-11 RX ORDER — MECLIZINE HYDROCHLORIDE 25 MG/1
25 TABLET ORAL DAILY PRN
Status: DISCONTINUED | OUTPATIENT
Start: 2021-07-11 | End: 2021-07-12 | Stop reason: HOSPADM

## 2021-07-11 RX ORDER — HEPARIN SODIUM 5000 [USP'U]/ML
5000 INJECTION, SOLUTION INTRAVENOUS; SUBCUTANEOUS EVERY 8 HOURS SCHEDULED
Status: DISCONTINUED | OUTPATIENT
Start: 2021-07-11 | End: 2021-07-12 | Stop reason: HOSPADM

## 2021-07-11 RX ORDER — HYDRALAZINE HYDROCHLORIDE 20 MG/ML
5 INJECTION INTRAMUSCULAR; INTRAVENOUS EVERY 6 HOURS PRN
Status: DISCONTINUED | OUTPATIENT
Start: 2021-07-11 | End: 2021-07-12 | Stop reason: HOSPADM

## 2021-07-11 RX ORDER — MAGNESIUM SULFATE HEPTAHYDRATE 40 MG/ML
2 INJECTION, SOLUTION INTRAVENOUS ONCE
Status: COMPLETED | OUTPATIENT
Start: 2021-07-11 | End: 2021-07-11

## 2021-07-11 RX ORDER — HYDRALAZINE HYDROCHLORIDE 20 MG/ML
5 INJECTION INTRAMUSCULAR; INTRAVENOUS EVERY 8 HOURS PRN
Status: DISCONTINUED | OUTPATIENT
Start: 2021-07-11 | End: 2021-07-11

## 2021-07-11 RX ORDER — ESCITALOPRAM OXALATE 10 MG/1
5 TABLET ORAL DAILY
Status: DISCONTINUED | OUTPATIENT
Start: 2021-07-11 | End: 2021-07-12 | Stop reason: HOSPADM

## 2021-07-11 RX ADMIN — AMLODIPINE BESYLATE 2.5 MG: 2.5 TABLET ORAL at 17:21

## 2021-07-11 RX ADMIN — ESCITALOPRAM 5 MG: 5 TABLET, FILM COATED ORAL at 15:38

## 2021-07-11 RX ADMIN — METOPROLOL TARTRATE 12.5 MG: 25 TABLET, FILM COATED ORAL at 21:15

## 2021-07-11 RX ADMIN — AMLODIPINE BESYLATE 2.5 MG: 2.5 TABLET ORAL at 13:37

## 2021-07-11 RX ADMIN — HEPARIN SODIUM 5000 UNITS: 5000 INJECTION INTRAVENOUS; SUBCUTANEOUS at 21:16

## 2021-07-11 RX ADMIN — HEPARIN SODIUM 5000 UNITS: 5000 INJECTION INTRAVENOUS; SUBCUTANEOUS at 15:38

## 2021-07-11 RX ADMIN — METOPROLOL TARTRATE 12.5 MG: 25 TABLET, FILM COATED ORAL at 13:37

## 2021-07-11 RX ADMIN — MAGNESIUM SULFATE HEPTAHYDRATE 2 G: 40 INJECTION, SOLUTION INTRAVENOUS at 11:54

## 2021-07-11 NOTE — ASSESSMENT & PLAN NOTE
POA,  Patient collapsed  Lost consciousness for approximately 2-1/2 hours  She denies any prodromal symptoms or any symptoms after gaining her consciousness  ·   EKG:  Sinus rhythm with PVC    No significant ischemic changes  ·  initial troponin negative    Plan:  ·  check orthostatic blood pressure  ·  trend troponin  ·  monitor on telemetry  ·  Check echo,  Echo was 18 with EF of 74%, grade 2 diastolic dysfunction and no significant valvular diseases

## 2021-07-11 NOTE — ASSESSMENT & PLAN NOTE
POA,  Patient states that usually blood pressure is controlled at home and her goal is 510-556 systolic  This could be the reason for her collapse      ·  continue to monitor blood pressure  ·  see plan above

## 2021-07-11 NOTE — ED PROVIDER NOTES
History  Chief Complaint   Patient presents with    Syncope     Pt presents to the ED from assisted living with c/o passing out earling this morning  Pt reports she was sitting in her recliner, got up and syncopized  Pt has no c/o of pain, denies head strike and thinners  History provided by:  Patient   used: No    Syncope  Associated symptoms: chest pain    Associated symptoms: no diaphoresis, no dizziness, no fever, no headaches, no nausea, no palpitations, no shortness of breath, no vomiting and no weakness      Patient is a 75-year-old female presenting to emergency department after syncopal episode today morning  States was trying to get up when she passed out and ended up on the floor  Afterwards was on the floor for 2 hours as was unable to get up  Had chest pain after the fall  No shortness of breath  No nausea vomiting  No diarrhea  No pain with urination  No fevers or chills  History of syncope in the past   History of CHF  No weakness  MDM will do cardiac evaluation, CT head, UA, re-evaluate for likely observation      Prior to Admission Medications   Prescriptions Last Dose Informant Patient Reported? Taking? Carboxymethylcellul-Glycerin (REFRESH OPTIVE) 0 5-0 9 % SOLN  Family Member Yes No   Sig: Apply to eye Drops to b/l eyes   Cholecalciferol (VITAMIN D-3) 1000 UNITS CAPS  Family Member Yes No   Sig: Take 2,000 Units by mouth daily     Multiple Vitamins-Minerals (CENTRUM SILVER PO)  Family Member Yes No   Sig: Take 1 tablet by mouth daily     Multiple Vitamins-Minerals (PRESERVISION AREDS 2) CAPS  Family Member No No   Sig: Take 1 capsule by mouth daily   amLODIPine (NORVASC) 2 5 mg tablet   No No   Sig: Take 1 tablet (2 5 mg total) by mouth 2 (two) times a day   clobetasol (TEMOVATE) 0 05 % cream   No No   Sig: Apply topically 3 (three) times a week   cyanocobalamin (VITAMIN B-12) 100 MCG tablet  Family Member Yes No   Sig: Take 100 mcg by mouth daily escitalopram (LEXAPRO) 5 mg tablet   No No   Sig: Take 1 tablet (5 mg total) by mouth daily   levothyroxine (Synthroid) 150 mcg tablet   No No   Sig: Take 1 tablet (150 mcg total) by mouth daily   meclizine (ANTIVERT) 25 mg tablet   No No   Sig: Take 1 tablet (25 mg total) by mouth daily as needed for dizziness   metoprolol tartrate (LOPRESSOR) 25 mg tablet   No No   Sig: Take 0 5 tablets (12 5 mg total) by mouth 2 (two) times a day   omeprazole (PriLOSEC) 40 MG capsule   No No   Sig: Take 1 capsule (40 mg total) by mouth daily      Facility-Administered Medications: None       Past Medical History:   Diagnosis Date    AAA (abdominal aortic aneurysm) (HCC)     Acute medial meniscus tear     Aspiration pneumonia ([facility] [address] )     LAST ASSESSED: 7/30/14    Breast CA ([facility] [address] )     left    Chest pain     RESOLVED: 1/31/17    CTS (carpal tunnel syndrome)     Depression     Dermatitis     LAST ASSESSED: 7/25/13    Disease of thyroid gland     Fainting     LAST ASSESSED: 3/15/13    GERD (gastroesophageal reflux disease)     H  pylori infection 3/17/2009    Hypertension     Incomplete defecation     LAST ASSESSED: 7/25/13    Macular degeneration     Osteoporosis     Pneumonia     Vertigo 2012       Past Surgical History:   Procedure Laterality Date    APPENDECTOMY      CHOLECYSTECTOMY      COLONOSCOPY      MASTECTOMY Left 09/2017    SIMPLE    AK INTRAOPERATIVE SENTINEL LYMPH NODE ID W DYE INJECTION Left 9/5/2017    Procedure: INTRAOPERATIVE LYMPHATIC MAPPING; BLUE DYE ONLY; [facility] 9938 LYMPH NODE BIOPSY;  Surgeon: Nicki Jara MD;  Location: [facility];  Service: Surgical Oncology    AK MASTECTOMY, SIMPLE, COMPLETE Left 9/5/2017    Procedure: BREAST MASTECTOMY;  Surgeon: Nicki Jara MD;  [facility];  Service: Surgical Oncology    SENTINEL LYMPH NODE BIOPSY      US GUIDED BREAST BIOPSY LEFT COMPLETE Left 8/14/2017       Family History   Problem Relation Age of Onset    Angina Mother         PECTORIS    Alcohol abuse Neg Hx     Depression Neg Hx     Drug abuse Neg Hx     Substance Abuse Neg Hx      I have reviewed and agree with the history as documented  E-Cigarette/Vaping     E-Cigarette/Vaping Substances     Social History     Tobacco Use    Smoking status: Never Smoker    Smokeless tobacco: Never Used   Substance Use Topics    Alcohol use: No    Drug use: No       Review of Systems   Constitutional: Negative for chills, diaphoresis and fever  HENT: Negative for congestion and sore throat  Respiratory: Negative for cough, shortness of breath, wheezing and stridor  Cardiovascular: Positive for chest pain and syncope  Negative for palpitations and leg swelling  Gastrointestinal: Negative for abdominal pain, blood in stool, diarrhea, nausea and vomiting  Genitourinary: Negative for dysuria, frequency and urgency  Musculoskeletal: Negative for neck pain and neck stiffness  Skin: Negative for pallor and rash  Neurological: Positive for syncope  Negative for dizziness, weakness, light-headedness and headaches  All other systems reviewed and are negative  Physical Exam  Physical Exam  Vitals reviewed  Constitutional:       Appearance: Normal appearance  She is well-developed  HENT:      Head: Normocephalic and atraumatic  Eyes:      Extraocular Movements: Extraocular movements intact  Pupils: Pupils are equal, round, and reactive to light  Cardiovascular:      Rate and Rhythm: Normal rate and regular rhythm  Pulses: Normal pulses  Heart sounds: Normal heart sounds  Pulmonary:      Effort: Pulmonary effort is normal  No respiratory distress  Breath sounds: Normal breath sounds  Abdominal:      General: Bowel sounds are normal       Palpations: Abdomen is soft  Tenderness: There is no abdominal tenderness  Musculoskeletal:         General: No deformity  Normal range of motion  Cervical back: Neck supple  Right lower leg: No edema  Left lower leg: No edema  Comments: Abrasion noted over the right knee  Full range of motion  Neurovascular intact distally  Skin:     General: Skin is warm and dry  Capillary Refill: Capillary refill takes less than 2 seconds  Neurological:      General: No focal deficit present  Mental Status: She is alert and oriented to person, place, and time           Vital Signs  ED Triage Vitals   Temperature Pulse Respirations Blood Pressure SpO2   07/11/21 0923 07/11/21 0923 07/11/21 0923 07/11/21 0923 07/11/21 0923   98 6 °F (37 °C) 65 16 (!) 191/94 97 %      Temp Source Heart Rate Source Patient Position - Orthostatic VS BP Location FiO2 (%)   07/11/21 0923 07/11/21 0923 07/11/21 1030 07/11/21 0923 --   Oral Monitor Lying Right arm       Pain Score       07/11/21 0923       No Pain           Vitals:    07/11/21 1430 07/11/21 1500 07/11/21 1630 07/11/21 1700   BP: (!) 191/99 (!) 200/92 (!) 189/87 (!) 199/106   Pulse: 58 60 56 64   Patient Position - Orthostatic VS: Lying Lying Lying Lying         Visual Acuity  Visual Acuity      Most Recent Value   L Pupil Size (mm)  3   R Pupil Size (mm)  3          ED Medications  Medications   metoprolol tartrate (LOPRESSOR) tablet 12 5 mg (12 5 mg Oral Given 7/11/21 1337)   amLODIPine (NORVASC) tablet 2 5 mg (2 5 mg Oral Given 7/11/21 1721)   escitalopram (LEXAPRO) tablet 5 mg (5 mg Oral Given 7/11/21 1538)   levothyroxine tablet 150 mcg (0 mcg Oral Hold 7/11/21 1535)   meclizine (ANTIVERT) tablet 25 mg (has no administration in time range)   pantoprazole (PROTONIX) EC tablet 40 mg (has no administration in time range)   heparin (porcine) subcutaneous injection 5,000 Units (5,000 Units Subcutaneous Given 7/11/21 1538)   hydrALAZINE (APRESOLINE) injection 5 mg (has no administration in time range)   magnesium sulfate 2 g/50 mL IVPB (premix) 2 g (0 g Intravenous Stopped 7/11/21 1354)   amLODIPine (NORVASC) tablet 2 5 mg (2 5 mg Oral Given 7/11/21 9122) Diagnostic Studies  Results Reviewed     Procedure Component Value Units Date/Time    Troponin I [[id number]]  (Normal) Collected: 07/11/21 1534    Lab Status: Final result Specimen: Blood from Arm, Right Updated: 07/11/21 1619     Troponin I <0 02 ng/mL     Platelet count [[id number]]  (Normal) Collected: 07/11/21 1534    Lab Status: Final result Specimen: Blood from Arm, Right Updated: 07/11/21 1541     Platelets 291 Thousands/uL      MPV 10 3 fL     Troponin I [[id number]]     Lab Status: No result Specimen: Blood     Urine Microscopic [[id number]]  (Abnormal) Collected: 07/11/21 1015    Lab Status: Final result Specimen: Urine, Other Updated: 07/11/21 1104     RBC, UA 2-4 /hpf      WBC, UA None Seen /hpf      Epithelial Cells Occasional /hpf      Bacteria, UA Moderate /hpf     Comprehensive metabolic panel [[id number]]  (Abnormal) Collected: 07/11/21 0954    Lab Status: Final result Specimen: Blood from Arm, Right Updated: 07/11/21 1033     Sodium 140 mmol/L      Potassium 3 5 mmol/L      Chloride 104 mmol/L      CO2 25 mmol/L      ANION GAP 11 mmol/L      BUN 21 mg/dL      Creatinine 0 69 mg/dL      Glucose 106 mg/dL      Calcium 8 4 mg/dL      Corrected Calcium 9 1 mg/dL      AST 18 U/L      ALT 20 U/L      Alkaline Phosphatase 81 U/L      Total Protein 7 3 g/dL      Albumin 3 1 g/dL      Total Bilirubin 0 68 mg/dL      eGFR 74 ml/min/1 73sq m     Narrative:      Caitlyn guidelines for Chronic Kidney Disease (CKD):     Stage 1 with normal or high GFR (GFR > 90 mL/min/1 73 square meters)    Stage 2 Mild CKD (GFR = 60-89 mL/min/1 73 square meters)    Stage 3A Moderate CKD (GFR = 45-59 mL/min/1 73 square meters)    Stage 3B Moderate CKD (GFR = 30-44 mL/min/1 73 square meters)    Stage 4 Severe CKD (GFR = 15-29 mL/min/1 73 square meters)    Stage 5 End Stage CKD (GFR <15 mL/min/1 73 square meters)  Note: GFR calculation is accurate only with a steady state creatinine    CK (with reflex to MB) [[id number]]  (Normal) Collected: 07/11/21 0954    Lab Status: Final result Specimen: Blood from Arm, Right Updated: 07/11/21 1033     Total  U/L     Magnesium [[id number]]  (Abnormal) Collected: 07/11/21 0954    Lab Status: Final result Specimen: Blood from Arm, Right Updated: 07/11/21 1033     Magnesium 1 3 mg/dL     Troponin I [[id number]]  (Normal) Collected: 07/11/21 0954    Lab Status: Final result Specimen: Blood from Arm, Right Updated: 07/11/21 1032     Troponin I <0 02 ng/mL     Urine Macroscopic, POC [[id number]]  (Abnormal) Collected: 07/11/21 1015    Lab Status: Final result Specimen: Urine Updated: 07/11/21 1017     Color, UA Yellow     Clarity, UA Clear     pH, UA 5 5     Leukocytes, UA Trace     Nitrite, UA Negative     Protein, UA Negative mg/dl      Glucose, UA Negative mg/dl      Ketones, UA Negative mg/dl      Urobilinogen, UA 0 2 E U /dl      Bilirubin, UA Negative     Blood, UA Negative     Specific Gravity, UA 1 015    Narrative:      CLINITEK RESULT    CBC and differential [[id number]]  (Abnormal) Collected: 07/11/21 0954    Lab Status: Final result Specimen: Blood from Arm, Right Updated: 07/11/21 1006     WBC 12 94 Thousand/uL      RBC 4 19 Million/uL      Hemoglobin 12 5 g/dL      Hematocrit 38 7 %      MCV 92 fL      MCH 29 8 pg      MCHC 32 3 g/dL      RDW 13 9 %      MPV 10 6 fL      Platelets 313 Thousands/uL      nRBC 0 /100 WBCs      Neutrophils Relative 76 %      Immat GRANS % 1 %      Lymphocytes Relative 15 %      Monocytes Relative 7 %      Eosinophils Relative 1 %      Basophils Relative 0 %      Neutrophils Absolute 9 94 Thousands/µL      Immature Grans Absolute 0 07 Thousand/uL      Lymphocytes Absolute 1 87 Thousands/µL      Monocytes Absolute 0 92 Thousand/µL      Eosinophils Absolute 0 11 Thousand/µL      Basophils Absolute 0 03 Thousands/µL                  CT head without contrast   Final Result by Love Reno MD (07/11 1201)      1    No acute intracranial CT abnormality  No significant change from prior exam       2   Cerebral atrophy and advanced microangiopathic change  Workstation performed: [id number]         XR knee 4+ views Right injury   ED Interpretation by Fred Covarrubias MD (07/11 9634)   No acute abnormality      XR chest 1 view portable   ED Interpretation by Fred Covarrubias MD (07/11 0145)   No acute abnormality                 Procedures  Procedures         ED Course  ED Course as of Jul 11 1822   Sun Jul 11, 2021   1002 ECG shows rate of 71, sinus, PVCs noted, nonspecific ST and T-waves throughout, left axis deviation, independently interpreted by me                                              MDM    Disposition  Final diagnoses:   Chest pain   Syncope     Time reflects when diagnosis was documented in both MDM as applicable and the Disposition within this note     Time User Action Codes Description Comment    7/11/2021 12:32 PM Cassidy Hodgson [R07 [id number]] Chest pain     7/11/2021 12:32 PM Casisdy Perez [[id number]] Syncope       ED Disposition     ED Disposition Condition Date/Time Comment    Admit St[facility] [place] Jul 11, 2021 12:32 PM Case was discussed with  medicine team and the patient's admission status was agreed to be Admission Status: observation status to the service of Dr Meryle Cartwright           Follow-up Information    None         Current Discharge Medication List      CONTINUE these medications which have NOT CHANGED    Details   amLODIPine (NORVASC) 2 5 mg tablet Take 1 tablet (2 5 mg total) by mouth 2 (two) times a day  Qty: 180 tablet, Refills: 1    Associated Diagnoses: Essential hypertension      Carboxymethylcellul-Glycerin (REFRESH OPTIVE) 0 5-0 9 % SOLN Apply to eye Drops to b/l eyes      Cholecalciferol (VITAMIN D-3) 1000 UNITS CAPS Take 2,000 Units by mouth daily        clobetasol (TEMOVATE) 0 05 % cream Apply topically 3 (three) times a week  Qty: 30 g, Refills: 0    Associated Diagnoses: Interstitial cystitis cyanocobalamin (VITAMIN B-12) 100 MCG tablet Take 100 mcg by mouth daily       escitalopram (LEXAPRO) 5 mg tablet Take 1 tablet (5 mg total) by mouth daily  Qty: 30 tablet, Refills: 1    Associated Diagnoses: Anxiety      levothyroxine (Synthroid) 150 mcg tablet Take 1 tablet (150 mcg total) by mouth daily  Qty: 90 tablet, Refills: 1    Associated Diagnoses: Acquired hypothyroidism      meclizine (ANTIVERT) 25 mg tablet Take 1 tablet (25 mg total) by mouth daily as needed for dizziness  Qty: 90 tablet, Refills: 0    Associated Diagnoses: Vertigo      metoprolol tartrate (LOPRESSOR) 25 mg tablet Take 0 5 tablets (12 5 mg total) by mouth 2 (two) times a day  Qty: 90 tablet, Refills: 1    Associated Diagnoses: Essential hypertension; Paroxysmal atrial fibrillation (HCC)      Multiple Vitamins-Minerals (CENTRUM SILVER PO) Take 1 tablet by mouth daily  Multiple Vitamins-Minerals (PRESERVISION AREDS 2) CAPS Take 1 capsule by mouth daily  Qty: 90 capsule, Refills: 0    Associated Diagnoses: Malignant neoplasm of left breast in female, estrogen receptor negative, unspecified site of breast (HCC)      omeprazole (PriLOSEC) 40 MG capsule Take 1 capsule (40 mg total) by mouth daily  Qty: 90 capsule, Refills: 1    Associated Diagnoses: Gastroesophageal reflux disease without esophagitis           No discharge procedures on file      PDMP Review     None          ED Provider  Electronically Signed by           Salomon Trejo MD  07/11/21 8632

## 2021-07-11 NOTE — ASSESSMENT & PLAN NOTE
Wt Readings from Last 3 Encounters:   06/03/21 66 2 kg (146 lb)   05/07/21 67 5 kg (148 lb 14 4 oz)   03/25/21 70 3 kg (155 lb)       Euvolemic at this time  Patient is not on any diuretics at home      ·  monitor volume status, daily weight and I&Os
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
## 2021-07-11 NOTE — ASSESSMENT & PLAN NOTE
A pressure elevated on admission  Could be the reason of her collapse  ·   Resume patient's medication, she did not take that prior to admission  ·  monitor blood pressure  Might need to increase her blood pressure medication if blood pressure consistently high  ·  hydralazine 5 mg p r n  for systolic blood pressure above 180    ·  goal of blood pressure for her would be 160-170 as she develops dizziness /lightheadedness if he drops below that per chart review and confirmed by her son

## 2021-07-12 ENCOUNTER — APPOINTMENT (OUTPATIENT)
Dept: NON INVASIVE DIAGNOSTICS | Facility: HOSPITAL | Age: 86
End: 2021-07-12
Payer: MEDICARE

## 2021-07-12 ENCOUNTER — APPOINTMENT (OUTPATIENT)
Dept: RADIOLOGY | Facility: HOSPITAL | Age: 86
End: 2021-07-12
Payer: MEDICARE

## 2021-07-12 VITALS
BODY MASS INDEX: 27.68 KG/M2 | HEART RATE: 71 BPM | TEMPERATURE: 98.4 F | HEIGHT: 61 IN | RESPIRATION RATE: 16 BRPM | OXYGEN SATURATION: 93 % | SYSTOLIC BLOOD PRESSURE: 170 MMHG | WEIGHT: 146.61 LBS | DIASTOLIC BLOOD PRESSURE: 81 MMHG

## 2021-07-12 PROBLEM — R15.9 FECAL INCONTINENCE: Status: ACTIVE | Noted: 2021-07-12

## 2021-07-12 PROBLEM — E87.6 HYPOKALEMIA: Status: ACTIVE | Noted: 2021-07-12

## 2021-07-12 LAB
ANION GAP SERPL CALCULATED.3IONS-SCNC: 13 MMOL/L (ref 4–13)
BASOPHILS # BLD AUTO: 0.02 THOUSANDS/ΜL (ref 0–0.1)
BASOPHILS NFR BLD AUTO: 0 % (ref 0–1)
BUN SERPL-MCNC: 15 MG/DL (ref 5–25)
CALCIUM SERPL-MCNC: 8.2 MG/DL (ref 8.3–10.1)
CHLORIDE SERPL-SCNC: 106 MMOL/L (ref 100–108)
CO2 SERPL-SCNC: 25 MMOL/L (ref 21–32)
CREAT SERPL-MCNC: 0.69 MG/DL (ref 0.6–1.3)
EOSINOPHIL # BLD AUTO: 0.13 THOUSAND/ΜL (ref 0–0.61)
EOSINOPHIL NFR BLD AUTO: 2 % (ref 0–6)
ERYTHROCYTE [DISTWIDTH] IN BLOOD BY AUTOMATED COUNT: 14 % (ref 11.6–15.1)
GFR SERPL CREATININE-BSD FRML MDRD: 74 ML/MIN/1.73SQ M
GLUCOSE P FAST SERPL-MCNC: 118 MG/DL (ref 65–99)
GLUCOSE SERPL-MCNC: 118 MG/DL (ref 65–140)
HCT VFR BLD AUTO: 37.2 % (ref 34.8–46.1)
HGB BLD-MCNC: 12.1 G/DL (ref 11.5–15.4)
IMM GRANULOCYTES # BLD AUTO: 0.03 THOUSAND/UL (ref 0–0.2)
IMM GRANULOCYTES NFR BLD AUTO: 0 % (ref 0–2)
LYMPHOCYTES # BLD AUTO: 2.34 THOUSANDS/ΜL (ref 0.6–4.47)
LYMPHOCYTES NFR BLD AUTO: 29 % (ref 14–44)
MAGNESIUM SERPL-MCNC: 1.9 MG/DL (ref 1.6–2.6)
MCH RBC QN AUTO: 29.7 PG (ref 26.8–34.3)
MCHC RBC AUTO-ENTMCNC: 32.5 G/DL (ref 31.4–37.4)
MCV RBC AUTO: 91 FL (ref 82–98)
MONOCYTES # BLD AUTO: 0.86 THOUSAND/ΜL (ref 0.17–1.22)
MONOCYTES NFR BLD AUTO: 11 % (ref 4–12)
NEUTROPHILS # BLD AUTO: 4.67 THOUSANDS/ΜL (ref 1.85–7.62)
NEUTS SEG NFR BLD AUTO: 58 % (ref 43–75)
NRBC BLD AUTO-RTO: 0 /100 WBCS
PLATELET # BLD AUTO: 231 THOUSANDS/UL (ref 149–390)
PMV BLD AUTO: 10.5 FL (ref 8.9–12.7)
POTASSIUM SERPL-SCNC: 3 MMOL/L (ref 3.5–5.3)
RBC # BLD AUTO: 4.07 MILLION/UL (ref 3.81–5.12)
SODIUM SERPL-SCNC: 144 MMOL/L (ref 136–145)
TSH SERPL DL<=0.05 MIU/L-ACNC: 2.63 UIU/ML (ref 0.36–3.74)
WBC # BLD AUTO: 8.05 THOUSAND/UL (ref 4.31–10.16)

## 2021-07-12 PROCEDURE — 97163 PT EVAL HIGH COMPLEX 45 MIN: CPT

## 2021-07-12 PROCEDURE — 85025 COMPLETE CBC W/AUTO DIFF WBC: CPT | Performed by: INTERNAL MEDICINE

## 2021-07-12 PROCEDURE — 93306 TTE W/DOPPLER COMPLETE: CPT

## 2021-07-12 PROCEDURE — 99217 PR OBSERVATION CARE DISCHARGE MANAGEMENT: CPT | Performed by: INTERNAL MEDICINE

## 2021-07-12 PROCEDURE — 80048 BASIC METABOLIC PNL TOTAL CA: CPT | Performed by: INTERNAL MEDICINE

## 2021-07-12 PROCEDURE — 84443 ASSAY THYROID STIM HORMONE: CPT | Performed by: INTERNAL MEDICINE

## 2021-07-12 PROCEDURE — 83735 ASSAY OF MAGNESIUM: CPT | Performed by: INTERNAL MEDICINE

## 2021-07-12 PROCEDURE — 73110 X-RAY EXAM OF WRIST: CPT

## 2021-07-12 PROCEDURE — 93306 TTE W/DOPPLER COMPLETE: CPT | Performed by: INTERNAL MEDICINE

## 2021-07-12 PROCEDURE — 97166 OT EVAL MOD COMPLEX 45 MIN: CPT

## 2021-07-12 RX ORDER — POTASSIUM CHLORIDE 20 MEQ/1
40 TABLET, EXTENDED RELEASE ORAL ONCE
Status: COMPLETED | OUTPATIENT
Start: 2021-07-12 | End: 2021-07-12

## 2021-07-12 RX ORDER — POTASSIUM CHLORIDE 14.9 MG/ML
20 INJECTION INTRAVENOUS ONCE
Status: COMPLETED | OUTPATIENT
Start: 2021-07-12 | End: 2021-07-12

## 2021-07-12 RX ADMIN — HEPARIN SODIUM 5000 UNITS: 5000 INJECTION INTRAVENOUS; SUBCUTANEOUS at 05:20

## 2021-07-12 RX ADMIN — HEPARIN SODIUM 5000 UNITS: 5000 INJECTION INTRAVENOUS; SUBCUTANEOUS at 14:48

## 2021-07-12 RX ADMIN — METOPROLOL TARTRATE 12.5 MG: 25 TABLET, FILM COATED ORAL at 08:04

## 2021-07-12 RX ADMIN — ESCITALOPRAM 5 MG: 5 TABLET, FILM COATED ORAL at 08:04

## 2021-07-12 RX ADMIN — POTASSIUM CHLORIDE 40 MEQ: 1500 TABLET, EXTENDED RELEASE ORAL at 12:04

## 2021-07-12 RX ADMIN — AMLODIPINE BESYLATE 2.5 MG: 2.5 TABLET ORAL at 08:04

## 2021-07-12 RX ADMIN — PANTOPRAZOLE SODIUM 40 MG: 40 TABLET, DELAYED RELEASE ORAL at 05:20

## 2021-07-12 RX ADMIN — POTASSIUM CHLORIDE 20 MEQ: 14.9 INJECTION, SOLUTION INTRAVENOUS at 12:03

## 2021-07-12 RX ADMIN — LEVOTHYROXINE SODIUM 150 MCG: 150 TABLET ORAL at 08:04

## 2021-07-12 NOTE — DISCHARGE INSTR - AVS FIRST PAGE
Dear Verena Avelar,     It was our pleasure to care for you here at [facility]  It is our hope that we were always able to exceed the expected standards for your care during your stay  You were hospitalized due to syncope  You were cared for on the 3rd floor by Bryan Lao MD under the service of Pedro Santa MD with the [facility] who covers for your primary care physician (PCP), Karyna Patel MD, while you were hospitalized  If you have any questions or concerns related to this hospitalization, you may contact us at [phone]  For follow up as well as any medication refills, we recommend that you follow up with your primary care physician  A registered nurse will reach out to you by phone within a few days after your discharge to answer any additional questions that you may have after going home  However, at this time we provide for you here, the most important instructions / recommendations at discharge:     · Notable Medication Adjustments -   · None  · Testing Required after Discharge -   · None  · Important follow up information -   · Please follow-up with your primary care doctor within 1 week  · Please follow-up with your primary cardiologist in clinic as soon as possible  · Please follow-up with gastroenterology in clinic  Referral was sent  · Other Instructions -   · Refer to discharge instructions for further details  · Please review this entire after visit summary as additional general instructions including medication list, appointments, activity, diet, any pertinent wound care, and other additional recommendations from your care team that may be provided for you        Sincerely,     Bryan Lao MD

## 2021-07-12 NOTE — ASSESSMENT & PLAN NOTE
POA,  Patient states that usually blood pressure is controlled at home and her goal is 230-771 systolic  This could be the reason for her collapse      ·  continue home meds Detail Level: Generalized

## 2021-07-12 NOTE — DISCHARGE INSTRUCTIONS
Syncope   WHAT YOU NEED TO KNOW:   Syncope is also called fainting or passing out  Syncope is a sudden, temporary loss of consciousness, followed by a fall from a standing or sitting position  Syncope ranges from not serious to a sign of a more serious condition that needs to be treated  You can control some health conditions that cause syncope  Your healthcare providers can help you create a plan to manage syncope and prevent episodes  DISCHARGE INSTRUCTIONS:   Seek care immediately if:   · You are bleeding because you hit your head when you fainted  · You suddenly have double vision, difficulty speaking, numbness, and cannot move your arms or legs  · You have chest pain and trouble breathing  · You vomit blood or material that looks like coffee grounds  · You see blood in your bowel movement  Contact your healthcare provider if:   · You have new or worsening symptoms  · You have another syncope episode  · You have a headache, fast heartbeat, or feel too dizzy to stand up  · You have questions or concerns about your condition or care  Medicines:   · Medicines  may be needed to help your heart pump strongly and regularly  Your healthcare provider may also make changes to any medicines that are causing syncope  · Take your medicine as directed  Contact your healthcare provider if you think your medicine is not helping or if you have side effects  Tell him or her if you are allergic to any medicine  Keep a list of the medicines, vitamins, and herbs you take  Include the amounts, and when and why you take them  Bring the list or the pill bottles to follow-up visits  Carry your medicine list with you in case of an emergency  Follow up with your healthcare provider as directed:  Write down your questions so you remember to ask them during your visits  Manage syncope:   · Keep a record of your syncope episodes  Include your symptoms and your activity before and after the episode  The record can help your healthcare provider find the cause of your syncope and help you manage episodes  · Sit or lie down when needed  This includes when you feel dizzy, your throat is getting tight, and your vision changes  Raise your legs above the level of your heart  · Take slow, deep breaths if you start to breathe faster with anxiety or fear  This can help decrease dizziness and the feeling that you might faint  · Check your blood pressure often  This is important if you take medicine to lower your blood pressure  Check your blood pressure when you are lying down and when you are standing  Ask how often to check during the day  Keep a record of your blood pressure numbers  Your healthcare provider may use the record to help plan your treatment  Prevent a syncope episode:   · Move slowly and let yourself get used to one position before you move to another position  This is very important when you change from a lying or sitting position to a standing position  Take some deep breaths before you stand up from a lying position  Stand up slowly  Sudden movements may cause a fainting spell  Sit on the side of the bed or couch for a few minutes before you stand up  If you are on bedrest, try to be upright for about 2 hours each day, or as directed  Do not lock your legs if you are standing for a long period of time  Move your legs and bend your knees to keep blood flowing  · Follow your healthcare provider's recommendations  Your provider may  recommend that you drink more liquids to prevent dehydration  You may also need to have more salt to keep your blood pressure from dropping too low and causing syncope  Your provider will tell you how much liquid and sodium to have each day  He or she will also tell you how much physical activity is safe for you  This will depend on what is causing your syncope  · Watch for signs of low blood sugar    These include hunger, nervousness, sweating, and fast or fluttery heartbeats  Talk with your healthcare provider about ways to keep your blood sugar level steady  · Do not strain if you are constipated  You may faint if you strain to have a bowel movement  Walking is the best way to get your bowels moving  Eat foods high in fiber to make it easier to have a bowel movement  Good examples are high-fiber cereals, beans, vegetables, and whole-grain breads  Prune juice may help make bowel movements softer  · Be careful in hot weather  Heat can cause a syncope episode  Limit activity done outside on hot days  Physical activity in hot weather can lead to dehydration  This can cause an episode  © Copyright [address] Information is for End User's use only and may not be sold, redistributed or otherwise used for commercial purposes  All illustrations and images included in CareNotes® are the copyrighted property of [employer]  or [facility] Abad Marcial   The above information is an  only  It is not intended as medical advice for individual conditions or treatments  Talk to your doctor, nurse or pharmacist before following any medical regimen to see if it is safe and effective for you
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
## 2021-07-12 NOTE — ASSESSMENT & PLAN NOTE
POA,  Patient states that usually blood pressure is controlled at home and her goal is 161-248 systolic  This could be the reason for her collapse      ·  continue home meds

## 2021-07-12 NOTE — PHYSICAL THERAPY NOTE
PHYSICAL THERAPY EVALUATION NOTE    Patient Name: Te GOMEZ Date: 2021     AGE:   89 y o  Mrn:   [id number]  ADMIT DX:  Syncope [R55]  Chest pain [R07 9]    Past Medical History:   Diagnosis Date    AAA (abdominal aortic aneurysm) (HCC)     Acute medial meniscus tear     Aspiration pneumonia ([facility] )     LAST ASSESSED: 14    Breast CA ([facility] )     left    Chest pain     RESOLVED: 17    CTS (carpal tunnel syndrome)     Depression     Dermatitis     LAST ASSESSED: 13    Disease of thyroid gland     Fainting     LAST ASSESSED: 3/15/13    GERD (gastroesophageal reflux disease)     H  pylori infection 3/17/2009    Hypertension     Incomplete defecation     LAST ASSESSED: 13    Macular degeneration     Osteoporosis     Pneumonia     Vertigo      Length Of Stay: 0  PHYSICAL THERAPY EVALUATION :   Patient's identity confirmed via 2 patient identifiers (full name and ) at start of session       21 1440   PT Last Visit   PT Visit Date 21   Note Type   Note type Evaluation   Pain Assessment   Pain Assessment Tool Pain Assessment not indicated - pt denies pain   Pain Score No Pain   Home Living   Type of Home Apartment  [facility])   Home Layout One level;Elevator   Bathroom Shower/Tub Walk-in [facility] [address]  (Rollator)   Additional Comments Pt reports using her rollator for functional mobility, being independent within her apartment  She uses her rollator and walks to [facility], laundry room, etc     Prior Function   Level of [address] with ADLs and functional mobility   Lives With [address] Help From Family  (staff at facility as needed)   ADL Assistance Independent   IADLs Needs assistance  (dining parson for meals)   Falls in the last 6 months 1 to 4  (2)   Vocational Retired   Comments Pt w/ syncopal episode and fall  Restrictions/Precautions   Weight Bearing Precautions Per Order No   Other Precautions Chair Alarm; Bed Alarm; Fall Risk;Multiple lines  (IV pole)   General   Family/Caregiver Present No   Cognition   Overall Cognitive Status WFL   Arousal/Participation Alert   Orientation Level Oriented X4  (Pt identified by full name and )   Memory Within functional limits   Following Commands Follows multistep commands with increased time or repetition   Comments Pt pleasant and agreeable to participate in PT evaluation  She reports concerns regarding her diarrhea and GI issues multiple times throughout session, therapist encouraged pt to bring this up w/ SLIM and would pass along as well  RLE Assessment   RLE Assessment WFL   Strength RLE   RLE Overall Strength 4-/5   LLE Assessment   LLE Assessment WFL   Strength LLE   LLE Overall Strength 4-/5   Bed Mobility   Supine to Sit 6  Modified independent   Sit to Supine 6  Modified independent   Additional Comments Pt laid down on bed/sat self back up to don/doff socks  Transfers   Sit to Stand 6  Modified independent   Additional items Armrests   Stand to Sit 6  Modified independent   Additional items Armrests   Ambulation/Elevation   Gait pattern Decreased foot clearance   Gait Assistance 6  Modified independent   Additional items Assist x 1   Assistive Device Rolling walker   Distance 15 ft, 40 ft  (within room w/ multiple turns)   Curbs Pt only requires A w/IV pole for mobility   Balance   Static Sitting Good   Static Standing Good   Ambulatory Good   Activity Tolerance   Activity Tolerance Patient tolerated treatment well   Medical [place] [id number] [facility] to MIRNA Alejandra   Assessment   Prognosis Fair   Problem List Decreased strength;Decreased endurance; Impaired balance   Assessment Christiano Vigil is a 80 y o  Female who [facility] 2021 from home w/ c/o synocpe and fall and diagnosis of syncope   Orders for PT eval and treat received, w/ activity orders of up w/ A and fall precautions  Pt presents w/ comorbidities of HTN, GERD, Afib, CHF, hx of BBPV, osteoporosis, hx of L breast cancer, macular degeneration and personal factors including: advanced age, mobilizing w/ assistive device and positive fall history  At baseline, pt mobilizes independently w/ rollator, and reports 2 falls in the last 6 months  Upon evaluation, pt presents w/ the following deficits: weakness, impaired balance and gait deviations  Pt requires mod I for bed mobility, mod I for transfers, and mod I for gait  Pt's clinical presentation is unstable/unpredictable due to need for assist w/ all phases of mobility when usually mobilizing independently, need for input for mobility technique and recent history of falls  At this time, pt is able to navigate around hospital environment without assistance, and report no concern w/ navigating home environment upon D/C  No acute PT needs at this time, will D/C from PT caseload  Discharge recommendation is for Home w/ no skilled PT needs in order to reduce fall risk and maximize level of functional independence  Goals   Patient Goals to fix her diarrhea   Recommendation   PT Discharge Recommendation No rehabilitation needs   AM-PAC Basic Mobility Inpatient   Turning in Bed Without Bedrails 4   Lying on Back to Sitting on Edge of Flat Bed 4   Moving Bed to Chair 4   Standing Up From Chair 4   Walk in Room 4   Climb 3-5 Stairs 2   Basic Mobility Inpatient Raw Score 22   Basic Mobility Standardized Score 47 4     The patient's AM-PAC Basic Mobility Inpatient Short Form Raw Score is 22, Standardized Score is 47 4  A standardized score greater than 42 9 suggests the patient may benefit from discharge to home which may not  coincide with above PT recommendations  However please refer to therapist recommendation for discharge planning given other factors that may influence destination      Adapted from Warren Richardson Use of 6-clicks to Provide Decision Support in the Hospital Setting  [address]  APTA [facility] [address] [facility]  Given the above findings from this evaluation, at this time this patient does not require skilled inpatient PT as she is at her mobility baseline  Will D/C patient from PT caseload       Kiki Zamudio, PT, DPT

## 2021-07-12 NOTE — ASSESSMENT & PLAN NOTE
POA,  Patient collapsed  Lost consciousness for approximately 2-1/2 hours  She denies any prodromal symptoms or any symptoms after gaining her consciousness  ·   EKG:  Sinus rhythm with PVC    No significant ischemic changes  ·  initial troponin negative  · No events noted on telemetry  · Echo done showing EF of 06%, grade 2 diastolic dysfunction and no significant valvular abnormalities  · Patient was advised to follow-up with her cardiologist in clinic as soon as possible for zio patch

## 2021-07-12 NOTE — ASSESSMENT & PLAN NOTE
Patient had an episode of fecal incontinence while in the hospital and reported that she had few more for some time now  Patient does not have fever or white count and no other GI symptoms therefore doubt this is infectious  Discussed with patient and her family that we will be referring her to GI in clinic and they are all in agreement    Referral was sent

## 2021-07-12 NOTE — ASSESSMENT & PLAN NOTE
Wt Readings from Last 3 Encounters:   07/12/21 66 5 kg (146 lb 9 7 oz)   06/03/21 66 2 kg (146 lb)   05/07/21 67 5 kg (148 lb 14 4 oz)       Euvolemic at this time  Patient is not on any diuretics at home

## 2021-07-12 NOTE — DISCHARGE SUMMARY
[facility]  Discharge- Guillermo Choudhary 7/17/1925, 80 y o  female MRN: [id number]  Unit/Bed#: S -01 Encounter: [id number]  Primary Care Provider: Ralph Rice MD   Date and time admitted to hospital: 7/11/2021  9:17 AM    * Syncope  Assessment & Plan  POA,  Patient collapsed  Lost consciousness for approximately 2-1/2 hours  She denies any prodromal symptoms or any symptoms after gaining her consciousness  ·   EKG:  Sinus rhythm with PVC  No significant ischemic changes  ·  initial troponin negative  · No events noted on telemetry  · Echo done showing EF of 06%, grade 2 diastolic dysfunction and no significant valvular abnormalities  · Patient was advised to follow-up with her cardiologist in clinic as soon as possible for zio patch      Essential hypertension  Assessment & Plan  · Continue home meds    Fecal incontinence  Assessment & Plan  Patient had an episode of fecal incontinence while in the hospital and reported that she had few more for some time now  Patient does not have fever or white count and no other GI symptoms therefore doubt this is infectious  Discussed with patient and her family that we will be referring her to GI in clinic and they are all in agreement  Referral was sent    Hypokalemia  Assessment & Plan  Repleted    Leukocytosis  Assessment & Plan   Likely reactive  Monitor off of antibiotics    Asymptomatic bacteriuria  Assessment & Plan   Denies any symptoms  Monitor off of antibiotics  Hypomagnesemia  Assessment & Plan  · Resolved    Hypertensive urgency  Assessment & Plan  POA,  Patient states that usually blood pressure is controlled at home and her goal is 975-299 systolic  This could be the reason for her collapse      ·  continue home meds      Chronic diastolic heart failure [facility])  Assessment & Plan  Wt Readings from Last 3 Encounters:   07/12/21 66 5 kg (146 lb 9 7 oz)   06/03/21 66 2 kg (146 lb)   05/07/21 67 5 kg (148 lb 14 4 oz) Euvolemic at this time  Patient is not on any diuretics at home  Paroxysmal atrial fibrillation (HCC)  Assessment & Plan   Rate controlled  Currently in sinus  ·  continue metoprolol    GERD (gastroesophageal reflux disease)  Assessment & Plan   Continue home med      Discharging Resident Physician: Bryan Lao MD  Attending: Pedro Santa MD  PCP: Karyna Patel MD  Admission Date: 7/11/2021  Discharge Date: 07/12/21    Disposition:     Home    Reason for Admission:  Syncope    Consultations During Hospital Stay:  · None    Procedures Performed:     · None    Significant Findings / Test Results:     XR chest 1 view portable    Result Date: 7/11/2021  Impression: Unchanged right lung base patchy density possibly atelectasis  Workstation performed: [id number]     XR knee 4+ views Right injury    Result Date: 7/11/2021  Impression: Tricompartmental degenerative changes  No acute osseous abnormality  Workstation performed: [id number]     CT head without contrast    Result Date: 7/11/2021  · Impression: 1  No acute intracranial CT abnormality  No significant change from prior exam  2   Cerebral atrophy and advanced microangiopathic change  Workstation performed: [id number]     Incidental Findings:   · None     Test Results Pending at Discharge (will require follow up): · None     Outpatient Tests Requested:  · Patient was advised to follow-up with her primary cardiologist in clinic for possible zio patch    Complications:  None    Hospital Course:     Verena Avelar is a 80 y o  female patient who originally presented to the hospital on 7/11/2021 due to syncope  Workup including EKG, troponin, monitoring on telemetry, echo, orthostatic blood pressure were unremarkable as no events were noted on the telemetry and her echo showed EF of 65% with no significant valvular diseases    Patient blood pressure was ranging between 160-170 and after talking to patient's son, he stated that her primary care doctor was trying to keep her blood pressure around that range as she starts to feel dizzy when it drops below that therefore we did not make any changes with her high blood pressure medications and advised patient to follow-up with her primary care doctor for further instructions if any change indications needed  Patient has a cardiologist who she follows up with and we advised her to follow-up with them as soon as possible for possibility of getting a zio patch to monitor for any arrhythmia that was not captured on the telemetry while in the hospital   Physical therapy evaluated the patient and stated that she does not require any needs therefore patient is medically stable to be discharged home today  Patient and family agreeable to the discharge plan  Condition at Discharge: stable     Discharge Day Visit / Exam:     Subjective:  Patient complains of intermittent fecal incontinence with loose stool that has been going on for some time now  Discussed with patient and her family and the plan is to send a referral to GI to see Dr Rosey Arshad in clinic for further workup    Vitals: Blood Pressure: (!) 175/81 (07/12/21 0700)  Pulse: 65 (07/12/21 0700)  Temperature: 98 °F (36 7 °C) (07/12/21 0700)  Temp Source: Oral (07/12/21 0700)  Respirations: 16 (07/12/21 0700)  Height: 5' 1" (154 9 cm) (07/11/21 1824)  Weight - Scale: 66 5 kg (146 lb 9 7 oz) (07/12/21 0600)  SpO2: 92 % (07/12/21 0700)  Exam:   Physical Exam  Vitals and nursing note reviewed  Constitutional:       General: She is not in acute distress  Appearance: She is not ill-appearing or diaphoretic  HENT:      Head: Normocephalic and atraumatic  Mouth/Throat:      Mouth: Mucous membranes are moist       Pharynx: Oropharynx is clear  Eyes:      General: No scleral icterus  Extraocular Movements: Extraocular movements intact        Conjunctiva/sclera: Conjunctivae normal    Cardiovascular:      Rate and Rhythm: Normal rate and regular rhythm  Heart sounds: Normal heart sounds  No murmur heard  Pulmonary:      Effort: Pulmonary effort is normal  No respiratory distress  Breath sounds: Normal breath sounds  No wheezing or rales  Abdominal:      General: Bowel sounds are normal       Palpations: Abdomen is soft  Tenderness: There is no abdominal tenderness  Musculoskeletal:      Cervical back: Normal range of motion and neck supple  Skin:     General: Skin is warm and dry  Capillary Refill: Capillary refill takes less than 2 seconds  Neurological:      General: No focal deficit present  Mental Status: She is alert and oriented to person, place, and time  Psychiatric:         Mood and Affect: Mood normal          Behavior: Behavior normal          Discussion with Family:  Discussed with patient's daughter at bedside and all questions answered    Discharge instructions/Information to patient and family:   See after visit summary for information provided to patient and family  Provisions for Follow-Up Care:  See after visit summary for information related to follow-up care and any pertinent home health orders  Planned Readmission:  No     Discharge Medications:  See after visit summary for reconciled discharge medications provided to patient and family        ** Please Note: This note has been constructed using a voice recognition system **

## 2021-07-12 NOTE — CASE MANAGEMENT
LOS: 1  Readmission: none  Bundle: none  Unplanned Readmission Risk: n/a - observation status    Patient admitted under observation status due to syncope  PT/OT evals complete and not recommending any d/c needs  Met with patient at bedside to complete CM assessment  Patient presents as alert, oriented x4  Introduced self and explained role of case management  Patient states that she's a resident at [facility], [facility], where she has resided for the last 8 years  States that she usually is independent with ambulation, using her rollator  Reports that daughter provides transportation and does all her errands, shopping, etc      Patient reports d/c plan is to go to her daughter, Lillian's home, temporarily  Relays that [facility] or her grandchildren will be to hospital to transport home  Patient reports that she has two children, [facility] who lives locally, and Kvng who lives in [place], [place]  Patient believes [facility] has POA  Agreeable for this writer to reach out to her daughter to discuss d/c plan  Observation notice provided to patient which she signed; copy of paperwork given for her records  Call made to patient's daughter, [facility] ([phone]), to discuss patient's discharge home  Daughter relays concerns re: patient's recent ED visits in the last few months  Aware that recommendation at this time is for patient to follow-up with her cardiologist  Reports that she was having difficulty reaching her cardiologist yesterday due to issues with the phone system  Offer made to schedule cardiac follow-up, but daughter declines reporting she'd rather do it herself  Daughter states that it has not been definitively decided that patient is going to be staying with her at discharge  Does report concerns about patient's ongoing weakness and diarrhea  Daughter is asking for MD to reach out to her brother, Kvng, who is a ED MD to provide medical update/recommendations  TT to MD to relay same  Daughter asking about patient going to [facility] following discharge  Reviewed rehab requirements per Medicare and daughter aware that at this time, rehab is not being recommended as patient was independent, or modified independent with PT's assessment  Daughter reports ongoing concern with weakness and inability to complete ADLs while at home, but same is not reflected in PT/OT's assessment  If patient to be transferred to rehab, it would likely be as a resident and as an out-of-pocket cost      Daughter reports that she's on her way to the hospital and will be arriving between 4:30-5:00pm  Aware that MD confirmed they will reach out to her brother with medical update  No referrals made at this point - daughter discussed possibility of out-patient PT/OT, but seems hesitant about same       JOSÉ LUIS Montalvo  7/12/2021  4:19 PM

## 2021-07-12 NOTE — NURSING NOTE
Reviewed discharge instructions  No new Rx  No questions at this time  IV DC and intact  Family will be picking up patient  All belongings accounted for

## 2021-07-12 NOTE — ASSESSMENT & PLAN NOTE
POA,  Patient collapsed  Lost consciousness for approximately 2-1/2 hours  She denies any prodromal symptoms or any symptoms after gaining her consciousness  ·   EKG:  Sinus rhythm with PVC    No significant ischemic changes  ·  initial troponin negative  · No events noted on telemetry  · Echo done showing EF of 30%, grade 2 diastolic dysfunction and no significant valvular abnormalities  · Patient was advised to follow-up with her cardiologist in clinic as soon as possible for zio patch

## 2021-07-12 NOTE — PLAN OF CARE
Problem: MOBILITY - ADULT  Goal: Maintain or return to baseline ADL function  Description: INTERVENTIONS:  -  Assess patient's ability to carry out ADLs; assess patient's baseline for ADL function and identify physical deficits which impact ability to perform ADLs (bathing, care of mouth/teeth, toileting, grooming, dressing, etc )  - Assess/evaluate cause of self-care deficits   - Assess range of motion  - Assess patient's mobility; develop plan if impaired  - Assess patient's need for assistive devices and provide as appropriate  - Encourage maximum independence but intervene and supervise when necessary  - Involve family in performance of ADLs  - Assess for home care needs following discharge   - Consider OT consult to assist with ADL evaluation and planning for discharge  - Provide patient education as appropriate  Outcome: Progressing  Goal: Maintains/Returns to pre admission functional level  Description: INTERVENTIONS:  - Perform BMAT or MOVE assessment daily    - Set and communicate daily mobility goal to care team and patient/family/caregiver  - Collaborate with rehabilitation services on mobility goals if consulted  - Reposition patient every 2 hours    - Out of bed for toileting  - Record patient progress and toleration of activity level   Outcome: Progressing     Problem: Potential for Falls  Goal: Patient will remain free of falls  Description: INTERVENTIONS:  - Educate patient/family on patient safety including physical limitations  - Instruct patient to call for assistance with activity   - Consult OT/PT to assist with strengthening/mobility   - Keep Call bell within reach  - Keep bed low and locked with side rails adjusted as appropriate  - Keep care items and personal belongings within reach  - Initiate and maintain comfort rounds  - Make Fall Risk Sign visible to staff  - Offer Toileting every 2 Hours, in advance of need  - Initiate/Maintain bed alarm  - Obtain necessary fall risk management equipment  - Apply yellow socks and bracelet for high fall risk patients  - Consider moving patient to room near nurses station  Outcome: Progressing

## 2021-07-13 ENCOUNTER — TRANSITIONAL CARE MANAGEMENT (OUTPATIENT)
Dept: INTERNAL MEDICINE CLINIC | Facility: CLINIC | Age: 86
End: 2021-07-13

## 2021-07-13 PROCEDURE — TCMPR: Performed by: INTERNAL MEDICINE

## 2021-07-14 ENCOUNTER — TELEPHONE (OUTPATIENT)
Dept: GASTROENTEROLOGY | Facility: CLINIC | Age: 86
End: 2021-07-14

## 2021-07-27 ENCOUNTER — APPOINTMENT (OUTPATIENT)
Dept: LAB | Age: 86
End: 2021-07-27
Payer: MEDICARE

## 2021-07-27 ENCOUNTER — OFFICE VISIT (OUTPATIENT)
Dept: CARDIOLOGY CLINIC | Facility: CLINIC | Age: 86
End: 2021-07-27
Payer: MEDICARE

## 2021-07-27 VITALS
SYSTOLIC BLOOD PRESSURE: 132 MMHG | OXYGEN SATURATION: 97 % | HEART RATE: 64 BPM | WEIGHT: 145.1 LBS | HEIGHT: 61 IN | BODY MASS INDEX: 27.4 KG/M2 | DIASTOLIC BLOOD PRESSURE: 64 MMHG

## 2021-07-27 DIAGNOSIS — I10 HYPERTENSION, UNSPECIFIED TYPE: ICD-10-CM

## 2021-07-27 DIAGNOSIS — R55 SYNCOPE, UNSPECIFIED SYNCOPE TYPE: Primary | ICD-10-CM

## 2021-07-27 DIAGNOSIS — I48.0 PAROXYSMAL ATRIAL FIBRILLATION (HCC): ICD-10-CM

## 2021-07-27 LAB
ANION GAP SERPL CALCULATED.3IONS-SCNC: 7 MMOL/L (ref 4–13)
BUN SERPL-MCNC: 14 MG/DL (ref 5–25)
CALCIUM SERPL-MCNC: 9 MG/DL (ref 8.3–10.1)
CHLORIDE SERPL-SCNC: 104 MMOL/L (ref 100–108)
CO2 SERPL-SCNC: 26 MMOL/L (ref 21–32)
CREAT SERPL-MCNC: 0.66 MG/DL (ref 0.6–1.3)
GFR SERPL CREATININE-BSD FRML MDRD: 75 ML/MIN/1.73SQ M
GLUCOSE SERPL-MCNC: 86 MG/DL (ref 65–140)
MAGNESIUM SERPL-MCNC: 2 MG/DL (ref 1.6–2.6)
POTASSIUM SERPL-SCNC: 4.4 MMOL/L (ref 3.5–5.3)
SODIUM SERPL-SCNC: 137 MMOL/L (ref 136–145)

## 2021-07-27 PROCEDURE — 36415 COLL VENOUS BLD VENIPUNCTURE: CPT | Performed by: NURSE PRACTITIONER

## 2021-07-27 PROCEDURE — 80048 BASIC METABOLIC PNL TOTAL CA: CPT | Performed by: NURSE PRACTITIONER

## 2021-07-27 PROCEDURE — 99215 OFFICE O/P EST HI 40 MIN: CPT | Performed by: NURSE PRACTITIONER

## 2021-07-27 PROCEDURE — 83735 ASSAY OF MAGNESIUM: CPT | Performed by: NURSE PRACTITIONER

## 2021-07-27 RX ORDER — METOPROLOL SUCCINATE 25 MG/1
TABLET, EXTENDED RELEASE ORAL
Qty: 30 TABLET | Refills: 1 | Status: SHIPPED | OUTPATIENT
Start: 2021-07-27 | End: 2021-07-27 | Stop reason: SDUPTHER

## 2021-07-27 RX ORDER — METOPROLOL SUCCINATE 25 MG/1
TABLET, EXTENDED RELEASE ORAL
Qty: 30 TABLET | Refills: 1 | Status: SHIPPED | OUTPATIENT
Start: 2021-07-27 | End: 2021-09-07 | Stop reason: SDUPTHER

## 2021-07-27 NOTE — PATIENT INSTRUCTIONS
2gm sodium low fat low cholesterol diet,eating fresh is best  Stop Metoprolol tartrate start Metoprolol succinate 12 5mg daily     BMP, mag  One month Zio Patch
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
## 2021-07-28 ENCOUNTER — TELEPHONE (OUTPATIENT)
Dept: CARDIOLOGY CLINIC | Facility: CLINIC | Age: 86
End: 2021-07-28

## 2021-07-28 NOTE — TELEPHONE ENCOUNTER
----- Message from Yvonne Hinojosa, [address] sent at 7/27/2021  1:09 PM EDT -----  Please call Andre Villareal and inform her lab study, BMP, was normal   Thank you     Spoke with pts daughter, Tony Frye re: normal BMP rslts

## 2021-08-08 DIAGNOSIS — I10 ESSENTIAL HYPERTENSION: ICD-10-CM

## 2021-08-09 RX ORDER — AMLODIPINE BESYLATE 2.5 MG/1
2.5 TABLET ORAL 2 TIMES DAILY
Qty: 180 TABLET | Refills: 0 | Status: SHIPPED | OUTPATIENT
Start: 2021-08-09 | End: 2021-11-04 | Stop reason: SDUPTHER

## 2021-08-24 ENCOUNTER — CLINICAL SUPPORT (OUTPATIENT)
Dept: CARDIOLOGY CLINIC | Facility: CLINIC | Age: 86
End: 2021-08-24
Payer: MEDICARE

## 2021-08-24 DIAGNOSIS — R55 SYNCOPE, UNSPECIFIED SYNCOPE TYPE: ICD-10-CM

## 2021-08-24 PROCEDURE — 93248 EXT ECG>7D<15D REV&INTERPJ: CPT | Performed by: NURSE PRACTITIONER

## 2021-09-02 ENCOUNTER — CONSULT (OUTPATIENT)
Dept: CARDIOLOGY CLINIC | Facility: CLINIC | Age: 86
End: 2021-09-02
Payer: MEDICARE

## 2021-09-02 VITALS
HEIGHT: 61 IN | SYSTOLIC BLOOD PRESSURE: 150 MMHG | WEIGHT: 148.3 LBS | BODY MASS INDEX: 28 KG/M2 | HEART RATE: 73 BPM | DIASTOLIC BLOOD PRESSURE: 82 MMHG

## 2021-09-02 DIAGNOSIS — I48.0 PAROXYSMAL ATRIAL FIBRILLATION (HCC): ICD-10-CM

## 2021-09-02 DIAGNOSIS — I50.32 CHRONIC DIASTOLIC HEART FAILURE (HCC): Chronic | ICD-10-CM

## 2021-09-02 DIAGNOSIS — R55 SYNCOPE, UNSPECIFIED SYNCOPE TYPE: Primary | ICD-10-CM

## 2021-09-02 DIAGNOSIS — E03.9 ACQUIRED HYPOTHYROIDISM: ICD-10-CM

## 2021-09-02 DIAGNOSIS — I48.92 ATRIAL FLUTTER, UNSPECIFIED TYPE (HCC): ICD-10-CM

## 2021-09-02 DIAGNOSIS — Z90.12 S/P LEFT MASTECTOMY: ICD-10-CM

## 2021-09-02 DIAGNOSIS — I10 ESSENTIAL HYPERTENSION: ICD-10-CM

## 2021-09-02 DIAGNOSIS — Z85.3 HISTORY OF LEFT BREAST CANCER: ICD-10-CM

## 2021-09-02 PROCEDURE — 93000 ELECTROCARDIOGRAM COMPLETE: CPT | Performed by: INTERNAL MEDICINE

## 2021-09-02 PROCEDURE — 99204 OFFICE O/P NEW MOD 45 MIN: CPT | Performed by: INTERNAL MEDICINE
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
## 2021-09-07 ENCOUNTER — LAB (OUTPATIENT)
Dept: LAB | Age: 86
End: 2021-09-07
Payer: MEDICARE

## 2021-09-07 ENCOUNTER — CLINICAL SUPPORT (OUTPATIENT)
Dept: CARDIOLOGY CLINIC | Facility: CLINIC | Age: 86
End: 2021-09-07
Payer: MEDICARE

## 2021-09-07 DIAGNOSIS — E78.5 DYSLIPIDEMIA: ICD-10-CM

## 2021-09-07 DIAGNOSIS — I48.92 ATRIAL FLUTTER, UNSPECIFIED TYPE (HCC): ICD-10-CM

## 2021-09-07 DIAGNOSIS — I10 ESSENTIAL HYPERTENSION: ICD-10-CM

## 2021-09-07 DIAGNOSIS — R55 SYNCOPE, UNSPECIFIED SYNCOPE TYPE: Primary | ICD-10-CM

## 2021-09-07 DIAGNOSIS — I44.0 FIRST DEGREE ATRIOVENTRICULAR BLOCK: ICD-10-CM

## 2021-09-07 DIAGNOSIS — E03.9 ACQUIRED HYPOTHYROIDISM: ICD-10-CM

## 2021-09-07 DIAGNOSIS — R55 SYNCOPE, UNSPECIFIED SYNCOPE TYPE: ICD-10-CM

## 2021-09-07 DIAGNOSIS — E53.8 VITAMIN B12 DEFICIENCY: ICD-10-CM

## 2021-09-07 DIAGNOSIS — E55.9 VITAMIN D DEFICIENCY: ICD-10-CM

## 2021-09-07 LAB
25(OH)D3 SERPL-MCNC: 38.1 NG/ML (ref 30–100)
ALBUMIN SERPL BCP-MCNC: 3.3 G/DL (ref 3.5–5)
ALP SERPL-CCNC: 78 U/L (ref 46–116)
ALT SERPL W P-5'-P-CCNC: 25 U/L (ref 12–78)
ANION GAP SERPL CALCULATED.3IONS-SCNC: 8 MMOL/L (ref 4–13)
AST SERPL W P-5'-P-CCNC: 22 U/L (ref 5–45)
BASOPHILS # BLD AUTO: 0.04 THOUSANDS/ΜL (ref 0–0.1)
BASOPHILS NFR BLD AUTO: 1 % (ref 0–1)
BILIRUB SERPL-MCNC: 0.63 MG/DL (ref 0.2–1)
BUN SERPL-MCNC: 18 MG/DL (ref 5–25)
CALCIUM ALBUM COR SERPL-MCNC: 9.5 MG/DL (ref 8.3–10.1)
CALCIUM SERPL-MCNC: 8.9 MG/DL (ref 8.3–10.1)
CHLORIDE SERPL-SCNC: 103 MMOL/L (ref 100–108)
CHOLEST SERPL-MCNC: 173 MG/DL (ref 50–200)
CO2 SERPL-SCNC: 24 MMOL/L (ref 21–32)
CREAT SERPL-MCNC: 0.66 MG/DL (ref 0.6–1.3)
EOSINOPHIL # BLD AUTO: 0.12 THOUSAND/ΜL (ref 0–0.61)
EOSINOPHIL NFR BLD AUTO: 2 % (ref 0–6)
ERYTHROCYTE [DISTWIDTH] IN BLOOD BY AUTOMATED COUNT: 13 % (ref 11.6–15.1)
GFR SERPL CREATININE-BSD FRML MDRD: 75 ML/MIN/1.73SQ M
GLUCOSE P FAST SERPL-MCNC: 102 MG/DL (ref 65–99)
HCT VFR BLD AUTO: 42.2 % (ref 34.8–46.1)
HDLC SERPL-MCNC: 31 MG/DL
HGB BLD-MCNC: 13.6 G/DL (ref 11.5–15.4)
IMM GRANULOCYTES # BLD AUTO: 0.04 THOUSAND/UL (ref 0–0.2)
IMM GRANULOCYTES NFR BLD AUTO: 1 % (ref 0–2)
LDLC SERPL CALC-MCNC: 111 MG/DL (ref 0–100)
LYMPHOCYTES # BLD AUTO: 2.42 THOUSANDS/ΜL (ref 0.6–4.47)
LYMPHOCYTES NFR BLD AUTO: 33 % (ref 14–44)
MCH RBC QN AUTO: 30.4 PG (ref 26.8–34.3)
MCHC RBC AUTO-ENTMCNC: 32.2 G/DL (ref 31.4–37.4)
MCV RBC AUTO: 94 FL (ref 82–98)
MONOCYTES # BLD AUTO: 0.76 THOUSAND/ΜL (ref 0.17–1.22)
MONOCYTES NFR BLD AUTO: 10 % (ref 4–12)
NEUTROPHILS # BLD AUTO: 3.91 THOUSANDS/ΜL (ref 1.85–7.62)
NEUTS SEG NFR BLD AUTO: 53 % (ref 43–75)
NONHDLC SERPL-MCNC: 142 MG/DL
NRBC BLD AUTO-RTO: 0 /100 WBCS
PLATELET # BLD AUTO: 258 THOUSANDS/UL (ref 149–390)
PMV BLD AUTO: 11 FL (ref 8.9–12.7)
POTASSIUM SERPL-SCNC: 4 MMOL/L (ref 3.5–5.3)
PROT SERPL-MCNC: 7.8 G/DL (ref 6.4–8.2)
RBC # BLD AUTO: 4.47 MILLION/UL (ref 3.81–5.12)
SODIUM SERPL-SCNC: 135 MMOL/L (ref 136–145)
TRIGL SERPL-MCNC: 153 MG/DL
TSH SERPL DL<=0.05 MIU/L-ACNC: 0.53 UIU/ML (ref 0.36–3.74)
VIT B12 SERPL-MCNC: 391 PG/ML (ref 100–900)
WBC # BLD AUTO: 7.29 THOUSAND/UL (ref 4.31–10.16)

## 2021-09-07 PROCEDURE — 93248 EXT ECG>7D<15D REV&INTERPJ: CPT | Performed by: INTERNAL MEDICINE

## 2021-09-07 PROCEDURE — 80061 LIPID PANEL: CPT

## 2021-09-07 PROCEDURE — 84443 ASSAY THYROID STIM HORMONE: CPT

## 2021-09-07 PROCEDURE — 80053 COMPREHEN METABOLIC PANEL: CPT

## 2021-09-07 PROCEDURE — 36415 COLL VENOUS BLD VENIPUNCTURE: CPT

## 2021-09-07 PROCEDURE — 85025 COMPLETE CBC W/AUTO DIFF WBC: CPT

## 2021-09-07 PROCEDURE — 82306 VITAMIN D 25 HYDROXY: CPT

## 2021-09-07 PROCEDURE — 82607 VITAMIN B-12: CPT

## 2021-09-07 RX ORDER — METOPROLOL SUCCINATE 25 MG/1
TABLET, EXTENDED RELEASE ORAL
Qty: 45 TABLET | Refills: 1 | Status: SHIPPED | OUTPATIENT
Start: 2021-09-07 | End: 2021-09-13 | Stop reason: SDUPTHER

## 2021-09-13 ENCOUNTER — OFFICE VISIT (OUTPATIENT)
Dept: CARDIOLOGY CLINIC | Facility: CLINIC | Age: 86
End: 2021-09-13
Payer: MEDICARE

## 2021-09-13 VITALS
WEIGHT: 149.4 LBS | OXYGEN SATURATION: 97 % | BODY MASS INDEX: 28.21 KG/M2 | DIASTOLIC BLOOD PRESSURE: 80 MMHG | SYSTOLIC BLOOD PRESSURE: 160 MMHG | HEIGHT: 61 IN | HEART RATE: 76 BPM

## 2021-09-13 DIAGNOSIS — R55 SYNCOPE, UNSPECIFIED SYNCOPE TYPE: Primary | ICD-10-CM

## 2021-09-13 DIAGNOSIS — E03.9 ACQUIRED HYPOTHYROIDISM: ICD-10-CM

## 2021-09-13 DIAGNOSIS — R55 SYNCOPE, UNSPECIFIED SYNCOPE TYPE: ICD-10-CM

## 2021-09-13 DIAGNOSIS — I10 ESSENTIAL HYPERTENSION: ICD-10-CM

## 2021-09-13 DIAGNOSIS — Z90.12 S/P LEFT MASTECTOMY: ICD-10-CM

## 2021-09-13 DIAGNOSIS — I71.2 THORACIC AORTIC ANEURYSM, WITHOUT RUPTURE (HCC): ICD-10-CM

## 2021-09-13 DIAGNOSIS — Z85.3 HISTORY OF LEFT BREAST CANCER: ICD-10-CM

## 2021-09-13 PROCEDURE — 99214 OFFICE O/P EST MOD 30 MIN: CPT | Performed by: INTERNAL MEDICINE

## 2021-09-13 RX ORDER — METOPROLOL SUCCINATE 25 MG/1
TABLET, EXTENDED RELEASE ORAL
Qty: 45 TABLET | Refills: 3 | Status: SHIPPED | OUTPATIENT
Start: 2021-09-13 | End: 2022-06-02 | Stop reason: SDUPTHER
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
## 2021-09-30 ENCOUNTER — OFFICE VISIT (OUTPATIENT)
Dept: SURGICAL ONCOLOGY | Facility: CLINIC | Age: 86
End: 2021-09-30
Payer: MEDICARE

## 2021-09-30 ENCOUNTER — OFFICE VISIT (OUTPATIENT)
Dept: INTERNAL MEDICINE CLINIC | Facility: CLINIC | Age: 86
End: 2021-09-30
Payer: MEDICARE

## 2021-09-30 VITALS
WEIGHT: 151 LBS | TEMPERATURE: 97.2 F | HEART RATE: 76 BPM | SYSTOLIC BLOOD PRESSURE: 148 MMHG | OXYGEN SATURATION: 97 % | DIASTOLIC BLOOD PRESSURE: 86 MMHG | BODY MASS INDEX: 28.51 KG/M2 | RESPIRATION RATE: 17 BRPM | HEIGHT: 61 IN

## 2021-09-30 VITALS
HEIGHT: 61 IN | SYSTOLIC BLOOD PRESSURE: 146 MMHG | WEIGHT: 150 LBS | OXYGEN SATURATION: 97 % | DIASTOLIC BLOOD PRESSURE: 80 MMHG | BODY MASS INDEX: 28.32 KG/M2 | HEART RATE: 67 BPM | TEMPERATURE: 96.5 F

## 2021-09-30 DIAGNOSIS — Z00.00 HEALTH MAINTENANCE EXAMINATION: ICD-10-CM

## 2021-09-30 DIAGNOSIS — F41.9 ANXIETY: ICD-10-CM

## 2021-09-30 DIAGNOSIS — Z23 ENCOUNTER FOR IMMUNIZATION: ICD-10-CM

## 2021-09-30 DIAGNOSIS — K21.9 GASTROESOPHAGEAL REFLUX DISEASE, UNSPECIFIED WHETHER ESOPHAGITIS PRESENT: ICD-10-CM

## 2021-09-30 DIAGNOSIS — E55.9 VITAMIN D DEFICIENCY: ICD-10-CM

## 2021-09-30 DIAGNOSIS — Z85.3 HISTORY OF LEFT BREAST CANCER: ICD-10-CM

## 2021-09-30 DIAGNOSIS — Z08 ENCOUNTER FOR FOLLOW-UP EXAMINATION AFTER COMPLETED TREATMENT FOR MALIGNANT NEOPLASM: Primary | ICD-10-CM

## 2021-09-30 DIAGNOSIS — E53.8 VITAMIN B12 DEFICIENCY: ICD-10-CM

## 2021-09-30 DIAGNOSIS — N18.2 STAGE 2 CHRONIC KIDNEY DISEASE: ICD-10-CM

## 2021-09-30 DIAGNOSIS — N30.10 INTERSTITIAL CYSTITIS: ICD-10-CM

## 2021-09-30 DIAGNOSIS — I48.0 PAROXYSMAL ATRIAL FIBRILLATION (HCC): Primary | ICD-10-CM

## 2021-09-30 DIAGNOSIS — E03.9 ACQUIRED HYPOTHYROIDISM: ICD-10-CM

## 2021-09-30 DIAGNOSIS — Z90.12 S/P LEFT MASTECTOMY: ICD-10-CM

## 2021-09-30 PROBLEM — I48.92 ATRIAL FLUTTER (HCC): Status: RESOLVED | Noted: 2021-05-06 | Resolved: 2021-09-30

## 2021-09-30 PROBLEM — E83.42 HYPOMAGNESEMIA: Status: RESOLVED | Noted: 2021-07-11 | Resolved: 2021-09-30

## 2021-09-30 PROBLEM — I16.0 HYPERTENSIVE URGENCY: Status: RESOLVED | Noted: 2021-07-11 | Resolved: 2021-09-30

## 2021-09-30 PROBLEM — E87.6 HYPOKALEMIA: Status: RESOLVED | Noted: 2021-07-12 | Resolved: 2021-09-30

## 2021-09-30 PROCEDURE — 90662 IIV NO PRSV INCREASED AG IM: CPT | Performed by: INTERNAL MEDICINE

## 2021-09-30 PROCEDURE — G0008 ADMIN INFLUENZA VIRUS VAC: HCPCS | Performed by: INTERNAL MEDICINE

## 2021-09-30 PROCEDURE — 99213 OFFICE O/P EST LOW 20 MIN: CPT | Performed by: NURSE PRACTITIONER

## 2021-09-30 PROCEDURE — 99214 OFFICE O/P EST MOD 30 MIN: CPT | Performed by: INTERNAL MEDICINE

## 2021-09-30 PROCEDURE — G0439 PPPS, SUBSEQ VISIT: HCPCS | Performed by: INTERNAL MEDICINE

## 2021-09-30 RX ORDER — ESCITALOPRAM OXALATE 5 MG/1
5 TABLET ORAL DAILY
Qty: 90 TABLET | Refills: 1
Start: 2021-09-30 | End: 2021-12-07 | Stop reason: SDUPTHER

## 2021-09-30 RX ORDER — LANOLIN ALCOHOL/MO/W.PET/CERES
CREAM (GRAM) TOPICAL
Qty: 30 TABLET | Refills: 0
Start: 2021-09-30
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
## 2021-09-30 NOTE — PROGRESS NOTES
Assessment/Plan:    Essential hypertension  BP controlled, on amlodipine bid, metoprolol daily  GERD (gastroesophageal reflux disease)  Doing well off omeprazole  Continue low acid diet, may take famotidine prn  Interstitial cystitis  No recent issues, may use steroid cream prn  On low acid diet  Paroxysmal atrial fibrillation (HCC)  Reviewed Zio patch (wore for 4 weeks)  She saw EP, metoprolol changed to succinate 12 5 mg daily  Doing well, no symptoms  Vitamin B12 deficiency  Restart supplement 2 days a week only  Anxiety  Restart Lexapro a few weeks ago, it has helped with sleep  Acquired hypothyroidism  Taking medication appropriately  S/P left mastectomy  Declined further imaging, prefers breast exam q6 mos  Diagnoses and all orders for this visit:    Paroxysmal atrial fibrillation ([facility] [address] )    Acquired hypothyroidism  -     TSH, 3rd generation with Free T4 reflex; Future    Gastroesophageal reflux disease, unspecified whether esophagitis present    Anxiety  -     escitalopram (LEXAPRO) 5 mg tablet; Take 1 tablet (5 mg total) by mouth daily    Interstitial cystitis    Stage 2 chronic kidney disease  -     Comprehensive metabolic panel; Future    S/P left mastectomy    Vitamin B12 deficiency  -     cyanocobalamin (VITAMIN B-12) 1,000 mcg tablet; Take 2 days a week  -     Vitamin B12; Future    Vitamin D deficiency    Encounter for immunization  -     influenza vaccine, high-dose, PF 0 7 mL (FLUZONE HIGH-DOSE)    Health maintenance examination  Comments:  Flu vaccine today  COVID 3rd dose in 2 weeks  Follow up in 6 months or as needed  Subjective:      Patient ID: Danielle Tai is a 80 y o  female is here with her daughter  She is feeling well, has no complaints  She passed out last July, has seen cardiology and EP  Since her metoprolol was changed and dose lowered, she is feeling much better  She reports no dizziness or weakness    She has also stopped omeprazole and diarrhea and other stomach symptoms have resolved  She watches her diet carefully, on a strict low acid diet due to her interstitial cystitis  She stopped taking Lexapro a few months ago, took it for about 3 weeks and was feeling unwell  This was during the syncopal episode and dizziness  She had since restarted taking this about 2 weeks ago and feels well  She sleeps through the night  She saw oncology earlier today, declined further mammograms  The following portions of the patient's history were reviewed and updated as appropriate: allergies, current medications, past medical history, past social history and problem list     Review of Systems   Constitutional: Negative for activity change, appetite change and fatigue  HENT: Negative for congestion, ear pain and postnasal drip  Eyes: Negative for visual disturbance  Respiratory: Negative for cough and shortness of breath  Cardiovascular: Negative for chest pain, palpitations and leg swelling  Gastrointestinal: Negative for abdominal pain, constipation, diarrhea, nausea and vomiting  Genitourinary: Negative for dysuria and frequency  Musculoskeletal: Negative for arthralgias and myalgias  Skin: Negative for rash and wound  Neurological: Negative for dizziness, numbness and headaches  Hematological: Does not bruise/bleed easily  Psychiatric/Behavioral: Negative for confusion and sleep disturbance  The patient is not nervous/anxious  Objective:      /80   Pulse 67   Temp (!) 96 5 °F (35 8 °C)   Ht 5' 1" (1 549 m)   Wt 68 kg (150 lb)   LMP  (LMP Unknown)   SpO2 97%   BMI 28 34 kg/m²          Physical Exam  Vitals and nursing note reviewed  Constitutional:       General: She is not in acute distress  Appearance: She is well-developed  HENT:      Head: Normocephalic and atraumatic        Right Ear: Tympanic membrane, ear canal and external ear normal       Left Ear: Tympanic membrane, ear canal and external ear normal    Eyes:      Pupils: Pupils are equal, round, and reactive to light  Cardiovascular:      Rate and Rhythm: Normal rate and regular rhythm  Heart sounds: Normal heart sounds  Pulmonary:      Effort: Pulmonary effort is normal       Breath sounds: Normal breath sounds  No wheezing  Abdominal:      General: Bowel sounds are normal       Palpations: Abdomen is soft  Musculoskeletal:         General: No swelling  Comments: Using walker   Skin:     General: Skin is warm  Findings: No rash  Neurological:      General: No focal deficit present  Mental Status: She is alert and oriented to person, place, and time  Psychiatric:         Mood and Affect: Mood normal          Behavior: Behavior normal            Labs & imaging results reviewed with patient

## 2021-09-30 NOTE — ASSESSMENT & PLAN NOTE
Reviewed Zio patch (wore for 4 weeks)  She saw EP, metoprolol changed to succinate 12 5 mg daily  Doing well, no symptoms

## 2021-09-30 NOTE — ASSESSMENT & PLAN NOTE
Restart supplement 2 days a week only  Subjective   Alicia Saunders is a 71 y.o. female presents in office for vaginal dicharge.     History of Present Illness   Vaginal discharge  New. Began 5 days ago after finishing antibiotics. Itching/burning with minimal thick discharge. Feels like previous yeast infections. Last sex 47 years ago.        The following portions of the patient's history were reviewed and updated as appropriate: allergies, current medications, past family history, past medical history, past social history, past surgical history and problem list.    Review of Systems   Constitutional: Negative for activity change, appetite change, chills, diaphoresis, fatigue and fever.   Genitourinary: Positive for vaginal discharge. Negative for difficulty urinating, dysuria, frequency, genital sores, pelvic pain, vaginal bleeding and vaginal pain.       Objective     Vitals:    08/14/20 0753   BP: 114/74   Pulse: 81   Resp: 18   Temp: 97.7 °F (36.5 °C)   SpO2: 98%       Physical Exam   Constitutional: She appears well-developed and well-nourished. No distress.   Pulmonary/Chest: Effort normal.   Abdominal: Soft. Bowel sounds are normal. She exhibits no mass. There is no hepatosplenomegaly. There is no tenderness. There is no CVA tenderness.   Genitourinary:   Genitourinary Comments: Declined gu exam   Neurological: She is alert.   Skin: Skin is warm and dry.   Psychiatric: She has a normal mood and affect.   Nursing note and vitals reviewed.         Patient's Body mass index is 27.11 kg/m². BMI is above normal parameters. Recommendations include: none (medical contraindication) acute visit.       Assessment/Plan   Alicia was seen today for vaginitis.    Diagnoses and all orders for this visit:    Yeast vaginitis    Other orders  -     fluconazole (DIFLUCAN) 150 MG tablet; Take 1 tablet by mouth Every 72 (Seventy-Two) Hours As Needed (yeast).          Return in about 1 week (around 8/21/2020).             Electronically signed by Dilan  ROSANA Garza, 08/14/20, 8:22 AM.
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
## 2021-10-16 ENCOUNTER — IMMUNIZATIONS (OUTPATIENT)
Dept: FAMILY MEDICINE CLINIC | Facility: MEDICAL CENTER | Age: 86
End: 2021-10-16

## 2021-10-16 DIAGNOSIS — Z23 ENCOUNTER FOR IMMUNIZATION: Primary | ICD-10-CM

## 2021-10-16 PROCEDURE — 91300 SARSCOV2 VAC 30MCG/0.3ML IM: CPT

## 2021-11-04 DIAGNOSIS — I10 ESSENTIAL HYPERTENSION: ICD-10-CM

## 2021-11-08 RX ORDER — AMLODIPINE BESYLATE 2.5 MG/1
2.5 TABLET ORAL 2 TIMES DAILY
Qty: 180 TABLET | Refills: 0 | Status: SHIPPED | OUTPATIENT
Start: 2021-11-08 | End: 2022-01-03 | Stop reason: SDUPTHER

## 2021-12-06 ENCOUNTER — TELEPHONE (OUTPATIENT)
Dept: SURGICAL ONCOLOGY | Facility: CLINIC | Age: 86
End: 2021-12-06

## 2021-12-06 ENCOUNTER — TELEPHONE (OUTPATIENT)
Dept: INTERNAL MEDICINE CLINIC | Facility: CLINIC | Age: 86
End: 2021-12-06

## 2021-12-06 ENCOUNTER — TELEMEDICINE (OUTPATIENT)
Dept: INTERNAL MEDICINE CLINIC | Facility: CLINIC | Age: 86
End: 2021-12-06
Payer: MEDICARE

## 2021-12-06 DIAGNOSIS — Z85.3 HISTORY OF LEFT BREAST CANCER: ICD-10-CM

## 2021-12-06 DIAGNOSIS — Z90.12 S/P LEFT MASTECTOMY: Primary | ICD-10-CM

## 2021-12-06 DIAGNOSIS — B34.9 VIRAL INFECTION, UNSPECIFIED: Primary | ICD-10-CM

## 2021-12-06 PROCEDURE — 99213 OFFICE O/P EST LOW 20 MIN: CPT | Performed by: INTERNAL MEDICINE

## 2021-12-06 PROCEDURE — U0005 INFEC AGEN DETEC AMPLI PROBE: HCPCS | Performed by: INTERNAL MEDICINE

## 2021-12-06 PROCEDURE — U0003 INFECTIOUS AGENT DETECTION BY NUCLEIC ACID (DNA OR RNA); SEVERE ACUTE RESPIRATORY SYNDROME CORONAVIRUS 2 (SARS-COV-2) (CORONAVIRUS DISEASE [COVID-19]), AMPLIFIED PROBE TECHNIQUE, MAKING USE OF HIGH THROUGHPUT TECHNOLOGIES AS DESCRIBED BY CMS-2020-01-R: HCPCS | Performed by: INTERNAL MEDICINE

## 2021-12-07 ENCOUNTER — OFFICE VISIT (OUTPATIENT)
Dept: INTERNAL MEDICINE CLINIC | Facility: CLINIC | Age: 86
End: 2021-12-07
Payer: MEDICARE

## 2021-12-07 VITALS
WEIGHT: 143.6 LBS | DIASTOLIC BLOOD PRESSURE: 70 MMHG | HEART RATE: 72 BPM | SYSTOLIC BLOOD PRESSURE: 116 MMHG | BODY MASS INDEX: 27.11 KG/M2 | OXYGEN SATURATION: 97 % | TEMPERATURE: 97.1 F | HEIGHT: 61 IN

## 2021-12-07 DIAGNOSIS — F41.9 ANXIETY: ICD-10-CM

## 2021-12-07 DIAGNOSIS — J30.1 SEASONAL ALLERGIC RHINITIS DUE TO POLLEN: ICD-10-CM

## 2021-12-07 DIAGNOSIS — M54.50 ACUTE BILATERAL LOW BACK PAIN WITHOUT SCIATICA: Primary | ICD-10-CM

## 2021-12-07 PROCEDURE — 99213 OFFICE O/P EST LOW 20 MIN: CPT | Performed by: INTERNAL MEDICINE

## 2021-12-07 RX ORDER — FLUTICASONE PROPIONATE 50 MCG
1 SPRAY, SUSPENSION (ML) NASAL DAILY
Qty: 16 G | Refills: 1 | Status: SHIPPED | OUTPATIENT
Start: 2021-12-07 | End: 2022-05-27 | Stop reason: SDUPTHER

## 2021-12-07 RX ORDER — ESCITALOPRAM OXALATE 5 MG/1
5 TABLET ORAL DAILY
Qty: 90 TABLET | Refills: 1 | Status: SHIPPED | OUTPATIENT
Start: 2021-12-07 | End: 2022-03-31 | Stop reason: SDUPTHER

## 2022-01-03 DIAGNOSIS — I10 ESSENTIAL HYPERTENSION: ICD-10-CM

## 2022-01-03 RX ORDER — AMLODIPINE BESYLATE 2.5 MG/1
2.5 TABLET ORAL 2 TIMES DAILY
Qty: 180 TABLET | Refills: 0 | Status: SHIPPED | OUTPATIENT
Start: 2022-01-03 | End: 2022-03-31 | Stop reason: SDUPTHER

## 2022-01-12 ENCOUNTER — HOSPITAL ENCOUNTER (OUTPATIENT)
Dept: NON INVASIVE DIAGNOSTICS | Facility: CLINIC | Age: 87
Discharge: HOME | End: 2022-01-12
Payer: MEDICARE

## 2022-01-12 DIAGNOSIS — Z85.3 HISTORY OF LEFT BREAST CANCER: ICD-10-CM

## 2022-01-12 DIAGNOSIS — R55 SYNCOPE, UNSPECIFIED SYNCOPE TYPE: ICD-10-CM

## 2022-01-12 DIAGNOSIS — E03.9 ACQUIRED HYPOTHYROIDISM: ICD-10-CM

## 2022-01-12 DIAGNOSIS — I10 ESSENTIAL HYPERTENSION: ICD-10-CM

## 2022-01-12 DIAGNOSIS — Z90.12 S/P LEFT MASTECTOMY: ICD-10-CM

## 2022-01-12 PROCEDURE — 93226 XTRNL ECG REC<48 HR SCAN A/R: CPT

## 2022-01-12 PROCEDURE — 93225 XTRNL ECG REC<48 HRS REC: CPT

## 2022-01-21 PROCEDURE — 93227 XTRNL ECG REC<48 HR R&I: CPT | Performed by: INTERNAL MEDICINE

## 2022-02-24 ENCOUNTER — TELEMEDICINE (OUTPATIENT)
Dept: CARDIOLOGY CLINIC | Facility: CLINIC | Age: 87
End: 2022-02-24
Payer: MEDICARE

## 2022-02-24 VITALS — HEIGHT: 61 IN | BODY MASS INDEX: 27.13 KG/M2

## 2022-02-24 DIAGNOSIS — I48.92 ATRIAL FLUTTER, UNSPECIFIED TYPE (HCC): ICD-10-CM

## 2022-02-24 DIAGNOSIS — I48.0 PAROXYSMAL ATRIAL FIBRILLATION (HCC): Primary | ICD-10-CM

## 2022-02-24 PROCEDURE — 99442 PR PHYS/QHP TELEPHONE EVALUATION 11-20 MIN: CPT | Performed by: PHYSICIAN ASSISTANT

## 2022-02-24 NOTE — PROGRESS NOTES
Virtual Brief Visit    Patient is located in the following state in which I hold an active license [place]      Assessment/Plan:    Problem List Items Addressed This Visit        Cardiovascular and Mediastinum    Paroxysmal atrial fibrillation ([facility] [address] ) - Primary    Atrial flutter ([facility] [address] )        Patient initially seen by General Cardiology in 2017 for episodes of paroxysmal atrial fibrillation during recent mastectomy  Holter at that time showed no significant atrial irritability  In 2019 patient had a symptomatic one hour episode of atrial fibrillation  In 07/2021, patient had syncopal episode for unknown reasons  Patient had been on Lopressor 12 5 mg twice daily  This was switched to Toprol XL12 5 mg daily  Two week Zio monitor was utilized showing brief episode of 2nd degree AV  Patient informed she could potentially need a pacemaker if she continues to have syncopal episodes or if she requires increase in beta-blocker due to atrial arrhythmias  Patient wore a 48 hour Holter monitor in January 2022  The predominant rhythm was atrial flutter with variable block and an average ventricular rate of 73 bpm   She ranged from 48 bpm to 148 bpm     Today, results of Holter monitor were discussed with the patient's son and daughter  They state their mother offers no complaints, denying lightheadedness, syncope, palpitations, chest pain, shortness of breath  In fact, patient states she is feeling quite well ever since decreasing her dose in beta-blocker  Patient was dancing at a relative's wedding 1 week ago without issue  Patient remains active, but does not exert herself  Treatment for atrial flutter could involve placement of pacemaker (would have to be right-sided) and up titration of beta-blocker, anticoagulation, atrial flutter ablation, or cardioversion  The patient and the family do not wish for any invasive procedures or medication changes at this time    This is reasonable considering the patient's age, lack of symptoms, and fact that she is rate controlled  Recent Visits  Date Type Provider Dept   02/24/22 Telemedicine Dexter Thompson PA-C [facility]   Showing recent visits within past 7 days and meeting all other requirements  Future Appointments  No visits were found meeting these conditions  Showing future appointments within next 150 days and meeting all other requirements         I spent 15 minutes directly with the patient during this visit          REQUIRED DOCUMENTATION:     1  This service was provided via Telemedicine  2  Provider located at [address] [place] [address]   3  TeleMed provider: Dexter Thompson PA-C   4  Identify all parties in room with patient during tele consult:  none  5  Patient was then informed that this was a Telemedicine visit and that the exam was being conducted confidentially over secure lines  My office door was closed  No one else was in the room  Patient acknowledged consent and understanding of privacy and security of the Telemedicine visit, and gave us permission to have the assistant stay in the room in order to assist with the history and to conduct the exam   I informed the patient that I have reviewed their record in Epic and presented the opportunity for them to ask any questions regarding the visit today  The patient agreed to participate  It was my intent to perform this visit via video technology but the patient was not able to do a video connection so the visit was completed via audio telephone only

## 2022-03-02 ENCOUNTER — OFFICE VISIT (OUTPATIENT)
Dept: CARDIOLOGY CLINIC | Facility: CLINIC | Age: 87
End: 2022-03-02
Payer: MEDICARE

## 2022-03-02 VITALS
DIASTOLIC BLOOD PRESSURE: 78 MMHG | OXYGEN SATURATION: 98 % | HEIGHT: 61 IN | HEART RATE: 72 BPM | SYSTOLIC BLOOD PRESSURE: 140 MMHG | WEIGHT: 149.3 LBS | BODY MASS INDEX: 28.19 KG/M2

## 2022-03-02 DIAGNOSIS — I48.92 ATRIAL FLUTTER, UNSPECIFIED TYPE (HCC): ICD-10-CM

## 2022-03-02 DIAGNOSIS — I10 ESSENTIAL HYPERTENSION: ICD-10-CM

## 2022-03-02 DIAGNOSIS — R55 SYNCOPE, UNSPECIFIED SYNCOPE TYPE: Primary | ICD-10-CM

## 2022-03-02 DIAGNOSIS — I50.32 CHRONIC DIASTOLIC HEART FAILURE (HCC): ICD-10-CM

## 2022-03-02 DIAGNOSIS — Z85.3 HISTORY OF LEFT BREAST CANCER: ICD-10-CM

## 2022-03-02 PROCEDURE — 99213 OFFICE O/P EST LOW 20 MIN: CPT | Performed by: INTERNAL MEDICINE
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
## 2022-03-24 ENCOUNTER — RA CDI HCC (OUTPATIENT)
Dept: OTHER | Facility: HOSPITAL | Age: 87
End: 2022-03-24

## 2022-03-24 NOTE — PROGRESS NOTES
[facility]  coding opportunities        I11 0  Chart Reviewed number of suggestions sent to Provider: 1     Patients Insurance     Medicare Insurance: [employer]

## 2022-03-30 ENCOUNTER — OFFICE VISIT (OUTPATIENT)
Dept: INTERNAL MEDICINE CLINIC | Facility: CLINIC | Age: 87
End: 2022-03-30
Payer: MEDICARE

## 2022-03-30 ENCOUNTER — APPOINTMENT (OUTPATIENT)
Dept: LAB | Age: 87
End: 2022-03-30
Payer: MEDICARE

## 2022-03-30 VITALS
HEART RATE: 69 BPM | DIASTOLIC BLOOD PRESSURE: 82 MMHG | SYSTOLIC BLOOD PRESSURE: 130 MMHG | OXYGEN SATURATION: 98 % | HEIGHT: 61 IN | TEMPERATURE: 96 F | BODY MASS INDEX: 28.32 KG/M2 | WEIGHT: 150 LBS

## 2022-03-30 DIAGNOSIS — I48.0 PAROXYSMAL ATRIAL FIBRILLATION (HCC): ICD-10-CM

## 2022-03-30 DIAGNOSIS — I10 ESSENTIAL HYPERTENSION: ICD-10-CM

## 2022-03-30 DIAGNOSIS — E03.9 ACQUIRED HYPOTHYROIDISM: ICD-10-CM

## 2022-03-30 DIAGNOSIS — E55.9 VITAMIN D DEFICIENCY: ICD-10-CM

## 2022-03-30 DIAGNOSIS — K21.9 GASTROESOPHAGEAL REFLUX DISEASE, UNSPECIFIED WHETHER ESOPHAGITIS PRESENT: ICD-10-CM

## 2022-03-30 DIAGNOSIS — E53.8 VITAMIN B12 DEFICIENCY: ICD-10-CM

## 2022-03-30 DIAGNOSIS — E03.9 ACQUIRED HYPOTHYROIDISM: Primary | ICD-10-CM

## 2022-03-30 DIAGNOSIS — Z90.12 S/P LEFT MASTECTOMY: ICD-10-CM

## 2022-03-30 DIAGNOSIS — N18.2 STAGE 2 CHRONIC KIDNEY DISEASE: ICD-10-CM

## 2022-03-30 DIAGNOSIS — F41.9 ANXIETY: ICD-10-CM

## 2022-03-30 LAB
ALBUMIN SERPL BCP-MCNC: 3.4 G/DL (ref 3.5–5)
ALP SERPL-CCNC: 72 U/L (ref 46–116)
ALT SERPL W P-5'-P-CCNC: 18 U/L (ref 12–78)
ANION GAP SERPL CALCULATED.3IONS-SCNC: 6 MMOL/L (ref 4–13)
AST SERPL W P-5'-P-CCNC: 14 U/L (ref 5–45)
BILIRUB SERPL-MCNC: 0.7 MG/DL (ref 0.2–1)
BUN SERPL-MCNC: 17 MG/DL (ref 5–25)
CALCIUM ALBUM COR SERPL-MCNC: 9.6 MG/DL (ref 8.3–10.1)
CALCIUM SERPL-MCNC: 9.1 MG/DL (ref 8.3–10.1)
CHLORIDE SERPL-SCNC: 105 MMOL/L (ref 100–108)
CO2 SERPL-SCNC: 27 MMOL/L (ref 21–32)
CREAT SERPL-MCNC: 0.76 MG/DL (ref 0.6–1.3)
GFR SERPL CREATININE-BSD FRML MDRD: 66 ML/MIN/1.73SQ M
GLUCOSE P FAST SERPL-MCNC: 106 MG/DL (ref 65–99)
POTASSIUM SERPL-SCNC: 4.4 MMOL/L (ref 3.5–5.3)
PROT SERPL-MCNC: 7.8 G/DL (ref 6.4–8.2)
SODIUM SERPL-SCNC: 138 MMOL/L (ref 136–145)
T4 FREE SERPL-MCNC: 1.42 NG/DL (ref 0.76–1.46)
TSH SERPL DL<=0.05 MIU/L-ACNC: 0.32 UIU/ML (ref 0.36–3.74)
VIT B12 SERPL-MCNC: 621 PG/ML (ref 100–900)

## 2022-03-30 PROCEDURE — 84439 ASSAY OF FREE THYROXINE: CPT

## 2022-03-30 PROCEDURE — 84443 ASSAY THYROID STIM HORMONE: CPT

## 2022-03-30 PROCEDURE — 36415 COLL VENOUS BLD VENIPUNCTURE: CPT

## 2022-03-30 PROCEDURE — 99214 OFFICE O/P EST MOD 30 MIN: CPT | Performed by: INTERNAL MEDICINE

## 2022-03-30 PROCEDURE — 80053 COMPREHEN METABOLIC PANEL: CPT

## 2022-03-30 PROCEDURE — 82607 VITAMIN B-12: CPT

## 2022-03-30 RX ORDER — LEVOTHYROXINE SODIUM 0.15 MG/1
TABLET ORAL
Qty: 90 TABLET | Refills: 1
Start: 2022-03-30 | End: 2022-05-09 | Stop reason: SDUPTHER

## 2022-03-30 NOTE — PROGRESS NOTES
Assessment/Plan:    Anxiety  Improved, taking Lexapro  Acquired hypothyroidism  Adjust dose, taking appropriately  Recheck in a few weeks  Essential hypertension  BP stable  On amlodipine bid and metoprolol daily  Paroxysmal atrial fibrillation (HCC)  No recent symptoms  On metoprolol daily  GERD (gastroesophageal reflux disease)  Minimal symptoms, has not needed medication  Vitamin B12 deficiency  Taking supplement 2 days a week  Osteoporosis  Walks daily, on supplements  Stage 2 chronic kidney disease  Lab Results   Component Value Date    EGFR 66 03/30/2022    EGFR 75 09/07/2021    EGFR 75 07/27/2021    CREATININE 0 76 03/30/2022    CREATININE 0 66 09/07/2021    CREATININE 0 66 07/27/2021     Stable  Increase daily fluid intake  Vitamin D deficiency  Taking D3 daily  S/P left mastectomy  Breast exam next visit  Interstitial cystitis  Will see Dr Reyes Brand prn  Diagnoses and all orders for this visit:    Acquired hypothyroidism  -     TSH, 3rd generation with Free T4 reflex; Future  -     levothyroxine (Synthroid) 150 mcg tablet; Take 1 tab 6 day a week    Anxiety    Essential hypertension  -     Comprehensive metabolic panel; Future  -     Lipid panel; Future    Paroxysmal atrial fibrillation (HCC)    Vitamin B12 deficiency    Vitamin D deficiency  -     Vitamin D 25 hydroxy; Future    Gastroesophageal reflux disease, unspecified whether esophagitis present    S/P left mastectomy      Follow up in 6 months or as needed  Subjective:      Patient ID: Georgi Arciniega is a 80 y o  female here for a follow up, with her daughter  She has been doing well, no complaints  She has been taking her Lexapro daily, has had no anxiety episodes  She admits not drinking a lot of fluids on a daily basis, takes Metamucil daily for her constipation  She reports occasional palpitations after walking or before she eats    She denies any chest pain, headaches, dizziness or syncopal episodes  She also reports occasionally feeling out of breath after walking to the food [employer]  This passes after few minutes  She is not on blood thinners or aspirin  The following portions of the patient's history were reviewed and updated as appropriate: allergies, current medications, past medical history, past social history and problem list     Review of Systems   Constitutional: Negative for activity change, appetite change and fatigue  HENT: Negative for congestion, ear pain and postnasal drip  Eyes: Negative for visual disturbance  Respiratory: Negative for cough and shortness of breath  Cardiovascular: Positive for palpitations  Negative for chest pain and leg swelling  Gastrointestinal: Negative for abdominal pain, constipation and diarrhea  Genitourinary: Negative for dysuria, frequency and urgency  Musculoskeletal: Negative for arthralgias, back pain and myalgias  Skin: Negative for rash and wound  Neurological: Negative for dizziness, numbness and headaches  Hematological: Does not bruise/bleed easily  Psychiatric/Behavioral: Negative for confusion and sleep disturbance  The patient is not nervous/anxious  Objective:      /82   Pulse 69   Temp (!) 96 °F (35 6 °C)   Ht 5' 1" (1 549 m)   Wt 68 kg (150 lb)   LMP  (LMP Unknown)   SpO2 98%   BMI 28 34 kg/m²          Physical Exam  Vitals and nursing note reviewed  Constitutional:       General: She is not in acute distress  Appearance: She is well-developed  HENT:      Head: Normocephalic and atraumatic  Eyes:      Pupils: Pupils are equal, round, and reactive to light  Cardiovascular:      Rate and Rhythm: Normal rate and regular rhythm  Heart sounds: Normal heart sounds  Pulmonary:      Effort: Pulmonary effort is normal       Breath sounds: Normal breath sounds  No wheezing  Abdominal:      General: Bowel sounds are normal       Palpations: Abdomen is soft     Musculoskeletal: General: No swelling  Right lower leg: No edema  Left lower leg: No edema  Comments: Using walker   Skin:     General: Skin is warm  Findings: No rash  Neurological:      General: No focal deficit present  Mental Status: She is alert and oriented to person, place, and time  Psychiatric:         Mood and Affect: Mood and affect normal  Mood is not anxious or depressed  Behavior: Behavior normal            Lab results reviewed with patient

## 2022-03-30 NOTE — ASSESSMENT & PLAN NOTE
Lab Results   Component Value Date    EGFR 66 03/30/2022    EGFR 75 09/07/2021    EGFR 75 07/27/2021    CREATININE 0 76 03/30/2022    CREATININE 0 66 09/07/2021    CREATININE 0 66 07/27/2021     Stable  Increase daily fluid intake

## 2022-03-31 DIAGNOSIS — F41.9 ANXIETY: ICD-10-CM

## 2022-03-31 DIAGNOSIS — I10 ESSENTIAL HYPERTENSION: ICD-10-CM

## 2022-03-31 RX ORDER — ESCITALOPRAM OXALATE 5 MG/1
5 TABLET ORAL DAILY
Qty: 90 TABLET | Refills: 0 | Status: SHIPPED | OUTPATIENT
Start: 2022-03-31 | End: 2022-06-13 | Stop reason: SDUPTHER

## 2022-03-31 RX ORDER — AMLODIPINE BESYLATE 2.5 MG/1
2.5 TABLET ORAL 2 TIMES DAILY
Qty: 180 TABLET | Refills: 0 | Status: SHIPPED | OUTPATIENT
Start: 2022-03-31 | End: 2022-06-13 | Stop reason: SDUPTHER

## 2022-05-09 ENCOUNTER — TELEPHONE (OUTPATIENT)
Dept: INTERNAL MEDICINE CLINIC | Facility: CLINIC | Age: 87
End: 2022-05-09

## 2022-05-09 ENCOUNTER — APPOINTMENT (OUTPATIENT)
Dept: LAB | Facility: AMBULARY SURGERY CENTER | Age: 87
End: 2022-05-09
Payer: MEDICARE

## 2022-05-09 DIAGNOSIS — E03.9 ACQUIRED HYPOTHYROIDISM: Primary | ICD-10-CM

## 2022-05-09 DIAGNOSIS — E03.9 ACQUIRED HYPOTHYROIDISM: ICD-10-CM

## 2022-05-09 LAB
T4 FREE SERPL-MCNC: 1.15 NG/DL (ref 0.76–1.46)
TSH SERPL DL<=0.05 MIU/L-ACNC: 0.39 UIU/ML (ref 0.45–4.5)

## 2022-05-09 PROCEDURE — 84439 ASSAY OF FREE THYROXINE: CPT

## 2022-05-09 PROCEDURE — 84443 ASSAY THYROID STIM HORMONE: CPT

## 2022-05-09 PROCEDURE — 36415 COLL VENOUS BLD VENIPUNCTURE: CPT

## 2022-05-09 RX ORDER — LEVOTHYROXINE SODIUM 0.15 MG/1
TABLET ORAL
Qty: 90 TABLET | Refills: 1
Start: 2022-05-09 | End: 2022-06-23 | Stop reason: SDUPTHER

## 2022-05-09 NOTE — TELEPHONE ENCOUNTER
Can speak to daughter  Thyroid test still not normal  She can take thyroid pill 5 days a week OR I can change the dose so it is not too confusing  Let me know  Will need to recheck labs in a few weeks again

## 2022-05-23 ENCOUNTER — ESTABLISHED COMPREHENSIVE EXAM (OUTPATIENT)
Dept: URBAN - METROPOLITAN AREA CLINIC 6 | Facility: CLINIC | Age: 87
End: 2022-05-23

## 2022-05-23 ENCOUNTER — TELEPHONE (OUTPATIENT)
Dept: INTERNAL MEDICINE CLINIC | Facility: CLINIC | Age: 87
End: 2022-05-23

## 2022-05-23 ENCOUNTER — HOSPITAL ENCOUNTER (OUTPATIENT)
Dept: RADIOLOGY | Facility: HOSPITAL | Age: 87
Discharge: HOME | End: 2022-05-23
Payer: MEDICARE

## 2022-05-23 DIAGNOSIS — M25.462 PAIN AND SWELLING OF KNEE, LEFT: ICD-10-CM

## 2022-05-23 DIAGNOSIS — M25.562 PAIN AND SWELLING OF KNEE, LEFT: Primary | ICD-10-CM

## 2022-05-23 DIAGNOSIS — M25.562 PAIN AND SWELLING OF KNEE, LEFT: ICD-10-CM

## 2022-05-23 DIAGNOSIS — M25.462 PAIN AND SWELLING OF KNEE, LEFT: Primary | ICD-10-CM

## 2022-05-23 DIAGNOSIS — H35.3132: ICD-10-CM

## 2022-05-23 DIAGNOSIS — Z96.1: ICD-10-CM

## 2022-05-23 DIAGNOSIS — H04.123: ICD-10-CM

## 2022-05-23 PROCEDURE — 92014 COMPRE OPH EXAM EST PT 1/>: CPT

## 2022-05-23 PROCEDURE — 92134 CPTRZ OPH DX IMG PST SGM RTA: CPT

## 2022-05-23 PROCEDURE — 73562 X-RAY EXAM OF KNEE 3: CPT

## 2022-05-23 ASSESSMENT — VISUAL ACUITY
OD_PH: 20/40-1
OD_SC: 20/80
OS_SC: 20/100
OU_SC: J2
OS_PH: 20/40-2

## 2022-05-23 ASSESSMENT — TONOMETRY
OS_IOP_MMHG: 9
OD_IOP_MMHG: 9

## 2022-05-23 NOTE — TELEPHONE ENCOUNTER
Pt woke up yesterday with a swollen left knee  No pain  No known injury  She is able to bend her knee  Difficulty walking  Difficult getting up from a sitting position  Would like to get an x-ray done?

## 2022-05-24 ENCOUNTER — TELEPHONE (OUTPATIENT)
Dept: INTERNAL MEDICINE CLINIC | Facility: CLINIC | Age: 87
End: 2022-05-24

## 2022-05-24 NOTE — TELEPHONE ENCOUNTER
X-ray did now show any fracture, it showed severe arthritis  If with pain, I can refer you to Orthopedics for an injection  If none, no need  You may continue applying ice for the swelling, take Tylenol for the pain

## 2022-05-24 NOTE — TELEPHONE ENCOUNTER
Nothing else to do for now  Offer appt this week (can use my acute spot) if they'd like      Staff: please call radiology

## 2022-05-24 NOTE — TELEPHONE ENCOUNTER
Rosangela Johnson aware, will see if her mother wants ortho referral, states not in pain last time they spoke, will call back

## 2022-05-24 NOTE — TELEPHONE ENCOUNTER
Pt 's knee is still swollen  Pt  did eat something small for lunch today which isn't typical-she usually eats three meals a day-hasn't been eating like normal  Ate only a muffin for lunch yesterday and doesn't think she ate anything after that  Liana Beavers just talked to pt  and she states her knee is fine and doesn't hurt  Liana Beavers wants to know it pt  Should be walking on it or resting it and if there is anything else she should be doing until Xray results are back

## 2022-05-27 ENCOUNTER — OFFICE VISIT (OUTPATIENT)
Dept: INTERNAL MEDICINE CLINIC | Facility: CLINIC | Age: 87
End: 2022-05-27
Payer: MEDICARE

## 2022-05-27 VITALS
DIASTOLIC BLOOD PRESSURE: 92 MMHG | TEMPERATURE: 97.1 F | BODY MASS INDEX: 28.51 KG/M2 | WEIGHT: 151 LBS | SYSTOLIC BLOOD PRESSURE: 170 MMHG | HEART RATE: 74 BPM | OXYGEN SATURATION: 96 % | HEIGHT: 61 IN

## 2022-05-27 DIAGNOSIS — I10 ESSENTIAL HYPERTENSION: ICD-10-CM

## 2022-05-27 DIAGNOSIS — Z03.818 ENCNTR FOR OBS FOR SUSP EXPSR TO OTH BIOLG AGENTS RULED OUT: ICD-10-CM

## 2022-05-27 DIAGNOSIS — M17.12 PRIMARY OSTEOARTHRITIS OF LEFT KNEE: ICD-10-CM

## 2022-05-27 DIAGNOSIS — J30.1 SEASONAL ALLERGIC RHINITIS DUE TO POLLEN: Primary | ICD-10-CM

## 2022-05-27 DIAGNOSIS — K12.0 CANKER SORE: ICD-10-CM

## 2022-05-27 LAB
SARS-COV-2 AG UPPER RESP QL IA: NEGATIVE
VALID CONTROL: NORMAL

## 2022-05-27 PROCEDURE — 99213 OFFICE O/P EST LOW 20 MIN: CPT | Performed by: INTERNAL MEDICINE

## 2022-05-27 PROCEDURE — 87811 SARS-COV-2 COVID19 W/OPTIC: CPT | Performed by: INTERNAL MEDICINE

## 2022-05-27 RX ORDER — FLUTICASONE PROPIONATE 50 MCG
1 SPRAY, SUSPENSION (ML) NASAL DAILY
Qty: 48 G | Refills: 1 | Status: SHIPPED | OUTPATIENT
Start: 2022-05-27

## 2022-05-27 NOTE — PROGRESS NOTES
Assessment/Plan:    Essential hypertension  Repeat BP remains elevated, monitor at home  On amlodipine and metoprolol  Primary osteoarthritis of left knee  Severe on x-ray  Declined Ortho referral   Apply ice/heat prn, continue Tylenol prn  Seasonal allergic rhinitis due to pollen  Use Flonase daily, saline spray prn  May take antihistamine prn  Diagnoses and all orders for this visit:    Seasonal allergic rhinitis due to pollen  -     fluticasone (FLONASE) 50 mcg/act nasal spray; 1 spray into each nostril daily    Essential hypertension    Primary osteoarthritis of left knee    Canker sore  Comments:  Start saline gargles  Reassured  Encntr for obs for susp expsr to oth biolg agents ruled out  Comments:  Rapid COVID was negative  Orders:  -     POCT Rapid Covid Ag    Follow up as scheduled or as needed  Subjective:      Patient ID: Stephen Christensen is a 80 y o  female here with her daughter, for knee pain  About 5-6 days ago, she developed left knee pain and swelling  She reports no falls or injury does not recall any specific activity that caused it  She thinks she may have twisted it getting out of bed or using her walker  She has been taking Tylenol as needed  She has been applying ice with some relief  She tries to elevate her legs when she can  She reports some swelling a few days ago which have subsided since 2 days ago  She also complains of more frequent nasal congestion and postnasal drip  She reports no fever or chills, no cough  She notice that there is something sore in her mouth  She has been using her Flonase daily recently  The following portions of the patient's history were reviewed and updated as appropriate: allergies, current medications, past medical history, past social history and problem list     Review of Systems   Constitutional: Negative for activity change, appetite change, fatigue and fever  HENT: Positive for postnasal drip and rhinorrhea  Negative for congestion, sore throat and trouble swallowing  Respiratory: Negative for cough and shortness of breath  Musculoskeletal: Positive for arthralgias, gait problem and joint swelling  Neurological: Negative for dizziness, weakness and headaches  Psychiatric/Behavioral: Negative for sleep disturbance  Objective:      /92 Comment: Recheck  Pulse 74   Temp (!) 97 1 °F (36 2 °C)   Ht 5' 1" (1 549 m)   Wt 68 5 kg (151 lb)   LMP  (LMP Unknown)   SpO2 96%   BMI 28 53 kg/m²          Physical Exam  Constitutional:       General: She is not in acute distress  Appearance: Normal appearance  She is not ill-appearing  HENT:      Head: Normocephalic and atraumatic  Nose: Nose normal       Mouth/Throat:      Mouth: Mucous membranes are moist       Tongue: No lesions  Pharynx: No pharyngeal swelling, oropharyngeal exudate or posterior oropharyngeal erythema  Tonsils: No tonsillar exudate  Eyes:      Pupils: Pupils are equal, round, and reactive to light  Cardiovascular:      Rate and Rhythm: Normal rate and regular rhythm  Pulmonary:      Effort: Pulmonary effort is normal  No respiratory distress  Musculoskeletal:      Right knee: No swelling  No tenderness  Left knee: Swelling present  Tenderness present over the medial joint line  Comments: Using walker   Neurological:      General: No focal deficit present  Mental Status: She is alert     Psychiatric:         Mood and Affect: Mood normal          Behavior: Behavior normal

## 2022-05-27 NOTE — PATIENT INSTRUCTIONS
Use Flonase once a day  Use saline spray 2 to 3 times a day, as needed  Start saline gargles 2 to 3 times a day  Apply ice or heat on knee as needed  If still swolle, elevate if you can

## 2022-06-02 DIAGNOSIS — R55 SYNCOPE, UNSPECIFIED SYNCOPE TYPE: ICD-10-CM

## 2022-06-02 RX ORDER — METOPROLOL SUCCINATE 25 MG/1
TABLET, EXTENDED RELEASE ORAL
Qty: 45 TABLET | Refills: 0 | Status: SHIPPED | OUTPATIENT
Start: 2022-06-02

## 2022-06-13 DIAGNOSIS — F41.9 ANXIETY: ICD-10-CM

## 2022-06-13 DIAGNOSIS — I10 ESSENTIAL HYPERTENSION: ICD-10-CM

## 2022-06-13 RX ORDER — ESCITALOPRAM OXALATE 5 MG/1
5 TABLET ORAL DAILY
Qty: 90 TABLET | Refills: 0 | Status: SHIPPED | OUTPATIENT
Start: 2022-06-13 | End: 2022-07-13 | Stop reason: SDUPTHER

## 2022-06-13 RX ORDER — AMLODIPINE BESYLATE 2.5 MG/1
2.5 TABLET ORAL 2 TIMES DAILY
Qty: 180 TABLET | Refills: 0 | Status: SHIPPED | OUTPATIENT
Start: 2022-06-13 | End: 2022-07-13 | Stop reason: SDUPTHER

## 2022-06-20 ENCOUNTER — APPOINTMENT (OUTPATIENT)
Dept: LAB | Facility: CLINIC | Age: 87
End: 2022-06-20
Payer: MEDICARE

## 2022-06-20 DIAGNOSIS — E03.9 ACQUIRED HYPOTHYROIDISM: ICD-10-CM

## 2022-06-20 LAB — TSH SERPL DL<=0.05 MIU/L-ACNC: 3 UIU/ML (ref 0.45–4.5)

## 2022-06-20 PROCEDURE — 36415 COLL VENOUS BLD VENIPUNCTURE: CPT

## 2022-06-20 PROCEDURE — 84443 ASSAY THYROID STIM HORMONE: CPT

## 2022-06-23 DIAGNOSIS — E03.9 ACQUIRED HYPOTHYROIDISM: ICD-10-CM

## 2022-06-23 RX ORDER — LEVOTHYROXINE SODIUM 0.15 MG/1
TABLET ORAL
Qty: 90 TABLET | Refills: 1
Start: 2022-06-23

## 2022-07-13 DIAGNOSIS — F41.9 ANXIETY: ICD-10-CM

## 2022-07-13 DIAGNOSIS — I10 ESSENTIAL HYPERTENSION: ICD-10-CM

## 2022-07-13 RX ORDER — AMLODIPINE BESYLATE 2.5 MG/1
2.5 TABLET ORAL 2 TIMES DAILY
Qty: 180 TABLET | Refills: 0 | Status: SHIPPED | OUTPATIENT
Start: 2022-07-13 | End: 2022-09-30 | Stop reason: SDUPTHER

## 2022-07-13 RX ORDER — ESCITALOPRAM OXALATE 5 MG/1
5 TABLET ORAL DAILY
Qty: 90 TABLET | Refills: 0 | Status: SHIPPED | OUTPATIENT
Start: 2022-07-13 | End: 2022-09-30 | Stop reason: SDUPTHER

## 2022-08-24 DIAGNOSIS — R55 SYNCOPE, UNSPECIFIED SYNCOPE TYPE: ICD-10-CM

## 2022-08-24 DIAGNOSIS — E03.9 ACQUIRED HYPOTHYROIDISM: ICD-10-CM

## 2022-08-24 RX ORDER — LEVOTHYROXINE SODIUM 0.15 MG/1
TABLET ORAL
Qty: 90 TABLET | Refills: 0 | Status: SHIPPED | OUTPATIENT
Start: 2022-08-24 | End: 2022-09-30 | Stop reason: SDUPTHER

## 2022-08-24 RX ORDER — METOPROLOL SUCCINATE 25 MG/1
TABLET, EXTENDED RELEASE ORAL
Qty: 45 TABLET | Refills: 0 | Status: SHIPPED | OUTPATIENT
Start: 2022-08-24 | End: 2022-09-30 | Stop reason: SDUPTHER

## 2022-09-24 ENCOUNTER — LAB (OUTPATIENT)
Dept: LAB | Age: 87
End: 2022-09-24
Payer: MEDICARE

## 2022-09-24 DIAGNOSIS — E55.9 VITAMIN D DEFICIENCY: ICD-10-CM

## 2022-09-24 DIAGNOSIS — I10 ESSENTIAL HYPERTENSION: ICD-10-CM

## 2022-09-24 LAB
25(OH)D3 SERPL-MCNC: 39.5 NG/ML (ref 30–100)
ALBUMIN SERPL BCP-MCNC: 3.3 G/DL (ref 3.5–5)
ALP SERPL-CCNC: 79 U/L (ref 46–116)
ALT SERPL W P-5'-P-CCNC: 20 U/L (ref 12–78)
ANION GAP SERPL CALCULATED.3IONS-SCNC: 6 MMOL/L (ref 4–13)
AST SERPL W P-5'-P-CCNC: 15 U/L (ref 5–45)
BILIRUB SERPL-MCNC: 0.8 MG/DL (ref 0.2–1)
BUN SERPL-MCNC: 21 MG/DL (ref 5–25)
CALCIUM ALBUM COR SERPL-MCNC: 10.1 MG/DL (ref 8.3–10.1)
CALCIUM SERPL-MCNC: 9.5 MG/DL (ref 8.3–10.1)
CHLORIDE SERPL-SCNC: 105 MMOL/L (ref 96–108)
CHOLEST SERPL-MCNC: 184 MG/DL
CO2 SERPL-SCNC: 26 MMOL/L (ref 21–32)
CREAT SERPL-MCNC: 0.88 MG/DL (ref 0.6–1.3)
GFR SERPL CREATININE-BSD FRML MDRD: 55 ML/MIN/1.73SQ M
GLUCOSE P FAST SERPL-MCNC: 112 MG/DL (ref 65–99)
HDLC SERPL-MCNC: 32 MG/DL
LDLC SERPL CALC-MCNC: 122 MG/DL (ref 0–100)
NONHDLC SERPL-MCNC: 152 MG/DL
POTASSIUM SERPL-SCNC: 4.9 MMOL/L (ref 3.5–5.3)
PROT SERPL-MCNC: 8.5 G/DL (ref 6.4–8.4)
SODIUM SERPL-SCNC: 137 MMOL/L (ref 135–147)
TRIGL SERPL-MCNC: 152 MG/DL

## 2022-09-24 PROCEDURE — 36415 COLL VENOUS BLD VENIPUNCTURE: CPT

## 2022-09-24 PROCEDURE — 82306 VITAMIN D 25 HYDROXY: CPT

## 2022-09-24 PROCEDURE — 80061 LIPID PANEL: CPT

## 2022-09-24 PROCEDURE — 80053 COMPREHEN METABOLIC PANEL: CPT

## 2022-09-26 ENCOUNTER — RA CDI HCC (OUTPATIENT)
Dept: OTHER | Facility: HOSPITAL | Age: 87
End: 2022-09-26

## 2022-09-26 NOTE — PROGRESS NOTES
[facility]  coding opportunities        I13 0  Chart Reviewed number of suggestions sent to Provider: 1     Patients Insurance     Medicare Insurance: [employer]

## 2022-09-30 ENCOUNTER — OFFICE VISIT (OUTPATIENT)
Dept: INTERNAL MEDICINE CLINIC | Facility: CLINIC | Age: 87
End: 2022-09-30
Payer: MEDICARE

## 2022-09-30 VITALS
WEIGHT: 152 LBS | HEART RATE: 67 BPM | OXYGEN SATURATION: 97 % | SYSTOLIC BLOOD PRESSURE: 122 MMHG | BODY MASS INDEX: 28.7 KG/M2 | DIASTOLIC BLOOD PRESSURE: 82 MMHG | TEMPERATURE: 96.9 F | HEIGHT: 61 IN

## 2022-09-30 DIAGNOSIS — F41.9 ANXIETY: ICD-10-CM

## 2022-09-30 DIAGNOSIS — E03.9 ACQUIRED HYPOTHYROIDISM: ICD-10-CM

## 2022-09-30 DIAGNOSIS — N18.2 STAGE 2 CHRONIC KIDNEY DISEASE: ICD-10-CM

## 2022-09-30 DIAGNOSIS — K21.9 GASTROESOPHAGEAL REFLUX DISEASE, UNSPECIFIED WHETHER ESOPHAGITIS PRESENT: Primary | ICD-10-CM

## 2022-09-30 DIAGNOSIS — K59.09 OTHER CONSTIPATION: ICD-10-CM

## 2022-09-30 DIAGNOSIS — E55.9 VITAMIN D DEFICIENCY: ICD-10-CM

## 2022-09-30 DIAGNOSIS — E78.5 DYSLIPIDEMIA: ICD-10-CM

## 2022-09-30 DIAGNOSIS — E53.8 VITAMIN B12 DEFICIENCY: ICD-10-CM

## 2022-09-30 DIAGNOSIS — Z00.00 HEALTH MAINTENANCE EXAMINATION: ICD-10-CM

## 2022-09-30 DIAGNOSIS — Z90.12 S/P LEFT MASTECTOMY: ICD-10-CM

## 2022-09-30 DIAGNOSIS — R55 SYNCOPE, UNSPECIFIED SYNCOPE TYPE: ICD-10-CM

## 2022-09-30 DIAGNOSIS — I10 ESSENTIAL HYPERTENSION: ICD-10-CM

## 2022-09-30 DIAGNOSIS — M81.0 AGE-RELATED OSTEOPOROSIS WITHOUT CURRENT PATHOLOGICAL FRACTURE: ICD-10-CM

## 2022-09-30 DIAGNOSIS — I48.0 PAROXYSMAL ATRIAL FIBRILLATION (HCC): ICD-10-CM

## 2022-09-30 DIAGNOSIS — Z23 IMMUNIZATION DUE: ICD-10-CM

## 2022-09-30 PROBLEM — R15.9 FECAL INCONTINENCE: Status: RESOLVED | Noted: 2021-07-12 | Resolved: 2022-09-30

## 2022-09-30 PROBLEM — R82.71 ASYMPTOMATIC BACTERIURIA: Status: RESOLVED | Noted: 2021-07-11 | Resolved: 2022-09-30

## 2022-09-30 PROCEDURE — 99214 OFFICE O/P EST MOD 30 MIN: CPT | Performed by: INTERNAL MEDICINE

## 2022-09-30 PROCEDURE — G0439 PPPS, SUBSEQ VISIT: HCPCS | Performed by: INTERNAL MEDICINE

## 2022-09-30 PROCEDURE — G0008 ADMIN INFLUENZA VIRUS VAC: HCPCS | Performed by: INTERNAL MEDICINE

## 2022-09-30 PROCEDURE — 90662 IIV NO PRSV INCREASED AG IM: CPT | Performed by: INTERNAL MEDICINE

## 2022-09-30 RX ORDER — ESCITALOPRAM OXALATE 5 MG/1
5 TABLET ORAL DAILY
Qty: 90 TABLET | Refills: 1 | Status: SHIPPED | OUTPATIENT
Start: 2022-09-30

## 2022-09-30 RX ORDER — FAMOTIDINE 20 MG/1
20 TABLET, FILM COATED ORAL DAILY PRN
Qty: 90 TABLET | Refills: 0 | Status: SHIPPED | OUTPATIENT
Start: 2022-09-30

## 2022-09-30 RX ORDER — AMLODIPINE BESYLATE 2.5 MG/1
2.5 TABLET ORAL 2 TIMES DAILY
Qty: 180 TABLET | Refills: 1 | Status: SHIPPED | OUTPATIENT
Start: 2022-09-30

## 2022-09-30 RX ORDER — METOPROLOL SUCCINATE 25 MG/1
TABLET, EXTENDED RELEASE ORAL
Qty: 45 TABLET | Refills: 1 | Status: SHIPPED | OUTPATIENT
Start: 2022-09-30

## 2022-09-30 RX ORDER — LEVOTHYROXINE SODIUM 0.15 MG/1
TABLET ORAL
Qty: 80 TABLET | Refills: 1 | Status: SHIPPED | OUTPATIENT
Start: 2022-09-30

## 2022-09-30 NOTE — ASSESSMENT & PLAN NOTE
Lab Results   Component Value Date    EGFR 55 09/24/2022    EGFR 66 03/30/2022    EGFR 75 09/07/2021    CREATININE 0 88 09/24/2022    CREATININE 0 76 03/30/2022    CREATININE 0 66 09/07/2021     Slightly worse  No NSAIDs

## 2022-09-30 NOTE — ASSESSMENT & PLAN NOTE
Taking D3 daily  Principal Discharge DX:	Bipolar disorder  Secondary Diagnosis:	Schizophrenia, unspecified type
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
## 2022-10-18 ENCOUNTER — TELEPHONE (OUTPATIENT)
Dept: INTERNAL MEDICINE CLINIC | Facility: CLINIC | Age: 87
End: 2022-10-18

## 2022-10-18 NOTE — TELEPHONE ENCOUNTER
Any fevers? She can take Mucinex or Robitussin for the cough  It is probably a viral infection / cold  Drink more fluids, may help with the recovery

## 2022-10-18 NOTE — TELEPHONE ENCOUNTER
Pt had a runny/stuffy nose and a cough started on Friday  Runny nose subsided  Patient says she has no energy  Negative Covid test today  Pt is coughing up pale green mucus  She is doing her regular activities every day  Daughter explained to pt that the cough and mucus is probably the end of a cold, but the patient is concerned  No SOB, chest pain, sore throat, headache, nausea, vomiting, or diarrhea

## 2022-10-20 ENCOUNTER — OFFICE VISIT (OUTPATIENT)
Dept: INTERNAL MEDICINE CLINIC | Facility: CLINIC | Age: 87
End: 2022-10-20

## 2022-10-20 VITALS
WEIGHT: 153 LBS | BODY MASS INDEX: 28.89 KG/M2 | DIASTOLIC BLOOD PRESSURE: 90 MMHG | HEART RATE: 86 BPM | HEIGHT: 61 IN | OXYGEN SATURATION: 99 % | TEMPERATURE: 97.6 F | SYSTOLIC BLOOD PRESSURE: 136 MMHG

## 2022-10-20 DIAGNOSIS — B34.9 VIRAL INFECTION: Primary | ICD-10-CM

## 2022-10-20 NOTE — PROGRESS NOTES
Name: Franchesca Dominguez      : 1925      MRN: [id number]  Encounter Provider: FLOYD Grubbs  Encounter Date: 10/20/2022   Encounter department: [address] [place] [address] Jakob     1  Viral infection    Symptoms are improving  Lungs are clear on exam    Continue flonase daily  Stay well hydrated  She can also take robitussin for cough if needed  Call the office if symptoms persist or worsen  Jean Marie Loyola is here today with complaints of cough, nasal congestion, and headache  Symptoms started about a week ago   The nasal congestion and headaches have improved  She continues to have a moist productive cough with clear or pale yellow mucous  She denies any fevers, wheezing, or shortness of breath  Her appetite is slightly less  She was more tired this weekend but the last few days she has been able to get dressed and go to the  today  She has been using flonase daily  She had a negative home covid test         Review of Systems   Constitutional: Positive for appetite change  Negative for activity change, fatigue and fever  HENT: Positive for postnasal drip  Negative for congestion, rhinorrhea and sore throat  Respiratory: Positive for cough  Negative for chest tightness, shortness of breath and wheezing  Cardiovascular: Negative for chest pain and palpitations  Gastrointestinal: Negative for abdominal pain, diarrhea, nausea and vomiting  Genitourinary: Negative for difficulty urinating  Neurological: Negative for dizziness, weakness, light-headedness and headaches         Current Outpatient Medications on File Prior to Visit   Medication Sig   • amLODIPine (NORVASC) 2 5 mg tablet Take 1 tablet (2 5 mg total) by mouth 2 (two) times a day   • Carboxymethylcellul-Glycerin 0 5-0 9 % SOLN Apply to eye Drops to b/l eyes   • Cholecalciferol (VITAMIN D-3) 1000 UNITS CAPS Take 2,000 Units by mouth daily     • clobetasol (TEMOVATE) 0 05 % cream Apply topically 3 (three) times a week (Patient not taking: No sig reported)   • cyanocobalamin (VITAMIN B-12) 1,000 mcg tablet Take 2 days a week   • escitalopram (LEXAPRO) 5 mg tablet Take 1 tablet (5 mg total) by mouth daily   • famotidine (PEPCID) 20 mg tablet Take 1 tablet (20 mg total) by mouth daily as needed for heartburn   • fluticasone (FLONASE) 50 mcg/act nasal spray 1 spray into each nostril daily   • levothyroxine (Synthroid) 150 mcg tablet Take 1 tab PO 5 days a week   • metoprolol succinate (Toprol XL) 25 mg 24 hr tablet TAKE ONE-HALF TABLET BY MOUTH EVERY DAY   • Multiple Vitamins-Minerals (CENTRUM SILVER PO) Take 1 tablet by mouth daily  • Multiple Vitamins-Minerals (PRESERVISION AREDS 2) CAPS Take 1 capsule by mouth daily       Objective     /90   Pulse 86   Temp 97 6 °F (36 4 °C)   Ht 5' 1" (1 549 m)   Wt 69 4 kg (153 lb)   LMP  (LMP Unknown)   SpO2 99%   BMI 28 91 kg/m²     Physical Exam  Vitals reviewed  Constitutional:       Appearance: Normal appearance  HENT:      Head: Normocephalic and atraumatic  Right Ear: Tympanic membrane, ear canal and external ear normal       Left Ear: Tympanic membrane, ear canal and external ear normal       Mouth/Throat:      Pharynx: Posterior oropharyngeal erythema present  No oropharyngeal exudate  Eyes:      Conjunctiva/sclera: Conjunctivae normal    Cardiovascular:      Rate and Rhythm: Normal rate and regular rhythm  Heart sounds: Normal heart sounds  Pulmonary:      Effort: Pulmonary effort is normal  No respiratory distress  Breath sounds: Normal breath sounds  No wheezing or rhonchi  Musculoskeletal:      Right lower leg: No edema  Left lower leg: No edema  Skin:     General: Skin is warm and dry  Neurological:      Mental Status: She is alert and oriented to person, place, and time     Psychiatric:         Mood and Affect: Mood normal          Behavior: Behavior normal        Karlene Mariee CRNP

## 2022-10-24 ENCOUNTER — TELEPHONE (OUTPATIENT)
Dept: INTERNAL MEDICINE CLINIC | Facility: CLINIC | Age: 87
End: 2022-10-24

## 2022-10-24 DIAGNOSIS — R05.1 ACUTE COUGH: Primary | ICD-10-CM

## 2022-10-24 RX ORDER — BENZONATATE 100 MG/1
100 CAPSULE ORAL 3 TIMES DAILY PRN
Qty: 30 CAPSULE | Refills: 0 | Status: SHIPPED | OUTPATIENT
Start: 2022-10-24 | End: 2023-02-22 | Stop reason: ALTCHOICE

## 2022-10-24 NOTE — TELEPHONE ENCOUNTER
Spoke with daughter she is good   Just has a lingering cough, it is not constant and not at night just during day here and there no mucus     Can we send Tessalon perles to [facility] on [address]

## 2023-01-24 ENCOUNTER — TELEPHONE (OUTPATIENT)
Dept: INTERNAL MEDICINE CLINIC | Facility: CLINIC | Age: 88
End: 2023-01-24

## 2023-01-24 ENCOUNTER — APPOINTMENT (OUTPATIENT)
Dept: RADIOLOGY | Age: 88
End: 2023-01-24

## 2023-01-24 DIAGNOSIS — M17.12 PRIMARY OSTEOARTHRITIS OF LEFT KNEE: ICD-10-CM

## 2023-01-24 DIAGNOSIS — M25.531 PAIN, WRIST, RIGHT: Primary | ICD-10-CM

## 2023-01-24 DIAGNOSIS — M25.531 PAIN, WRIST, RIGHT: ICD-10-CM

## 2023-01-24 DIAGNOSIS — R26.2 AMBULATORY DYSFUNCTION: ICD-10-CM

## 2023-01-24 NOTE — TELEPHONE ENCOUNTER
Spoke with Gertrudis Burrell  She said she is using her rt  wrist but it is painful  Gertrudis Burrell would like her to have an xray done especially since pt  worries a lot, it would ease her mind  No need to call Gertrudis Burrell back

## 2023-01-24 NOTE — TELEPHONE ENCOUNTER
Pt 's rt  wrist hurts and is a little swollen in thumb area  Looks a little bruised  Pt  Florence Nava said pt states she didn't fall or bump it  Florence Nava is wondering if Dr Jerrod Anne could order an xray w/o pt  coming in for an ov  It is hard to get her out and around because of her age

## 2023-01-25 NOTE — TELEPHONE ENCOUNTER
Wrist x-ray did now show any new fracture  It did show previous trauma/fracture  It also showed arthritis  There is some soft tissue swelling by her thumb area which is probably what you see  Recommend to apply ice on this area, or heat  Whichever she prefers  If she would like to use a wrist brace for the next few days,s he may do so

## 2023-01-26 NOTE — TELEPHONE ENCOUNTER
Yes, we can start PT for balance and to improve general strength  She can do it at her facility  If she needs an order, let me know

## 2023-01-26 NOTE — TELEPHONE ENCOUNTER
Yes Please order the PT, Marcial Suggs is going to come in and update covid shots so she will pick it up

## 2023-02-10 ENCOUNTER — HOSPITAL ENCOUNTER (OUTPATIENT)
Facility: HOSPITAL | Age: 88
Setting detail: OBSERVATION
Discharge: HOME | End: 2023-02-12
Attending: EMERGENCY MEDICINE | Admitting: INTERNAL MEDICINE

## 2023-02-10 ENCOUNTER — APPOINTMENT (EMERGENCY)
Dept: CT IMAGING | Facility: HOSPITAL | Age: 88
End: 2023-02-10

## 2023-02-10 DIAGNOSIS — R41.82 CHANGE IN MENTAL STATUS: ICD-10-CM

## 2023-02-10 DIAGNOSIS — R19.7 DIARRHEA: ICD-10-CM

## 2023-02-10 DIAGNOSIS — R53.1 WEAKNESS: ICD-10-CM

## 2023-02-10 DIAGNOSIS — K52.9 ENTEROCOLITIS: Primary | ICD-10-CM

## 2023-02-10 PROBLEM — E87.5 HYPERKALEMIA: Status: ACTIVE | Noted: 2023-02-10

## 2023-02-10 PROBLEM — R41.0 CONFUSION: Status: ACTIVE | Noted: 2023-02-10

## 2023-02-10 LAB
2HR DELTA HS TROPONIN: 0 NG/L
4HR DELTA HS TROPONIN: -3 NG/L
ALBUMIN SERPL BCP-MCNC: 4.1 G/DL (ref 3.5–5)
ALP SERPL-CCNC: 61 U/L (ref 34–104)
ALT SERPL W P-5'-P-CCNC: 19 U/L (ref 7–52)
ANION GAP SERPL CALCULATED.3IONS-SCNC: 9 MMOL/L (ref 4–13)
AST SERPL W P-5'-P-CCNC: 47 U/L (ref 13–39)
ATRIAL RATE: 267 BPM
BACTERIA UR QL AUTO: ABNORMAL /HPF
BASOPHILS # BLD AUTO: 0.04 THOUSANDS/ÂΜL (ref 0–0.1)
BASOPHILS NFR BLD AUTO: 1 % (ref 0–1)
BILIRUB SERPL-MCNC: 0.63 MG/DL (ref 0.2–1)
BILIRUB UR QL STRIP: NEGATIVE
BUN SERPL-MCNC: 17 MG/DL (ref 5–25)
CALCIUM SERPL-MCNC: 8.6 MG/DL (ref 8.4–10.2)
CARDIAC TROPONIN I PNL SERPL HS: 4 NG/L
CARDIAC TROPONIN I PNL SERPL HS: 7 NG/L
CARDIAC TROPONIN I PNL SERPL HS: 7 NG/L
CHLORIDE SERPL-SCNC: 108 MMOL/L (ref 96–108)
CLARITY UR: CLEAR
CO2 SERPL-SCNC: 20 MMOL/L (ref 21–32)
COLOR UR: YELLOW
CREAT SERPL-MCNC: 0.68 MG/DL (ref 0.6–1.3)
EOSINOPHIL # BLD AUTO: 0.13 THOUSAND/ÂΜL (ref 0–0.61)
EOSINOPHIL NFR BLD AUTO: 2 % (ref 0–6)
ERYTHROCYTE [DISTWIDTH] IN BLOOD BY AUTOMATED COUNT: 14.6 % (ref 11.6–15.1)
FLUAV RNA RESP QL NAA+PROBE: NEGATIVE
FLUBV RNA RESP QL NAA+PROBE: NEGATIVE
GFR SERPL CREATININE-BSD FRML MDRD: 73 ML/MIN/1.73SQ M
GLUCOSE SERPL-MCNC: 107 MG/DL (ref 65–140)
GLUCOSE UR STRIP-MCNC: NEGATIVE MG/DL
HCT VFR BLD AUTO: 44.3 % (ref 34.8–46.1)
HGB BLD-MCNC: 14.1 G/DL (ref 11.5–15.4)
HGB UR QL STRIP.AUTO: NEGATIVE
HYALINE CASTS #/AREA URNS LPF: ABNORMAL /LPF
IMM GRANULOCYTES # BLD AUTO: 0.02 THOUSAND/UL (ref 0–0.2)
IMM GRANULOCYTES NFR BLD AUTO: 0 % (ref 0–2)
KETONES UR STRIP-MCNC: ABNORMAL MG/DL
LEUKOCYTE ESTERASE UR QL STRIP: ABNORMAL
LYMPHOCYTES # BLD AUTO: 2.17 THOUSANDS/ÂΜL (ref 0.6–4.47)
LYMPHOCYTES NFR BLD AUTO: 31 % (ref 14–44)
MAGNESIUM SERPL-MCNC: 2.1 MG/DL (ref 1.9–2.7)
MCH RBC QN AUTO: 29 PG (ref 26.8–34.3)
MCHC RBC AUTO-ENTMCNC: 31.8 G/DL (ref 31.4–37.4)
MCV RBC AUTO: 91 FL (ref 82–98)
MONOCYTES # BLD AUTO: 0.76 THOUSAND/ÂΜL (ref 0.17–1.22)
MONOCYTES NFR BLD AUTO: 11 % (ref 4–12)
MUCOUS THREADS UR QL AUTO: ABNORMAL
NEUTROPHILS # BLD AUTO: 3.93 THOUSANDS/ÂΜL (ref 1.85–7.62)
NEUTS SEG NFR BLD AUTO: 55 % (ref 43–75)
NITRITE UR QL STRIP: NEGATIVE
NON-SQ EPI CELLS URNS QL MICRO: ABNORMAL /HPF
NRBC BLD AUTO-RTO: 0 /100 WBCS
P AXIS: 96 DEGREES
PH UR STRIP.AUTO: 5 [PH]
PLATELET # BLD AUTO: 237 THOUSANDS/UL (ref 149–390)
PMV BLD AUTO: 10.8 FL (ref 8.9–12.7)
POTASSIUM SERPL-SCNC: 4.1 MMOL/L (ref 3.5–5.3)
POTASSIUM SERPL-SCNC: 5.5 MMOL/L (ref 3.5–5.3)
PROT SERPL-MCNC: 8.2 G/DL (ref 6.4–8.4)
PROT UR STRIP-MCNC: ABNORMAL MG/DL
QRS AXIS: -54 DEGREES
QRSD INTERVAL: 98 MS
QT INTERVAL: 446 MS
QTC INTERVAL: 463 MS
RBC # BLD AUTO: 4.87 MILLION/UL (ref 3.81–5.12)
RBC #/AREA URNS AUTO: ABNORMAL /HPF
RSV RNA RESP QL NAA+PROBE: NEGATIVE
SARS-COV-2 RNA RESP QL NAA+PROBE: NEGATIVE
SODIUM SERPL-SCNC: 137 MMOL/L (ref 135–147)
SP GR UR STRIP.AUTO: 1.02 (ref 1–1.03)
T WAVE AXIS: 33 DEGREES
TSH SERPL DL<=0.05 MIU/L-ACNC: 2.38 UIU/ML (ref 0.45–4.5)
UROBILINOGEN UR STRIP-ACNC: <2 MG/DL
VENTRICULAR RATE: 65 BPM
WBC # BLD AUTO: 7.05 THOUSAND/UL (ref 4.31–10.16)
WBC #/AREA URNS AUTO: ABNORMAL /HPF

## 2023-02-10 RX ORDER — AMLODIPINE BESYLATE 2.5 MG/1
2.5 TABLET ORAL 2 TIMES DAILY
Status: DISCONTINUED | OUTPATIENT
Start: 2023-02-10 | End: 2023-02-12 | Stop reason: HOSPADM

## 2023-02-10 RX ORDER — ACETAMINOPHEN 325 MG/1
650 TABLET ORAL EVERY 6 HOURS PRN
Status: DISCONTINUED | OUTPATIENT
Start: 2023-02-10 | End: 2023-02-12 | Stop reason: HOSPADM

## 2023-02-10 RX ORDER — LOPERAMIDE HYDROCHLORIDE 2 MG/1
2 CAPSULE ORAL 3 TIMES DAILY PRN
Status: DISCONTINUED | OUTPATIENT
Start: 2023-02-10 | End: 2023-02-12 | Stop reason: HOSPADM

## 2023-02-10 RX ORDER — ENOXAPARIN SODIUM 100 MG/ML
40 INJECTION SUBCUTANEOUS DAILY
Status: DISCONTINUED | OUTPATIENT
Start: 2023-02-10 | End: 2023-02-12 | Stop reason: HOSPADM

## 2023-02-10 RX ORDER — METOPROLOL SUCCINATE 25 MG/1
25 TABLET, EXTENDED RELEASE ORAL DAILY
Status: DISCONTINUED | OUTPATIENT
Start: 2023-02-10 | End: 2023-02-12 | Stop reason: HOSPADM

## 2023-02-10 RX ORDER — MULTIVIT-MIN/FERROUS GLUCONATE 9 MG/15 ML
15 LIQUID (ML) ORAL DAILY
Status: DISCONTINUED | OUTPATIENT
Start: 2023-02-10 | End: 2023-02-12 | Stop reason: HOSPADM

## 2023-02-10 RX ORDER — LEVOTHYROXINE SODIUM 0.15 MG/1
150 TABLET ORAL
Status: DISCONTINUED | OUTPATIENT
Start: 2023-02-11 | End: 2023-02-12 | Stop reason: HOSPADM

## 2023-02-10 RX ORDER — ESCITALOPRAM OXALATE 5 MG/1
5 TABLET ORAL DAILY
Status: DISCONTINUED | OUTPATIENT
Start: 2023-02-10 | End: 2023-02-10

## 2023-02-10 RX ORDER — AMLODIPINE BESYLATE 2.5 MG/1
2.5 TABLET ORAL ONCE
Status: COMPLETED | OUTPATIENT
Start: 2023-02-10 | End: 2023-02-10

## 2023-02-10 RX ORDER — HYDRALAZINE HYDROCHLORIDE 20 MG/ML
10 INJECTION INTRAMUSCULAR; INTRAVENOUS ONCE
Status: COMPLETED | OUTPATIENT
Start: 2023-02-10 | End: 2023-02-10

## 2023-02-10 RX ORDER — ESCITALOPRAM OXALATE 10 MG/1
5 TABLET ORAL DAILY
Status: DISCONTINUED | OUTPATIENT
Start: 2023-02-11 | End: 2023-02-12 | Stop reason: HOSPADM

## 2023-02-10 RX ORDER — FAMOTIDINE 20 MG/1
20 TABLET, FILM COATED ORAL DAILY PRN
Status: DISCONTINUED | OUTPATIENT
Start: 2023-02-10 | End: 2023-02-12 | Stop reason: HOSPADM

## 2023-02-10 RX ADMIN — AMLODIPINE BESYLATE 2.5 MG: 2.5 TABLET ORAL at 10:18

## 2023-02-10 RX ADMIN — MULTIVITAMIN 15 ML: LIQUID ORAL at 19:19

## 2023-02-10 RX ADMIN — HYDRALAZINE HYDROCHLORIDE 10 MG: 20 INJECTION INTRAMUSCULAR; INTRAVENOUS at 14:00

## 2023-02-10 RX ADMIN — AMLODIPINE BESYLATE 2.5 MG: 2.5 TABLET ORAL at 19:19

## 2023-02-10 RX ADMIN — METOPROLOL SUCCINATE 25 MG: 25 TABLET, EXTENDED RELEASE ORAL at 15:14

## 2023-02-10 RX ADMIN — SODIUM CHLORIDE 250 ML: 0.9 INJECTION, SOLUTION INTRAVENOUS at 09:50

## 2023-02-10 RX ADMIN — ENOXAPARIN SODIUM 40 MG: 40 INJECTION SUBCUTANEOUS at 15:14

## 2023-02-10 NOTE — ASSESSMENT & PLAN NOTE
Patient has history of hypertension managed with amlodipine 2 5 mg twice daily and Toprol 25 mg daily  Patient is hypertensive on examination 188/88, asymptomatic  Denies headache, dizziness, lightheadedness, chest pain, palpitation, dysuria  Patient received amlodipine 2 5 mg in ED several hours prior to exam    · Continue with hydralazine 10 mg IV as needed if systolic blood pressure >100  · Continue with amlodipine 2 5 mg twice daily  · Continue with Toprol 25 mg daily

## 2023-02-10 NOTE — ED NOTES
Offered patient help with ambulation but patient declined, patient also does not wish to have food at this time     Shira Brannon  02/10/23 [id number]

## 2023-02-10 NOTE — ASSESSMENT & PLAN NOTE
Wt Readings from Last 3 Encounters:   10/20/22 69 4 kg (153 lb)   09/30/22 68 9 kg (152 lb)   05/27/22 68 5 kg (151 lb)   History CHF, presenting, not in exacerbation  Recent echo done 7/12/2021: Ejection fraction 20%, diastolic dysfunction grade 2, no wall motion abnormalities  · Continue with Toprol XL 25 mg every 24

## 2023-02-10 NOTE — ASSESSMENT & PLAN NOTE
Pt's states that she is more confused than usual   But on PE pt is AOx3  Possibly due to dehydration vs electrolytes disarrangement vs infection vs aging and deconditioning  · Monitor mental status  · Monitor electrolytes  · Rehydration

## 2023-02-10 NOTE — ED PROVIDER NOTES
History  Chief Complaint   Patient presents with   • Weakness - Generalized     Pt c/o generalized weakness and diarrhea x3 days     80year-old female patient who lives in a senior living apartment  For the last 3 days she has had diarrhea that is nonbloody  According to the daughter who is helping her history there is several people in her complex that have the same  She had decreased intake for the last 24 to 48 hours  Denies any abdominal pain  Daughter also noted that she is a little confused from baseline  However she passed her Mini-Mental exam and she is alert and oriented x3  There is been no fever chills headache blurred vision double vision cough extensive sore throat no chest pain or shortness of breath no nausea or vomiting just loss of appetite  Denies any urinary symptoms  Nothing makes it better or worse there is been no new medications differential diagnosis includes not limited to viral gastroenteritis, bacterial gastroenteritis, colitis, diverticulitis, increased confusion secondary dehydration, electrolyte abnormality, UTI she is nontoxic in no acute distress          Prior to Admission Medications   Prescriptions Last Dose Informant Patient Reported? Taking? Carboxymethylcellul-Glycerin 0 5-0 9 % SOLN   Yes No   Sig: Apply to eye Drops to b/l eyes   Cholecalciferol (VITAMIN D-3) 1000 UNITS CAPS   Yes No   Sig: Take 2,000 Units by mouth daily     Multiple Vitamins-Minerals (CENTRUM SILVER PO)   Yes No   Sig: Take 1 tablet by mouth daily     Multiple Vitamins-Minerals (PRESERVISION AREDS 2) CAPS   No No   Sig: Take 1 capsule by mouth daily   amLODIPine (NORVASC) 2 5 mg tablet   No No   Sig: Take 1 tablet (2 5 mg total) by mouth 2 (two) times a day   benzonatate (TESSALON PERLES) 100 mg capsule   No No   Sig: Take 1 capsule (100 mg total) by mouth 3 (three) times a day as needed for cough   clobetasol (TEMOVATE) 0 05 % cream   No No   Sig: Apply topically 3 (three) times a week   Patient not taking: No sig reported   cyanocobalamin (VITAMIN B-12) 1,000 mcg tablet   No No   Sig: Take 2 days a week   escitalopram (LEXAPRO) 5 mg tablet   No No   Sig: Take 1 tablet (5 mg total) by mouth daily   famotidine (PEPCID) 20 mg tablet   No No   Sig: Take 1 tablet (20 mg total) by mouth daily as needed for heartburn   fluticasone (FLONASE) 50 mcg/act nasal spray   No No   Si spray into each nostril daily   levothyroxine (Synthroid) 150 mcg tablet   No No   Sig: Take 1 tab PO 5 days a week   metoprolol succinate (Toprol XL) 25 mg 24 hr tablet   No No   Sig: TAKE ONE-HALF TABLET BY MOUTH EVERY DAY      Facility-Administered Medications: None       Past Medical History:   Diagnosis Date   • AAA (abdominal aortic aneurysm)    • Acute medial meniscus tear    • Aspiration pneumonia (HCC)     LAST ASSESSED: 14   • Breast CA ([address] )     left   • Chest pain     RESOLVED: 17   • CTS (carpal tunnel syndrome)    • Depression    • Dermatitis     LAST ASSESSED: 13   • Disease of thyroid gland    • Fainting     LAST ASSESSED: 3/15/13   • GERD (gastroesophageal reflux disease)    • H  pylori infection 3/17/2009   • Hypertension    • Incomplete defecation     LAST ASSESSED: 13   • Macular degeneration    • Osteoporosis    • Pneumonia    • Vertigo        Past Surgical History:   Procedure Laterality Date   • APPENDECTOMY     • CHOLECYSTECTOMY     • COLONOSCOPY     • MASTECTOMY Left 2017    SIMPLE   • CT INTRAOP SENTINEL LYMPH NODE ID W/DYE INJECTION Left 2017    Procedure: INTRAOPERATIVE LYMPHATIC MAPPING; BLUE DYE ONLY; [facility] 9938 LYMPH NODE BIOPSY;  Surgeon: Ethan Gómez MD;  Location: [facility];  Service: Surgical Oncology   • CT MASTECTOMY SIMPLE COMPLETE Left 2017    Procedure: BREAST MASTECTOMY;  Surgeon: Ethan Gómez MD;  Location: [facility];  Service: Surgical Oncology   • SENTINEL LYMPH NODE BIOPSY     • US GUIDED BREAST BIOPSY LEFT COMPLETE Left 2017       Family History   Problem Relation Age of Onset   • Angina Mother         PECTORIS   • Alcohol abuse Neg Hx    • Depression Neg Hx    • Drug abuse Neg Hx    • Substance Abuse Neg Hx      I have reviewed and agree with the history as documented  E-Cigarette/Vaping     E-Cigarette/Vaping Substances     Social History     Tobacco Use   • Smoking status: Never   • Smokeless tobacco: Never   Substance Use Topics   • Alcohol use: No   • Drug use: No       Review of Systems   Constitutional: Negative for chills, diaphoresis, fatigue and fever  HENT: Negative for congestion, ear pain, nosebleeds and sore throat  Eyes: Negative for photophobia, pain, discharge and visual disturbance  Respiratory: Negative for cough, choking, chest tightness, shortness of breath and wheezing  Cardiovascular: Negative for chest pain and palpitations  Gastrointestinal: Positive for diarrhea  Negative for abdominal distention, abdominal pain, anal bleeding, blood in stool, constipation, nausea, rectal pain and vomiting  Genitourinary: Negative for dysuria, flank pain, frequency and hematuria  Musculoskeletal: Negative for arthralgias, back pain, gait problem and joint swelling  Skin: Negative for color change and rash  Neurological: Negative for dizziness, tremors, seizures, syncope, facial asymmetry, speech difficulty, weakness, light-headedness, numbness and headaches  Psychiatric/Behavioral: Positive for confusion  Negative for behavioral problems, decreased concentration and dysphoric mood  The patient is not nervous/anxious  All other systems reviewed and are negative  Physical Exam  Physical Exam  Vitals and nursing note reviewed  Constitutional:       General: She is not in acute distress  Appearance: Normal appearance  She is not ill-appearing, toxic-appearing or diaphoretic  HENT:      Head: Normocephalic and atraumatic        Right Ear: Tympanic membrane, ear canal and external ear normal       Left Ear: Tympanic membrane, ear canal and external ear normal       Nose: Nose normal  No congestion or rhinorrhea  Mouth/Throat:      Mouth: Mucous membranes are moist       Pharynx: Oropharynx is clear  No oropharyngeal exudate or posterior oropharyngeal erythema  Eyes:      Extraocular Movements: Extraocular movements intact  Conjunctiva/sclera: Conjunctivae normal       Pupils: Pupils are equal, round, and reactive to light  Cardiovascular:      Rate and Rhythm: Normal rate and regular rhythm  Pulmonary:      Effort: Pulmonary effort is normal  No respiratory distress  Breath sounds: Normal breath sounds  Abdominal:      General: Bowel sounds are normal       Palpations: Abdomen is soft  Tenderness: There is no abdominal tenderness  Musculoskeletal:         General: Normal range of motion  Cervical back: Normal range of motion and neck supple  No rigidity or tenderness  Right lower leg: No edema  Left lower leg: No edema  Lymphadenopathy:      Cervical: No cervical adenopathy  Skin:     General: Skin is warm and dry  Capillary Refill: Capillary refill takes less than 2 seconds  Findings: No rash  Neurological:      General: No focal deficit present  Mental Status: She is alert and oriented to person, place, and time  Mental status is at baseline  Cranial Nerves: No cranial nerve deficit  Sensory: No sensory deficit  Motor: No weakness        Coordination: Coordination normal       Gait: Gait normal       Deep Tendon Reflexes: Reflexes normal    Psychiatric:         Mood and Affect: Mood normal          Behavior: Behavior normal          Vital Signs  ED Triage Vitals   Temperature Pulse Respirations Blood Pressure SpO2   02/10/23 0904 02/10/23 0904 02/10/23 0904 02/10/23 0904 02/10/23 0904   97 5 °F (36 4 °C) 68 16 (!) 200/93 96 %      Temp Source Heart Rate Source Patient Position - Orthostatic VS BP Location FiO2 (%)   02/10/23 0904 02/10/23 1296 02/10/23 0904 02/10/23 0904 --   Oral Monitor Sitting Right arm       Pain Score       02/10/23 1100       No Pain           Vitals:    02/10/23 1015 02/10/23 1030 02/10/23 1100 02/10/23 1130   BP: (!) 194/86 (!) 173/83 (!) 177/79 (!) 175/78   Pulse: 67 65 64 63   Patient Position - Orthostatic VS: Sitting Sitting Lying Lying         Visual Acuity      ED Medications  Medications   sodium chloride 0 9 % bolus 250 mL (0 mL Intravenous Stopped 2/10/23 1041)   amLODIPine (NORVASC) tablet 2 5 mg (2 5 mg Oral Given 2/10/23 1018)       Diagnostic Studies  Results Reviewed     Procedure Component Value Units Date/Time    Potassium [[id number]] Collected: 02/10/23 1201    Lab Status: No result Specimen: Blood from Arm, Right Updated: 02/10/23 1201    HS Troponin I 2hr [[id number]] Collected: 02/10/23 1201    Lab Status: No result Specimen: Blood from Arm, Right     TSH [[id number]] Collected: 02/10/23 0950    Lab Status:  In process Specimen: Blood from Arm, Right Updated: 02/10/23 1153    HS Troponin I 4hr [[id number]]     Lab Status: No result Specimen: Blood     HS Troponin 0hr (reflex protocol) [[id number]]  (Normal) Collected: 02/10/23 0950    Lab Status: Final result Specimen: Blood from Arm, Right Updated: 02/10/23 1050     hs TnI 0hr 7 ng/L     Comprehensive metabolic panel [[id number]]  (Abnormal) Collected: 02/10/23 0950    Lab Status: Final result Specimen: Blood from Arm, Right Updated: 02/10/23 1044     Sodium 137 mmol/L      Potassium 5 5 mmol/L      Chloride 108 mmol/L      CO2 20 mmol/L      ANION GAP 9 mmol/L      BUN 17 mg/dL      Creatinine 0 68 mg/dL      Glucose 107 mg/dL      Calcium 8 6 mg/dL      AST 47 U/L      ALT 19 U/L      Alkaline Phosphatase 61 U/L      Total Protein 8 2 g/dL      Albumin 4 1 g/dL      Total Bilirubin 0 63 mg/dL      eGFR 73 ml/min/1 73sq m     Narrative:      [place] guidelines for Chronic Kidney Disease (CKD):   •  Stage 1 with normal or high GFR (GFR > 90 mL/min/1 73 square meters)  •  Stage 2 Mild CKD (GFR = 60-89 mL/min/1 73 square meters)  •  Stage 3A Moderate CKD (GFR = 45-59 mL/min/1 73 square meters)  •  Stage 3B Moderate CKD (GFR = 30-44 mL/min/1 73 square meters)  •  Stage 4 Severe CKD (GFR = 15-29 mL/min/1 73 square meters)  •  Stage 5 End Stage CKD (GFR <15 mL/min/1 73 square meters)  Note: GFR calculation is accurate only with a steady state creatinine    Magnesium [[id number]]  (Normal) Collected: 02/10/23 0950    Lab Status: Final result Specimen: Blood from Arm, Right Updated: 02/10/23 1044     Magnesium 2 1 mg/dL     CBC and differential [[id number]] Collected: 02/10/23 0950    Lab Status: Final result Specimen: Blood from Arm, Right Updated: 02/10/23 0958     WBC 7 05 Thousand/uL      RBC 4 87 Million/uL      Hemoglobin 14 1 g/dL      Hematocrit 44 3 %      MCV 91 fL      MCH 29 0 pg      MCHC 31 8 g/dL      RDW 14 6 %      MPV 10 8 fL      Platelets 768 Thousands/uL      nRBC 0 /100 WBCs      Neutrophils Relative 55 %      Immat GRANS % 0 %      Lymphocytes Relative 31 %      Monocytes Relative 11 %      Eosinophils Relative 2 %      Basophils Relative 1 %      Neutrophils Absolute 3 93 Thousands/µL      Immature Grans Absolute 0 02 Thousand/uL      Lymphocytes Absolute 2 17 Thousands/µL      Monocytes Absolute 0 76 Thousand/µL      Eosinophils Absolute 0 13 Thousand/µL      Basophils Absolute 0 04 Thousands/µL     UA w Reflex to Microscopic w Reflex to Culture [[id number]]     Lab Status: No result Specimen: Urine     Stool Enteric Bacterial Panel by PCR [[id number]]     Lab Status: No result Specimen: Stool from Rectum     Clostridium difficile toxin by PCR with EIA [[id number]]     Lab Status: No result Specimen: Stool from Per Rectum                  CT abdomen pelvis wo contrast   Final Result by Zoltan Sandoval MD (02/10 1106)      Findings suggesting mild enterocolitis  No bowel obstruction  Moderate to large hiatal hernia  Workstation performed: [id number]                    Procedures  Procedures         ED Course  ED Course as of 02/10/23 1206   Fri Feb 10, 2023   1141 Initial EKG interpreted by me 6 5 bpm atrial flutter appears 4-1 left axis deviation QTc 463 it is different than previous the patient does have a history of paroxysmal A-fib and flutter in the past   Currently taking metoprolol  SBIRT 20yo+    Flowsheet Row Most Recent Value   SBIRT (25 yo +)    In order to provide better care to our patients, we are screening all of our patients for alcohol and drug use  Would it be okay to ask you these screening questions? Yes Filed at: 02/10/2023 0881   Initial Alcohol Screen: US AUDIT-C     1  How often do you have a drink containing alcohol? 0 Filed at: 02/10/2023 0916   2  How many drinks containing alcohol do you have on a typical day you are drinking? 0 Filed at: 02/10/2023 0916   3a  Male UNDER 65: How often do you have five or more drinks on one occasion? 0 Filed at: 02/10/2023 0916   3b  FEMALE Any Age, or MALE 65+: How often do you have 4 or more drinks on one occassion? 0 Filed at: 02/10/2023 0916   Audit-C Score 0 Filed at: 02/10/2023 1939   KIRIT: How many times in the past year have you    Used an illegal drug or used a prescription medication for non-medical reasons? Never Filed at: 02/10/2023 1060                    Medical Decision Making  80-year-old female patient lives in a senior apartment complex  Is brought in by her daughter because she has had diarrhea x3 days and feels weak  Daughter states that she has some change in mental status however during this visit she is alert and oriented x3 nontoxic in no acute distress  Denies any abdominal pain no fever chills or headache no cough chills no sore throat no chest pain or shortness of breath no urinary symptoms    Differential diagnosis includes not limited to viral diarrhea, bacterial diarrhea, colitis infectious versus inflammatory, diverticulitis, UTI    Change in mental status: acute illness or injury     Details: Noted by daughter  Not noted by this provider  A coronary daughter seem to clear after fluids  Patient will be admitted for observation  Diarrhea: acute illness or injury     Details: Stool cultures ordered still pending patient be admitted for fluids based on story and lab work does not appear bacterially infectious  Enterocolitis: acute illness or injury     Details: Found on CT will be treated by symptomology no antibiotics needed at this time  Weakness: acute illness or injury     Details: Most likely secondary to diarrhea and decreased p o  intake EKG did not reveal ST elevation first troponin was not significantly elevated  Patient may also have UTI urinalysis is pending she is unable to give urine at this time  Amount and/or Complexity of Data Reviewed  Independent Historian:      Details: Spoke with son who is a physician and daughter who is bedside who helped with previous history and current history of present illness  External Data Reviewed: labs and notes  Details: Previous notes and labs reviewed to help obtain further history and medical status  Labs: ordered  Decision-making details documented in ED Course  Details: Patient had elevated potassium at 5 5 however it was hemolyzed and repeat was drawn pending  Radiology: ordered  Decision-making details documented in ED Course  Details: Reviewed CT positive for enterocolitis patient be admitted for fluids further treatment and evaluation  ECG/medicine tests: ordered and independent interpretation performed  Decision-making details documented in ED Course  Details: I reviewed the EKG  Discussion of management or test interpretation with external provider(s): Patient presents with diarrhea diagnosis enterocolitis, urine pending, fluids administered patient feeling better very much alert will be admitted for fluids further care    I did speak with the admitting service physician regarding this admission  Risk  Prescription drug management  Decision regarding hospitalization  Disposition  Final diagnoses:   Enterocolitis   Diarrhea   Weakness   Change in mental status - Subjective patient is alert and oriented     Time reflects when diagnosis was documented in both MDM as applicable and the Disposition within this note     Time User Action Codes Description Comment    2/10/2023 11:59 AM DiNapoli, Polo Add [K52 9] Enterocolitis     2/10/2023 11:59 AM DiNapoli, Polo Add [R19 7] Diarrhea     2/10/2023 11:59 AM DiNapoli, Polo Add [R53 1] Weakness     2/10/2023 11:59 AM DiNapoli, Polo Add [R41 82] Change in mental status     2/10/2023 12:00 PM DiNapoli, Polo Modify [R41 82] Change in mental status Subjective patient is alert and oriented      ED Disposition     ED Disposition   Admit    Condition   Stable    Date/Time   Fri Feb 10, 2023 11:59 AM    Comment   Case was discussed with Dr Krystin Portillo and the patient's admission status was agreed to be Admission Status: observation status to the service of Dr Krystin Portillo              Follow-up Information    None         Current Discharge Medication List      CONTINUE these medications which have NOT CHANGED    Details   amLODIPine (NORVASC) 2 5 mg tablet Take 1 tablet (2 5 mg total) by mouth 2 (two) times a day  Qty: 180 tablet, Refills: 1    Associated Diagnoses: Essential hypertension      benzonatate (TESSALON PERLES) 100 mg capsule Take 1 capsule (100 mg total) by mouth 3 (three) times a day as needed for cough  Qty: 30 capsule, Refills: 0    Associated Diagnoses: Acute cough      Carboxymethylcellul-Glycerin 0 5-0 9 % SOLN Apply to eye Drops to b/l eyes      Cholecalciferol (VITAMIN D-3) 1000 UNITS CAPS Take 2,000 Units by mouth daily        clobetasol (TEMOVATE) 0 05 % cream Apply topically 3 (three) times a week  Qty: 30 g, Refills: 0    Associated Diagnoses: Interstitial cystitis cyanocobalamin (VITAMIN B-12) 1,000 mcg tablet Take 2 days a week  Qty: 30 tablet, Refills: 0    Associated Diagnoses: Vitamin B12 deficiency      escitalopram (LEXAPRO) 5 mg tablet Take 1 tablet (5 mg total) by mouth daily  Qty: 90 tablet, Refills: 1    Associated Diagnoses: Anxiety      famotidine (PEPCID) 20 mg tablet Take 1 tablet (20 mg total) by mouth daily as needed for heartburn  Qty: 90 tablet, Refills: 0    Associated Diagnoses: Gastroesophageal reflux disease, unspecified whether esophagitis present      fluticasone (FLONASE) 50 mcg/act nasal spray 1 spray into each nostril daily  Qty: 48 g, Refills: 1    Associated Diagnoses: Seasonal allergic rhinitis due to pollen      levothyroxine (Synthroid) 150 mcg tablet Take 1 tab PO 5 days a week  Qty: 80 tablet, Refills: 1    Associated Diagnoses: Acquired hypothyroidism      metoprolol succinate (Toprol XL) 25 mg 24 hr tablet TAKE ONE-HALF TABLET BY MOUTH EVERY DAY  Qty: 45 tablet, Refills: 1    Associated Diagnoses: Syncope, unspecified syncope type      Multiple Vitamins-Minerals (CENTRUM SILVER PO) Take 1 tablet by mouth daily  Multiple Vitamins-Minerals (PRESERVISION AREDS 2) CAPS Take 1 capsule by mouth daily  Qty: 90 capsule, Refills: 0    Associated Diagnoses: Malignant neoplasm of left breast in female, estrogen receptor negative, unspecified site of breast ([facility] [address] )             No discharge procedures on file      PDMP Review     None          ED Provider  Electronically Signed by           Flor Duffy PA-C  02/10/23 8860

## 2023-02-10 NOTE — ED NOTES
Pt eating chicken broth, vanilla pudding and water  Refuses meal tray        Mateo Rodrigues RN  02/10/23 1629

## 2023-02-10 NOTE — ED NOTES
Patient transported to [address], receiving PCA at bedside upon arrival and receiving RN notified     Stacie Kilgore  02/10/23 4557
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
## 2023-02-10 NOTE — ASSESSMENT & PLAN NOTE
Patient presents with 4 days of watery diarrhea (about 5 episodes a day) associated with mild diffuse abdominal pain  Patient resides in an elderly community, states that several residents had the same episode  Patient denies fever, chills, blood in stool, with dark stool    No significant findings in labs, CT abdomen pelvis revealed mild enterocolitis  Most likely viral gastroenteritis vs C  Diff vs low suspicious for bacterial gastroenteritis    · Follow-up stool enteric panel PCR  · Follow C   Diff  · Keep C  difficile contact as a lesion until clarification of diagnosis  · Follow COVID/flu/RSV  · Follow UA  · Patient looks somewhat dry on examination, will hold IV hydration for now due to high blood pressure  · Encourage oral fluid intake  · Advance diet as patient tolerating  · Imodium prn

## 2023-02-11 LAB
ANION GAP SERPL CALCULATED.3IONS-SCNC: 8 MMOL/L (ref 4–13)
BASOPHILS # BLD AUTO: 0.03 THOUSANDS/ÂΜL (ref 0–0.1)
BASOPHILS NFR BLD AUTO: 0 % (ref 0–1)
BUN SERPL-MCNC: 14 MG/DL (ref 5–25)
CALCIUM SERPL-MCNC: 8.4 MG/DL (ref 8.4–10.2)
CHLORIDE SERPL-SCNC: 110 MMOL/L (ref 96–108)
CO2 SERPL-SCNC: 21 MMOL/L (ref 21–32)
CREAT SERPL-MCNC: 0.64 MG/DL (ref 0.6–1.3)
EOSINOPHIL # BLD AUTO: 0.12 THOUSAND/ÂΜL (ref 0–0.61)
EOSINOPHIL NFR BLD AUTO: 2 % (ref 0–6)
ERYTHROCYTE [DISTWIDTH] IN BLOOD BY AUTOMATED COUNT: 14.5 % (ref 11.6–15.1)
GFR SERPL CREATININE-BSD FRML MDRD: 75 ML/MIN/1.73SQ M
GLUCOSE P FAST SERPL-MCNC: 98 MG/DL (ref 65–99)
GLUCOSE SERPL-MCNC: 98 MG/DL (ref 65–140)
HCT VFR BLD AUTO: 39 % (ref 34.8–46.1)
HGB BLD-MCNC: 12.9 G/DL (ref 11.5–15.4)
IMM GRANULOCYTES # BLD AUTO: 0.02 THOUSAND/UL (ref 0–0.2)
IMM GRANULOCYTES NFR BLD AUTO: 0 % (ref 0–2)
LYMPHOCYTES # BLD AUTO: 3.01 THOUSANDS/ÂΜL (ref 0.6–4.47)
LYMPHOCYTES NFR BLD AUTO: 42 % (ref 14–44)
MCH RBC QN AUTO: 29.3 PG (ref 26.8–34.3)
MCHC RBC AUTO-ENTMCNC: 33.1 G/DL (ref 31.4–37.4)
MCV RBC AUTO: 88 FL (ref 82–98)
MONOCYTES # BLD AUTO: 0.77 THOUSAND/ÂΜL (ref 0.17–1.22)
MONOCYTES NFR BLD AUTO: 11 % (ref 4–12)
NEUTROPHILS # BLD AUTO: 3.21 THOUSANDS/ÂΜL (ref 1.85–7.62)
NEUTS SEG NFR BLD AUTO: 45 % (ref 43–75)
NRBC BLD AUTO-RTO: 0 /100 WBCS
PLATELET # BLD AUTO: 235 THOUSANDS/UL (ref 149–390)
PMV BLD AUTO: 10.4 FL (ref 8.9–12.7)
POTASSIUM SERPL-SCNC: 3.8 MMOL/L (ref 3.5–5.3)
RBC # BLD AUTO: 4.41 MILLION/UL (ref 3.81–5.12)
SODIUM SERPL-SCNC: 139 MMOL/L (ref 135–147)
WBC # BLD AUTO: 7.16 THOUSAND/UL (ref 4.31–10.16)

## 2023-02-11 RX ADMIN — ESCITALOPRAM OXALATE 5 MG: 10 TABLET ORAL at 20:13

## 2023-02-11 RX ADMIN — MULTIVITAMIN 15 ML: LIQUID ORAL at 09:46

## 2023-02-11 RX ADMIN — ENOXAPARIN SODIUM 40 MG: 40 INJECTION SUBCUTANEOUS at 09:25

## 2023-02-11 RX ADMIN — AMLODIPINE BESYLATE 2.5 MG: 2.5 TABLET ORAL at 17:04

## 2023-02-11 RX ADMIN — METOPROLOL SUCCINATE 25 MG: 25 TABLET, EXTENDED RELEASE ORAL at 09:25

## 2023-02-11 RX ADMIN — AMLODIPINE BESYLATE 2.5 MG: 2.5 TABLET ORAL at 09:25

## 2023-02-11 NOTE — PROGRESS NOTES
[facility]  Progress Note Kvngmikaylae Safe 7/17/1925, 80 y o  female MRN: [id number]  Unit/Bed#: W -01 Encounter: [id number]  Primary Care Provider: Whit Lo MD   Date and time admitted to hospital: 2/10/2023  9:01 AM    Chronic diastolic heart failure [facility])  Assessment & Plan  Wt Readings from Last 3 Encounters:   02/10/23 70 kg (154 lb 5 2 oz)   10/20/22 69 4 kg (153 lb)   09/30/22 68 9 kg (152 lb)       · Appears at baseline weight, and exacerbation  · Continue home Toprol-XL      Acquired hypothyroidism  Assessment & Plan  Continue home levothyroxine 150 mcg daily    Paroxysmal atrial fibrillation (HCC)  Assessment & Plan  Continue Toprol XL  Not on anticoagulation    Essential hypertension  Assessment & Plan  · Patient did present with elevated blood pressures into the 135T systolic, asymptomatic  · Decreased with treatment with as needed hydralazine and resumption of home medications  · Likely missed medication dosages in the setting of decreased oral intake    Plan:  Continue all home antihypertensive medications (zotepine 2 5 twice daily and Toprol-XL 25 mg daily  /102      * Diarrhea  Assessment & Plan  · Reports 5 days of liquid diarrhea with 3 bowel movements per day  · No abdominal pain  · No blood  · No further episodes of diarrhea since admission to the hospital  · Because of this, no stool enteric panel/C  difficile collected  · Also no further BM   · Reports sick contacts at sending facility  · Received only 250 mL bolus given history CHF  · Hemodynamically stable, other than hypertension on admission  · Likely viral gastroenteritis    Plan:  · DC home tomorrow after discussion with family  VTE Pharmacologic Prophylaxis: VTE Score: 6 Moderate Risk (Score 3-4) - Pharmacological DVT Prophylaxis Ordered: heparin  Patient Centered Rounds: I performed bedside rounds with nursing staff today    Discussions with Specialists or Other Care Team Provider: Discussed with   Nursing  Education and Discussions with Family / Patient: Updated  (daughter) at bedside  Also called son  Time Spent for Care: 30 minutes  More than 50% of total time spent on counseling and coordination of care as described above  Current Length of Stay: 0 day(s)  Current Patient Status: Observation   Certification Statement: monitor patinet overnight to ensure tolreating diet and no further diarrhea  Discharge Plan: Anticipate discharge tomorrow to home  Code Status: Level 3 - DNAR and DNI    Subjective:   Patient seen and examined   Feeling "okay"    Objective:     Vitals:   Temp (24hrs), Av °F (36 7 °C), Min:97 7 °F (36 5 °C), Max:98 4 °F (36 9 °C)    Temp:  [97 7 °F (36 5 °C)-98 4 °F (36 9 °C)] 97 9 °F (36 6 °C)  HR:  [] 80  Resp:  [16-20] 20  BP: (123-156)/() 156/102  SpO2:  [95 %-96 %] 95 %  Body mass index is 29 16 kg/m²  Input and Output Summary (last 24 hours): Intake/Output Summary (Last 24 hours) at 2023 1659  Last data filed at 2023 1301  Gross per 24 hour   Intake 480 ml   Output 600 ml   Net -120 ml       Physical Exam:   Physical Exam  Constitutional:       General: She is not in acute distress  Appearance: She is not ill-appearing, toxic-appearing or diaphoretic  HENT:      Head: Normocephalic  Eyes:      Pupils: Pupils are equal, round, and reactive to light  Cardiovascular:      Rate and Rhythm: Normal rate  Heart sounds: No murmur heard  No friction rub  No gallop  Pulmonary:      Effort: Pulmonary effort is normal  No respiratory distress  Breath sounds: No stridor  No wheezing, rhonchi or rales  Chest:      Chest wall: No tenderness  Abdominal:      General: Abdomen is flat  There is no distension  Palpations: There is no mass  Tenderness: There is no abdominal tenderness  There is no right CVA tenderness, left CVA tenderness, guarding or rebound        Hernia: No hernia is present  Skin:     Capillary Refill: Capillary refill takes less than 2 seconds  Coloration: Skin is pale  Skin is not jaundiced  Findings: No bruising, erythema, lesion or rash  Neurological:      General: No focal deficit present  Mental Status: She is alert  Cranial Nerves: No cranial nerve deficit  Sensory: No sensory deficit  Motor: No weakness  Coordination: Coordination normal       Gait: Gait normal       Deep Tendon Reflexes: Reflexes normal    Psychiatric:         Mood and Affect: Mood normal           Additional Data:     Labs:  Results from last 7 days   Lab Units 02/11/23  0438   WBC Thousand/uL 7 16   HEMOGLOBIN g/dL 12 9   HEMATOCRIT % 39 0   PLATELETS Thousands/uL 235   NEUTROS PCT % 45   LYMPHS PCT % 42   MONOS PCT % 11   EOS PCT % 2     Results from last 7 days   Lab Units 02/11/23  0438 02/10/23  1201 02/10/23  0950   SODIUM mmol/L 139  --  137   POTASSIUM mmol/L 3 8   < > 5 5*   CHLORIDE mmol/L 110*  --  108   CO2 mmol/L 21  --  20*   BUN mg/dL 14  --  17   CREATININE mg/dL 0 64  --  0 68   ANION GAP mmol/L 8  --  9   CALCIUM mg/dL 8 4  --  8 6   ALBUMIN g/dL  --   --  4 1   TOTAL BILIRUBIN mg/dL  --   --  0 63   ALK PHOS U/L  --   --  61   ALT U/L  --   --  19   AST U/L  --   --  47*   GLUCOSE RANDOM mg/dL 98  --  107    < > = values in this interval not displayed  Lines/Drains:  Invasive Devices     Peripheral Intravenous Line  Duration           Peripheral IV 02/10/23 Right Forearm 1 day                      Imaging: No pertinent imaging reviewed      Recent Cultures (last 7 days):         Last 24 Hours Medication List:   Current Facility-Administered Medications   Medication Dose Route Frequency Provider Last Rate   • acetaminophen  650 mg Oral Q6H PRN Daja Capellan MD     • amLODIPine  2 5 mg Oral BID Daja Capellan MD     • enoxaparin  40 mg Subcutaneous Daily Daja Capellan MD     • escitalopram  5 mg Oral Daily Sy Mcmahan MD     • famotidine  20 mg Oral Daily PRN Sy Mcmahan MD     • glycerin-hypromellose-  1 drop Both Eyes Q6H PRN Sy Mcmahan MD     • levothyroxine  150 mcg Oral Early Morning Sy Mcmahan MD     • loperamide  2 mg Oral TID PRN Sy Mcmahan MD     • metoprolol succinate  25 mg Oral Daily Sy Mcmahan MD     • multivitamin with iron-minerals  15 mL Oral Daily Sy Mcmahan MD          Today, Patient Was Seen By: Aline Sloan MD    **Please Note: This note may have been constructed using a voice recognition system  **

## 2023-02-11 NOTE — CASE MANAGEMENT
Case Management Assessment & Discharge Planning Note    Patient name Markel Diamond  Location W [place] 26/W -74 MRN [id number]  : 1925 Date 2023       Current Admission Date: 2/10/2023  Current Admission [facility]   Patient Active Problem List    Diagnosis Date Noted   • Diarrhea 02/10/2023   • Hyperkalemia 02/10/2023   • Confusion 02/10/2023   • Primary osteoarthritis of left knee 2022   • Seasonal allergic rhinitis due to pollen 2022   • Syncope 2021   • Leukocytosis 2021   • Interstitial cystitis 2021   • Anxiety 2021   • Atrial flutter ([address] ) 2021   • Vitamin B12 deficiency 09/10/2019   • Localized edema 09/10/2019   • Stage 2 chronic kidney disease 09/10/2019   • Hiatal hernia 2019   • Chronic diastolic heart failure ([address] ) 2019   • Ambulatory dysfunction 2019   • Encounter for follow-up examination after completed treatment for malignant neoplasm 2019   • Hemorrhoids 2018   • History of left breast cancer 2018   • S/P left mastectomy 2017   • Paroxysmal atrial fibrillation ([address] ) 2017   • Osteoporosis    • Essential hypertension    • GERD (gastroesophageal reflux disease)    • Constipation 2016   • Macular degeneration 2015   • Aneurysm of ascending aorta 2014   • First degree AV block 03/15/2013   • Benign paroxysmal vertigo 2013   • Vitamin D deficiency 10/01/2012   • Dyslipidemia 2012   • Acquired hypothyroidism 2012   • Vertigo 2012   • H  pylori infection 2009   • Advance directive in chart 2005      LOS (days): 0  Geometric Mean LOS (GMLOS) (days):   Days to GMLOS:     OBJECTIVE:              Current admission status: Observation       Preferred Pharmacy:   [id number] - [address] [facility]  [address] [address]  Phone: [phone] Fax: [phone] Jennifer Lopez [address]  Phone: [phone] Fax: [phone] [facility], [address] - [address] [facility] [address]  [address] [facility] [address]  [address] [facility],[address]  Phone: [phone] Fax: [phone]    Primary Care Provider: Emeli Loja MD    Primary Insurance: MEDICARE  Secondary Insurance:     ASSESSMENT:  Perla Bar Proxies    There are no active Health Care Proxies on file  Readmission Root Cause  30 Day Readmission: No    Patient Information  Admitted from[de-identified] Home  Mental Status: Alert  Assessment information provided by[de-identified] Patient, Daughter  Primary Caregiver: Self  Support Systems: Daughter, Son  [place] of Residence: [address] [place] [address] do you live in?: [facility] entry access options   Select all that apply : No steps to enter home  Type of Current Residence: Apartment ([address] [facility])  Upon entering residence, is there a bedroom on the main floor (no further steps)?: Yes  Upon entering residence, is there a bathroom on the main floor (no further steps)?: Yes  In the last 12 months, was there a time when you were not able to pay the mortgage or rent on time?: No  In the last 12 months, how many places have you lived?: 1  In the last 12 months, was there a time when you did not have a steady place to sleep or slept in a shelter (including now)?: No  Homeless/housing insecurity resource given?: N/A  Living Arrangements: Lives Alone  Is patient a ?: No    Activities of Daily Living Prior to Admission  Functional Status: Independent  Ambulates independently?: Yes  Does patient use assisted devices?: Yes  Assisted Devices (DME) used: Rollator, Walker  Does patient currently own DME?: Yes  What DME does the patient currently own?: Diana Beaver  Does patient have a history of Outpatient Therapy (PT/OT)?: No  Does the patient have a history of Short-Term Rehab?: No  Does patient have a history of HHC?: Yes  Does patient currently have [address]?: Yes ([facility] which is through pt's long-term community)    [facility]  Type of Current Home Care Services: Home PT    Patient Information Continued  Income Source: Pension/long-term  Does patient have prescription coverage?: Yes  Within the past 12 months, you worried that your food would run out before you got the money to buy more : Never true  Within the past 12 months, the food you bought just didn't last and you didn't have money to get more : Never true  Food insecurity resource given?: N/A  Does patient receive dialysis treatments?: No  Does patient have a history of substance abuse?: No  Does patient have a history of Mental Health Diagnosis?: No         Means of Transportation  Means of Transport to Appts[de-identified] Family transport  In the past 12 months, has lack of transportation kept you from medical appointments or from getting medications?: No  In the past 12 months, has lack of transportation kept you from meetings, work, or from getting things needed for daily living?: No  Was application for public transport provided?: N/A        DISCHARGE DETAILS:    Discharge planning discussed with[de-identified] patient and daughter Mary Yun of Choice: Yes     CM contacted family/caregiver?: Yes  Were Treatment Team discharge recommendations reviewed with patient/caregiver?: Yes  Did patient/caregiver verbalize understanding of patient care needs?: Yes  Were patient/caregiver advised of the risks associated with not following Treatment Team discharge recommendations?: Yes    Contacts  Patient Contacts: Crystal Myrick  Relationship to Patient[de-identified] Family  Contact Method:  In Person  Reason/Outcome: Discharge [address]         Is the patient interested in [address] at discharge?: No    DME Referral Provided  Referral made for DME?: No    Other Referral/Resources/Interventions Provided:  Referral Comments: Met with pt and daughter at bedside who provided information for initial assessment  Pt lives alone at [facility] which is a assisted community for [employer] living  Prior to admission, pt ambulated with a rolling walker and also has a rollator, independent with ADLs  Pt's building does have home therapy services through [place] if needed  Per daughter, pt was starting home PT through agency just this past Wednesday and will be starting OT later this month  She stated she has already informed them pt being in the hospital  Inquired if they would need new orders to resume but daughter does not think agency will  Pt is also currently just under observation  Pt to return home when medically cleared  Daughter will transport pt home at discharge           Treatment Team Recommendation: Home  Discharge Destination Plan[de-identified] Home

## 2023-02-11 NOTE — ASSESSMENT & PLAN NOTE
· Reports 5 days of liquid diarrhea with 3 bowel movements per day  · No abdominal pain  · No blood  · No further episodes of diarrhea since admission to the hospital  · Because of this, no stool enteric panel/C  difficile collected  · Also no further BM   · Reports sick contacts at sending facility  · Received only 250 mL bolus given history CHF  · Hemodynamically stable, other than hypertension on admission  · Likely viral gastroenteritis    Plan:  · DC home tomorrow after discussion with family

## 2023-02-11 NOTE — ASSESSMENT & PLAN NOTE
· Patient did present with elevated blood pressures into the 822I systolic, asymptomatic  · Decreased with treatment with as needed hydralazine and resumption of home medications  · Likely missed medication dosages in the setting of decreased oral intake    Plan:  Continue all home antihypertensive medications (zotepine 2 5 twice daily and Toprol-XL 25 mg daily  /102

## 2023-02-11 NOTE — PLAN OF CARE
Problem: Potential for Falls  Goal: Patient will remain free of falls  Description: INTERVENTIONS:  - Educate patient/family on patient safety including physical limitations  - Instruct patient to call for assistance with activity   - Consult OT/PT to assist with strengthening/mobility   - Keep Call bell within reach  - Keep bed low and locked with side rails adjusted as appropriate  - Keep care items and personal belongings within reach  - Initiate and maintain comfort rounds  - Make Fall Risk Sign visible to staff  - Offer Toileting every  Hours, in advance of need  - Initiate/Maintainalarm  - Obtain necessary fall risk management equipment:   - Apply yellow socks and bracelet for high fall risk patients  - Consider moving patient to room near nurses station  Outcome: Progressing     Problem: MOBILITY - ADULT  Goal: Maintain or return to baseline ADL function  Description: INTERVENTIONS:  -  Assess patient's ability to carry out ADLs; assess patient's baseline for ADL function and identify physical deficits which impact ability to perform ADLs (bathing, care of mouth/teeth, toileting, grooming, dressing, etc )  - Assess/evaluate cause of self-care deficits   - Assess range of motion  - Assess patient's mobility; develop plan if impaired  - Assess patient's need for assistive devices and provide as appropriate  - Encourage maximum independence but intervene and supervise when necessary  - Involve family in performance of ADLs  - Assess for home care needs following discharge   - Consider OT consult to assist with ADL evaluation and planning for discharge  - Provide patient education as appropriate  Outcome: Progressing  Goal: Maintains/Returns to pre admission functional level  Description: INTERVENTIONS:  - Perform BMAT or MOVE assessment daily    - Set and communicate daily mobility goal to care team and patient/family/caregiver     - Collaborate with rehabilitation services on mobility goals if consulted  - Perform Range of Motion  times a day  - Reposition patient every  hours    - Dangle patient  times a day  - Stand patient times a day  - Ambulate patient times a day  - Out of bed to chair  times a day   - Out of bed for meals  times a day  - Out of bed for toileting  - Record patient progress and toleration of activity level   Outcome: Progressing     Problem: PAIN - ADULT  Goal: Verbalizes/displays adequate comfort level or baseline comfort level  Description: Interventions:  - Encourage patient to monitor pain and request assistance  - Assess pain using appropriate pain scale  - Administer analgesics based on type and severity of pain and evaluate response  - Implement non-pharmacological measures as appropriate and evaluate response  - Consider cultural and social influences on pain and pain management  - Notify physician/advanced practitioner if interventions unsuccessful or patient reports new pain  Outcome: Progressing     Problem: INFECTION - ADULT  Goal: Absence or prevention of progression during hospitalization  Description: INTERVENTIONS:  - Assess and monitor for signs and symptoms of infection  - Monitor lab/diagnostic results  - Monitor all insertion sites, i e  indwelling lines, tubes, and drains  - Monitor endotracheal if appropriate and nasal secretions for changes in amount and color  - [place] appropriate cooling/warming therapies per order  - Administer medications as ordered  - Instruct and encourage patient and family to use good hand hygiene technique  - Identify and instruct in appropriate isolation precautions for identified infection/condition  Outcome: Progressing  Goal: Absence of fever/infection during neutropenic period  Description: INTERVENTIONS:  - Monitor WBC    Outcome: Progressing     Problem: SAFETY ADULT  Goal: Patient will remain free of falls  Description: INTERVENTIONS:  - Educate patient/family on patient safety including physical limitations  - Instruct patient to call for assistance with activity   - Consult OT/PT to assist with strengthening/mobility   - Keep Call bell within reach  - Keep bed low and locked with side rails adjusted as appropriate  - Keep care items and personal belongings within reach  - Initiate and maintain comfort rounds  - Make Fall Risk Sign visible to staff  - Offer Toileting every  Hours, in advance of need  - Initiate/Maintain alarm  - Obtain necessary fall risk management equipment:   - Apply yellow socks and bracelet for high fall risk patients  - Consider moving patient to room near nurses station  Outcome: Progressing  Goal: Maintain or return to baseline ADL function  Description: INTERVENTIONS:  -  Assess patient's ability to carry out ADLs; assess patient's baseline for ADL function and identify physical deficits which impact ability to perform ADLs (bathing, care of mouth/teeth, toileting, grooming, dressing, etc )  - Assess/evaluate cause of self-care deficits   - Assess range of motion  - Assess patient's mobility; develop plan if impaired  - Assess patient's need for assistive devices and provide as appropriate  - Encourage maximum independence but intervene and supervise when necessary  - Involve family in performance of ADLs  - Assess for home care needs following discharge   - Consider OT consult to assist with ADL evaluation and planning for discharge  - Provide patient education as appropriate  Outcome: Progressing  Goal: Maintains/Returns to pre admission functional level  Description: INTERVENTIONS:  - Perform BMAT or MOVE assessment daily    - Set and communicate daily mobility goal to care team and patient/family/caregiver  - Collaborate with rehabilitation services on mobility goals if consulted  - Perform Range of Motion  times a day  - Reposition patient every  hours    - Dangle patient  times a day  - Stand patient  times a day  - Ambulate patient  times a day  - Out of bed to chair  times a day   - Out of bed for meals times a day  - Out of bed for toileting  - Record patient progress and toleration of activity level   Outcome: Progressing     Problem: DISCHARGE PLANNING  Goal: Discharge to home or other facility with appropriate resources  Description: INTERVENTIONS:  - Identify barriers to discharge w/patient and caregiver  - Arrange for needed discharge resources and transportation as appropriate  - Identify discharge learning needs (meds, wound care, etc )  - Arrange for interpretive services to assist at discharge as needed  - Refer to Case Management Department for coordinating discharge planning if the patient needs post-hospital services based on physician/advanced practitioner order or complex needs related to functional status, cognitive ability, or social support system  Outcome: Progressing     Problem: Knowledge Deficit  Goal: Patient/family/caregiver demonstrates understanding of disease process, treatment plan, medications, and discharge instructions  Description: Complete learning assessment and assess knowledge base    Interventions:  - Provide teaching at level of understanding  - Provide teaching via preferred learning methods  Outcome: Progressing     Problem: Prexisting or High Potential for Compromised Skin Integrity  Goal: Skin integrity is maintained or improved  Description: INTERVENTIONS:  - Identify patients at risk for skin breakdown  - Assess and monitor skin integrity  - Assess and monitor nutrition and hydration status  - Monitor labs   - Assess for incontinence   - Turn and reposition patient  - Assist with mobility/ambulation  - Relieve pressure over bony prominences  - Avoid friction and shearing  - Provide appropriate hygiene as needed including keeping skin clean and dry  - Evaluate need for skin moisturizer/barrier cream  - Collaborate with interdisciplinary team   - Patient/family teaching  - Consider wound care consult   Outcome: Progressing

## 2023-02-11 NOTE — ASSESSMENT & PLAN NOTE
Wt Readings from Last 3 Encounters:   02/10/23 70 kg (154 lb 5 2 oz)   10/20/22 69 4 kg (153 lb)   09/30/22 68 9 kg (152 lb)       · Appears at baseline weight, and exacerbation  · Continue home Toprol-XL

## 2023-02-12 VITALS
HEART RATE: 65 BPM | TEMPERATURE: 97.5 F | OXYGEN SATURATION: 97 % | HEIGHT: 61 IN | RESPIRATION RATE: 16 BRPM | DIASTOLIC BLOOD PRESSURE: 81 MMHG | SYSTOLIC BLOOD PRESSURE: 145 MMHG | WEIGHT: 154.32 LBS | BODY MASS INDEX: 29.14 KG/M2

## 2023-02-12 PROBLEM — R19.7 DIARRHEA: Status: RESOLVED | Noted: 2023-02-10 | Resolved: 2023-02-12

## 2023-02-12 LAB — MRSA NOSE QL CULT: NORMAL

## 2023-02-12 RX ORDER — LOPERAMIDE HYDROCHLORIDE 2 MG/1
2 CAPSULE ORAL 3 TIMES DAILY PRN
Qty: 30 CAPSULE | Refills: 0 | Status: SHIPPED | OUTPATIENT
Start: 2023-02-12

## 2023-02-12 RX ADMIN — ENOXAPARIN SODIUM 40 MG: 40 INJECTION SUBCUTANEOUS at 08:10

## 2023-02-12 RX ADMIN — ESCITALOPRAM OXALATE 5 MG: 10 TABLET ORAL at 08:07

## 2023-02-12 RX ADMIN — MULTIVITAMIN 15 ML: LIQUID ORAL at 08:06

## 2023-02-12 RX ADMIN — AMLODIPINE BESYLATE 2.5 MG: 2.5 TABLET ORAL at 08:10

## 2023-02-12 RX ADMIN — METOPROLOL SUCCINATE 25 MG: 25 TABLET, EXTENDED RELEASE ORAL at 08:07

## 2023-02-12 NOTE — PLAN OF CARE
Problem: Potential for Falls  Goal: Patient will remain free of falls  Description: INTERVENTIONS:  - Educate patient/family on patient safety including physical limitations  - Instruct patient to call for assistance with activity   - Consult OT/PT to assist with strengthening/mobility   - Keep Call bell within reach  - Keep bed low and locked with side rails adjusted as appropriate  - Keep care items and personal belongings within reach  - Initiate and maintain comfort rounds  - Make Fall Risk Sign visible to staff  - Offer Toileting every  Hours, in advance of need  - Initiate/Maintain alarm  - Obtain necessary fall risk management equipment:   - Apply yellow socks and bracelet for high fall risk patients  - Consider moving patient to room near nurses station  2/12/2023 1400 by Marcia Basilio RN  Outcome: Adequate for Discharge  2/12/2023 1116 by Marcia Basilio RN  Outcome: Progressing     Problem: MOBILITY - ADULT  Goal: Maintain or return to baseline ADL function  Description: INTERVENTIONS:  -  Assess patient's ability to carry out ADLs; assess patient's baseline for ADL function and identify physical deficits which impact ability to perform ADLs (bathing, care of mouth/teeth, toileting, grooming, dressing, etc )  - Assess/evaluate cause of self-care deficits   - Assess range of motion  - Assess patient's mobility; develop plan if impaired  - Assess patient's need for assistive devices and provide as appropriate  - Encourage maximum independence but intervene and supervise when necessary  - Involve family in performance of ADLs  - Assess for home care needs following discharge   - Consider OT consult to assist with ADL evaluation and planning for discharge  - Provide patient education as appropriate  2/12/2023 1400 by Marcia Basilio RN  Outcome: Adequate for Discharge  2/12/2023 1116 by Marcia Basilio RN  Outcome: Progressing  Goal: Maintains/Returns to pre admission functional level  Description: INTERVENTIONS:  - Perform BMAT or MOVE assessment daily    - Set and communicate daily mobility goal to care team and patient/family/caregiver  - Collaborate with rehabilitation services on mobility goals if consulted  - Perform Range of Motion times a day  - Reposition patient every  hours    - Dangle patient times a day  - Stand patient  times a day  - Ambulate patient  times a day  - Out of bed to chair  times a day   - Out of bed for meals  times a day  - Out of bed for toileting  - Record patient progress and toleration of activity level   2/12/2023 1400 by Roge Mo RN  Outcome: Adequate for Discharge  2/12/2023 1116 by Roge Mo RN  Outcome: Progressing     Problem: PAIN - ADULT  Goal: Verbalizes/displays adequate comfort level or baseline comfort level  Description: Interventions:  - Encourage patient to monitor pain and request assistance  - Assess pain using appropriate pain scale  - Administer analgesics based on type and severity of pain and evaluate response  - Implement non-pharmacological measures as appropriate and evaluate response  - Consider cultural and social influences on pain and pain management  - Notify physician/advanced practitioner if interventions unsuccessful or patient reports new pain  2/12/2023 1400 by Roge Mo RN  Outcome: Adequate for Discharge  2/12/2023 1116 by Roge Mo RN  Outcome: Progressing     Problem: INFECTION - ADULT  Goal: Absence or prevention of progression during hospitalization  Description: INTERVENTIONS:  - Assess and monitor for signs and symptoms of infection  - Monitor lab/diagnostic results  - Monitor all insertion sites, i e  indwelling lines, tubes, and drains  - Monitor endotracheal if appropriate and nasal secretions for changes in amount and color  - [place] appropriate cooling/warming therapies per order  - Administer medications as ordered  - Instruct and encourage patient and family to use good hand hygiene technique  - Identify and instruct in appropriate isolation precautions for identified infection/condition  2/12/2023 1400 by Lisa Gold RN  Outcome: Adequate for Discharge  2/12/2023 1116 by Lisa Gold RN  Outcome: Progressing  Goal: Absence of fever/infection during neutropenic period  Description: INTERVENTIONS:  - Monitor WBC    2/12/2023 1400 by Lisa Gold RN  Outcome: Adequate for Discharge  2/12/2023 1116 by Lisa Gold RN  Outcome: Progressing     Problem: SAFETY ADULT  Goal: Patient will remain free of falls  Description: INTERVENTIONS:  - Educate patient/family on patient safety including physical limitations  - Instruct patient to call for assistance with activity   - Consult OT/PT to assist with strengthening/mobility   - Keep Call bell within reach  - Keep bed low and locked with side rails adjusted as appropriate  - Keep care items and personal belongings within reach  - Initiate and maintain comfort rounds  - Make Fall Risk Sign visible to staff  - Offer Toileting every  Hours, in advance of need  - Initiate/Maintainalarm  - Obtain necessary fall risk management equipment:   - Apply yellow socks and bracelet for high fall risk patients  - Consider moving patient to room near nurses station  2/12/2023 1400 by Lisa Gold RN  Outcome: Adequate for Discharge  2/12/2023 1116 by Lisa Gold RN  Outcome: Progressing  Goal: Maintain or return to baseline ADL function  Description: INTERVENTIONS:  -  Assess patient's ability to carry out ADLs; assess patient's baseline for ADL function and identify physical deficits which impact ability to perform ADLs (bathing, care of mouth/teeth, toileting, grooming, dressing, etc )  - Assess/evaluate cause of self-care deficits   - Assess range of motion  - Assess patient's mobility; develop plan if impaired  - Assess patient's need for assistive devices and provide as appropriate  - Encourage maximum independence but intervene and supervise when necessary  - Involve family in performance of ADLs  - Assess for home care needs following discharge   - Consider OT consult to assist with ADL evaluation and planning for discharge  - Provide patient education as appropriate  2/12/2023 1400 by Lisa Gold RN  Outcome: Adequate for Discharge  2/12/2023 1116 by Lisa Gold RN  Outcome: Progressing  Goal: Maintains/Returns to pre admission functional level  Description: INTERVENTIONS:  - Perform BMAT or MOVE assessment daily    - Set and communicate daily mobility goal to care team and patient/family/caregiver  - Collaborate with rehabilitation services on mobility goals if consulted  - Perform Range of Motion  times a day  - Reposition patient every  hours    - Dangle patient  times a day  - Stand patient  times a day  - Ambulate patient  times a day  - Out of bed to chair  times a day   - Out of bed for meal times a day  - Out of bed for toileting  - Record patient progress and toleration of activity level   2/12/2023 1400 by Lisa Gold RN  Outcome: Adequate for Discharge  2/12/2023 1116 by Lisa Gold RN  Outcome: Progressing     Problem: DISCHARGE PLANNING  Goal: Discharge to home or other facility with appropriate resources  Description: INTERVENTIONS:  - Identify barriers to discharge w/patient and caregiver  - Arrange for needed discharge resources and transportation as appropriate  - Identify discharge learning needs (meds, wound care, etc )  - Arrange for interpretive services to assist at discharge as needed  - Refer to Case Management Department for coordinating discharge planning if the patient needs post-hospital services based on physician/advanced practitioner order or complex needs related to functional status, cognitive ability, or social support system  2/12/2023 1400 by Lisa Gold RN  Outcome: Adequate for Discharge  2/12/2023 1116 by Gricelda Velasquez MIRNA Pichardo  Outcome: Progressing     Problem: Knowledge Deficit  Goal: Patient/family/caregiver demonstrates understanding of disease process, treatment plan, medications, and discharge instructions  Description: Complete learning assessment and assess knowledge base  Interventions:  - Provide teaching at level of understanding  - Provide teaching via preferred learning methods  2/12/2023 1400 by Edwina López RN  Outcome: Adequate for Discharge  2/12/2023 1116 by Edwina López RN  Outcome: Progressing     Problem: Prexisting or High Potential for Compromised Skin Integrity  Goal: Skin integrity is maintained or improved  Description: INTERVENTIONS:  - Identify patients at risk for skin breakdown  - Assess and monitor skin integrity  - Assess and monitor nutrition and hydration status  - Monitor labs   - Assess for incontinence   - Turn and reposition patient  - Assist with mobility/ambulation  - Relieve pressure over bony prominences  - Avoid friction and shearing  - Provide appropriate hygiene as needed including keeping skin clean and dry  - Evaluate need for skin moisturizer/barrier cream  - Collaborate with interdisciplinary team   - Patient/family teaching  - Consider wound care consult   2/12/2023 1400 by Edwina López RN  Outcome: Adequate for Discharge  2/12/2023 1116 by Edwina López RN  Outcome: Progressing     Problem: Nutrition/Hydration-ADULT  Goal: Nutrient/Hydration intake appropriate for improving, restoring or maintaining nutritional needs  Description: Monitor and assess patient's nutrition/hydration status for malnutrition  Collaborate with interdisciplinary team and initiate plan and interventions as ordered  Monitor patient's weight and dietary intake as ordered or per policy  Utilize nutrition screening tool and intervene as necessary  Determine patient's food preferences and provide high-protein, high-caloric foods as appropriate       INTERVENTIONS:  - Monitor oral intake, urinary output, labs, and treatment plans  - Assess nutrition and hydration status and recommend course of action  - Evaluate amount of meals eaten  - Assist patient with eating if necessary   - Allow adequate time for meals  - Recommend/ encourage appropriate diets, oral nutritional supplements, and vitamin/mineral supplements  - Order, calculate, and assess calorie counts as needed  - Recommend, monitor, and adjust tube feedings and TPN/PPN based on assessed needs  - Assess need for intravenous fluids  - Provide specific nutrition/hydration education as appropriate  - Include patient/family/caregiver in decisions related to nutrition  2/12/2023 1400 by Lisa Gold RN  Outcome: Adequate for Discharge  2/12/2023 1116 by Lisa Gold RN  Outcome: Progressing

## 2023-02-12 NOTE — PLAN OF CARE
Problem: Potential for Falls  Goal: Patient will remain free of falls  Description: INTERVENTIONS:  - Educate patient/family on patient safety including physical limitations  - Instruct patient to call for assistance with activity   - Consult OT/PT to assist with strengthening/mobility   - Keep Call bell within reach  - Keep bed low and locked with side rails adjusted as appropriate  - Keep care items and personal belongings within reach  - Initiate and maintain comfort rounds  - Make Fall Risk Sign visible to staff  - Offer Toileting every Hours, in advance of need  - Initiate/Maintain alarm  - Obtain necessary fall risk management equipment:   - Apply yellow socks and bracelet for high fall risk patients  - Consider moving patient to room near nurses station  Outcome: Progressing     Problem: MOBILITY - ADULT  Goal: Maintain or return to baseline ADL function  Description: INTERVENTIONS:  -  Assess patient's ability to carry out ADLs; assess patient's baseline for ADL function and identify physical deficits which impact ability to perform ADLs (bathing, care of mouth/teeth, toileting, grooming, dressing, etc )  - Assess/evaluate cause of self-care deficits   - Assess range of motion  - Assess patient's mobility; develop plan if impaired  - Assess patient's need for assistive devices and provide as appropriate  - Encourage maximum independence but intervene and supervise when necessary  - Involve family in performance of ADLs  - Assess for home care needs following discharge   - Consider OT consult to assist with ADL evaluation and planning for discharge  - Provide patient education as appropriate  Outcome: Progressing  Goal: Maintains/Returns to pre admission functional level  Description: INTERVENTIONS:  - Perform BMAT or MOVE assessment daily    - Set and communicate daily mobility goal to care team and patient/family/caregiver     - Collaborate with rehabilitation services on mobility goals if consulted  - Perform Range of Motion  times a day  - Reposition patient every  hours    - Dangle patient times a day  - Stand patient  times a day  - Ambulate patient  times a day  - Out of bed to chair  times a day   - Out of bed for meals  times a day  - Out of bed for toileting  - Record patient progress and toleration of activity level   Outcome: Progressing     Problem: PAIN - ADULT  Goal: Verbalizes/displays adequate comfort level or baseline comfort level  Description: Interventions:  - Encourage patient to monitor pain and request assistance  - Assess pain using appropriate pain scale  - Administer analgesics based on type and severity of pain and evaluate response  - Implement non-pharmacological measures as appropriate and evaluate response  - Consider cultural and social influences on pain and pain management  - Notify physician/advanced practitioner if interventions unsuccessful or patient reports new pain  Outcome: Progressing     Problem: INFECTION - ADULT  Goal: Absence or prevention of progression during hospitalization  Description: INTERVENTIONS:  - Assess and monitor for signs and symptoms of infection  - Monitor lab/diagnostic results  - Monitor all insertion sites, i e  indwelling lines, tubes, and drains  - Monitor endotracheal if appropriate and nasal secretions for changes in amount and color  - [place] appropriate cooling/warming therapies per order  - Administer medications as ordered  - Instruct and encourage patient and family to use good hand hygiene technique  - Identify and instruct in appropriate isolation precautions for identified infection/condition  Outcome: Progressing  Goal: Absence of fever/infection during neutropenic period  Description: INTERVENTIONS:  - Monitor WBC    Outcome: Progressing     Problem: SAFETY ADULT  Goal: Patient will remain free of falls  Description: INTERVENTIONS:  - Educate patient/family on patient safety including physical limitations  - Instruct patient to call for assistance with activity   - Consult OT/PT to assist with strengthening/mobility   - Keep Call bell within reach  - Keep bed low and locked with side rails adjusted as appropriate  - Keep care items and personal belongings within reach  - Initiate and maintain comfort rounds  - Make Fall Risk Sign visible to staff  - Offer Toileting every  Hours, in advance of need  - Initiate/Maintain alarm  - Obtain necessary fall risk management equipment:   - Apply yellow socks and bracelet for high fall risk patients  - Consider moving patient to room near nurses station  Outcome: Progressing  Goal: Maintain or return to baseline ADL function  Description: INTERVENTIONS:  -  Assess patient's ability to carry out ADLs; assess patient's baseline for ADL function and identify physical deficits which impact ability to perform ADLs (bathing, care of mouth/teeth, toileting, grooming, dressing, etc )  - Assess/evaluate cause of self-care deficits   - Assess range of motion  - Assess patient's mobility; develop plan if impaired  - Assess patient's need for assistive devices and provide as appropriate  - Encourage maximum independence but intervene and supervise when necessary  - Involve family in performance of ADLs  - Assess for home care needs following discharge   - Consider OT consult to assist with ADL evaluation and planning for discharge  - Provide patient education as appropriate  Outcome: Progressing  Goal: Maintains/Returns to pre admission functional level  Description: INTERVENTIONS:  - Perform BMAT or MOVE assessment daily    - Set and communicate daily mobility goal to care team and patient/family/caregiver  - Collaborate with rehabilitation services on mobility goals if consulted  - Perform Range of Motion  times a day  - Reposition patient every  hours    - Dangle patient  times a day  - Stand patient  times a day  - Ambulate patient  times a day  - Out of bed to chair  times a day   - Out of bed for meals times a day  - Out of bed for toileting  - Record patient progress and toleration of activity level   Outcome: Progressing     Problem: DISCHARGE PLANNING  Goal: Discharge to home or other facility with appropriate resources  Description: INTERVENTIONS:  - Identify barriers to discharge w/patient and caregiver  - Arrange for needed discharge resources and transportation as appropriate  - Identify discharge learning needs (meds, wound care, etc )  - Arrange for interpretive services to assist at discharge as needed  - Refer to Case Management Department for coordinating discharge planning if the patient needs post-hospital services based on physician/advanced practitioner order or complex needs related to functional status, cognitive ability, or social support system  Outcome: Progressing     Problem: Knowledge Deficit  Goal: Patient/family/caregiver demonstrates understanding of disease process, treatment plan, medications, and discharge instructions  Description: Complete learning assessment and assess knowledge base    Interventions:  - Provide teaching at level of understanding  - Provide teaching via preferred learning methods  Outcome: Progressing     Problem: Prexisting or High Potential for Compromised Skin Integrity  Goal: Skin integrity is maintained or improved  Description: INTERVENTIONS:  - Identify patients at risk for skin breakdown  - Assess and monitor skin integrity  - Assess and monitor nutrition and hydration status  - Monitor labs   - Assess for incontinence   - Turn and reposition patient  - Assist with mobility/ambulation  - Relieve pressure over bony prominences  - Avoid friction and shearing  - Provide appropriate hygiene as needed including keeping skin clean and dry  - Evaluate need for skin moisturizer/barrier cream  - Collaborate with interdisciplinary team   - Patient/family teaching  - Consider wound care consult   Outcome: Progressing     Problem: Nutrition/Hydration-ADULT  Goal: Nutrient/Hydration intake appropriate for improving, restoring or maintaining nutritional needs  Description: Monitor and assess patient's nutrition/hydration status for malnutrition  Collaborate with interdisciplinary team and initiate plan and interventions as ordered  Monitor patient's weight and dietary intake as ordered or per policy  Utilize nutrition screening tool and intervene as necessary  Determine patient's food preferences and provide high-protein, high-caloric foods as appropriate       INTERVENTIONS:  - Monitor oral intake, urinary output, labs, and treatment plans  - Assess nutrition and hydration status and recommend course of action  - Evaluate amount of meals eaten  - Assist patient with eating if necessary   - Allow adequate time for meals  - Recommend/ encourage appropriate diets, oral nutritional supplements, and vitamin/mineral supplements  - Order, calculate, and assess calorie counts as needed  - Recommend, monitor, and adjust tube feedings and TPN/PPN based on assessed needs  - Assess need for intravenous fluids  - Provide specific nutrition/hydration education as appropriate  - Include patient/family/caregiver in decisions related to nutrition  Outcome: Progressing

## 2023-02-12 NOTE — DISCHARGE INSTR - AVS FIRST PAGE
Dear Johnny Jhaveri,     It was our pleasure to care for you here at [facility]  It is our hope that we were always able to exceed the expected standards for your care during your stay  You were hospitalized due to diarrhea  You were cared for on the [address] [facility] [address] floor by Razia Reyes MD under the service of Leoncio Luna MD with the [facility] who covers for your primary care physician (PCP), Rhett Wells MD, while you were hospitalized  If you have any questions or concerns related to this hospitalization, you may contact us at [phone]  For follow up as well as any medication refills, we recommend that you follow up with your primary care physician  A registered nurse will reach out to you by phone within a few days after your discharge to answer any additional questions that you may have after going home  However, at this time we provide for you here, the most important instructions / recommendations at discharge:     Notable Medication Adjustments -   None  Testing Required after Discharge -   None  Important follow up information -   Please follow up with your PCP within 1 week of discharge   Other Instructions -   None  Please review this entire after visit summary as additional general instructions including medication list, appointments, activity, diet, any pertinent wound care, and other additional recommendations from your care team that may be provided for you        Sincerely,     Razia Reyes MD

## 2023-02-12 NOTE — ASSESSMENT & PLAN NOTE
· Reports 5 days of liquid diarrhea with 3 bowel movements per day  · No abdominal pain  · No blood  · No further episodes of diarrhea since admission to the hospital  · Because of this, no stool enteric panel/C  difficile collected  · Also no further BM   · Reports sick contacts at sending facility  · Received only 250 mL bolus given history CHF  · Hemodynamically stable, other than hypertension on admission  · Likely viral gastroenteritis  · 2/12 - No further diarrhea or abdominal pain since admission  Plan:  · DC back to nursing facility after discussion with family

## 2023-02-12 NOTE — PLAN OF CARE
Problem: Potential for Falls  Goal: Patient will remain free of falls  Description: INTERVENTIONS:  - Educate patient/family on patient safety including physical limitations  - Instruct patient to call for assistance with activity   - Consult OT/PT to assist with strengthening/mobility   - Keep Call bell within reach  - Keep bed low and locked with side rails adjusted as appropriate  - Keep care items and personal belongings within reach  - Initiate and maintain comfort rounds  - Make Fall Risk Sign visible to staff  - Offer Toileting every  Hours, in advance of need  - Initiate/Maintain alarm  - Obtain necessary fall risk management equipment  - Apply yellow socks and bracelet for high fall risk patients  - Consider moving patient to room near nurses station  Outcome: Progressing     Problem: MOBILITY - ADULT  Goal: Maintain or return to baseline ADL function  Description: INTERVENTIONS:  -  Assess patient's ability to carry out ADLs; assess patient's baseline for ADL function and identify physical deficits which impact ability to perform ADLs (bathing, care of mouth/teeth, toileting, grooming, dressing, etc )  - Assess/evaluate cause of self-care deficits   - Assess range of motion  - Assess patient's mobility; develop plan if impaired  - Assess patient's need for assistive devices and provide as appropriate  - Encourage maximum independence but intervene and supervise when necessary  - Involve family in performance of ADLs  - Assess for home care needs following discharge   - Consider OT consult to assist with ADL evaluation and planning for discharge  - Provide patient education as appropriate  Outcome: Progressing  Goal: Maintains/Returns to pre admission functional level  Description: INTERVENTIONS:  - Perform BMAT or MOVE assessment daily    - Set and communicate daily mobility goal to care team and patient/family/caregiver     - Collaborate with rehabilitation services on mobility goals if consulted  - Perform Range of Motion  times a day  - Reposition patient every  hours    - Dangle patient  times a day  - Stand patient  times a day  - Ambulate patient  times a day  - Out of bed to chair  times a day   - Out of bed for meals  times a day  - Out of bed for toileting  - Record patient progress and toleration of activity level   Outcome: Progressing     Problem: PAIN - ADULT  Goal: Verbalizes/displays adequate comfort level or baseline comfort level  Description: Interventions:  - Encourage patient to monitor pain and request assistance  - Assess pain using appropriate pain scale  - Administer analgesics based on type and severity of pain and evaluate response  - Implement non-pharmacological measures as appropriate and evaluate response  - Consider cultural and social influences on pain and pain management  - Notify physician/advanced practitioner if interventions unsuccessful or patient reports new pain  Outcome: Progressing     Problem: INFECTION - ADULT  Goal: Absence or prevention of progression during hospitalization  Description: INTERVENTIONS:  - Assess and monitor for signs and symptoms of infection  - Monitor lab/diagnostic results  - Monitor all insertion sites, i e  indwelling lines, tubes, and drains  - Monitor endotracheal if appropriate and nasal secretions for changes in amount and color  - Eunice appropriate cooling/warming therapies per order  - Administer medications as ordered  - Instruct and encourage patient and family to use good hand hygiene technique  - Identify and instruct in appropriate isolation precautions for identified infection/condition  Outcome: Progressing  Goal: Absence of fever/infection during neutropenic period  Description: INTERVENTIONS:  - Monitor WBC    Outcome: Progressing     Problem: SAFETY ADULT  Goal: Patient will remain free of falls  Description: INTERVENTIONS:  - Educate patient/family on patient safety including physical limitations  - Instruct patient to call for assistance with activity   - Consult OT/PT to assist with strengthening/mobility   - Keep Call bell within reach  - Keep bed low and locked with side rails adjusted as appropriate  - Keep care items and personal belongings within reach  - Initiate and maintain comfort rounds  - Make Fall Risk Sign visible to staff  - Offer Toileting every  Hours, in advance of need  - Initiate/Maintain alarm  - Obtain necessary fall risk management equipment:   - Apply yellow socks and bracelet for high fall risk patients  - Consider moving patient to room near nurses station  Outcome: Progressing  Goal: Maintain or return to baseline ADL function  Description: INTERVENTIONS:  -  Assess patient's ability to carry out ADLs; assess patient's baseline for ADL function and identify physical deficits which impact ability to perform ADLs (bathing, care of mouth/teeth, toileting, grooming, dressing, etc )  - Assess/evaluate cause of self-care deficits   - Assess range of motion  - Assess patient's mobility; develop plan if impaired  - Assess patient's need for assistive devices and provide as appropriate  - Encourage maximum independence but intervene and supervise when necessary  - Involve family in performance of ADLs  - Assess for home care needs following discharge   - Consider OT consult to assist with ADL evaluation and planning for discharge  - Provide patient education as appropriate  Outcome: Progressing  Goal: Maintains/Returns to pre admission functional level  Description: INTERVENTIONS:  - Perform BMAT or MOVE assessment daily    - Set and communicate daily mobility goal to care team and patient/family/caregiver  - Collaborate with rehabilitation services on mobility goals if consulted  - Perform Range of Motion  times a day  - Reposition patient every  hours    - Dangle patient  times a day  - Stand patient  times a day  - Ambulate patient  times a day  - Out of bed to chair  times a day   - Out of bed for meals  times a day  - Out of bed for toileting  - Record patient progress and toleration of activity level   Outcome: Progressing     Problem: DISCHARGE PLANNING  Goal: Discharge to home or other facility with appropriate resources  Description: INTERVENTIONS:  - Identify barriers to discharge w/patient and caregiver  - Arrange for needed discharge resources and transportation as appropriate  - Identify discharge learning needs (meds, wound care, etc )  - Arrange for interpretive services to assist at discharge as needed  - Refer to Case Management Department for coordinating discharge planning if the patient needs post-hospital services based on physician/advanced practitioner order or complex needs related to functional status, cognitive ability, or social support system  Outcome: Progressing     Problem: Knowledge Deficit  Goal: Patient/family/caregiver demonstrates understanding of disease process, treatment plan, medications, and discharge instructions  Description: Complete learning assessment and assess knowledge base    Interventions:  - Provide teaching at level of understanding  - Provide teaching via preferred learning methods  Outcome: Progressing     Problem: Prexisting or High Potential for Compromised Skin Integrity  Goal: Skin integrity is maintained or improved  Description: INTERVENTIONS:  - Identify patients at risk for skin breakdown  - Assess and monitor skin integrity  - Assess and monitor nutrition and hydration status  - Monitor labs   - Assess for incontinence   - Turn and reposition patient  - Assist with mobility/ambulation  - Relieve pressure over bony prominences  - Avoid friction and shearing  - Provide appropriate hygiene as needed including keeping skin clean and dry  - Evaluate need for skin moisturizer/barrier cream  - Collaborate with interdisciplinary team   - Patient/family teaching  - Consider wound care consult   Outcome: Progressing     Problem: Nutrition/Hydration-ADULT  Goal: Nutrient/Hydration intake appropriate for improving, restoring or maintaining nutritional needs  Description: Monitor and assess patient's nutrition/hydration status for malnutrition  Collaborate with interdisciplinary team and initiate plan and interventions as ordered  Monitor patient's weight and dietary intake as ordered or per policy  Utilize nutrition screening tool and intervene as necessary  Determine patient's food preferences and provide high-protein, high-caloric foods as appropriate       INTERVENTIONS:  - Monitor oral intake, urinary output, labs, and treatment plans  - Assess nutrition and hydration status and recommend course of action  - Evaluate amount of meals eaten  - Assist patient with eating if necessary   - Allow adequate time for meals  - Recommend/ encourage appropriate diets, oral nutritional supplements, and vitamin/mineral supplements  - Order, calculate, and assess calorie counts as needed  - Recommend, monitor, and adjust tube feedings and TPN/PPN based on assessed needs  - Assess need for intravenous fluids  - Provide specific nutrition/hydration education as appropriate  - Include patient/family/caregiver in decisions related to nutrition  Outcome: Progressing

## 2023-02-12 NOTE — PROGRESS NOTES
[facility] NOTE- Mitch Apley, 7/17/1925, 80 y o  female MRN: [id number]   Unit/Bed#: W /W -01 Encounter: [id number]   Primary Care Physician: Whit Lo MD   Date and Time Admitted to Hospital: 2/10/2023  9:01 AM     Assessment/Plan:   * Diarrhea-resolved as of 2/12/2023  Assessment & Plan  · Reports 5 days of liquid diarrhea with 3 bowel movements per day  · No abdominal pain  · No blood  · No further episodes of diarrhea since admission to the hospital  · Because of this, no stool enteric panel/C  difficile collected  · Also no further BM   · Reports sick contacts at sending facility  · Received only 250 mL bolus given history CHF  · Hemodynamically stable, other than hypertension on admission  · Likely viral gastroenteritis  · 2/12 - No further diarrhea or abdominal pain since admission  Plan:  · DC back to nursing facility after discussion with family      Chronic diastolic heart failure (HCC)  Assessment & Plan  Wt Readings from Last 3 Encounters:   02/10/23 70 kg (154 lb 5 2 oz)   10/20/22 69 4 kg (153 lb)   09/30/22 68 9 kg (152 lb)       · Appears at baseline weight, and no s/s of exacerbation  · Continue home Toprol-XL    Acquired hypothyroidism  Assessment & Plan  Continue home levothyroxine 150 mcg daily    Paroxysmal atrial fibrillation (HCC)  Assessment & Plan  Continue Toprol XL  Not on anticoagulation    GERD (gastroesophageal reflux disease)  Assessment & Plan  Continue Pepcid 20 mg daily as needed    Essential hypertension  Assessment & Plan  · Patient did present with elevated blood pressures into the 448H systolic, asymptomatic  · Decreased with treatment with as needed hydralazine and resumption of home medications  · Likely missed medication dosages in the setting of decreased oral intake    Plan:  Continue all home antihypertensive medications (zotepine 2 5 twice daily and Toprol-XL 25 mg daily       VTE Pharmacologic Prophylaxis: VTE Score: 6 High Risk (Score >/= 5) - Pharmacological DVT Prophylaxis Ordered: enoxaparin (Lovenox)  Sequential Compression Devices Ordered  Patient Centered Rounds:  Performed bedside rounds with nursing staff  Discussions with Specialists or Other Care Team Provider: None    Current Length of Stay: 0 day(s)  Current Patient Status: Observation   Discharge Plan: Anticipate discharge later today or tomorrow to nursing facility    Code Status: Level 3 - DNAR and DNI    Subjective:   Patient was seen at bedside this a m  in no acute distress  Patient reports feeling well today and has had no further diarrhea  Denies fever, chills, dizziness/lightheadedness, headache, N/V, chest pain, palpitations, SOB at rest, abdominal pain, or urinary symptoms  Patient remains medically stable with no significant events overnight and has no new concerns at this time  Objective:     Vitals:   Temp (24hrs), Av 8 °F (36 6 °C), Min:97 7 °F (36 5 °C), Max:97 9 °F (36 6 °C)    Temp:  [97 7 °F (36 5 °C)-97 9 °F (36 6 °C)] 97 7 °F (36 5 °C)  HR:  [62-80] 62  Resp:  [20] 20  BP: (156-157)/() 157/82  SpO2:  [95 %] 95 %  Body mass index is 29 16 kg/m²  Input and Output Summary (last 24 hours): Intake/Output Summary (Last 24 hours) at 2023 0743  Last data filed at 2023 0736  Gross per 24 hour   Intake 480 ml   Output 750 ml   Net -270 ml       Physical Exam  Vitals reviewed  Constitutional:       General: She is not in acute distress  Appearance: She is not ill-appearing or toxic-appearing  HENT:      Head: Normocephalic and atraumatic  Nose: Nose normal       Mouth/Throat:      Mouth: Mucous membranes are moist       Pharynx: Oropharynx is clear  Eyes:      Extraocular Movements: Extraocular movements intact  Pupils: Pupils are equal, round, and reactive to light  Cardiovascular:      Rate and Rhythm: Normal rate and regular rhythm  Pulses: Normal pulses        Heart sounds: Normal heart sounds  No murmur heard  No gallop  Pulmonary:      Effort: Pulmonary effort is normal  No respiratory distress  Breath sounds: Normal breath sounds  No wheezing, rhonchi or rales  Abdominal:      General: Abdomen is protuberant  Bowel sounds are normal  There is no distension  Palpations: Abdomen is soft  Tenderness: There is no abdominal tenderness  Musculoskeletal:         General: Normal range of motion  Cervical back: Normal range of motion and neck supple  Right lower leg: No edema  Left lower leg: No edema  Skin:     General: Skin is warm and dry  Findings: No lesion or rash  Neurological:      General: No focal deficit present  Mental Status: She is alert and oriented to person, place, and time  Mental status is at baseline  Psychiatric:         Mood and Affect: Mood normal          Behavior: Behavior normal           Additional Data:     Labs:  Results from last 7 days   Lab Units 02/11/23  0438   WBC Thousand/uL 7 16   HEMOGLOBIN g/dL 12 9   HEMATOCRIT % 39 0   PLATELETS Thousands/uL 235   NEUTROS PCT % 45   LYMPHS PCT % 42   MONOS PCT % 11   EOS PCT % 2     Results from last 7 days   Lab Units 02/11/23  0438 02/10/23  1201 02/10/23  0950   SODIUM mmol/L 139  --  137   POTASSIUM mmol/L 3 8   < > 5 5*   CHLORIDE mmol/L 110*  --  108   CO2 mmol/L 21  --  20*   BUN mg/dL 14  --  17   CREATININE mg/dL 0 64  --  0 68   ANION GAP mmol/L 8  --  9   CALCIUM mg/dL 8 4  --  8 6   ALBUMIN g/dL  --   --  4 1   TOTAL BILIRUBIN mg/dL  --   --  0 63   ALK PHOS U/L  --   --  61   ALT U/L  --   --  19   AST U/L  --   --  47*   GLUCOSE RANDOM mg/dL 98  --  107    < > = values in this interval not displayed  Lines/Drains:  Invasive Devices     Peripheral Intravenous Line  Duration           Peripheral IV 02/10/23 Right Forearm 1 day                    Imaging: Reviewed pertinent radiology reports from this admission     Recent Cultures (last 7 days):         Last 24 Hours Medication List:   Current Facility-Administered Medications   Medication Dose Route Frequency Provider Last Rate   • acetaminophen  650 mg Oral Q6H PRN Renay Morel MD     • amLODIPine  2 5 mg Oral BID Renay Morel MD     • enoxaparin  40 mg Subcutaneous Daily Renay Morel MD     • escitalopram  5 mg Oral Daily Renay Morel MD     • famotidine  20 mg Oral Daily PRN Renay Morel MD     • glycerin-hypromellose-  1 drop Both Eyes Q6H PRN Renay Morel MD     • levothyroxine  150 mcg Oral Early Morning Renay Morel MD     • loperamide  2 mg Oral TID PRN Renay Morel MD     • metoprolol succinate  25 mg Oral Daily Renay Morel MD     • multivitamin with iron-minerals  15 mL Oral Daily Renay Morel MD          Today, Patient Was Seen By: Jammie Gandhi MD    **Please Note: This note may have been constructed using a voice recognition system  **

## 2023-02-12 NOTE — ASSESSMENT & PLAN NOTE
Wt Readings from Last 3 Encounters:   02/10/23 70 kg (154 lb 5 2 oz)   10/20/22 69 4 kg (153 lb)   09/30/22 68 9 kg (152 lb)       · Appears at baseline weight, and no s/s of exacerbation  · Continue home Toprol-XL

## 2023-02-12 NOTE — ASSESSMENT & PLAN NOTE
· Patient did present with elevated blood pressures into the 781J systolic, asymptomatic  · Decreased with treatment with as needed hydralazine and resumption of home medications  · Likely missed medication dosages in the setting of decreased oral intake    Plan:  Continue all home antihypertensive medications (zotepine 2 5 twice daily and Toprol-XL 25 mg daily

## 2023-02-12 NOTE — NURSING NOTE
Patient discharged home  IV removed per protocol  Patient agreed to follow-up with family provider within two weeks  AVS was reviewed in detail with patient and patient daughter  All questions and concerns were addressed at this time

## 2023-02-12 NOTE — DISCHARGE SUMMARY
[address] [place] [address], 7/17/1925, 80 y o  female MRN: [id number]   Unit/Bed#: W /W -01 Encounter: [id number]   Primary Care Physician: Claribel Amanda MD   Date and Time Admitted to Hospital: 2/10/2023  9:01 AM       * Diarrhea-resolved as of 2/12/2023  Assessment & Plan  · Reports 5 days of liquid diarrhea with 3 bowel movements per day  · No abdominal pain  · No blood  · No further episodes of diarrhea since admission to the hospital  · Because of this, no stool enteric panel/C  difficile collected  · Also no further BM   · Reports sick contacts at sending facility  · Received only 250 mL bolus given history CHF  · Hemodynamically stable, other than hypertension on admission  · Likely viral gastroenteritis  · 2/12 - No further diarrhea or abdominal pain since admission  Plan:  · DC back to nursing facility after discussion with family      Chronic diastolic heart failure (HCC)  Assessment & Plan  Wt Readings from Last 3 Encounters:   02/10/23 70 kg (154 lb 5 2 oz)   10/20/22 69 4 kg (153 lb)   09/30/22 68 9 kg (152 lb)       · Appears at baseline weight, and no s/s of exacerbation  · Continue home Toprol-XL    Acquired hypothyroidism  Assessment & Plan  Continue home levothyroxine 150 mcg daily    Paroxysmal atrial fibrillation (HCC)  Assessment & Plan  Continue Toprol XL  Not on anticoagulation    GERD (gastroesophageal reflux disease)  Assessment & Plan  Continue Pepcid 20 mg daily as needed    Essential hypertension  Assessment & Plan  · Patient did present with elevated blood pressures into the 564U systolic, asymptomatic  · Decreased with treatment with as needed hydralazine and resumption of home medications  · Likely missed medication dosages in the setting of decreased oral intake    Plan:  Continue all home antihypertensive medications (zotepine 2 5 twice daily and Toprol-XL 25 mg daily         Medical Problems     Resolved Problems  Date Reviewed: 2/11/2023          Resolved    * (Principal) Diarrhea 2/12/2023     Resolved by  Summer Apple MD        Discharging Resident: Summer Apple MD  Discharging Attending: No att  providers found  PCP: Delmar Lock MD  Admission Date:   Admission Orders (From admission, onward)     Ordered        02/10/23 1200  Place in Observation  Once                      Discharge Date: 02/12/23    Consultations During Hospital Stay:  · None    Procedures Performed:   · None    Significant Findings / Test Results:   CT A/P - Findings suggesting mild enterocolitis  No bowel obstruction  Incidental Findings:   · None    Test Results Pending at Discharge (will require follow up): · Stool PCR for enteric panel and C  Difficile  · MRSA culture      Outpatient Tests Requested:  · None    Complications:  None    Reason for Admission: Diarrhea concerning for possible C  difficile    Hospital Course:   Minal Lu is a 80 y o  female patient who originally presented to the hospital on 2/10/2023 due to diarrhea and recent sick contact at living facility  Patient has had no further diarrhea since admission and remained hemodynamically stable throughout hospitalization  CTA abdomen pelvis on admission showed mild enterocolitis but otherwise no acute abnormality noted  Given that the patient is medically stable with diarrhea resolved, she will be discharged home today  Patient and her daughter were made aware of the discharge and agree with the plan  Please see above list of diagnoses and related plan for additional information  Condition at Discharge: good    Discharge Day Visit / Exam:   * Please refer to separate progress note for these details *    Discussion with Family: Updated  (daughter) at bedside  Discharge instructions/Information to patient and family:   See after visit summary for information provided to patient and family        Provisions for Follow-Up Care:  See after visit summary for information related to follow-up care and any pertinent home health orders  Disposition:   Other: Prior independent living facility    Planned Readmission: No    Discharge Medications:  See after visit summary for reconciled discharge medications provided to patient and/or family        **Please Note: This note may have been constructed using a voice recognition system**

## 2023-02-13 LAB
C DIFF TOX GENS STL QL NAA+PROBE: NEGATIVE
CAMPYLOBACTER DNA SPEC NAA+PROBE: NORMAL
SALMONELLA DNA SPEC QL NAA+PROBE: NORMAL
SHIGA TOXIN STX GENE SPEC NAA+PROBE: NORMAL
SHIGELLA DNA SPEC QL NAA+PROBE: NORMAL

## 2023-02-14 ENCOUNTER — TELEPHONE (OUTPATIENT)
Dept: INTERNAL MEDICINE CLINIC | Facility: CLINIC | Age: 88
End: 2023-02-14

## 2023-02-14 ENCOUNTER — TRANSITIONAL CARE MANAGEMENT (OUTPATIENT)
Dept: INTERNAL MEDICINE CLINIC | Facility: CLINIC | Age: 88
End: 2023-02-14

## 2023-02-22 ENCOUNTER — OFFICE VISIT (OUTPATIENT)
Dept: INTERNAL MEDICINE CLINIC | Facility: CLINIC | Age: 88
End: 2023-02-22

## 2023-02-22 VITALS
TEMPERATURE: 97.2 F | BODY MASS INDEX: 29.27 KG/M2 | WEIGHT: 155 LBS | OXYGEN SATURATION: 97 % | DIASTOLIC BLOOD PRESSURE: 82 MMHG | SYSTOLIC BLOOD PRESSURE: 128 MMHG | HEART RATE: 65 BPM | HEIGHT: 61 IN

## 2023-02-22 DIAGNOSIS — I48.0 PAROXYSMAL ATRIAL FIBRILLATION (HCC): ICD-10-CM

## 2023-02-22 DIAGNOSIS — K52.9 ENTEROCOLITIS: Primary | ICD-10-CM

## 2023-02-22 DIAGNOSIS — I10 ESSENTIAL HYPERTENSION: ICD-10-CM

## 2023-02-22 DIAGNOSIS — K21.9 GASTROESOPHAGEAL REFLUX DISEASE, UNSPECIFIED WHETHER ESOPHAGITIS PRESENT: ICD-10-CM

## 2023-02-22 DIAGNOSIS — N18.2 STAGE 2 CHRONIC KIDNEY DISEASE: ICD-10-CM

## 2023-02-22 DIAGNOSIS — R55 SYNCOPE, UNSPECIFIED SYNCOPE TYPE: ICD-10-CM

## 2023-02-22 DIAGNOSIS — F41.9 ANXIETY: ICD-10-CM

## 2023-02-22 DIAGNOSIS — J30.1 SEASONAL ALLERGIC RHINITIS DUE TO POLLEN: ICD-10-CM

## 2023-02-22 DIAGNOSIS — M81.0 AGE-RELATED OSTEOPOROSIS WITHOUT CURRENT PATHOLOGICAL FRACTURE: ICD-10-CM

## 2023-02-22 DIAGNOSIS — E03.9 ACQUIRED HYPOTHYROIDISM: ICD-10-CM

## 2023-02-22 PROBLEM — R41.0 CONFUSION: Status: RESOLVED | Noted: 2023-02-10 | Resolved: 2023-02-22

## 2023-02-22 PROBLEM — E87.5 HYPERKALEMIA: Status: RESOLVED | Noted: 2023-02-10 | Resolved: 2023-02-22

## 2023-02-22 RX ORDER — LEVOTHYROXINE SODIUM 0.15 MG/1
TABLET ORAL
Qty: 80 TABLET | Refills: 1 | Status: SHIPPED | OUTPATIENT
Start: 2023-02-22

## 2023-02-22 RX ORDER — AMLODIPINE BESYLATE 2.5 MG/1
2.5 TABLET ORAL 2 TIMES DAILY
Qty: 180 TABLET | Refills: 1 | Status: SHIPPED | OUTPATIENT
Start: 2023-02-22

## 2023-02-22 RX ORDER — METOPROLOL SUCCINATE 25 MG/1
TABLET, EXTENDED RELEASE ORAL
Qty: 45 TABLET | Refills: 1 | Status: SHIPPED | OUTPATIENT
Start: 2023-02-22

## 2023-02-22 RX ORDER — ESCITALOPRAM OXALATE 5 MG/1
5 TABLET ORAL DAILY
Qty: 90 TABLET | Refills: 1 | Status: SHIPPED | OUTPATIENT
Start: 2023-02-22

## 2023-02-22 NOTE — ASSESSMENT & PLAN NOTE
Lab Results   Component Value Date    EGFR 75 02/11/2023    EGFR 73 02/10/2023    EGFR 55 09/24/2022    CREATININE 0 64 02/11/2023    CREATININE 0 68 02/10/2023    CREATININE 0 88 09/24/2022     Stable

## 2023-02-22 NOTE — PROGRESS NOTES
Assessment & Plan     1  Enterocolitis  Comments:  Resolved  Good oral intake, good appetite  Normal bowel movements  2  Essential hypertension  Assessment & Plan:  BP stable, on metoprolol and amlodipine  Orders:  -     amLODIPine (NORVASC) 2 5 mg tablet; Take 1 tablet (2 5 mg total) by mouth 2 (two) times a day    3  Acquired hypothyroidism  Assessment & Plan:  Repeat labs due  Orders:  -     levothyroxine (Synthroid) 150 mcg tablet; Take 1 tab PO 5 days a week    4  Anxiety  Assessment & Plan:  Continue Lexapro  Monitor sleep  Orders:  -     escitalopram (LEXAPRO) 5 mg tablet; Take 1 tablet (5 mg total) by mouth daily    5  Syncope, unspecified syncope type  -     metoprolol succinate (Toprol XL) 25 mg 24 hr tablet; TAKE ONE-HALF TABLET BY MOUTH EVERY DAY    6  Gastroesophageal reflux disease, unspecified whether esophagitis present  Assessment & Plan:  Continue famotidine prn       7  Stage 2 chronic kidney disease  Assessment & Plan:  Lab Results   Component Value Date    EGFR 75 02/11/2023    EGFR 73 02/10/2023    EGFR 55 09/24/2022    CREATININE 0 64 02/11/2023    CREATININE 0 68 02/10/2023    CREATININE 0 88 09/24/2022     Stable  8  Paroxysmal atrial fibrillation (HCC)  Assessment & Plan:  No recent symptoms  On metoprolol  9  Age-related osteoporosis without current pathological fracture  Assessment & Plan:  Continue daily walks  10  Seasonal allergic rhinitis due to pollen        Follow up in 6 months or as needed  Subjective     Transitional Care Management Review:   Gera Garcia is a 80 y o  female here for TCM follow up       During the TCM phone call patient stated:  TCM Call     Date and time call was made  2/14/2023  2:40 PM    Hospital care reviewed  Records reviewed    Patient was hospitialized at  [address] [place] [address]    Date of Admission  02/10/23    Date of discharge  02/12/23    Diagnosis  Diarrhea    Disposition  Home    Were the patients medications reviewed and updated  Yes    Current Symptoms  None      TCM Call     Post hospital issues  None    Should patient be enrolled in anticoag monitoring? No    Scheduled for follow up? Yes    Patient refusal reason  following up with cardiology and gastroenterology     Did you obtain your prescribed medications  Yes    Do you need help managing your prescriptions or medications  No    Is transportation to your appointment needed  No    I have advised the patient to call PCP with any new or worsening symptoms  [address]        She is here with her daughter today  She is feeling well, no complaints  She stopped taking her fiber supplements since her hospital discharge  She thinks she moves her bowels almost daily, does not really remember  Denies any abdominal pain or leakage  Daughter reports that she would find some stool in her depends sometimes  She admits not drinking a lot of fluids, about 30 ounces a day only which consists of Gatorade, tea and cranberry juice  She reports occasional palpitations, denies any chest pain or chest pressure  Her daughter reports darted physical and occupational therapy to help with her balance  She thinks she fell and hurt her right wrist a few weeks ago  She had found a bruise in the area and she was complaining of pain  X-ray did not show any fracture  Review of Systems   Constitutional: Negative for activity change and appetite change  HENT: Negative for congestion  Respiratory: Negative for cough and shortness of breath  Cardiovascular: Positive for palpitations  Negative for chest pain  Gastrointestinal: Negative for abdominal pain, constipation, diarrhea and nausea  Genitourinary: Negative for dysuria  Musculoskeletal: Positive for gait problem  Neurological: Negative for dizziness and headaches  Psychiatric/Behavioral: Negative for confusion and sleep disturbance         Objective     /82   Pulse 65   Temp (!) 97 2 °F (36 2 °C)   Ht 5' 1" (1 549 m)   Wt 70 3 kg (155 lb)   LMP  (LMP Unknown)   SpO2 97%   BMI 29 29 kg/m²      Physical Exam  Vitals and nursing note reviewed  Constitutional:       General: She is not in acute distress  Appearance: Normal appearance  She is well-developed  HENT:      Head: Normocephalic and atraumatic  Eyes:      Conjunctiva/sclera: Conjunctivae normal    Cardiovascular:      Rate and Rhythm: Normal rate and regular rhythm  Heart sounds: No murmur heard  Pulmonary:      Effort: Pulmonary effort is normal  No respiratory distress  Breath sounds: Normal breath sounds  Abdominal:      Palpations: Abdomen is soft  Tenderness: There is no abdominal tenderness  Musculoskeletal:         General: No swelling  Cervical back: Neck supple  Skin:     General: Skin is warm and dry  Neurological:      General: No focal deficit present  Mental Status: She is alert  Psychiatric:         Mood and Affect: Mood normal          Behavior: Behavior normal        Medications have been reviewed by provider in current encounter    Beatrice Herrmann MD     Labs & imaging results reviewed with patient

## 2023-02-22 NOTE — PROGRESS NOTES
Assessment/Plan:    Essential hypertension  BP stable, on metoprolol and amlodipine  Paroxysmal atrial fibrillation (HCC)  No recent symptoms  On metoprolol  GERD (gastroesophageal reflux disease)  Continue famotidine prn  Diagnoses and all orders for this visit:    Enterocolitis  Comments:  Resolved  Good oral intake, good appetite  Normal bowel movements  Essential hypertension  -     amLODIPine (NORVASC) 2 5 mg tablet; Take 1 tablet (2 5 mg total) by mouth 2 (two) times a day    Syncope, unspecified syncope type  -     metoprolol succinate (Toprol XL) 25 mg 24 hr tablet; TAKE ONE-HALF TABLET BY MOUTH EVERY DAY    Acquired hypothyroidism  -     levothyroxine (Synthroid) 150 mcg tablet; Take 1 tab PO 5 days a week    Seasonal allergic rhinitis due to pollen    Anxiety  -     escitalopram (LEXAPRO) 5 mg tablet; Take 1 tablet (5 mg total) by mouth daily        Subjective:      Patient ID: Pablo De La O is a 80 y o  female  She is feeling better    She had stopped fiber    weigt ok    Ot/pt     Right hand bruise, may have fell      The following portions of the patient's history were reviewed and updated as appropriate: allergies, current medications, past medical history, past social history and problem list     Review of Systems      Objective:      /82   Pulse 65   Temp (!) 97 2 °F (36 2 °C)   Ht 5' 1" (1 549 m)   Wt 70 3 kg (155 lb)   LMP  (LMP Unknown)   SpO2 97%   BMI 29 29 kg/m²          Physical Exam

## 2023-03-15 ENCOUNTER — OFFICE VISIT (OUTPATIENT)
Dept: CARDIOLOGY CLINIC | Facility: CLINIC | Age: 88
End: 2023-03-15

## 2023-03-15 VITALS
WEIGHT: 149.8 LBS | BODY MASS INDEX: 28.3 KG/M2 | SYSTOLIC BLOOD PRESSURE: 106 MMHG | HEART RATE: 132 BPM | DIASTOLIC BLOOD PRESSURE: 58 MMHG | OXYGEN SATURATION: 96 %

## 2023-03-15 DIAGNOSIS — Z90.12 S/P LEFT MASTECTOMY: ICD-10-CM

## 2023-03-15 DIAGNOSIS — R55 SYNCOPE, UNSPECIFIED SYNCOPE TYPE: ICD-10-CM

## 2023-03-15 DIAGNOSIS — I48.92 ATRIAL FLUTTER, UNSPECIFIED TYPE (HCC): Primary | ICD-10-CM

## 2023-03-15 DIAGNOSIS — I48.0 PAROXYSMAL ATRIAL FIBRILLATION (HCC): ICD-10-CM
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
## 2023-03-16 ENCOUNTER — TELEPHONE (OUTPATIENT)
Dept: INTERNAL MEDICINE CLINIC | Facility: CLINIC | Age: 88
End: 2023-03-16

## 2023-03-16 ENCOUNTER — APPOINTMENT (OUTPATIENT)
Dept: RADIOLOGY | Age: 88
End: 2023-03-16

## 2023-03-16 DIAGNOSIS — M54.50 ACUTE BILATERAL LOW BACK PAIN WITHOUT SCIATICA: Primary | ICD-10-CM

## 2023-03-16 DIAGNOSIS — M54.50 ACUTE BILATERAL LOW BACK PAIN WITHOUT SCIATICA: ICD-10-CM

## 2023-03-16 RX ORDER — TIZANIDINE 2 MG/1
1 TABLET ORAL 2 TIMES DAILY PRN
Qty: 10 TABLET | Refills: 0 | Status: SHIPPED | OUTPATIENT
Start: 2023-03-16 | End: 2023-03-20

## 2023-03-16 NOTE — TELEPHONE ENCOUNTER
Pt  fell on Tuesday  Son, who is a Doctor, was with her and examined her  She is okay except she is c/o lower back pain  Wondering if she could get an xray done of lumbar spine  She doesn't have any bruises

## 2023-03-16 NOTE — TELEPHONE ENCOUNTER
Please give her Tylenol 3 times a day, apply heat often  We can try a muscle relaxant if you'd like, but might make her drowsy

## 2023-03-16 NOTE — TELEPHONE ENCOUNTER
Bianca Mendoza stated mary is having a lot of pain from the fall  They might take a break from physical therapy while she recovers

## 2023-03-16 NOTE — TELEPHONE ENCOUNTER
X-ray did not show any fracture, no new findings  She can apply warm moist heat if she'd like, take Tylenol for pain  We can add the back for physical therapy  Let me know

## 2023-03-16 NOTE — TELEPHONE ENCOUNTER
Deshawn Miller notified  Please send muscle relaxer to [facility] either one in chart - just let her know which one - she wants to have incase mom needs it but is gong to try and not give it to her

## 2023-03-20 ENCOUNTER — APPOINTMENT (EMERGENCY)
Dept: CT IMAGING | Facility: HOSPITAL | Age: 88
End: 2023-03-20

## 2023-03-20 ENCOUNTER — HOSPITAL ENCOUNTER (INPATIENT)
Facility: HOSPITAL | Age: 88
LOS: 3 days | Discharge: NURSING, REHAB OR LONG-TERM CARE | End: 2023-03-23
Attending: EMERGENCY MEDICINE | Admitting: EMERGENCY MEDICINE

## 2023-03-20 ENCOUNTER — APPOINTMENT (INPATIENT)
Dept: RADIOLOGY | Facility: HOSPITAL | Age: 88
End: 2023-03-20

## 2023-03-20 ENCOUNTER — APPOINTMENT (EMERGENCY)
Dept: RADIOLOGY | Facility: HOSPITAL | Age: 88
End: 2023-03-20

## 2023-03-20 DIAGNOSIS — S22.080A COMPRESSION FRACTURE OF T12 VERTEBRA, INITIAL ENCOUNTER (HCC): ICD-10-CM

## 2023-03-20 DIAGNOSIS — R10.84 GENERALIZED ABDOMINAL PAIN: ICD-10-CM
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
## 2023-03-20 LAB
ALBUMIN SERPL BCP-MCNC: 3.8 G/DL (ref 3.5–5)
ALP SERPL-CCNC: 71 U/L (ref 34–104)
ALT SERPL W P-5'-P-CCNC: 15 U/L (ref 7–52)
ANION GAP SERPL CALCULATED.3IONS-SCNC: 10 MMOL/L (ref 4–13)
AST SERPL W P-5'-P-CCNC: 16 U/L (ref 13–39)
ATRIAL RATE: 267 BPM
BASOPHILS # BLD AUTO: 0.04 THOUSANDS/ÂΜL (ref 0–0.1)
BASOPHILS NFR BLD AUTO: 0 % (ref 0–1)
BILIRUB SERPL-MCNC: 0.85 MG/DL (ref 0.2–1)
BUN SERPL-MCNC: 18 MG/DL (ref 5–25)
CALCIUM SERPL-MCNC: 9 MG/DL (ref 8.4–10.2)
CHLORIDE SERPL-SCNC: 98 MMOL/L (ref 96–108)
CO2 SERPL-SCNC: 28 MMOL/L (ref 21–32)
CREAT SERPL-MCNC: 0.65 MG/DL (ref 0.6–1.3)
EOSINOPHIL # BLD AUTO: 0.11 THOUSAND/ÂΜL (ref 0–0.61)
EOSINOPHIL NFR BLD AUTO: 1 % (ref 0–6)
ERYTHROCYTE [DISTWIDTH] IN BLOOD BY AUTOMATED COUNT: 13.8 % (ref 11.6–15.1)
GFR SERPL CREATININE-BSD FRML MDRD: 74 ML/MIN/1.73SQ M
GLUCOSE SERPL-MCNC: 131 MG/DL (ref 65–140)
HCT VFR BLD AUTO: 45.3 % (ref 34.8–46.1)
HGB BLD-MCNC: 14.9 G/DL (ref 11.5–15.4)
IMM GRANULOCYTES # BLD AUTO: 0.06 THOUSAND/UL (ref 0–0.2)
IMM GRANULOCYTES NFR BLD AUTO: 1 % (ref 0–2)
LIPASE SERPL-CCNC: 9 U/L (ref 11–82)
LYMPHOCYTES # BLD AUTO: 1.97 THOUSANDS/ÂΜL (ref 0.6–4.47)
LYMPHOCYTES NFR BLD AUTO: 20 % (ref 14–44)
MCH RBC QN AUTO: 29.2 PG (ref 26.8–34.3)
MCHC RBC AUTO-ENTMCNC: 32.9 G/DL (ref 31.4–37.4)
MCV RBC AUTO: 89 FL (ref 82–98)
MONOCYTES # BLD AUTO: 1.2 THOUSAND/ÂΜL (ref 0.17–1.22)
MONOCYTES NFR BLD AUTO: 12 % (ref 4–12)
NEUTROPHILS # BLD AUTO: 6.53 THOUSANDS/ÂΜL (ref 1.85–7.62)
NEUTS SEG NFR BLD AUTO: 66 % (ref 43–75)
NRBC BLD AUTO-RTO: 0 /100 WBCS
PLATELET # BLD AUTO: 289 THOUSANDS/UL (ref 149–390)
PMV BLD AUTO: 10.1 FL (ref 8.9–12.7)
POTASSIUM SERPL-SCNC: 3.5 MMOL/L (ref 3.5–5.3)
PROT SERPL-MCNC: 7.8 G/DL (ref 6.4–8.4)
QRS AXIS: -59 DEGREES
QRSD INTERVAL: 98 MS
QT INTERVAL: 408 MS
QTC INTERVAL: 493 MS
RBC # BLD AUTO: 5.1 MILLION/UL (ref 3.81–5.12)
SODIUM SERPL-SCNC: 136 MMOL/L (ref 135–147)
T WAVE AXIS: 67 DEGREES
VENTRICULAR RATE: 88 BPM
WBC # BLD AUTO: 9.91 THOUSAND/UL (ref 4.31–10.16)

## 2023-03-20 RX ORDER — MINERAL OIL AND PETROLATUM 150; 830 MG/G; MG/G
OINTMENT OPHTHALMIC 2 TIMES DAILY PRN
Status: DISCONTINUED | OUTPATIENT
Start: 2023-03-20 | End: 2023-03-23 | Stop reason: HOSPADM

## 2023-03-20 RX ORDER — LEVOTHYROXINE SODIUM 0.15 MG/1
150 TABLET ORAL
Status: DISCONTINUED | OUTPATIENT
Start: 2023-03-21 | End: 2023-03-23 | Stop reason: HOSPADM

## 2023-03-20 RX ORDER — SODIUM CHLORIDE, SODIUM LACTATE, POTASSIUM CHLORIDE, CALCIUM CHLORIDE 600; 310; 30; 20 MG/100ML; MG/100ML; MG/100ML; MG/100ML
100 INJECTION, SOLUTION INTRAVENOUS CONTINUOUS
Status: DISCONTINUED | OUTPATIENT
Start: 2023-03-20 | End: 2023-03-20

## 2023-03-20 RX ORDER — MELATONIN
2000 DAILY
Status: DISCONTINUED | OUTPATIENT
Start: 2023-03-20 | End: 2023-03-23 | Stop reason: HOSPADM

## 2023-03-20 RX ORDER — METOPROLOL SUCCINATE 25 MG/1
12.5 TABLET, EXTENDED RELEASE ORAL DAILY
Status: DISCONTINUED | OUTPATIENT
Start: 2023-03-21 | End: 2023-03-23 | Stop reason: HOSPADM

## 2023-03-20 RX ORDER — AMOXICILLIN 250 MG
1 CAPSULE ORAL
Status: DISCONTINUED | OUTPATIENT
Start: 2023-03-20 | End: 2023-03-23 | Stop reason: HOSPADM

## 2023-03-20 RX ORDER — OXYCODONE HYDROCHLORIDE 5 MG/1
5 TABLET ORAL EVERY 4 HOURS PRN
Status: DISCONTINUED | OUTPATIENT
Start: 2023-03-20 | End: 2023-03-23 | Stop reason: HOSPADM

## 2023-03-20 RX ORDER — ENOXAPARIN SODIUM 100 MG/ML
30 INJECTION SUBCUTANEOUS EVERY 12 HOURS
Status: DISCONTINUED | OUTPATIENT
Start: 2023-03-20 | End: 2023-03-23 | Stop reason: HOSPADM

## 2023-03-20 RX ORDER — AMLODIPINE BESYLATE 2.5 MG/1
2.5 TABLET ORAL ONCE
Status: COMPLETED | OUTPATIENT
Start: 2023-03-20 | End: 2023-03-20

## 2023-03-20 RX ORDER — GABAPENTIN 100 MG/1
100 CAPSULE ORAL
Status: DISCONTINUED | OUTPATIENT
Start: 2023-03-20 | End: 2023-03-23 | Stop reason: HOSPADM

## 2023-03-20 RX ORDER — ACETAMINOPHEN 325 MG/1
975 TABLET ORAL EVERY 8 HOURS SCHEDULED
Status: DISCONTINUED | OUTPATIENT
Start: 2023-03-20 | End: 2023-03-23 | Stop reason: HOSPADM

## 2023-03-20 RX ORDER — ESCITALOPRAM OXALATE 10 MG/1
5 TABLET ORAL DAILY
Status: DISCONTINUED | OUTPATIENT
Start: 2023-03-21 | End: 2023-03-23 | Stop reason: HOSPADM

## 2023-03-20 RX ORDER — METHOCARBAMOL 500 MG/1
250 TABLET, FILM COATED ORAL EVERY 6 HOURS PRN
Status: DISCONTINUED | OUTPATIENT
Start: 2023-03-20 | End: 2023-03-23 | Stop reason: HOSPADM

## 2023-03-20 RX ORDER — AMLODIPINE BESYLATE 2.5 MG/1
2.5 TABLET ORAL 2 TIMES DAILY
Status: DISCONTINUED | OUTPATIENT
Start: 2023-03-21 | End: 2023-03-23 | Stop reason: HOSPADM

## 2023-03-20 RX ORDER — LIDOCAINE 50 MG/G
2 PATCH TOPICAL ONCE
Status: COMPLETED | OUTPATIENT
Start: 2023-03-20 | End: 2023-03-20

## 2023-03-20 RX ORDER — ONDANSETRON 2 MG/ML
4 INJECTION INTRAMUSCULAR; INTRAVENOUS EVERY 4 HOURS PRN
Status: DISCONTINUED | OUTPATIENT
Start: 2023-03-20 | End: 2023-03-23 | Stop reason: HOSPADM

## 2023-03-20 RX ORDER — HYDROMORPHONE HCL IN WATER/PF 6 MG/30 ML
0.2 PATIENT CONTROLLED ANALGESIA SYRINGE INTRAVENOUS EVERY 4 HOURS PRN
Status: DISCONTINUED | OUTPATIENT
Start: 2023-03-20 | End: 2023-03-23 | Stop reason: HOSPADM

## 2023-03-20 RX ORDER — LIDOCAINE 50 MG/G
1 PATCH TOPICAL DAILY
Status: DISCONTINUED | OUTPATIENT
Start: 2023-03-21 | End: 2023-03-23 | Stop reason: HOSPADM

## 2023-03-20 RX ORDER — ACETAMINOPHEN 325 MG/1
650 TABLET ORAL ONCE
Status: COMPLETED | OUTPATIENT
Start: 2023-03-20 | End: 2023-03-20

## 2023-03-20 RX ADMIN — GABAPENTIN 100 MG: 100 CAPSULE ORAL at 21:38

## 2023-03-20 RX ADMIN — Medication 2000 UNITS: at 13:29

## 2023-03-20 RX ADMIN — MULTIPLE VITAMINS W/ MINERALS TAB 1 TABLET: TAB ORAL at 13:29

## 2023-03-20 RX ADMIN — SODIUM CHLORIDE, SODIUM LACTATE, POTASSIUM CHLORIDE, AND CALCIUM CHLORIDE 100 ML/HR: .6; .31; .03; .02 INJECTION, SOLUTION INTRAVENOUS at 11:12

## 2023-03-20 RX ADMIN — AMLODIPINE BESYLATE 2.5 MG: 2.5 TABLET ORAL at 13:03

## 2023-03-20 RX ADMIN — LIDOCAINE 2 PATCH: 50 PATCH TOPICAL at 10:05

## 2023-03-20 RX ADMIN — ACETAMINOPHEN 650 MG: 325 TABLET ORAL at 10:03

## 2023-03-20 RX ADMIN — ENOXAPARIN SODIUM 30 MG: 30 INJECTION SUBCUTANEOUS at 13:32

## 2023-03-20 RX ADMIN — ACETAMINOPHEN 975 MG: 325 TABLET ORAL at 17:15

## 2023-03-20 RX ADMIN — OXYCODONE HYDROCHLORIDE 5 MG: 5 TABLET ORAL at 13:31

## 2023-03-20 RX ADMIN — SENNOSIDES AND DOCUSATE SODIUM 1 TABLET: 8.6; 5 TABLET ORAL at 21:38

## 2023-03-20 NOTE — ED PROCEDURE NOTE
PROCEDURE  ECG 12 Lead Documentation Only    Date/Time: 3/20/2023 10:48 AM  Performed by: Consuelo Payne MD  Authorized by: Consuelo Payne MD     Indications / Diagnosis:  Fall/ sob   ECG reviewed by me, the ED Provider: yes    Patient location:  ED and bedside  Previous ECG:     Previous ECG:  Compared to current    Comparison ECG info:  2/10/23- no sign changes    Similarity:  No change    Comparison to cardiac monitor: Yes    Interpretation:     Interpretation: non-specific    Rate:     ECG rate:  88    ECG rate assessment: normal    Rhythm:     Rhythm: atrial flutter    Ectopy:     Ectopy: none    QRS:     QRS axis:  Left    QRS intervals:  Normal  Conduction:     Conduction: normal    ST segments:     ST segments:  Normal  T waves:     T waves: flattening      Flattening:  AVL and V1  Q waves:     Q waves:  III, V1, V2, V3 and V4  Other findings:     Other findings: poor R wave progression and prolonged qTc interval    Comments:      - no ecg signs of ischemia/ injury          Consuelo Payne MD  03/20/23 1058
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
## 2023-03-20 NOTE — ASSESSMENT & PLAN NOTE
· Secondary to fall  · CT imaging showed: There is an acute moderate compression fracture of the T12 vertebral body with approximate 5 mm bony retropulsion into the canal causing moderate central canal stenosis  This is new since 2/10/2023    · Neurosurgery consulted  · TLSO  · Upright imaging  · Multimodal pain regimen  · PT/OT

## 2023-03-20 NOTE — ASSESSMENT & PLAN NOTE
· Patient describes falling approximately 6 days prior to admission  Since the fall she has had worsening back pain    · Injuries as stated below  · PT/OT

## 2023-03-20 NOTE — PLAN OF CARE
Problem: PHYSICAL THERAPY ADULT  Goal: Performs mobility at highest level of function for planned discharge setting  See evaluation for individualized goals  Description: Treatment/Interventions: Functional transfer training, LE strengthening/ROM, Therapeutic exercise, Endurance training, Patient/family training, Equipment eval/education, Bed mobility, Gait training, Spoke to nursing  Equipment Recommended: Merlin Sovereign       See flowsheet documentation for full assessment, interventions and recommendations  Note: Prognosis: Fair  Problem List: Decreased endurance, Impaired balance, Decreased mobility, Pain, Decreased coordination (? cognition, gait deviations, fear/retreat)  Assessment: Assessment:   Pt is a 80 y o  female seen for PT evaluation s/p admit to [facility] on 3/20/2023 w/increased back pain s/p fall 6 days prior, + compression fx  Order placed for PT and TLSO fitted as above  Prior to admission: Pt lived alone in a second floor apartment with elevator access, used rolling walker for mobility  Patient states that her daughter frequently assisted her with ADLs, and meals were brought to her room  Upon evaluation: Pt requires substantial assistance of 1 for bed mobility, min assist for transfers and pre-gait activities using a rolling walker         Pt's clinical presentation is currently unstable/unpredictable given the functional mobility deficits above, especially (but not limited to) gait deviations, pain and orthopedic restrictions, coupled with fall risks including hx of falls and impaired balance, and combined with medical complications of fear/retreat and A-flutter per telemetry monitor  During this admission, pt would benefit from continued skilled inpatient PT in the acute care setting in order to address deficits as defined above to maximize function and mobility        Recommendations:    From a PT standpoint, recommend focus on continued mobility with further education on minimizing bending lifting twisting with spinal precautions, education on using brace for functional mobility, using rolling walker for ambulation trials  Nursing Recommendations:   Based on patient's [facility] Level of Mobility scores today, patient currently has a goal of -[facility] Levels: 4: MOVE TO CHAIR/COMMODE, to be completed with nursing  At this time recommend assist of 1 using rolling walker for mobility tasks of bed to chair/commode, patient requires TLSO per neurosurgery recommendations  Barriers to Discharge: Decreased caregiver support (home alone)  Barriers to Discharge Comments: Co-morbidities affecting pt's physical performance at time of assessment include: AAA, arthritis, breast cancer, fainting, hypertension, macular degeneration, vertigo  Personal factors affecting pt at time of IE include: limited home support, advanced age, past experience, inability to perform IADLs, inability to perform ADLs, inability to navigate community distances and recent fall(s)  PT Discharge Recommendation: (S) Post acute rehabilitation services (Pt would benefit from rehab to promote more I LOF w/mobility while educating patient on use of TLSO brace  However, patient may make mobility progress and if family can provide Ax 1 care upon DC +HHPT she may be able to progress to home with support)    See flowsheet documentation for full assessment

## 2023-03-20 NOTE — PHYSICAL THERAPY NOTE
PHYSICAL THERAPY EVALUATION  NAME:  Deisy Listen  DATE: 03/20/23    AGE:   80 y o  Mrn:   [id number]  Principal problem: Active Problems:    Essential hypertension    Acquired hypothyroidism    Atrial flutter (HCC)    Anxiety    Fall    T12 compression fracture (HCC)      Vitals:    03/20/23 1100 03/20/23 1303 03/20/23 1500 03/20/23 1627   BP: (!) 171/82 145/89  153/80   BP Location: Right arm      Pulse: 69   63   Resp:    17   Temp:    97 9 °F (36 6 °C)   TempSrc:    Oral   SpO2: 95%  94% 90%   Weight:    65 8 kg (145 lb 1 oz)   Height:    5' 1" (1 549 m)       Length Of Stay: 0  Performed at least 2 patient identifiers during session: Name and Birthday  PHYSICAL THERAPY EVALUATION :    03/20/23 1413   PT Last Visit   PT Visit Date 03/20/23   Note Type   Note type Evaluation;Orthotic Management/Training   Type of Brace   Brace [employer] TLSO  (backpack between size 3 1/2 to 4)   Additional Brace Ordered No   Patient Position When Brace Applied Seated   Bracing Recommendations Recommendation (comment)  (Pt measured and fit for TLSO backpack brace while patient in sitting, size 3 5-4 @ sides  Pt requires maxAfor donning /doffing brace at this time, substantial verbal instruction after staff demonstration )    Uncertain if pt could get to indep don/doffing of brace upon DC w/TLSO backpack  Education   Education Provided Yes; Other (comment)  (Pt verbally agreed to brace but preferred to not sign paper due to pain and discomfort in stretcher in ED  Pt verbalized fair understanding  of donning/doffing brace, when to wear brace, monitoring skin for irritation  Written handout provided )   End of Consult   Patient Position at End of Consult Supine; All needs within reach  (rails up @ stretcher)   Nurse Communication Nurse aware of consult, application of brace  (Marcelle Sanchez RN and spoke to kehinde Degroot;  Pt verbalized fair understanding  of donning/doffing brace, when to wear brace, monitoring skin for irritation  RN aware  )   Pain Assessment   Pain Assessment Tool 0-10   Pain Score 8   Pain Location/Orientation Location: Back  (more low back as opposed to belt line)   Pain Radiating Towards 0   Effect of Pain on Daily Activities limits flexion>extension, sitting tolerance, transitional movement indep and speed   Patient's Stated Pain Goal No pain   Hospital Pain Intervention(s) Repositioned; Ambulation/increased activity; Emotional support  (TLSO brace)   Restrictions/Precautions   Weight Bearing Precautions Per Order No   Braces or Orthoses TLSO   Other Precautions Bed Alarm; Chair Alarm;Spinal precautions;Pain; Fall Risk;Telemetry  (reports of syncope in chart)   [facility]  (Second floor @ [facility])   Home Layout One level;Elevator  (no SARAH building)   Home Equipment Walker   Prior Function   Level of [place] Independent with ADLs; Independent with functional mobility   Lives With Alone   Receives Help From Family  (Per Pt, [place]  is there every day per pt x 4 hours)   IADLs Family/Friend/Other provides transportation; Family/Friend/Other provides meals; Family/Friend/Other provides medication management  (tomy RIZZO with meds, meals are sent up)   Falls in the last 6 months 1 to 4  (pt reports only one fall at son's house about 1 week ago)   General   Additional Pertinent History (S)  Contacted by nursing as pt has TLSO brace ordered  Pt also w/ PT consult  NS reports L5 compression fx @ impression, but films and body of note state that pt has moderate compression fracture of the T12 vertebral body with 5 mm bony retropulsion into the canal causing moderate central canal stenosis  Chronic mild loss of height of L2, and mild anterior wedging of the T4 vertebral body-age-indeterminate     Family/Caregiver Present No   Cognition   Arousal/Participation Alert   Orientation Level Oriented to person;Oriented to place;Oriented to time;Oriented to situation   Memory Decreased recall of recent events;Decreased short term memory   Following Commands Follows one step commands with increased time or repetition  (w/ MMT)   Subjective   Subjective Pt pleasant and cooperative, denies any lightheadedness w/ change of position, despite + afib on telemetry monitor   RUE Assessment   RUE Assessment WFL   LUE Assessment   LUE Assessment WFL   RLE Assessment   RLE Assessment   (-SLR)   Strength RLE   R Hip Flexion 4-/5   R Knee Extension 4-/5   R Ankle Dorsiflexion 4/5   LLE Assessment   LLE Assessment   (-SLR)   Strength LLE   L Hip Flexion 4-/5   L Knee Extension 4-/5   L Ankle Dorsiflexion 4/5   Vision-Basic Assessment   Current Vision Does not wear glasses   Coordination   Movements are Fluid and Coordinated 0   Coordination and Movement Description antalgic   Light Touch   RLE Light Touch Grossly intact   LLE Light Touch Grossly intact   Bed Mobility   Supine to Sit 3  Moderate assistance   Additional items Assist x 1;HOB elevated; Bedrails; Increased time required;Verbal cues   Sit to Supine 3  Moderate assistance   Additional items Assist x 1; Increased time required;Verbal cues; Bedrails   Transfers   Sit to Stand 4  Minimal assistance   Additional items Assist x 1; Increased time required;Armrests; Verbal cues   Stand to Sit 4  Minimal assistance   Additional items Assist x 1; Increased time required;Verbal cues;Armrests   Ambulation/Elevation   Gait pattern Antalgic; Excessively slow; Short stride; Step to   Gait Assistance 4  Minimal assist   Additional items Assist x 1;Verbal cues   Assistive Device Rolling walker   Distance 1' forward and backward   Stair Management Assistance Not tested   Balance   Static Sitting Fair  (but limited due to stretcher height)   Static Standing Poor +   Ambulatory Poor +   Endurance Deficit   Endurance Deficit Yes   Endurance Deficit Description limited standing tolerance   HR into 120's w/ upright positioning and amb   Activity Tolerance   Activity Tolerance Patient limited by pain; Patient limited by fatigue   Nurse Made Aware spoke to [place] and GEORGINA Rivera RN       Assessment:   Pt is a 80 y o  female seen for PT evaluation s/p admit to [facility] on 3/20/2023 w/increased back pain s/p fall 6 days prior, + compression fx  Order placed for PT and TLSO fitted as above  Prior to admission: Pt lived alone in a second floor apartment with elevator access, used rolling walker for mobility  Patient states that her daughter frequently assisted her with ADLs, and meals were brought to her room  Upon evaluation: Pt requires substantial assistance of 1 for bed mobility, min assist for transfers and pre-gait activities using a rolling walker         Pt's clinical presentation is currently unstable/unpredictable given the functional mobility deficits above, especially (but not limited to) gait deviations, pain and orthopedic restrictions, coupled with fall risks including hx of falls and impaired balance, and combined with medical complications of fear/retreat and A-flutter per telemetry monitor  During this admission, pt would benefit from continued skilled inpatient PT in the acute care setting in order to address deficits as defined above to maximize function and mobility  Recommendations:    From a PT standpoint, recommend focus on continued mobility with further education on minimizing bending lifting twisting with spinal precautions, education on using brace for functional mobility, using rolling walker for ambulation trials  Nursing Recommendations:   Based on patient's [facility] Level of Mobility scores today, patient currently has a goal of -[facility] Levels: 4: MOVE TO CHAIR/COMMODE, to be completed with nursing  At this time recommend assist of 1 using rolling walker for mobility tasks of bed to chair/commode, patient requires TLSO per neurosurgery recommendations  Prognosis Fair   Problem List Decreased endurance; Impaired balance;Decreased mobility;Pain;Decreased coordination  (? cognition, gait deviations, fear/retreat)   Barriers to Discharge Decreased caregiver support  (home alone)   Barriers to Discharge Comments Co-morbidities affecting pt's physical performance at time of assessment include: AAA, arthritis, breast cancer, fainting, hypertension, macular degeneration, vertigo  Personal factors affecting pt at time of IE include: limited home support, advanced age, past experience, inability to perform IADLs, inability to perform ADLs, inability to navigate community distances and recent fall(s)  Goals   Patient Goals to have less pain   STG Expiration Date 03/30/23   Short Term Goal #1 Pt will: Perform bed mobility tasks with supervision to prepare for transfers and reposition in bed with minimizing twisting  Perform transfers with supervision to improve ease of transfers  Perform ambulation with rolling walker for up to 50 feet with supervision to increase Indep in home environment and decrease risk for falls  Patient to don/doff TLSO brace without physical assistance/verbal instruction to promote compliance with using brace  PT Treatment Day 0   Plan   Treatment/Interventions Functional transfer training;LE strengthening/ROM; Therapeutic exercise; Endurance training;Patient/family training;Equipment eval/education; Bed mobility;Gait training;Spoke to nursing   PT Frequency 4-6x/wk   Recommendation   PT Discharge Recommendation (S)  Post acute rehabilitation services  (Pt would benefit from rehab to promote more I LOF w/mobility while educating patient on use of TLSO brace    However, patient may make mobility progress and if family can provide Ax 1 care upon DC +HHPT she may be able to progress to home with support)   Equipment Recommended Walker   AM-PAC Basic Mobility Inpatient   Turning in Flat Bed Without Bedrails 2   Lying on Back to Sitting on Edge of Flat Bed Without Bedrails 2   Moving Bed to Chair 3   Standing Up From Chair Using Arms 3   Walk in Room 1   Climb 3-5 Stairs With Railing 1   Basic Mobility Inpatient Raw Score 12   Basic Mobility Standardized Score 32 23   Highest Level Of Mobility   -HLM Goal 4: Move to chair/commode   JH-HLM Achieved 5: Stand (1 or more minutes)  (Pt would be able to get to chair if available in ED)   (Please find full objective findings from PT assessment regarding body systems outlined above)  The patient's -[facility] Basic Mobility Inpatient Short Form Raw Score is 12  A Raw score of less than or equal to 16 suggests the patient may benefit from discharge to post-acute rehabilitation services, which DOES coincide with CURRENT above PT recommendations  However please refer to therapist recommendation for discharge planning given other factors that may influence destination  Adapted from [employer] “6-Clicks” Basic Mobility and Daily Activity Scores With Discharge Destination  Physical Therapy, 2021;101:1-9  DOI: [id number]    Portions of the record may have been created with voice recognition software  Occasional wrong word or "sound a like" substitutions may have occurred due to the inherent limitations of voice recognition software    Read the chart carefully and recognize, using context, where substitutions have occurred

## 2023-03-20 NOTE — PLAN OF CARE
Problem: MOBILITY - ADULT  Goal: Maintain or return to baseline ADL function  Description: INTERVENTIONS:  -  Assess patient's ability to carry out ADLs; assess patient's baseline for ADL function and identify physical deficits which impact ability to perform ADLs (bathing, care of mouth/teeth, toileting, grooming, dressing, etc )  - Assess/evaluate cause of self-care deficits   - Assess range of motion  - Assess patient's mobility; develop plan if impaired  - Assess patient's need for assistive devices and provide as appropriate  - Encourage maximum independence but intervene and supervise when necessary  - Involve family in performance of ADLs  - Assess for home care needs following discharge   - Consider OT consult to assist with ADL evaluation and planning for discharge  - Provide patient education as appropriate  Outcome: Progressing  Goal: Maintains/Returns to pre admission functional level  Description: INTERVENTIONS:  - Perform BMAT or MOVE assessment daily    - Set and communicate daily mobility goal to care team and patient/family/caregiver     - Collaborate with rehabilitation services on mobility goals if consulted  - Out of bed for toileting  - Record patient progress and toleration of activity level   Outcome: Progressing     Problem: Prexisting or High Potential for Compromised Skin Integrity  Goal: Skin integrity is maintained or improved  Description: INTERVENTIONS:  - Identify patients at risk for skin breakdown  - Assess and monitor skin integrity  - Assess and monitor nutrition and hydration status  - Monitor labs   - Assess for incontinence   - Turn and reposition patient  - Assist with mobility/ambulation  - Relieve pressure over bony prominences  - Avoid friction and shearing  - Provide appropriate hygiene as needed including keeping skin clean and dry  - Evaluate need for skin moisturizer/barrier cream  - Collaborate with interdisciplinary team   - Patient/family teaching  - Consider wound care consult   Outcome: Progressing     Problem: PAIN - ADULT  Goal: Verbalizes/displays adequate comfort level or baseline comfort level  Description: Interventions:  - Encourage patient to monitor pain and request assistance  - Assess pain using appropriate pain scale  - Administer analgesics based on type and severity of pain and evaluate response  - Implement non-pharmacological measures as appropriate and evaluate response  - Consider cultural and social influences on pain and pain management  - Notify physician/advanced practitioner if interventions unsuccessful or patient reports new pain  Outcome: Progressing     Problem: SAFETY ADULT  Goal: Maintain or return to baseline ADL function  Description: INTERVENTIONS:  -  Assess patient's ability to carry out ADLs; assess patient's baseline for ADL function and identify physical deficits which impact ability to perform ADLs (bathing, care of mouth/teeth, toileting, grooming, dressing, etc )  - Assess/evaluate cause of self-care deficits   - Assess range of motion  - Assess patient's mobility; develop plan if impaired  - Assess patient's need for assistive devices and provide as appropriate  - Encourage maximum independence but intervene and supervise when necessary  - Involve family in performance of ADLs  - Assess for home care needs following discharge   - Consider OT consult to assist with ADL evaluation and planning for discharge  - Provide patient education as appropriate  Outcome: Progressing  Goal: Maintains/Returns to pre admission functional level  Description: INTERVENTIONS:  - Perform BMAT or MOVE assessment daily    - Set and communicate daily mobility goal to care team and patient/family/caregiver     - Collaborate with rehabilitation services on mobility goals if consulted  - Out of bed for toileting  - Record patient progress and toleration of activity level   Outcome: Progressing  Goal: Patient will remain free of falls  Description: INTERVENTIONS:  - Educate patient/family on patient safety including physical limitations  - Instruct patient to call for assistance with activity   - Consult OT/PT to assist with strengthening/mobility   - Keep Call bell within reach  - Keep bed low and locked with side rails adjusted as appropriate  - Keep care items and personal belongings within reach  - Initiate and maintain comfort rounds  - Make Fall Risk Sign visible to staff  - Apply yellow socks and bracelet for high fall risk patients  - Consider moving patient to room near nurses station  Outcome: Progressing     Problem: DISCHARGE PLANNING  Goal: Discharge to home or other facility with appropriate resources  Description: INTERVENTIONS:  - Identify barriers to discharge w/patient and caregiver  - Arrange for needed discharge resources and transportation as appropriate  - Identify discharge learning needs (meds, wound care, etc )  - Arrange for interpretive services to assist at discharge as needed  - Refer to Case Management Department for coordinating discharge planning if the patient needs post-hospital services based on physician/advanced practitioner order or complex needs related to functional status, cognitive ability, or social support system  Outcome: Progressing

## 2023-03-21 ENCOUNTER — TELEPHONE (OUTPATIENT)
Dept: NEUROSURGERY | Facility: CLINIC | Age: 88
End: 2023-03-21

## 2023-03-21 LAB
ANION GAP SERPL CALCULATED.3IONS-SCNC: 10 MMOL/L (ref 4–13)
BUN SERPL-MCNC: 22 MG/DL (ref 5–25)
CALCIUM SERPL-MCNC: 8.1 MG/DL (ref 8.4–10.2)
CHLORIDE SERPL-SCNC: 102 MMOL/L (ref 96–108)
CO2 SERPL-SCNC: 20 MMOL/L (ref 21–32)
CREAT SERPL-MCNC: 0.6 MG/DL (ref 0.6–1.3)
ERYTHROCYTE [DISTWIDTH] IN BLOOD BY AUTOMATED COUNT: 13.8 % (ref 11.6–15.1)
GFR SERPL CREATININE-BSD FRML MDRD: 76 ML/MIN/1.73SQ M
GLUCOSE SERPL-MCNC: 107 MG/DL (ref 65–140)
HCT VFR BLD AUTO: 42.1 % (ref 34.8–46.1)
HGB BLD-MCNC: 13.1 G/DL (ref 11.5–15.4)
MCH RBC QN AUTO: 28.9 PG (ref 26.8–34.3)
MCHC RBC AUTO-ENTMCNC: 31.1 G/DL (ref 31.4–37.4)
MCV RBC AUTO: 93 FL (ref 82–98)
PLATELET # BLD AUTO: 232 THOUSANDS/UL (ref 149–390)
PMV BLD AUTO: 10.6 FL (ref 8.9–12.7)
POTASSIUM SERPL-SCNC: 4.4 MMOL/L (ref 3.5–5.3)
RBC # BLD AUTO: 4.54 MILLION/UL (ref 3.81–5.12)
SODIUM SERPL-SCNC: 132 MMOL/L (ref 135–147)
TSH SERPL DL<=0.05 MIU/L-ACNC: 2.64 UIU/ML (ref 0.45–4.5)
WBC # BLD AUTO: 8.87 THOUSAND/UL (ref 4.31–10.16)

## 2023-03-21 RX ADMIN — AMLODIPINE BESYLATE 2.5 MG: 2.5 TABLET ORAL at 21:34

## 2023-03-21 RX ADMIN — LEVOTHYROXINE SODIUM 150 MCG: 150 TABLET ORAL at 05:17

## 2023-03-21 RX ADMIN — METOPROLOL SUCCINATE 12.5 MG: 25 TABLET, EXTENDED RELEASE ORAL at 08:19

## 2023-03-21 RX ADMIN — Medication 2000 UNITS: at 08:19

## 2023-03-21 RX ADMIN — AMLODIPINE BESYLATE 2.5 MG: 2.5 TABLET ORAL at 08:19

## 2023-03-21 RX ADMIN — OXYCODONE HYDROCHLORIDE 5 MG: 5 TABLET ORAL at 14:09

## 2023-03-21 RX ADMIN — SENNOSIDES AND DOCUSATE SODIUM 1 TABLET: 8.6; 5 TABLET ORAL at 21:34

## 2023-03-21 RX ADMIN — ENOXAPARIN SODIUM 30 MG: 30 INJECTION SUBCUTANEOUS at 01:01

## 2023-03-21 RX ADMIN — ACETAMINOPHEN 975 MG: 325 TABLET ORAL at 21:34

## 2023-03-21 RX ADMIN — ESCITALOPRAM 5 MG: 10 TABLET, FILM COATED ORAL at 08:19

## 2023-03-21 RX ADMIN — ENOXAPARIN SODIUM 30 MG: 30 INJECTION SUBCUTANEOUS at 12:24

## 2023-03-21 RX ADMIN — ACETAMINOPHEN 975 MG: 325 TABLET ORAL at 01:01

## 2023-03-21 RX ADMIN — ACETAMINOPHEN 975 MG: 325 TABLET ORAL at 05:16

## 2023-03-21 RX ADMIN — LIDOCAINE 5% 1 PATCH: 700 PATCH TOPICAL at 08:19

## 2023-03-21 RX ADMIN — MULTIPLE VITAMINS W/ MINERALS TAB 1 TABLET: TAB ORAL at 08:19

## 2023-03-21 RX ADMIN — GABAPENTIN 100 MG: 100 CAPSULE ORAL at 21:35

## 2023-03-21 NOTE — TELEPHONE ENCOUNTER
3/24/23:  DISCHARGED TO [place] [facility] #[id number]    3/23/23: INPATIENT    3/22/23: INPATIENT    3/21/23: INPATIENT    2WK HFU W/ AP  4/5/23 / 2:Christopher / Swetha Lozano    IMAGING:   XRAY TCSPINE    PER:   [facility]   OK TO MAKE VIRTUAL IF NEEDED

## 2023-03-21 NOTE — TREATMENT PLAN
Baseline upright thoracolumbar spine x-rays demonstrates  stable T12 compression deformity  From neurosurgery perspective, no open procedure is indicated at this juncture  Continue conservative treatment with bracing, pain control, PT/OT and a local lidocaine patch or ice pack application  If pain fails to control, consider APS evaluation, or IR for kyphoplasty-provided patient is a good candidate for the procedure  F/U at OP NSx clinic in 2 weeks with upright TL spine xrays in brace  NSx will sign off  Call with question or concern

## 2023-03-21 NOTE — PHYSICAL THERAPY NOTE
PHYSICAL THERAPY NOTE          Patient Name: Denver Decker  [id number] Date: 3/21/2023           03/21/23 1426   PT Last Visit   PT Visit Date 03/21/23   Note Type   Note Type Treatment   Type of Brace   Brace Applied [place] TLSO  (backpack size 3 1/2 to 4)   Additional Brace Ordered No   Patient Position When Brace Applied Seated   Bracing Recommendations Recommendation (comment)  (pt continues to require max Ax1 for all donning of TLSO but required mod Ax1 for doffing of TLSO)   End of Consult   Patient Position at End of Consult Supine; All needs within reach   Pain Assessment   Pain Assessment Tool 0-10   Pain Score 8   Pain Location/Orientation Location: Back   Patient's Stated Pain Goal No pain   Hospital Pain Intervention(s) Repositioned; Ambulation/increased activity; Emotional support; Rest   Multiple Pain Sites No   Restrictions/Precautions   Weight Bearing Precautions Per Order No   Braces or Orthoses TLSO   Other Precautions Chair Alarm; Bed Alarm;Spinal precautions; Fall Risk;Telemetry   General   Chart Reviewed Yes   Response to Previous Treatment Patient with no complaints from previous session  Family/Caregiver Present No   Cognition   Overall Cognitive Status Unable to assess   Arousal/Participation Alert; Responsive; Cooperative   Attention Attends with cues to redirect   Orientation Level Oriented X4   Memory Decreased short term memory;Decreased recall of recent events   Following Commands Follows one step commands with increased time or repetition   Subjective   Subjective pt was agreeable to participate in PT intervention   Bed Mobility   Rolling R 4  Minimal assistance   Additional items Assist x 1;HOB elevated; Bedrails; Increased time required;Verbal cues;LE management; Other  (log roll utilized)   Supine to Sit 4  Minimal assistance   Additional items Assist x 1;HOB elevated; Bedrails; Increased time required;Verbal cues  (from R side pt used elbow to pull up into seated EOB position with LE off EOB)   Sit to Supine 4  Minimal assistance   Additional items Assist x 1;HOB elevated; Bedrails; Increased time required;Verbal cues;LE management   Additional Comments pt did require mod Ax2 to reposition towards [facility]   Transfers   Sit to Stand 4  Minimal assistance   Additional items Assist x 1; Increased time required;Verbal cues  (w/ RW)   Stand to Sit 4  Minimal assistance   Additional items Assist x 1; Increased time required;Verbal cues  (w/ RW)   Stand pivot 4  Minimal assistance   Additional items Assist x 1; Armrests; Increased time required;Verbal cues  (w/ RW)   Additional Comments pt was able to perform multiple functional transfers to and from rW with min Ax1 for all functional transfers abnd VC's for hand placement while ascending to RW,descending back to seated EOB   Ambulation/Elevation   Gait pattern Antalgic;Decreased foot clearance; Excessively slow; Step to;Short stride   Gait Assistance 4  Minimal assist   Additional items Assist x 1;Verbal cues   Assistive Device Rolling walker   Distance 30'x1 RW 40'x1 RW   Stair Management Assistance Not tested   Balance   Static Sitting Fair   Static Standing Poor +   Ambulatory Poor +   Endurance Deficit   Endurance Deficit Yes   Endurance Deficit Description limited activity tolerance and ambulation distance   Activity Tolerance   Activity Tolerance Patient limited by fatigue   Nurse Made Aware Spoke to RN   Exercises   Quad Sets Supine;10 reps;AROM; Bilateral   Heelslides Supine;5 reps;AROM; Bilateral   Hip Abduction Sitting;10 reps;AROM; Bilateral   Hip Adduction Sitting;10 reps;AROM; Bilateral  (pillow squeezes)   Knee AROM Long Arc Quad Sitting;10 reps;AROM; Bilateral   Ankle Pumps Sitting;10 reps;AROM; Bilateral   Marching Sitting;10 reps;AROM; Bilateral   Assessment   Prognosis Fair   Problem List Decreased endurance; Impaired balance;Decreased mobility;Pain;Decreased coordination  (VC's for locking/unlocking of rolling walker brakes)   Assessment pt began tx session lying supine  bed and was agreeable to participate in PT intervention  Prior to initiation of bed mobility pt educated on all spinal precautions and use of log roll in order to maintain spinal precautions  Progress was noted this tx session as pt was able to utilize log roll and complete roll to R side and sidelying to sit EOB w/ a decrease in level of assistance required min Ax1 as pt used elbow to complete transfer to seated EOB  pt was able to sit EOB and perform TE activities w/o LOB while increasing static/dynamic sitting balance and tolerance  pt continues to require assistance while donning/ doffing TLSO  pt required max Ax1 for doffing of TLSO but required mod Ax1 for doffing of TLSO  pt was able to complete 3 STS in todays tx session all with min Ax1 and VC's for hand placement while ascending to rollator and descending back to seated EOB  pt also required VC's for locking/ unlocking rollator before/after transfers  pt continues to be limited with activity tolerance and functional mobility as pt ambulated 30'x1 RW and 40'x1 RW with min Ax1 and no LOB  pt did require several therapeutic seated trest breaks due to fatigue throughout tx session  At this time continue to recommennd post acute rehab services at the time of D/C in order to maximize pt functional independence, safety with all OoB mobility and a decrease in assistance in order to margarita/doff TLSO  Post tx pt in bed with call bell and all pt needs met   Barriers to Discharge Decreased caregiver support   Goals   Patient Goals no goals stated this tx session   STG Expiration Date 03/30/23   PT Treatment Day 1   Plan   Treatment/Interventions Functional transfer training;LE strengthening/ROM; Therapeutic exercise; Endurance training;Patient/family training;Equipment eval/education; Bed mobility;Gait training;Spoke to nursing   Progress Slow progress, decreased activity tolerance   PT Frequency 4-6x/wk   Recommendation   PT Discharge Recommendation Post acute rehabilitation services   Equipment Recommended Walker   AM-PAC Basic Mobility Inpatient   Turning in Flat Bed Without Bedrails 3   Lying on Back to Sitting on Edge of Flat Bed Without Bedrails 3   Moving Bed to Chair 3   Standing Up From Chair Using Arms 3   Walk in Room 3   Climb 3-5 Stairs With Railing 1   Basic Mobility Inpatient Raw Score 16   Basic Mobility Standardized Score 38 32   Highest Level Of Mobility   JH-HLM Goal 5: Stand one or more mins   JH-HLM Achieved 7: Walk 25 feet or more   Education   Education Provided Mobility training;Assistive device   Patient Reinforcement needed   End of Consult   Patient Position at End of Consult Supine;Bed/Chair alarm activated; All needs within reach   The patient's AM-PAC Basic Mobility Inpatient Short Form Raw Score is 16  A Raw score of less than or equal to 16 suggests the patient may benefit from discharge to post-acute rehabilitation services  Please also refer to the recommendation of the Physical Therapist for safe discharge planning       Madalyn Martinez

## 2023-03-21 NOTE — MALNUTRITION/BMI
This medical record reflects one or more clinical indicators suggestive of malnutrition  Malnutrition Findings:   Adult Malnutrition type: Acute illness  Adult Degree of Malnutrition: Malnutrition of moderate degree  Malnutrition Characteristics: Weight loss, Inadequate energy    360 Statement: Moderate malnutrition in the context of acute illness r/t inadequate energy intake with decreased appetite and recent fall as evidenced by wt decrease of 5 8% x 1mo, intake <50% of estimated needs x >1mo  Will treat with nutrition therapy, PO diet, supplements  BMI Findings: Body mass index is 27 41 kg/m²  See Nutrition note dated 03/21/23 for additional details  Completed nutrition assessment is viewable in the nutrition documentation

## 2023-03-21 NOTE — PLAN OF CARE
Problem: PHYSICAL THERAPY ADULT  Goal: Performs mobility at highest level of function for planned discharge setting  See evaluation for individualized goals  Description: Treatment/Interventions: Functional transfer training, LE strengthening/ROM, Therapeutic exercise, Endurance training, Patient/family training, Equipment eval/education, Bed mobility, Gait training, Spoke to nursing  Equipment Recommended: Rodolfo Portillo       See flowsheet documentation for full assessment, interventions and recommendations  Outcome: Progressing  Note: Prognosis: Fair  Problem List: Decreased endurance, Impaired balance, Decreased mobility, Pain, Decreased coordination (VC's for locking/unlocking of rolling walker brakes)  Assessment: pt began tx session lying supine  bed and was agreeable to participate in PT intervention  Prior to initiation of bed mobility pt educated on all spinal precautions and use of log roll in order to maintain spinal precautions  Progress was noted this tx session as pt was able to utilize log roll and complete roll to R side and sidelying to sit EOB w/ a decrease in level of assistance required min Ax1 as pt used elbow to complete transfer to seated EOB  pt was able to sit EOB and perform TE activities w/o LOB while increasing static/dynamic sitting balance and tolerance  pt continues to require assistance while donning/ doffing TLSO  pt required max Ax1 for doffing of TLSO but required mod Ax1 for doffing of TLSO  pt was able to complete 3 STS in todays tx session all with min Ax1 and VC's for hand placement while ascending to rollator and descending back to seated EOB  pt also required VC's for locking/ unlocking rollator before/after transfers  pt continues to be limited with activity tolerance and functional mobility as pt ambulated 30'x1 RW and 40'x1 RW with min Ax1 and no LOB  pt did require several therapeutic seated trest breaks due to fatigue throughout tx session   At this time continue to recommennd post acute rehab services at the time of D/C in order to maximize pt functional independence, safety with all OoB mobility and a decrease in assistance in order to margarita/doff TLSO  Post tx pt in bed with call bell and all pt needs met  Barriers to Discharge: Decreased caregiver support  Barriers to Discharge Comments: Co-morbidities affecting pt's physical performance at time of assessment include: AAA, arthritis, breast cancer, fainting, hypertension, macular degeneration, vertigo  Personal factors affecting pt at time of IE include: limited home support, advanced age, past experience, inability to perform IADLs, inability to perform ADLs, inability to navigate community distances and recent fall(s)  PT Discharge Recommendation: Post acute rehabilitation services    See flowsheet documentation for full assessment

## 2023-03-21 NOTE — PLAN OF CARE
Problem: MOBILITY - ADULT  Goal: Maintain or return to baseline ADL function  Description: INTERVENTIONS:  -  Assess patient's ability to carry out ADLs; assess patient's baseline for ADL function and identify physical deficits which impact ability to perform ADLs (bathing, care of mouth/teeth, toileting, grooming, dressing, etc )  - Assess/evaluate cause of self-care deficits   - Assess range of motion  - Assess patient's mobility; develop plan if impaired  - Assess patient's need for assistive devices and provide as appropriate  - Encourage maximum independence but intervene and supervise when necessary  - Involve family in performance of ADLs  - Assess for home care needs following discharge   - Consider OT consult to assist with ADL evaluation and planning for discharge  - Provide patient education as appropriate  Outcome: Progressing  Goal: Maintains/Returns to pre admission functional level  Description: INTERVENTIONS:  - Perform BMAT or MOVE assessment daily    - Set and communicate daily mobility goal to care team and patient/family/caregiver  - Collaborate with rehabilitation services on mobility goals if consulted  - Perform Range of Motion  times a day  - Reposition patient every  hours    - Dangle patient  times a day  - Stand patient  times a day  - Ambulate patient  times a day  - Out of bed to chair  times a day   - Out of bed for meals  times a day  - Out of bed for toileting  - Record patient progress and toleration of activity level   Outcome: Progressing     Problem: Prexisting or High Potential for Compromised Skin Integrity  Goal: Skin integrity is maintained or improved  Description: INTERVENTIONS:  - Identify patients at risk for skin breakdown  - Assess and monitor skin integrity  - Assess and monitor nutrition and hydration status  - Monitor labs   - Assess for incontinence   - Turn and reposition patient  - Assist with mobility/ambulation  - Relieve pressure over bony prominences  - Avoid friction and shearing  - Provide appropriate hygiene as needed including keeping skin clean and dry  - Evaluate need for skin moisturizer/barrier cream  - Collaborate with interdisciplinary team   - Patient/family teaching  - Consider wound care consult   Outcome: Progressing     Problem: PAIN - ADULT  Goal: Verbalizes/displays adequate comfort level or baseline comfort level  Description: Interventions:  - Encourage patient to monitor pain and request assistance  - Assess pain using appropriate pain scale  - Administer analgesics based on type and severity of pain and evaluate response  - Implement non-pharmacological measures as appropriate and evaluate response  - Consider cultural and social influences on pain and pain management  - Notify physician/advanced practitioner if interventions unsuccessful or patient reports new pain  Outcome: Progressing     Problem: SAFETY ADULT  Goal: Maintain or return to baseline ADL function  Description: INTERVENTIONS:  -  Assess patient's ability to carry out ADLs; assess patient's baseline for ADL function and identify physical deficits which impact ability to perform ADLs (bathing, care of mouth/teeth, toileting, grooming, dressing, etc )  - Assess/evaluate cause of self-care deficits   - Assess range of motion  - Assess patient's mobility; develop plan if impaired  - Assess patient's need for assistive devices and provide as appropriate  - Encourage maximum independence but intervene and supervise when necessary  - Involve family in performance of ADLs  - Assess for home care needs following discharge   - Consider OT consult to assist with ADL evaluation and planning for discharge  - Provide patient education as appropriate  Outcome: Progressing  Goal: Maintains/Returns to pre admission functional level  Description: INTERVENTIONS:  - Perform BMAT or MOVE assessment daily    - Set and communicate daily mobility goal to care team and patient/family/caregiver     - Collaborate with rehabilitation services on mobility goals if consulted  - Perform Range of Motion  times a day  - Reposition patient every  hours    - Dangle patient  times a day  - Stand patient  times a day  - Ambulate patient  times a day  - Out of bed to chair  times a day   - Out of bed for meals  times a day  - Out of bed for toileting  - Record patient progress and toleration of activity level   Outcome: Progressing  Goal: Patient will remain free of falls  Description: INTERVENTIONS:  - Educate patient/family on patient safety including physical limitations  - Instruct patient to call for assistance with activity   - Consult OT/PT to assist with strengthening/mobility   - Keep Call bell within reach  - Keep bed low and locked with side rails adjusted as appropriate  - Keep care items and personal belongings within reach  - Initiate and maintain comfort rounds  - Make Fall Risk Sign visible to staff  - Offer Toileting every  Hours, in advance of need  - Initiate/Maintain alarm  - Obtain necessary fall risk management equipment:  - Apply yellow socks and bracelet for high fall risk patients  - Consider moving patient to room near nurses station  Outcome: Progressing     Problem: DISCHARGE PLANNING  Goal: Discharge to home or other facility with appropriate resources  Description: INTERVENTIONS:  - Identify barriers to discharge w/patient and caregiver  - Arrange for needed discharge resources and transportation as appropriate  - Identify discharge learning needs (meds, wound care, etc )  - Arrange for interpretive services to assist at discharge as needed  - Refer to Case Management Department for coordinating discharge planning if the patient needs post-hospital services based on physician/advanced practitioner order or complex needs related to functional status, cognitive ability, or social support system  Outcome: Progressing

## 2023-03-21 NOTE — PROGRESS NOTES
[facility]  Progress Note Anisha Hidalgo 7/17/1925, 80 y o  female MRN: [id number]  Unit/Bed#: S -01 Encounter: [id number]  Primary Care Provider: Mai Garcia MD   Date and time admitted to hospital: 3/20/2023  8:02 AM    T12 compression fracture [facility])  Assessment & Plan  · Secondary to fall  · CT imaging showed: There is an acute moderate compression fracture of the T12 vertebral body with approximate 5 mm bony retropulsion into the canal causing moderate central canal stenosis  This is new since 2/10/2023  · Neurosurgery consulted  · TLSO  · Upright imaging  · Multimodal pain regimen  · PT/OT    Fall  Assessment & Plan  · Patient describes falling approximately 6 days prior to admission  Since the fall she has had worsening back pain  · Injuries as stated below  · PT/OT    Anxiety  Assessment & Plan  · Continue home Lexapro    Atrial flutter (HCC)  Assessment & Plan  · Continue home metoprolol    · Patient is not on anticoagulation due to age    Acquired hypothyroidism  Assessment & Plan  · Continue home levothyroxine    Essential hypertension  Assessment & Plan  · Continue home metoprolol and amlodipine        Bowel Regimen: Senna and Colace  VTE Prophylaxis:Sequential compression device (Venodyne)  and Enoxaparin (Lovenox)     Disposition: rehab    Subjective   Chief Complaint: back soreness    Subjective: " I am okay"     Objective   Vitals:   Temp:  [97 4 °F (36 3 °C)-97 9 °F (36 6 °C)] 97 4 °F (36 3 °C)  HR:  [63-66] 66  Resp:  [17] 17  BP: (145-155)/(80-89) 155/81    I/O     None           Physical Exam:   GENERAL APPEARANCE: comfortable  NEURO: intact, GCS - 15  HEENT: eyes closed  CV: RRR, no complaint of chest pain  LUNGS: CTA bilaterally, no shortness of breath  GI: bowel regimen  : small amount output  MSK: moves extremities  SKIN: warm to touch    Invasive Devices     Peripheral Intravenous Line  Duration           Peripheral IV 03/20/23 Right Antecubital 1 day                      Lab Results:    Latest Reference Range & Units 03/21/23 04:52   Sodium 135 - 147 mmol/L 132 (L)   Potassium 3 5 - 5 3 mmol/L 4 4   Chloride 96 - 108 mmol/L 102   CO2 21 - 32 mmol/L 20 (L)   Anion Gap 4 - 13 mmol/L 10   BUN 5 - 25 mg/dL 22   Creatinine 0 60 - 1 30 mg/dL 0 60   Glucose, Random 65 - 140 mg/dL 107   Calcium 8 4 - 10 2 mg/dL 8 1 (L)   eGFR ml/min/1 73sq m 76   (L): Data is abnormally low  Imaging: none  Other Studies: none
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
## 2023-03-22 PROBLEM — E44.0 MODERATE PROTEIN-CALORIE MALNUTRITION (HCC): Status: ACTIVE | Noted: 2023-03-22

## 2023-03-22 RX ADMIN — Medication 2000 UNITS: at 08:00

## 2023-03-22 RX ADMIN — GABAPENTIN 100 MG: 100 CAPSULE ORAL at 21:30

## 2023-03-22 RX ADMIN — ACETAMINOPHEN 975 MG: 325 TABLET ORAL at 10:38

## 2023-03-22 RX ADMIN — ENOXAPARIN SODIUM 30 MG: 30 INJECTION SUBCUTANEOUS at 03:03

## 2023-03-22 RX ADMIN — ESCITALOPRAM 5 MG: 10 TABLET, FILM COATED ORAL at 08:00

## 2023-03-22 RX ADMIN — ACETAMINOPHEN 975 MG: 325 TABLET ORAL at 17:42

## 2023-03-22 RX ADMIN — AMLODIPINE BESYLATE 2.5 MG: 2.5 TABLET ORAL at 17:42

## 2023-03-22 RX ADMIN — AMLODIPINE BESYLATE 2.5 MG: 2.5 TABLET ORAL at 08:00

## 2023-03-22 RX ADMIN — METOPROLOL SUCCINATE 12.5 MG: 25 TABLET, EXTENDED RELEASE ORAL at 08:00

## 2023-03-22 RX ADMIN — LEVOTHYROXINE SODIUM 150 MCG: 150 TABLET ORAL at 05:47

## 2023-03-22 RX ADMIN — ENOXAPARIN SODIUM 30 MG: 30 INJECTION SUBCUTANEOUS at 13:15

## 2023-03-22 RX ADMIN — ACETAMINOPHEN 975 MG: 325 TABLET ORAL at 03:03

## 2023-03-22 RX ADMIN — LIDOCAINE 5% 1 PATCH: 700 PATCH TOPICAL at 08:00

## 2023-03-22 RX ADMIN — MULTIPLE VITAMINS W/ MINERALS TAB 1 TABLET: TAB ORAL at 08:00

## 2023-03-22 NOTE — PLAN OF CARE
Problem: MOBILITY - ADULT  Goal: Maintain or return to baseline ADL function  Description: INTERVENTIONS:  -  Assess patient's ability to carry out ADLs; assess patient's baseline for ADL function and identify physical deficits which impact ability to perform ADLs (bathing, care of mouth/teeth, toileting, grooming, dressing, etc )  - Assess/evaluate cause of self-care deficits   - Assess range of motion  - Assess patient's mobility; develop plan if impaired  - Assess patient's need for assistive devices and provide as appropriate  - Encourage maximum independence but intervene and supervise when necessary  - Involve family in performance of ADLs  - Assess for home care needs following discharge   - Consider OT consult to assist with ADL evaluation and planning for discharge  - Provide patient education as appropriate  Outcome: Progressing  Goal: Maintains/Returns to pre admission functional level  Description: INTERVENTIONS:  - Perform BMAT or MOVE assessment daily    - Set and communicate daily mobility goal to care team and patient/family/caregiver     - Collaborate with rehabilitation services on mobility goals if consulted  - Out of bed for toileting  - Record patient progress and toleration of activity level   Outcome: Progressing     Problem: Prexisting or High Potential for Compromised Skin Integrity  Goal: Skin integrity is maintained or improved  Description: INTERVENTIONS:  - Identify patients at risk for skin breakdown  - Assess and monitor skin integrity  - Assess and monitor nutrition and hydration status  - Monitor labs   - Assess for incontinence   - Turn and reposition patient  - Assist with mobility/ambulation  - Relieve pressure over bony prominences  - Avoid friction and shearing  - Provide appropriate hygiene as needed including keeping skin clean and dry  - Evaluate need for skin moisturizer/barrier cream  - Collaborate with interdisciplinary team   - Patient/family teaching  - Consider wound care consult   Outcome: Progressing     Problem: PAIN - ADULT  Goal: Verbalizes/displays adequate comfort level or baseline comfort level  Description: Interventions:  - Encourage patient to monitor pain and request assistance  - Assess pain using appropriate pain scale  - Administer analgesics based on type and severity of pain and evaluate response  - Implement non-pharmacological measures as appropriate and evaluate response  - Consider cultural and social influences on pain and pain management  - Notify physician/advanced practitioner if interventions unsuccessful or patient reports new pain  Outcome: Progressing     Problem: SAFETY ADULT  Goal: Maintain or return to baseline ADL function  Description: INTERVENTIONS:  -  Assess patient's ability to carry out ADLs; assess patient's baseline for ADL function and identify physical deficits which impact ability to perform ADLs (bathing, care of mouth/teeth, toileting, grooming, dressing, etc )  - Assess/evaluate cause of self-care deficits   - Assess range of motion  - Assess patient's mobility; develop plan if impaired  - Assess patient's need for assistive devices and provide as appropriate  - Encourage maximum independence but intervene and supervise when necessary  - Involve family in performance of ADLs  - Assess for home care needs following discharge   - Consider OT consult to assist with ADL evaluation and planning for discharge  - Provide patient education as appropriate  Outcome: Progressing  Goal: Maintains/Returns to pre admission functional level  Description: INTERVENTIONS:  - Perform BMAT or MOVE assessment daily    - Set and communicate daily mobility goal to care team and patient/family/caregiver     - Collaborate with rehabilitation services on mobility goals if consulted  - Out of bed for toileting  - Record patient progress and toleration of activity level   Outcome: Progressing  Goal: Patient will remain free of falls  Description: INTERVENTIONS:  - Educate patient/family on patient safety including physical limitations  - Instruct patient to call for assistance with activity   - Consult OT/PT to assist with strengthening/mobility   - Keep Call bell within reach  - Keep bed low and locked with side rails adjusted as appropriate  - Keep care items and personal belongings within reach  - Initiate and maintain comfort rounds  - Make Fall Risk Sign visible to staff  - Apply yellow socks and bracelet for high fall risk patients  - Consider moving patient to room near nurses station  Outcome: Progressing     Problem: DISCHARGE PLANNING  Goal: Discharge to home or other facility with appropriate resources  Description: INTERVENTIONS:  - Identify barriers to discharge w/patient and caregiver  - Arrange for needed discharge resources and transportation as appropriate  - Identify discharge learning needs (meds, wound care, etc )  - Arrange for interpretive services to assist at discharge as needed  - Refer to Case Management Department for coordinating discharge planning if the patient needs post-hospital services based on physician/advanced practitioner order or complex needs related to functional status, cognitive ability, or social support system  Outcome: Progressing     Problem: Nutrition/Hydration-ADULT  Goal: Nutrient/Hydration intake appropriate for improving, restoring or maintaining nutritional needs  Description: Monitor and assess patient's nutrition/hydration status for malnutrition  Collaborate with interdisciplinary team and initiate plan and interventions as ordered  Monitor patient's weight and dietary intake as ordered or per policy  Utilize nutrition screening tool and intervene as necessary  Determine patient's food preferences and provide high-protein, high-caloric foods as appropriate       INTERVENTIONS:  - Monitor oral intake, urinary output, labs, and treatment plans  - Assess nutrition and hydration status and recommend course of action  - Evaluate amount of meals eaten  - Assist patient with eating if necessary   - Allow adequate time for meals  - Recommend/ encourage appropriate diets, oral nutritional supplements, and vitamin/mineral supplements  - Order, calculate, and assess calorie counts as needed  - Recommend, monitor, and adjust tube feedings and TPN/PPN based on assessed needs  - Assess need for intravenous fluids  - Provide specific nutrition/hydration education as appropriate  - Include patient/family/caregiver in decisions related to nutrition  Outcome: Progressing

## 2023-03-22 NOTE — PROGRESS NOTES
[facility]  Progress Note Lexi Gold 7/17/1925, 80 y o  female MRN: [id number]  Unit/Bed#: S -01 Encounter: [id number]  Primary Care Provider: Bandar Villarreal MD   Date and time admitted to hospital: 3/20/2023  8:02 AM    Moderate protein-calorie malnutrition ([address] )  Assessment & Plan  Malnutrition Findings:   Adult Malnutrition type: Acute illness  Adult Degree of Malnutrition: Malnutrition of moderate degree  Malnutrition Characteristics: Weight loss, Inadequate energy    On supplements and a diet               360 Statement: Moderate malnutrition in the context of acute illness r/t inadequate energy intake with decreased appetite and recent fall as evidenced by wt decrease of 5 8% x 1mo, intake <50% of estimated needs x >1mo  Will treat with nutrition therapy, PO diet, supplements  BMI Findings: Body mass index is 27 41 kg/m²  T12 compression fracture [facility])  Assessment & Plan  · Secondary to fall  · CT imaging showed: There is an acute moderate compression fracture of the T12 vertebral body with approximate 5 mm bony retropulsion into the canal causing moderate central canal stenosis  This is new since 2/10/2023  · Neurosurgery consulted  · TLSO  · Upright imaging done - awaiting final read  · Multimodal pain regimen  · PT/OT    Anxiety  Assessment & Plan  · Continue home Lexapro    Atrial flutter (HCC)  Assessment & Plan  · Continue home metoprolol  · Patient is not on anticoagulation due to age    Acquired hypothyroidism  Assessment & Plan  · Continue home levothyroxine    Essential hypertension  Assessment & Plan  · Continue home metoprolol and amlodipine    * Fall  Assessment & Plan  · Patient describes falling approximately 6 days prior to admission  Since the fall she has had worsening back pain    · Injuries as stated below  · PT/OT        Bowel Regimen: *Senna and Colace  VTE Prophylaxis:Sequential compression device (Venodyne)  and Enoxaparin (Lovenox)     Disposition: rehab    Subjective   Chief Complaint: soreness in the back    Subjective:" I am doing okay"     Objective   Vitals:   Temp:  [97 3 °F (36 3 °C)-98 2 °F (36 8 °C)] 98 1 °F (36 7 °C)  HR:  [] 111  Resp:  [16-18] 18  BP: (130-195)/(65-85) 130/79    I/O       03/20 0701  03/21 0700 03/21 0701  03/22 0700 03/22 0701  03/23 0700    Urine (mL/kg/hr)  150 (0 1)     Total Output  150     Net  -150                   Physical Exam:   GENERAL APPEARANCE: comfortable  NEURO: GCS - 15  HEENT: EOm's intact  CV: RRR< no complaints of chest pain  LUNGS: CTA bilaterally, no shortness of breath  GI: tolerating a diet  : voiding  MSK: moving extremities  SKIN: warm and dry    Invasive Devices     Peripheral Intravenous Line  Duration           Peripheral IV 03/20/23 Right Antecubital 2 days                      Lab Results: none  Imaging: none  Other Studies: none

## 2023-03-22 NOTE — PROGRESS NOTES
Progress Note - Geriatric Medicine   Wally Stanford 80 y o  female MRN: [id number]  Unit/Bed#: S -01 Encounter: [id number]      Assessment/Plan:  1 -T12 compression fracture  -  Patient was lying in bed and told me that her pain was much improved  Daughters present in room and she relates that the patient had been sitting briefly on the recliner but she developed pain in her back and demanded that she be placed back in bed  Patient will be going to skilled care rehab daughter is awaiting options  2 -History of recurrent falls -Patient will need to be assessed by physical and Occupational Therapy  3 -  Atrial flutter with variable AV block -Patient tachycardic today with a heart rate of 111  EKG also showed evidence of septal infarct appears to be old  Patient not anticoagulated because of her recurrent falls we will increase her metoprolol in order to better control her rate  4 -Pain -  Today she tells me she has very little pain yesterday she was 7 out of 10     5 -History of breast cancer with previous left mastectomy    6 -Hypothyroidism -Patient's TSH is 2 637 within normal range      Subjective:   Patient has not moved her bowels, slept well last night  Review of Systems   Constitutional: Negative  HENT: Negative  Eyes: Negative  Respiratory: Negative  Cardiovascular: Negative  Gastrointestinal: Negative  Endocrine: Negative  Genitourinary: Negative  Musculoskeletal: Positive for arthralgias and gait problem  Psychiatric/Behavioral: Positive for decreased concentration  Objective:     Vitals: Blood pressure 130/79, pulse (!) 111, temperature 98 1 °F (36 7 °C), resp  rate 18, height 5' 1" (1 549 m), weight 65 8 kg (145 lb 1 oz), SpO2 93 %, not currently breastfeeding  ,Body mass index is 27 41 kg/m²        Intake/Output Summary (Last 24 hours) at 3/22/2023 1220  Last data filed at 3/22/2023 0582  Gross per 24 hour   Intake --   Output 150 ml   Net -150 ml Current Medications: Reviewed    Physical Exam:   Physical Exam  Constitutional:       Appearance: Normal appearance  HENT:      Head: Atraumatic  Right Ear: Ear canal and external ear normal       Left Ear: Ear canal and external ear normal       Nose: Nose normal       Mouth/Throat:      Mouth: Mucous membranes are moist    Eyes:      Pupils: Pupils are equal, round, and reactive to light  Cardiovascular:      Rate and Rhythm: Tachycardia present  Rhythm irregular  Pulses: Normal pulses  Heart sounds: Normal heart sounds  Pulmonary:      Effort: Pulmonary effort is normal       Breath sounds: Normal breath sounds  Abdominal:      General: Bowel sounds are normal       Palpations: Abdomen is soft  Musculoskeletal:      Cervical back: Neck supple  Skin:     General: Skin is dry  Neurological:      Mental Status: She is oriented to person, place, and time  Mental status is at baseline  Psychiatric:         Mood and Affect: Mood normal          Thought Content: Thought content normal          Judgment: Judgment normal           Invasive Devices     Peripheral Intravenous Line  Duration           Peripheral IV 03/20/23 Right Antecubital 2 days                Lab, Imaging and other studies: I have personally reviewed pertinent reports

## 2023-03-22 NOTE — CASE MANAGEMENT
Case Management Assessment & Discharge Planning Note    Patient name Elsa Isaac S /S -99 MRN [id number]  : 1925 Date 3/22/2023       Current Admission Date: 3/20/2023  Current Admission Diagnosis:Fall   Patient Active Problem List    Diagnosis Date Noted   • Moderate protein-calorie malnutrition ([address] ) 2023   • Fall 2023   • T12 compression fracture ([address] ) 2023   • Primary osteoarthritis of left knee 2022   • Seasonal allergic rhinitis due to pollen 2022   • Syncope 2021   • Leukocytosis 2021   • Interstitial cystitis 2021   • Anxiety 2021   • Atrial flutter ([address] ) 2021   • Vitamin B12 deficiency 09/10/2019   • Localized edema 09/10/2019   • Stage 2 chronic kidney disease 09/10/2019   • Hiatal hernia 2019   • Chronic diastolic heart failure ([address] ) 2019   • Ambulatory dysfunction 2019   • Encounter for follow-up examination after completed treatment for malignant neoplasm 2019   • Hemorrhoids 2018   • History of left breast cancer 2018   • S/P left mastectomy 2017   • Paroxysmal atrial fibrillation ([address] ) 2017   • Osteoporosis    • Essential hypertension    • GERD (gastroesophageal reflux disease)    • Constipation 2016   • Macular degeneration 2015   • Aneurysm of ascending aorta 2014   • First degree AV block 03/15/2013   • Benign paroxysmal vertigo 2013   • Vitamin D deficiency 10/01/2012   • Dyslipidemia 2012   • Acquired hypothyroidism 2012   • Vertigo 2012   • H  pylori infection 2009   • Advance directive in chart 2005      LOS (days): 2  Geometric Mean LOS (GMLOS) (days): 2 80  Days to GMLOS:0 6     OBJECTIVE:    Risk of Unplanned Readmission Score: 14 22         Current admission status: Inpatient       Preferred Pharmacy:   [facility]/pharmacy #[id number]- [place] [address] [address]  Phone: [phone] Fax: [phone] - [place], [address]  [address] [facility] [address]  Phone: [phone] Fax: [phone] [facility] 79   [address] [facility] [address]  [address] [facility],[address]  Phone: [phone] Fax: [phone]    Primary Care Provider: Mai Garcia MD    Primary Insurance: MEDICARE  Secondary Insurance: Sprig    ASSESSMENT:  Per[address] 26 Proxies    There are no active Health Care Proxies on file  Readmission Root Cause  30 Day Readmission: No    Patient Information  Admitted from[de-identified] Home  Mental Status: Confused  During Assessment patient was accompanied by: Daughter  Assessment information provided by[de-identified] Daughter  Primary Caregiver: Family  Caregiver's Telephone Number[de-identified] Stella [phone]: Self, Daughter, Home care staff  [place] of Residence: [address] [place] [address] do you live in?: [facility] entry access options   Select all that apply : No steps to enter home  Type of Current Residence: Apartment  Floor Level: 2 (Elevator Access)  Upon entering residence, is there a bedroom on the main floor (no further steps)?: Yes  Upon entering residence, is there a bathroom on the main floor (no further steps)?: Yes  In the last 12 months, was there a time when you were not able to pay the mortgage or rent on time?: No  In the last 12 months, how many places have you lived?: 1  In the last 12 months, was there a time when you did not have a steady place to sleep or slept in a shelter (including now)?: No  Homeless/housing insecurity resource given?: No  Living Arrangements: Lives Alone  Is patient a ?: No    Activities of Daily Living Prior to Admission  Functional Status: Independent  Completes ADLs independently?: No  Level of ADL dependence: Assistance  Ambulates independently?: Yes  Does patient use assisted devices?: Yes  Assisted Devices (DME) used: Evelyn Romo  Does patient currently own DME?: Yes  What DME does the patient currently own?: Evelyn Romo  Does patient have a history of Outpatient Therapy (PT/OT)?: No  Does the patient have a history of Short-Term Rehab?: No  Does patient have a history of HHC?: Yes  Does patient currently have [facility]?: No         Patient Information Continued  Income Source: Pension/penitentiary  Does patient have prescription coverage?: Yes  Within the past 12 months, you worried that your food would run out before you got the money to buy more : Never true  Food insecurity resource given?: N/A  Does patient have a history of substance abuse?: No  Does patient have a history of Mental Health Diagnosis?: No         Means of Transportation  Means of Transport to Appts[de-identified] Family transport  In the past 12 months, has lack of transportation kept you from medical appointments or from getting medications?: No  In the past 12 months, has lack of transportation kept you from meetings, work, or from getting things needed for daily living?: No  Was application for public transport provided?: N/A        DISCHARGE DETAILS:    Discharge planning discussed with[de-identified] Patient's daughter  Freedom of Choice: Yes  Comments - Freedom of Choice: CM discussed discharge plans per care team recommendations  DTR requested [facility] SN[facility] referral which were made  CM provided patient's DTR SNF choice list and she requested [facility]  CM contacted family/caregiver?: Yes  Were Treatment Team discharge recommendations reviewed with patient/caregiver?: Yes  Did patient/caregiver verbalize understanding of patient care needs?: N/A- going to facility  Were patient/caregiver advised of the risks associated with not following Treatment Team discharge recommendations?: Yes    Contacts  Patient Contacts: Severino Big  Relationship to Patient[de-identified] Family  Contact Method:  In Person  Reason/Outcome: Discharge Planning, Referral    Requested [address]         Is the patient interested in [facility] at discharge?: No    DME Referral Provided  Referral made for DME?: No    Other Referral/Resources/Interventions Provided:  Interventions: Short Term Rehab  Referral Comments: CM discussed discharge plans per care team recommendations  DTR requested blanket SNF referral which were made  CM provided patient's DTR SNF choice list and she requested [facility]      Would you like to participate in our [facility] [address] service program?  : No - Declined    Treatment Team Recommendation: Short Term Rehab  Discharge Destination Plan[de-identified] Short Term Rehab

## 2023-03-22 NOTE — PLAN OF CARE
Problem: Nutrition/Hydration-ADULT  Goal: Nutrient/Hydration intake appropriate for improving, restoring or maintaining nutritional needs  Description: Monitor and assess patient's nutrition/hydration status for malnutrition  Collaborate with interdisciplinary team and initiate plan and interventions as ordered  Monitor patient's weight and dietary intake as ordered or per policy  Utilize nutrition screening tool and intervene as necessary  Determine patient's food preferences and provide high-protein, high-caloric foods as appropriate       INTERVENTIONS:  - Monitor oral intake, urinary output, labs, and treatment plans  - Assess nutrition and hydration status and recommend course of action  - Evaluate amount of meals eaten  - Assist patient with eating if necessary   - Allow adequate time for meals  - Recommend/ encourage appropriate diets, oral nutritional supplements, and vitamin/mineral supplements  - Order, calculate, and assess calorie counts as needed  - Recommend, monitor, and adjust tube feedings and TPN/PPN based on assessed needs  - Assess need for intravenous fluids  - Provide specific nutrition/hydration education as appropriate  - Include patient/family/caregiver in decisions related to nutrition  Outcome: Progressing     Problem: SAFETY ADULT  Goal: Maintain or return to baseline ADL function  Description: INTERVENTIONS:  -  Assess patient's ability to carry out ADLs; assess patient's baseline for ADL function and identify physical deficits which impact ability to perform ADLs (bathing, care of mouth/teeth, toileting, grooming, dressing, etc )  - Assess/evaluate cause of self-care deficits   - Assess range of motion  - Assess patient's mobility; develop plan if impaired  - Assess patient's need for assistive devices and provide as appropriate  - Encourage maximum independence but intervene and supervise when necessary  - Involve family in performance of ADLs  - Assess for home care needs following discharge   - Consider OT consult to assist with ADL evaluation and planning for discharge  - Provide patient education as appropriate  Outcome: Progressing     Problem: PAIN - ADULT  Goal: Verbalizes/displays adequate comfort level or baseline comfort level  Description: Interventions:  - Encourage patient to monitor pain and request assistance  - Assess pain using appropriate pain scale  - Administer analgesics based on type and severity of pain and evaluate response  - Implement non-pharmacological measures as appropriate and evaluate response  - Consider cultural and social influences on pain and pain management  - Notify physician/advanced practitioner if interventions unsuccessful or patient reports new pain  Outcome: Progressing     Problem: Prexisting or High Potential for Compromised Skin Integrity  Goal: Skin integrity is maintained or improved  Description: INTERVENTIONS:  - Identify patients at risk for skin breakdown  - Assess and monitor skin integrity  - Assess and monitor nutrition and hydration status  - Monitor labs   - Assess for incontinence   - Turn and reposition patient  - Assist with mobility/ambulation  - Relieve pressure over bony prominences  - Avoid friction and shearing  - Provide appropriate hygiene as needed including keeping skin clean and dry  - Evaluate need for skin moisturizer/barrier cream  - Collaborate with interdisciplinary team   - Patient/family teaching  - Consider wound care consult   Outcome: Progressing     Problem: MOBILITY - ADULT  Goal: Maintain or return to baseline ADL function  Description: INTERVENTIONS:  -  Assess patient's ability to carry out ADLs; assess patient's baseline for ADL function and identify physical deficits which impact ability to perform ADLs (bathing, care of mouth/teeth, toileting, grooming, dressing, etc )  - Assess/evaluate cause of self-care deficits   - Assess range of motion  - Assess patient's mobility; develop plan if impaired  - Assess patient's need for assistive devices and provide as appropriate  - Encourage maximum independence but intervene and supervise when necessary  - Involve family in performance of ADLs  - Assess for home care needs following discharge   - Consider OT consult to assist with ADL evaluation and planning for discharge  - Provide patient education as appropriate  Outcome: Progressing

## 2023-03-22 NOTE — ASSESSMENT & PLAN NOTE
· Secondary to fall  · CT imaging showed: There is an acute moderate compression fracture of the T12 vertebral body with approximate 5 mm bony retropulsion into the canal causing moderate central canal stenosis  This is new since 2/10/2023    · Neurosurgery consulted  · TLSO  · Upright imaging done - awaiting final read  · Multimodal pain regimen  · PT/OT

## 2023-03-22 NOTE — ASSESSMENT & PLAN NOTE
Malnutrition Findings:   Adult Malnutrition type: Acute illness  Adult Degree of Malnutrition: Malnutrition of moderate degree  Malnutrition Characteristics: Weight loss, Inadequate energy    On supplements and a diet               360 Statement: Moderate malnutrition in the context of acute illness r/t inadequate energy intake with decreased appetite and recent fall as evidenced by wt decrease of 5 8% x 1mo, intake <50% of estimated needs x >1mo  Will treat with nutrition therapy, PO diet, supplements  BMI Findings: Body mass index is 27 41 kg/m²

## 2023-03-23 ENCOUNTER — TELEPHONE (OUTPATIENT)
Dept: OTHER | Facility: OTHER | Age: 88
End: 2023-03-23

## 2023-03-23 VITALS
HEIGHT: 61 IN | SYSTOLIC BLOOD PRESSURE: 136 MMHG | RESPIRATION RATE: 16 BRPM | WEIGHT: 145.06 LBS | TEMPERATURE: 97.8 F | DIASTOLIC BLOOD PRESSURE: 77 MMHG | OXYGEN SATURATION: 94 % | HEART RATE: 67 BPM | BODY MASS INDEX: 27.39 KG/M2

## 2023-03-23 LAB
FLUAV RNA RESP QL NAA+PROBE: NEGATIVE
FLUBV RNA RESP QL NAA+PROBE: NEGATIVE
MRSA NOSE QL CULT: NORMAL
RSV RNA RESP QL NAA+PROBE: NEGATIVE
SARS-COV-2 RNA RESP QL NAA+PROBE: NEGATIVE

## 2023-03-23 RX ADMIN — AMLODIPINE BESYLATE 2.5 MG: 2.5 TABLET ORAL at 08:35

## 2023-03-23 RX ADMIN — ESCITALOPRAM 5 MG: 10 TABLET, FILM COATED ORAL at 08:35

## 2023-03-23 RX ADMIN — Medication 2000 UNITS: at 08:35

## 2023-03-23 RX ADMIN — ACETAMINOPHEN 975 MG: 325 TABLET ORAL at 03:44

## 2023-03-23 RX ADMIN — MULTIPLE VITAMINS W/ MINERALS TAB 1 TABLET: TAB ORAL at 08:35

## 2023-03-23 RX ADMIN — METOPROLOL SUCCINATE 12.5 MG: 25 TABLET, EXTENDED RELEASE ORAL at 08:35

## 2023-03-23 RX ADMIN — ENOXAPARIN SODIUM 30 MG: 30 INJECTION SUBCUTANEOUS at 13:49

## 2023-03-23 RX ADMIN — LIDOCAINE 5% 1 PATCH: 700 PATCH TOPICAL at 08:35

## 2023-03-23 RX ADMIN — LEVOTHYROXINE SODIUM 150 MCG: 150 TABLET ORAL at 05:10

## 2023-03-23 RX ADMIN — ENOXAPARIN SODIUM 30 MG: 30 INJECTION SUBCUTANEOUS at 00:17

## 2023-03-23 NOTE — DISCHARGE SUMMARY
Discharge Summary - Gerardo Herron 80 y o  female MRN: [id number]    Unit/Bed#: S -01 Encounter: [id number]    Admission Date:   Admission Orders (From admission, onward)     Ordered        03/20/23 1247  Inpatient Admission  Once                        Admitting Diagnosis: Generalized abdominal pain [R10 84]  Compression fracture of T12 vertebra, initial encounter ([facility] ) [S22 080A]    HPI: per LINDA BARRIENTOS:  "Gerardo Herron is a 80 y o  female with history of atrial fibrillation, who does not take any AC/PC, presenting today for evaluation after suffering a fall approximately 6 days ago  Patient is unsure of the events surrounding the fall but does describe having worsening back pain since the fall  Patient describes having mid back pain  She denies any neurological symptoms including but not limited to numbness, tingling, weakness, loss of bowel or bladder function  She is unsure if she struck her head, but denies any headache, dizziness, lightheadedness, lateralizing weakness, visual changes, or hearing changes  She denies any other complaints besides back pain   "    Procedures Performed:   Orders Placed This Encounter   Procedures   • ED ECG Documentation Only       Summary of Hospital Course: 81 y/o female who fell 6 days prior to coming in  Found to have a T12 compression fracture  Admitted to trauma, Neurosurgery consulted as well as PT and OT  Upright images obtained and stable  Will be discharged to rehab today and follow up with PCP and Neurosurgery  No trauma follow up required at this time  Significant Findings, Care, Treatment and Services Provided: CT chest abdomen pelvis wo contrast    Result Date: 3/20/2023  Impression: 1  There is an acute moderate compression fracture of the T12 vertebral body with approximate 5 mm bony retropulsion into the canal causing moderate central canal stenosis    This is new since 2/10/2023  2   Moderate loss of height of the T4 vertebral body of indeterminate age, new since 2014  3   4 4 cm fusiform ectasia of the ascending thoracic aorta  One-year follow-up recommended  5   Moderate to large hiatal hernia  Duodenal diverticulum  The study was marked in [facility] for immediate notification  Workstation performed: [id number]     XR chest 1 view portable    Result Date: 3/20/2023  Impression: No acute cardiopulmonary disease  Workstation performed: [id number]     XR spine thoracolumbar 2 vw    Result Date: 3/22/2023  Impression: 1  Redemonstration of T12 compression fracture with approximately 5 mm of retropulsion, similar to same day CT  2   Age-indeterminate T4 compression fracture and chronic mild L2 superior endplate compression deformity, also stable from same day CT  Workstation performed: [id number]     CT recon only thoracolumbar (no charge)    Result Date: 3/20/2023  Impression: Acute appearing moderate compression fracture of the T12 vertebral body with 5 mm bony retropulsion into the canal causing moderate central canal stenosis  Chronic mild loss of height of L2  Mild anterior wedging of the T4 vertebral body of indeterminate age but new since 2014  Workstation performed: [id number]         Complications: none    Discharge Diagnosis: S/P Fall  T12 compression fracture  HTN  Hypothyroidism  Anxiety  Atrial flutter      Medical Problems     Resolved Problems  Date Reviewed: 3/22/2023   None         Condition at Discharge: stable         Discharge instructions/Information to patient and family:   See after visit summary for information provided to patient and family  Provisions for Follow-Up Care:  See after visit summary for information related to follow-up care and any pertinent home health orders  PCP: Connee Rinne, MD    Disposition: Rehab    Planned Readmission: No      Discharge Statement   I spent 30 minutes discharging the patient  This time was spent on the day of discharge   I had direct contact with the patient on the day of discharge  Additional documentation is required if more than 30 minutes were spent on discharge  Discharge Medications:  See after visit summary for reconciled discharge medications provided to patient and family

## 2023-03-23 NOTE — PROGRESS NOTES
Progress Note - Geriatric Medicine   Debby Houser 80 y o  female MRN: [id number]  Unit/Bed#: S -01 Encounter: [id number]      Assessment/Plan:  1  T12 compression fracture: Pain has been better controlled, continue PT OT  Plan for discharge to a skilled care rehab  Awaiting options  2   History of recurrent falls: Continue fall precautions  Continue PT and OT  3   Atrial flutter with variable AV block: Heart rate has been controlled  4  Hyponatremia: Sodium noted to be 132, will recheck BMP  May need further work-up if level continues to decline  5   History of breast cancer with previous left mastectomy  6  Hypothyroidism: Continue home dose levothyroxine  THS is 2 6 within normal range  Subjective:   Patient does not have any complaints today, vital signs are stable, blood pressure mildly elevated  Patient saturating well on room air  Review of Systems   Constitutional: Negative for fever  Cardiovascular: Negative for chest pain and leg swelling  Gastrointestinal: Negative for abdominal pain  Genitourinary: Negative for difficulty urinating  Psychiatric/Behavioral: Positive for confusion  Negative for agitation  Objective:     Vitals: Blood pressure 166/91, pulse 64, temperature 98 °F (36 7 °C), resp  rate 16, height 5' 1" (1 549 m), weight 65 8 kg (145 lb 1 oz), SpO2 95 %, not currently breastfeeding  ,Body mass index is 27 41 kg/m²  Intake/Output Summary (Last 24 hours) at 3/23/2023 1357  Last data filed at 3/23/2023 0501  Gross per 24 hour   Intake 480 ml   Output 350 ml   Net 130 ml       Current Medications: Reviewed    Physical Exam:   Physical Exam  Constitutional:       General: She is not in acute distress  Appearance: She is not toxic-appearing or diaphoretic  HENT:      Head: Normocephalic  Nose: Nose normal       Mouth/Throat:      Mouth: Mucous membranes are dry  Cardiovascular:      Rate and Rhythm: Normal rate     Pulmonary:      Breath sounds: Normal breath sounds  Abdominal:      General: There is no distension  Tenderness: There is no abdominal tenderness  Musculoskeletal:      Right lower leg: No edema  Left lower leg: No edema  Skin:     General: Skin is warm  Neurological:      Mental Status: She is alert  She is disoriented  Psychiatric:         Mood and Affect: Mood normal           Invasive Devices     Peripheral Intravenous Line  Duration           Peripheral IV 03/20/23 Right Antecubital 3 days                Lab, Imaging and other studies: I have personally reviewed pertinent reports

## 2023-03-23 NOTE — PLAN OF CARE
Problem: OCCUPATIONAL THERAPY ADULT  Goal: Performs self-care activities at highest level of function for planned discharge setting  See evaluation for individualized goals  Description: Treatment Interventions: ADL retraining, Functional transfer training, UE strengthening/ROM, Patient/family training, Cognitive reorientation, Endurance training, Compensatory technique education, Equipment evaluation/education, Activityengagement          See flowsheet documentation for full assessment, interventions and recommendations  3/23/2023 1150 by Lara Reyes OT  Note: Limitation: Decreased ADL status, Decreased Safe judgement during ADL, Decreased UE strength, Decreased endurance, Decreased cognition, Decreased self-care trans, Decreased high-level ADLs (spinal precautions)  Prognosis: Good  Assessment: Patient is a 80 y o  female seen for OT evaluation at [facility] following admission on 3/20/2023  s/p T12 compression fracture ([facility] [address] )  Please see above for comprehensive list of comorbidities and significant PMHx impacting functional performance  Patient presents with active orders for OT eval and treat and up in chair  At baseline, pt is (I) with ADLs, mod (I) c rollator community distances  Upon initial evaluation, patient requires Min A assistance for UB ADLs, Mod A  assistance for LB ADLs, and Supervision assistance for transfers and functional mobility household distance with RW  Based on functional eval, pt presents with intact  attention, impaired  safety awareness, intact  problem solving skills, and impaired   memory  Occupational performance is affected by the following deficits:  decreased muscular strength , decreased standing tolerance for self care tasks , decreased functional reach , decreased activity tolerance , impaired safety awareness  and (+) pain ; further limited by thoracic spinal precautions   Patient would benefit from OT services within the acute care setting to maximize level of functional independence in the following areas fall prevention , UB ADLs, LB ADLs, self-care transfers and functional mobility  Personal factors impacting D/C include: (+) Hx of falls  and decreased caregiver status   From OT standpoint, recommendation at time of D/C would be post-acute rehabilitation   OT Discharge Recommendation: Post acute rehabilitation services       3/23/2023 1150 by Tejas Maria OT  Note: Limitation: Decreased ADL status, Decreased Safe judgement during ADL, Decreased UE strength, Decreased endurance, Decreased cognition, Decreased self-care trans, Decreased high-level ADLs (spinal precautions)  Prognosis: Good  Assessment: Patient is a 80 y o  female seen for OT evaluation at [facility] following admission on 3/20/2023  s/p T12 compression fracture ([facility] [address] )  Please see above for comprehensive list of comorbidities and significant PMHx impacting functional performance  Patient presents with active orders for OT eval and treat and up in chair  At baseline, pt is (I) with ADLs, mod (I) c rollator community distances  Upon initial evaluation, patient requires Min A assistance for UB ADLs, Mod A  assistance for LB ADLs, and Supervision assistance for transfers and functional mobility household distance with RW  Based on functional eval, pt presents with intact  attention, impaired  safety awareness, intact  problem solving skills, and impaired   memory  Occupational performance is affected by the following deficits:  decreased muscular strength , decreased standing tolerance for self care tasks , decreased functional reach , decreased activity tolerance , impaired safety awareness  and (+) pain ; further limited by thoracic spinal precautions  Patient would benefit from OT services within the acute care setting to maximize level of functional independence in the following areas fall prevention , UB ADLs, LB ADLs, self-care transfers and functional mobility   Personal factors impacting D/C include: (+) Hx of falls  and decreased caregiver status   From OT standpoint, recommendation at time of D/C would be post-acute rehabilitation        OT Discharge Recommendation: Post acute rehabilitation services     [facility]

## 2023-03-23 NOTE — OCCUPATIONAL THERAPY NOTE
Occupational Therapy Evaluation      Angela Bhatia    3/23/2023    Patient Active Problem List   Diagnosis    Osteoporosis    Essential hypertension    GERD (gastroesophageal reflux disease)    Paroxysmal atrial fibrillation (HCC)    S/P left mastectomy    History of left breast cancer    Aneurysm of ascending aorta    Constipation    Dyslipidemia    First degree AV block    Acquired hypothyroidism    Macular degeneration    Vertigo    Vitamin D deficiency    Benign paroxysmal vertigo    H  pylori infection    Hemorrhoids    Advance directive in chart    Encounter for follow-up examination after completed treatment for malignant neoplasm    Ambulatory dysfunction    Chronic diastolic heart failure ([address] )    Hiatal hernia    Vitamin B12 deficiency    Localized edema    Stage 2 chronic kidney disease    Atrial flutter (HCC)    Interstitial cystitis    Anxiety    Syncope    Leukocytosis    Primary osteoarthritis of left knee    Seasonal allergic rhinitis due to pollen    Fall    T12 compression fracture (HCC)    Moderate protein-calorie malnutrition ([address] )       Past Medical History:   Diagnosis Date    AAA (abdominal aortic aneurysm)     Acute medial meniscus tear     Arthritis 1980    Aspiration pneumonia ([address] )     LAST ASSESSED: 7/30/14    Breast CA ([address] )     left    Cancer ([address] ) 2017    Chest pain     RESOLVED: 1/31/17    CTS (carpal tunnel syndrome)     Depression     Dermatitis     LAST ASSESSED: 7/25/13    Disease of thyroid gland     Fainting     LAST ASSESSED: 3/15/13    GERD (gastroesophageal reflux disease)     H  pylori infection 03/17/2009    Hypertension     Incomplete defecation     LAST ASSESSED: 7/25/13    Macular degeneration     Osteoporosis     Pneumonia     Vertigo 2012       Past Surgical History:   Procedure Laterality Date    APPENDECTOMY      CHOLECYSTECTOMY      COLONOSCOPY      MASTECTOMY Left 09/2017    SIMPLE    MA INTRAOP SENTINEL LYMPH NODE ID W/DYE INJECTION Left 9/5/2017    Procedure: INTRAOPERATIVE LYMPHATIC MAPPING; BLUE DYE ONLY; [facility] 9938 LYMPH NODE BIOPSY;  Surgeon: Sue Duffy MD;  Location: [facility];  Service: Surgical Oncology    NM MASTECTOMY SIMPLE COMPLETE Left 9/5/2017    Procedure: BREAST MASTECTOMY;  Surgeon: Sue Duffy MD;  [facility];  Service: Surgical Oncology    SENTINEL LYMPH NODE BIOPSY      US GUIDED BREAST BIOPSY LEFT COMPLETE Left 8/14/2017 03/23/23 0945   OT Last Visit   OT Visit Date 03/23/23   Note Type   Note type Evaluation   Pain Assessment   Pain Assessment Tool 0-10   Pain Score 10 - Worst Possible Pain  (0/10 at rest, 10/10 following activity)   Pain Location/Orientation Orientation: Lower; Location: Back   Pain Onset/Description Onset: Gradual;Frequency: Intermittent   Effect of Pain on Daily Activities comfort, activity tolerance   Hospital Pain Intervention(s) Repositioned; Ambulation/increased activity; Emotional support  (pt reporting TLSO has been helping her)   Restrictions/Precautions   Weight Bearing Precautions Per Order No   Braces or Orthoses TLSO  (when OOB or HOB >45)   Other Precautions Bed Alarm; Chair Alarm;Spinal precautions; Fall Risk;Pain   Home Living   Type of Home Apartment  (2nd floor apartment)   Home Layout One level;Performs ADLs on one level; Able to live on main level with bedroom/bathroom; Elevator; Access   [employer] Walk-in shower   Bathroom Toilet Raised   Bathroom Equipment Grab bars in shower; Shower chair; Toilet raiser   Bathroom Accessibility Accessible via walker   [address] [place] [address],[address]  (rollator used at baseline )   Additional Comments reports using rollator at all   Prior Function   Level of [place] Independent with functional mobility; Needs assistance with IADLS; Independent with ADLs   Lives With Alone   Receives Help From Family  (Per Pttomy is there every day per pt x 4 hours, however later reporting 30 minutes  ? )   IADLs Family/Friend/Other provides transportation; Family/Friend/Other provides medication management; Family/Friend/Other provides meals  (tomy manages medications  Daughter completes laundry for pt)   Falls in the last 6 months 1 to 4  (1 fall x1 week ago leading to current injuries  Denies others)   Comments walks down to dining [employer] for 3 meals /day  Reports daughter assists with dressing/showering when she is available, otherwise pt does (I)   Lifestyle   Autonomy At baseline, pt is (I) with ADLs, needs assistance with IADLs  Lives alone at [facility]  Rollator used at [employer] Relationships supportive daughter, son   Service to Others retired   General   Additional Pertinent History pt admitted d/t back pain following fall x1 week ago  Findings of T12 compression fracture  Plan for non-operative treatment with OP neurosx f/u per EMR  Family/Caregiver Present No   ADL   Where Assessed Other (Comment)  (toilet)   Eating Assistance 6  Modified independent   Grooming Assistance 5  Supervision/Setup   UB Bathing Assistance 5  Supervision/Setup   LB Bathing Assistance 4  Minimal Assistance   [address] 3  Moderate Assistance   LB Dressing Deficit Thread RLE into underwear; Thread LLE into underwear;Don/doff R sock; Don/doff L sock;Pull up over hips; Supervision/safety;Verbal cueing; Requires assistive device for steadying;Setup; Increased time to complete  (requires assistance for for threading BLE through underwear, limited by spinal precautions, pain )   Toileting Assistance  3  Moderate Assistance   Toileting Deficit Perineal hygiene;Clothing management up;Clothing management down;Use of bedpan/urinal setup; Increased time to complete;Grab bar use;Supervison/safety;Setup  (pt with episode of urinary incontinence during mobility   assistance for posterior/pericare )   Functional Assistance 5  Supervision/Setup   Functional Deficit Supervision/safety;Verbal cueing   Additional Comments (S)  pt required Max A to don/doff TLSO brace  step by step cueing needed for donning  assistance for reaching around back to place arms into straps  Bed Mobility   Supine to Sit 5  Supervision   Additional items Assist x 1; Increased time required;Verbal cues;LE management; Bedrails;HOB elevated   Sit to Supine 4  Minimal assistance   Additional items Assist x 1; Increased time required;LE management;Verbal cues   Additional Comments sat EOB ~4 minutes c supervision  Transfers   Sit to Stand 5  Supervision   Additional items Assist x 1; Increased time required;Verbal cues   Stand to Sit 5  Supervision   Additional items Assist x 1; Increased time required;Verbal cues   Toilet transfer 4  Minimal assistance  (CGA)   Additional items Assist x 1; Increased time required;Standard toilet;Verbal cues  (grab bars)   Additional Comments cues for safe body mechanics during transfers   Functional Mobility   Functional Mobility 5  Supervision   Additional Comments functional mobility household distance within room + bathroom with rollator  no overt LOB   Additional items Rolling walker   Balance   Static Sitting Fair +   Dynamic Sitting Fair   Static Standing Fair   Dynamic Standing Fair -   Activity Tolerance   Activity Tolerance Patient limited by fatigue;Patient limited by pain   Medical Staff Made Aware communication with CM, PTA   Nurse Made Aware RN pre/post session, LEMUEL Castellano   RUE Assessment   RUE Assessment WFL  (Full AROM, MMT 4/5 throughout based on functional assessment)   LUE Assessment   LUE Assessment WFL  (Full AROM, MMT 4/5 throughout based on functional assessment)   Hand Function   Gross Motor Coordination Functional   Fine Motor Coordination Functional   Sensation   Light Touch No apparent deficits   Vision-Basic Assessment   Current Vision Does not wear glasses   Cognition   Overall Cognitive Status WFL  (functional cognition intact, suspect higher level deficits)   Arousal/Participation Cooperative; Alert   Orientation Level Oriented to person;Oriented to place;Oriented to situation;Disoriented to time  ("2032" able report month correctly  Identifies hospital setting, unable to recall [facility] , [facility]   Memory (S)  Decreased short term memory;Decreased recall of recent events;Decreased recall of precautions  (able to recall 0/3 spinal precautions at start of session)   Following Commands Follows one step commands with increased time or repetition   Comments Pt pleasant and agreeablt o OT session  cues to adhere to spinal precautions during ADL tasks and bed mobility  Assessment   Limitation Decreased ADL status; Decreased Safe judgement during ADL;Decreased UE strength;Decreased endurance;Decreased cognition;Decreased self-care trans;Decreased high-level ADLs  (spinal precautions)   Prognosis Good   Assessment Patient is a 80 y o  female seen for OT evaluation at [facility] following admission on 3/20/2023  s/p T12 compression fracture ([facility] [address] )  Please see above for comprehensive list of comorbidities and significant PMHx impacting functional performance  Patient presents with active orders for OT eval and treat and up in chair  At baseline, pt is (I) with ADLs, mod (I) c rollator community distances  Upon initial evaluation, patient requires Min A assistance for UB ADLs, Mod A  assistance for LB ADLs, and Supervision assistance for transfers and functional mobility household distance with RW  Based on functional eval, pt presents with intact  attention, impaired  safety awareness, intact  problem solving skills, and impaired   memory  Occupational performance is affected by the following deficits:  decreased muscular strength , decreased standing tolerance for self care tasks , decreased functional reach , decreased activity tolerance , impaired safety awareness  and (+) pain ; further limited by thoracic spinal precautions   Patient would benefit from OT services within the acute care setting to maximize level of functional independence in the following areas fall prevention , UB ADLs, LB ADLs, self-care transfers and functional mobility  Personal factors impacting D/C include: (+) Hx of falls  and decreased caregiver status   From OT standpoint, recommendation at time of D/C would be post-acute rehabilitation   Goals   Patient Goals to be independent again; agreeable to STR   Plan   Treatment Interventions ADL retraining;Functional transfer training;UE strengthening/ROM; Patient/family training;Cognitive reorientation; Endurance training; Compensatory technique education;Equipment evaluation/education; Activityengagement   Goal Expiration Date 04/02/23   OT Treatment Day 0   OT Frequency 3-5x/wk   Recommendation   OT Discharge Recommendation Post acute rehabilitation services   Additional Comments  (S)  Recommend encouragement of active participation during donning/doffing of TLSO with RN staff to increase functional independence  Additional Comments 2 The patient's raw score on the AM-PAC Daily Activity Inpatient Short Form is 16  A raw score of less than 19 suggests the patient may benefit from discharge to post-acute rehabilitation services  Please refer to the recommendation of the Occupational Therapist for safe discharge planning     AM-PAC Daily Activity Inpatient   Lower Body Dressing 2   Bathing 2   Toileting 2   Upper Body Dressing 3   Grooming 3   Eating 4   Daily Activity Raw Score 16   Daily Activity Standardized Score (Calc for Raw Score >=11) 35 96   AM-PAC Applied Cognition Inpatient   Following a Speech/Presentation 3   Understanding Ordinary Conversation 4   Taking Medications 3   Remembering Where Things Are Placed or Put Away 3   Remembering List of 4-5 Errands 2   Taking Care of Complicated Tasks 2   Applied Cognition Raw Score 17   Applied Cognition Standardized Score 36 52   End of Consult   Education Provided Yes  (spinal precautions, TLSO brace)   Patient Position at End of Consult Bed/Chair alarm activated; All needs within reach; Supine   Nurse Communication Nurse aware of consult   Pt will complete UB ADLs Mod independent  , including donning/doffing TLSO brace as needed for increased ADL independence within 10 days  Pt will complete LB ADLs Min A  with use of LHAE as needed for increased ADL independence within 10 days  Pt will complete toileting Min A  with use of DME for increased ADL independence within 10 days  Pt will demonstrate proper body mechanics to complete self-care transfers and functional mobility with Mod independent  and use of AD PRN for increased safety and functional independence within 10 days  Pt will demonstrate standing tolerance of 5 min with Mod independent  and use of AD PRN for increased activity tolerance during ADL/IADL tasks within 10 days  Pt will demonstrate proper body mechanics and fall prevention strategies during 100% of tx sessions for increased safety awareness during ADL/IADLs    Pt will participate in ongoing cognitive assessments to assist with safe D/C planning and supervision/assistance recommendations  Pt will verbalize and demonstrate understanding of spinal precautions in 100% of tx sessions for increased safety and functional mobility  Pt will demonstrate use of pain management techniques PRN for increased activity tolerance and engagement       [employer]

## 2023-03-23 NOTE — PROGRESS NOTES
[facility]  Progress Note Devoria Part 7/17/1925, 80 y o  female MRN: [id number]  Unit/Bed#: S -01 Encounter: [id number]  Primary Care Provider: Linda Jorge MD   Date and time admitted to hospital: 3/20/2023  8:02 AM    Moderate protein-calorie malnutrition ([address] )  Assessment & Plan  Malnutrition Findings:   Adult Malnutrition type: Acute illness  Adult Degree of Malnutrition: Malnutrition of moderate degree  Malnutrition Characteristics: Weight loss, Inadequate energy    On supplements and a diet               360 Statement: Moderate malnutrition in the context of acute illness r/t inadequate energy intake with decreased appetite and recent fall as evidenced by wt decrease of 5 8% x 1mo, intake <50% of estimated needs x >1mo  Will treat with nutrition therapy, PO diet, supplements  BMI Findings: Body mass index is 27 41 kg/m²  Fall  Assessment & Plan  · Patient describes falling approximately 6 days prior to admission  Since the fall she has had worsening back pain  · Injuries as stated below  · PT/OT    Anxiety  Assessment & Plan  · Continue home Lexapro    Atrial flutter (HCC)  Assessment & Plan  · Continue home metoprolol  · Patient is not on anticoagulation due to age    Acquired hypothyroidism  Assessment & Plan  · Continue home levothyroxine    Essential hypertension  Assessment & Plan  · Continue home metoprolol and amlodipine    * T12 compression fracture [facility])  Assessment & Plan  · Secondary to fall  · CT imaging showed: There is an acute moderate compression fracture of the T12 vertebral body with approximate 5 mm bony retropulsion into the canal causing moderate central canal stenosis  This is new since 2/10/2023    · Neurosurgery consulted  · TLSO  · Upright imaging done -did not show any differences from prior CT scan  · Multimodal pain regimen  · PT/OT        Bowel Regimen: Senokot  VTE Prophylaxis:Enoxaparin (Lovenox)     Disposition: Continue current level of care, discharged to a rehabilitation facility is pending    Subjective   Chief Complaint: Back pain, fall    Subjective: Yoana Monroe is a 70-year-old female who presented on 3/20 for evaluation of back pain secondary to her fall, she was found to have a T12 fracture with 5 mm of retropulsion, the fracture has remained stable here in the hospital, neurosurgery was consulted and she is to be wearing a TLSO brace  Patient did not have any significant overnight events  She is pending placement to a rehabilitation facility  Objective   Vitals:   Temp:  [97 7 °F (36 5 °C)-98 3 °F (36 8 °C)] 97 7 °F (36 5 °C)  HR:  [] 63  Resp:  [18] 18  BP: (107-195)/(66-85) 141/77    I/O       03/21 0701  03/22 0700 03/22 0701  03/23 0700    P  O   660    Total Intake(mL/kg)  660 (10)    Urine (mL/kg/hr) 150 (0 1)     Total Output 150     Net -150 +660          Unmeasured Urine Occurrence  3 x    Unmeasured Stool Occurrence  3 x           Physical Exam:   General-comfortable  Neuro-no acute neurological deficits; alert and oriented x3  HEENT-EOM intact no pain on EOM, oropharynx clear and patent  Cardiac-regular rate, irregular rhythm, no murmurs rubs or gallops  Pulmonary-clear to auscultation bilaterally, no adventitious breath sounds  GI-soft, nontender, no distension, normal bowel sounds  -voiding  Musculoskeletal-moves all extremities; mild back pain worse with movement, neurovascularly intact  Skin-warm and well perfused      Invasive Devices     Peripheral Intravenous Line  Duration           Peripheral IV 03/20/23 Right Antecubital 2 days                   Lab Results: Results: I have personally reviewed all pertinent laboratory/tests results, BMP/CMP: No results found for: SODIUM, K, CL, CO2, ANIONGAP, BUN, CREATININE, GLUCOSE, CALCIUM, AST, ALT, ALKPHOS, PROT, BILITOT, EGFR and CBC: No results found for: WBC, HGB, HCT, MCV, PLT, ADJUSTEDWBC, MCH, MCHC, RDW, MPV, NRBC  Imaging: I have personally reviewed pertinent reports       Other Studies: N/A

## 2023-03-23 NOTE — CASE MANAGEMENT
Case Management Discharge Planning Note    Patient name Marko De Jesus  Location S /S -86 MRN [id number]  : 1925 Date 3/23/2023       Current Admission Date: 3/20/2023  Current Admission Diagnosis:T12 compression fracture [facility])   Patient Active Problem List    Diagnosis Date Noted   • Moderate protein-calorie malnutrition ([address] ) 2023   • Fall 2023   • T12 compression fracture ([address] ) 2023   • Primary osteoarthritis of left knee 2022   • Seasonal allergic rhinitis due to pollen 2022   • Syncope 2021   • Leukocytosis 2021   • Interstitial cystitis 2021   • Anxiety 2021   • Atrial flutter ([address] ) 2021   • Vitamin B12 deficiency 09/10/2019   • Localized edema 09/10/2019   • Stage 2 chronic kidney disease 09/10/2019   • Hiatal hernia 2019   • Chronic diastolic heart failure ([address] ) 2019   • Ambulatory dysfunction 2019   • Encounter for follow-up examination after completed treatment for malignant neoplasm 2019   • Hemorrhoids 2018   • History of left breast cancer 2018   • S/P left mastectomy 2017   • Paroxysmal atrial fibrillation ([address] ) 2017   • Osteoporosis    • Essential hypertension    • GERD (gastroesophageal reflux disease)    • Constipation 2016   • Macular degeneration 2015   • Aneurysm of ascending aorta 2014   • First degree AV block 03/15/2013   • Benign paroxysmal vertigo 2013   • Vitamin D deficiency 10/01/2012   • Dyslipidemia 2012   • Acquired hypothyroidism 2012   • Vertigo 2012   • H  pylori infection 2009   • Advance directive in chart 2005      LOS (days): 3  Geometric Mean LOS (GMLOS) (days): 2 80  Days to GMLOS:-0 2     OBJECTIVE:  Risk of Unplanned Readmission Score: 14 4         Current admission status: Inpatient   Preferred Pharmacy:   [id number] [place] - [address] [place] [address]  Phone: [phone] Fax: [facility] [address]  [address] [place] [address]  Phone: [phone] Fax: [phone] [facility]   [address] [facility] [address]  [facility],[address]  Phone: [phone] Fax: [phone]    Primary Care Provider: Mina Mckeon MD    Primary Insurance: MEDICARE  Secondary Insurance:     DISCHARGE DETAILS:         Treatment Team Recommendation: Short Term Rehab  Discharge Destination Plan[de-identified] Short Term Rehab  Transport at Discharge : [facility]  Dispatcher Contacted: Yes  Number/Name of Dispatcher: Teresa Fairchild and Unit #): [place]  ETA of Transport (Date): 03/23/23  ETA of Transport (Time): 1630     Transfer Mode: Stretcher  Accompanied by: EMS personnel     IMM Given (Date):: 03/23/23  IMM Given to[de-identified] Family        Padilla Victorino IMM reviewed with patient's caregiver, patient's caregiver agrees with discharge determination        Accepting Facility Name, [address] : [address]  Receiving Facility/Agency Phone Number: [phone]  Facility/Agency Fax Number: [phone]

## 2023-03-23 NOTE — ASSESSMENT & PLAN NOTE
· Secondary to fall  · CT imaging showed: There is an acute moderate compression fracture of the T12 vertebral body with approximate 5 mm bony retropulsion into the canal causing moderate central canal stenosis  This is new since 2/10/2023    · Neurosurgery consulted  · TLSO  · Upright imaging done -did not show any differences from prior CT scan  · Multimodal pain regimen  · PT/OT

## 2023-03-23 NOTE — INCIDENTAL FINDINGS
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

## 2023-03-24 ENCOUNTER — TELEPHONE (OUTPATIENT)
Dept: FAMILY MEDICINE CLINIC | Facility: CLINIC | Age: 88
End: 2023-03-24

## 2023-03-24 ENCOUNTER — PATIENT OUTREACH (OUTPATIENT)
Dept: CASE MANAGEMENT | Facility: OTHER | Age: 88
End: 2023-03-24

## 2023-03-24 ENCOUNTER — NURSING HOME VISIT (OUTPATIENT)
Dept: GERIATRICS | Facility: OTHER | Age: 88
End: 2023-03-24

## 2023-03-24 ENCOUNTER — TRANSITIONAL CARE MANAGEMENT (OUTPATIENT)
Dept: INTERNAL MEDICINE CLINIC | Facility: CLINIC | Age: 88
End: 2023-03-24

## 2023-03-24 DIAGNOSIS — J30.1 SEASONAL ALLERGIC RHINITIS DUE TO POLLEN: ICD-10-CM

## 2023-03-24 DIAGNOSIS — R55 SYNCOPE, UNSPECIFIED SYNCOPE TYPE: ICD-10-CM

## 2023-03-24 DIAGNOSIS — S22.080D COMPRESSION FRACTURE OF T12 VERTEBRA WITH ROUTINE HEALING, SUBSEQUENT ENCOUNTER: Primary | ICD-10-CM

## 2023-03-24 DIAGNOSIS — E03.9 ACQUIRED HYPOTHYROIDISM: ICD-10-CM

## 2023-03-24 DIAGNOSIS — E44.0 MODERATE PROTEIN-CALORIE MALNUTRITION (HCC): ICD-10-CM

## 2023-03-24 DIAGNOSIS — F41.9 ANXIETY: ICD-10-CM

## 2023-03-24 DIAGNOSIS — E53.8 VITAMIN B12 DEFICIENCY: ICD-10-CM

## 2023-03-24 DIAGNOSIS — I48.0 PAROXYSMAL ATRIAL FIBRILLATION (HCC): ICD-10-CM

## 2023-03-24 DIAGNOSIS — K21.9 GASTROESOPHAGEAL REFLUX DISEASE, UNSPECIFIED WHETHER ESOPHAGITIS PRESENT: ICD-10-CM

## 2023-03-24 DIAGNOSIS — R26.2 AMBULATORY DYSFUNCTION: ICD-10-CM

## 2023-03-24 DIAGNOSIS — I10 ESSENTIAL HYPERTENSION: ICD-10-CM

## 2023-03-24 RX ORDER — FAMOTIDINE 20 MG/1
20 TABLET, FILM COATED ORAL DAILY PRN
Qty: 90 TABLET | Refills: 0
Start: 2023-03-24

## 2023-03-24 RX ORDER — METOPROLOL SUCCINATE 25 MG/1
TABLET, EXTENDED RELEASE ORAL
Qty: 45 TABLET | Refills: 0
Start: 2023-03-24

## 2023-03-24 RX ORDER — FLUTICASONE PROPIONATE 50 MCG
1 SPRAY, SUSPENSION (ML) NASAL DAILY PRN
Qty: 48 G | Refills: 1
Start: 2023-03-24

## 2023-03-24 RX ORDER — LANOLIN ALCOHOL/MO/W.PET/CERES
CREAM (GRAM) TOPICAL
Qty: 30 TABLET | Refills: 0
Start: 2023-03-24

## 2023-03-24 RX ORDER — LEVOTHYROXINE SODIUM 0.15 MG/1
TABLET ORAL
Qty: 80 TABLET | Refills: 1
Start: 2023-03-24

## 2023-03-24 NOTE — TELEPHONE ENCOUNTER
Daughter called as follow up to patient being discharged and asking for clarification on medication, she feels there is confusion

## 2023-03-24 NOTE — TELEPHONE ENCOUNTER
-Metoprolol 12 5 q am  - pepcid prn  - flonase prn  - levothyroxine 150- 5 times a week  - centrum silver  - b 12 1000 twice a week  - amlodipine 2 5 twice a day  - eye drops  -lexapro 5 at in the evening  -vit d3 2000 units daily    Daughter is concerened that patient wasn't d/c correctly to rehab  Please advise on current medication list to send to facility  Medication list above is what patient was on before hospital       Patients medication list is crossed out  Needs to be updated for rehab facility  [facility]- rehab

## 2023-03-24 NOTE — PROGRESS NOTES
Outpatient Care Management CARLOS/SNF Pathway  Discharged 3/23/23 to [facility]   This care manager assistant will continue to monitor via chart review
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
## 2023-03-27 ENCOUNTER — NURSING HOME VISIT (OUTPATIENT)
Dept: GERIATRICS | Facility: OTHER | Age: 88
End: 2023-03-27

## 2023-03-27 ENCOUNTER — PATIENT OUTREACH (OUTPATIENT)
Dept: CASE MANAGEMENT | Facility: OTHER | Age: 88
End: 2023-03-27

## 2023-03-27 VITALS
BODY MASS INDEX: 27.42 KG/M2 | TEMPERATURE: 97.6 F | DIASTOLIC BLOOD PRESSURE: 69 MMHG | HEART RATE: 67 BPM | SYSTOLIC BLOOD PRESSURE: 122 MMHG | RESPIRATION RATE: 18 BRPM | WEIGHT: 145.1 LBS | OXYGEN SATURATION: 96 %

## 2023-03-27 DIAGNOSIS — K59.09 OTHER CONSTIPATION: ICD-10-CM

## 2023-03-27 DIAGNOSIS — F41.9 ANXIETY: ICD-10-CM

## 2023-03-27 DIAGNOSIS — S22.080D COMPRESSION FRACTURE OF T12 VERTEBRA WITH ROUTINE HEALING, SUBSEQUENT ENCOUNTER: Primary | ICD-10-CM

## 2023-03-27 DIAGNOSIS — E03.9 ACQUIRED HYPOTHYROIDISM: ICD-10-CM

## 2023-03-27 DIAGNOSIS — I48.0 PAROXYSMAL ATRIAL FIBRILLATION (HCC): ICD-10-CM

## 2023-03-27 DIAGNOSIS — R26.2 AMBULATORY DYSFUNCTION: ICD-10-CM

## 2023-03-27 DIAGNOSIS — I50.32 CHRONIC DIASTOLIC HEART FAILURE (HCC): Chronic | ICD-10-CM

## 2023-03-27 DIAGNOSIS — I10 ESSENTIAL HYPERTENSION: ICD-10-CM

## 2023-03-27 DIAGNOSIS — N18.2 STAGE 2 CHRONIC KIDNEY DISEASE: ICD-10-CM

## 2023-03-27 LAB
ATRIAL RATE: 267 BPM
QRS AXIS: -59 DEGREES
QRSD INTERVAL: 98 MS
QT INTERVAL: 408 MS
QTC INTERVAL: 493 MS
T WAVE AXIS: 67 DEGREES
VENTRICULAR RATE: 88 BPM

## 2023-03-27 NOTE — ASSESSMENT & PLAN NOTE
Multifactoral  Maintain fall precautions  Continue PT/OT  Ensure adequate nutrition and hydration   for dispo planning  Patient lives at home alone  Ambulates independently w/ walker

## 2023-03-27 NOTE — ASSESSMENT & PLAN NOTE
Denies symptoms  Continue metoprolol succinate for rate control  [facility]

## 2023-03-27 NOTE — ASSESSMENT & PLAN NOTE
S/p fall at home  CT revealed acute moderate compression fracture of T12 vertebral body w/ approximate 5 mm bony retropulsion into the canal causing moderate central canal stenosis- new since 2/10/2023  Evaluated by neuro sx- non operative  Continue TLSO brace when OOB  Continue tylenol 975 mg PO tid for pain control  Continue PT/OT

## 2023-03-27 NOTE — ASSESSMENT & PLAN NOTE
Lab Results   Component Value Date    EGFR 76 03/21/2023    EGFR 74 03/20/2023    EGFR 75 02/11/2023    CREATININE 0 60 03/21/2023    CREATININE 0 65 03/20/2023    CREATININE 0 64 02/11/2023   Stable and at baseline  Avoid hypotension and nephrotoxins
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
## 2023-03-27 NOTE — ASSESSMENT & PLAN NOTE
Wt Readings from Last 3 Encounters:   03/27/23 65 8 kg (145 lb 1 6 oz)   03/20/23 65 8 kg (145 lb 1 oz)   03/15/23 67 9 kg (149 lb 12 8 oz)   Clinically appears euvolemic  Weight is stable  Last ECHO July 2021 EF 65%  Continue beta blocker  Monitor weights and volume status  Encourage low sodium diet

## 2023-03-27 NOTE — ASSESSMENT & PLAN NOTE
BP has been controlled  Continue amlodipine 2 5 mg PO bid  Continue metoprolol succinate 25 mg PO daily  Monitor VS  Avoid hypotension  Encourage low sodium diet

## 2023-03-27 NOTE — PROGRESS NOTES
Chart review completed  Email sent to facility requesting update on patient  This care manager assistant will continue to monitor via chart review throughout episode

## 2023-03-28 ENCOUNTER — TELEPHONE (OUTPATIENT)
Dept: NEUROSURGERY | Facility: CLINIC | Age: 88
End: 2023-03-28

## 2023-03-28 NOTE — TELEPHONE ENCOUNTER
Received a call from [facility] asking if it would be ok to have xray completed this Friday in preparation for Kyra's upcoming visit 4/5  Advised this should be ok  She is currently residing at a facility and this is the date arranged for her imaging  She was appreciative of the confirmation

## 2023-03-29 ENCOUNTER — NURSING HOME VISIT (OUTPATIENT)
Dept: GERIATRICS | Facility: OTHER | Age: 88
End: 2023-03-29

## 2023-03-29 VITALS
OXYGEN SATURATION: 96 % | SYSTOLIC BLOOD PRESSURE: 122 MMHG | HEART RATE: 67 BPM | WEIGHT: 145.1 LBS | DIASTOLIC BLOOD PRESSURE: 69 MMHG | TEMPERATURE: 97.4 F | RESPIRATION RATE: 18 BRPM | BODY MASS INDEX: 27.42 KG/M2

## 2023-03-29 DIAGNOSIS — S22.080D COMPRESSION FRACTURE OF T12 VERTEBRA WITH ROUTINE HEALING, SUBSEQUENT ENCOUNTER: Primary | ICD-10-CM

## 2023-03-29 DIAGNOSIS — N18.2 STAGE 2 CHRONIC KIDNEY DISEASE: ICD-10-CM

## 2023-03-29 DIAGNOSIS — I10 ESSENTIAL HYPERTENSION: ICD-10-CM

## 2023-03-29 DIAGNOSIS — R26.2 AMBULATORY DYSFUNCTION: ICD-10-CM

## 2023-03-29 DIAGNOSIS — I50.32 CHRONIC DIASTOLIC HEART FAILURE (HCC): Chronic | ICD-10-CM

## 2023-03-29 DIAGNOSIS — K59.09 OTHER CONSTIPATION: ICD-10-CM

## 2023-03-29 DIAGNOSIS — F41.9 ANXIETY: ICD-10-CM

## 2023-03-29 DIAGNOSIS — I48.0 PAROXYSMAL ATRIAL FIBRILLATION (HCC): ICD-10-CM

## 2023-03-30 ENCOUNTER — TELEPHONE (OUTPATIENT)
Dept: INTERNAL MEDICINE CLINIC | Facility: CLINIC | Age: 88
End: 2023-03-30

## 2023-03-30 NOTE — TELEPHONE ENCOUNTER
Patient has a follow up appointment in August   Pt recently was hospitalized and had blood work done during hospitalization  Daughter wants to know how important it is to repeat blood work that Dr Chris Resendiz has ordered or is the hospital blood work enough?

## 2023-03-30 NOTE — ASSESSMENT & PLAN NOTE
Wt Readings from Last 3 Encounters:   03/29/23 65 8 kg (145 lb 1 6 oz)   03/27/23 65 8 kg (145 lb 1 6 oz)   03/20/23 65 8 kg (145 lb 1 oz)   Stable  Last ECHO July 2021 EF 65%  Continue beta blocker  Monitor weights and volume status  Encourage heart healthy diet

## 2023-03-30 NOTE — ASSESSMENT & PLAN NOTE
S/p fall at home  CT revealed acute moderate compression fracture of T12 vertebral body w/ approximate 5 mm bony retropulsion into the canal causing moderate central canal stenosis- new since 2/10/2023  Non operative per neuro sx  Patient denies any pain at this time  Continue TLSO brace when OOB  Continue tylenol 975 mg PO tid  Continue PT/OT
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
## 2023-03-30 NOTE — ASSESSMENT & PLAN NOTE
Multifactoral  Maintain fall precautions  Continue PT/OT  Ensure adequate nutrition and hydration   for dispo planning  Lives alone and is independent w/ ADLs

## 2023-03-30 NOTE — TELEPHONE ENCOUNTER
Blood work not urgent, I just need the vitamin levels  No need to do it yet  We can discuss at appointment  Once she leaves rehab, please call to schedule f/u appt with me

## 2023-03-30 NOTE — ASSESSMENT & PLAN NOTE
HR has been controlled  Denies any symptoms  Continue metoprolol succinate for rate control  Not on any current [facility]

## 2023-03-31 ENCOUNTER — HOSPITAL ENCOUNTER (OUTPATIENT)
Dept: RADIOLOGY | Facility: HOSPITAL | Age: 88
Discharge: HOME | End: 2023-03-31

## 2023-03-31 DIAGNOSIS — S22.080A COMPRESSION FRACTURE OF T12 VERTEBRA, INITIAL ENCOUNTER (HCC): ICD-10-CM

## 2023-04-03 ENCOUNTER — PATIENT OUTREACH (OUTPATIENT)
Dept: CASE MANAGEMENT | Facility: OTHER | Age: 88
End: 2023-04-03

## 2023-04-03 NOTE — PROGRESS NOTES
Update received the patient is receiving PT & OT no LCD at this time   This care manager assistant will continue to monitor via chart review

## 2023-04-04 ENCOUNTER — NURSING HOME VISIT (OUTPATIENT)
Dept: GERIATRICS | Facility: OTHER | Age: 88
End: 2023-04-04

## 2023-04-04 VITALS
OXYGEN SATURATION: 96 % | WEIGHT: 147 LBS | SYSTOLIC BLOOD PRESSURE: 122 MMHG | RESPIRATION RATE: 18 BRPM | BODY MASS INDEX: 27.78 KG/M2 | TEMPERATURE: 97.8 F | DIASTOLIC BLOOD PRESSURE: 69 MMHG | HEART RATE: 67 BPM

## 2023-04-04 DIAGNOSIS — N18.2 STAGE 2 CHRONIC KIDNEY DISEASE: ICD-10-CM

## 2023-04-04 DIAGNOSIS — I48.0 PAROXYSMAL ATRIAL FIBRILLATION (HCC): ICD-10-CM

## 2023-04-04 DIAGNOSIS — F41.9 ANXIETY: ICD-10-CM

## 2023-04-04 DIAGNOSIS — I50.32 CHRONIC DIASTOLIC HEART FAILURE (HCC): Chronic | ICD-10-CM

## 2023-04-04 DIAGNOSIS — S22.080D COMPRESSION FRACTURE OF T12 VERTEBRA WITH ROUTINE HEALING, SUBSEQUENT ENCOUNTER: Primary | ICD-10-CM

## 2023-04-04 DIAGNOSIS — K59.09 OTHER CONSTIPATION: ICD-10-CM

## 2023-04-04 DIAGNOSIS — I10 ESSENTIAL HYPERTENSION: ICD-10-CM

## 2023-04-04 DIAGNOSIS — R26.2 AMBULATORY DYSFUNCTION: ICD-10-CM

## 2023-04-04 NOTE — ASSESSMENT & PLAN NOTE
S/p fall at home  CT significant for acute moderate compression fracture of T12 vertebral body w/ approximate 5 mm bony retropulsion into the canal causing moderate central canal stenosis new since 2/10/23  Evaluated by neuro sx- non operative  Patient denies any pain at this time  Continue tylenol 975 mg PO tid  Continue TLSO brace when OOB as directed by neuro sx  Continue PT/OT

## 2023-04-04 NOTE — ASSESSMENT & PLAN NOTE
HR has been controlled  Denies any symptoms  Continue metoprolol succinate  Not on any [facility] [place]

## 2023-04-04 NOTE — ASSESSMENT & PLAN NOTE
Lab Results   Component Value Date    EGFR 76 03/21/2023    EGFR 74 03/20/2023    EGFR 75 02/11/2023    CREATININE 0 60 03/21/2023    CREATININE 0 65 03/20/2023    CREATININE 0 64 02/11/2023   Stable and at baseline  Avoid hypotension and nephrotoxins  Encourage fluid intake

## 2023-04-04 NOTE — ASSESSMENT & PLAN NOTE
Multifactoral  Maintain fall precautions  Continue PT/OT  Ensure adequate nutrition and hydration   for dispo planning  Patient lives alone and is independent w/ ADLs

## 2023-04-04 NOTE — ASSESSMENT & PLAN NOTE
Wt Readings from Last 3 Encounters:   04/04/23 66 7 kg (147 lb)   03/29/23 65 8 kg (145 lb 1 6 oz)   03/27/23 65 8 kg (145 lb 1 6 oz)   Clinically appears euvolemic  Last ECHO JULY 2021 EF 65%  Continue beta blocker  Weight is stable- continue to monitor  Monitor volume status  Encourage heart healthy diet

## 2023-04-04 NOTE — ASSESSMENT & PLAN NOTE
BP has been controlled  Continue amlodipine 2 5 mg PO bid  Continue metoprolol succinate 25 mg PO daily  Monitor VS  Avoid hypotension  Encourage sodium restricted diet
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
## 2023-04-05 ENCOUNTER — OFFICE VISIT (OUTPATIENT)
Dept: NEUROSURGERY | Facility: CLINIC | Age: 88
End: 2023-04-05

## 2023-04-05 VITALS
TEMPERATURE: 97.9 F | SYSTOLIC BLOOD PRESSURE: 117 MMHG | HEIGHT: 61 IN | HEART RATE: 84 BPM | BODY MASS INDEX: 27.78 KG/M2 | OXYGEN SATURATION: 96 % | DIASTOLIC BLOOD PRESSURE: 76 MMHG

## 2023-04-05 DIAGNOSIS — G89.4 CHRONIC PAIN SYNDROME: Primary | ICD-10-CM

## 2023-04-05 DIAGNOSIS — S22.080D COMPRESSION FRACTURE OF T12 VERTEBRA WITH ROUTINE HEALING, SUBSEQUENT ENCOUNTER: ICD-10-CM
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
## 2023-04-07 ENCOUNTER — NURSING HOME VISIT (OUTPATIENT)
Dept: GERIATRICS | Facility: OTHER | Age: 88
End: 2023-04-07

## 2023-04-07 VITALS
DIASTOLIC BLOOD PRESSURE: 69 MMHG | BODY MASS INDEX: 27.78 KG/M2 | OXYGEN SATURATION: 96 % | HEART RATE: 67 BPM | RESPIRATION RATE: 18 BRPM | WEIGHT: 147 LBS | SYSTOLIC BLOOD PRESSURE: 122 MMHG | TEMPERATURE: 97.7 F

## 2023-04-07 DIAGNOSIS — I48.0 PAROXYSMAL ATRIAL FIBRILLATION (HCC): ICD-10-CM

## 2023-04-07 DIAGNOSIS — S22.080D COMPRESSION FRACTURE OF T12 VERTEBRA WITH ROUTINE HEALING, SUBSEQUENT ENCOUNTER: Primary | ICD-10-CM

## 2023-04-07 DIAGNOSIS — F41.9 ANXIETY: ICD-10-CM

## 2023-04-07 DIAGNOSIS — K59.09 OTHER CONSTIPATION: ICD-10-CM

## 2023-04-07 DIAGNOSIS — I50.32 CHRONIC DIASTOLIC HEART FAILURE (HCC): Chronic | ICD-10-CM

## 2023-04-07 DIAGNOSIS — E44.0 MODERATE PROTEIN-CALORIE MALNUTRITION (HCC): ICD-10-CM

## 2023-04-07 DIAGNOSIS — N18.2 STAGE 2 CHRONIC KIDNEY DISEASE: ICD-10-CM

## 2023-04-07 DIAGNOSIS — I10 ESSENTIAL HYPERTENSION: ICD-10-CM

## 2023-04-07 DIAGNOSIS — R26.2 AMBULATORY DYSFUNCTION: ICD-10-CM

## 2023-04-07 NOTE — ASSESSMENT & PLAN NOTE
S/p fall at home w/ CT revealing acute moderate compression fracture of T12 vertebral body w/ approximate 5 mm bony retropulsion into the canal causing moderate central canal stenosis- new since 2/10/23  Neuro sx recommended non operative, conservative management  Patient has little to no pain  Continue tylenol 975 mg PO tid  Continue TLSO brace when OOB as directed  Continue PT/OT

## 2023-04-07 NOTE — ASSESSMENT & PLAN NOTE
BP has been controlled  Continue amlodipine 2 5 mg PO bid  Continue metoprolol succinate 25 mg PO daily  Monitor VS  Avoid hypotension

## 2023-04-07 NOTE — ASSESSMENT & PLAN NOTE
Wt Readings from Last 3 Encounters:   04/07/23 66 7 kg (147 lb)   04/04/23 66 7 kg (147 lb)   03/29/23 65 8 kg (145 lb 1 6 oz)   No concern for acute exacerbation at this time  Last ECHO July 2021 EF 65%  Continue beta blocker  Weight has been stable  Monitor volume status  Encourage heart healthy diet

## 2023-04-07 NOTE — ASSESSMENT & PLAN NOTE
Multifactoral  Maintain fall precautions  Continue PT/OT  Ensure adequate nutrition and hydration   for dispo planning  Patient lives alone and is ambulating and completing ADLs independently

## 2023-04-07 NOTE — ASSESSMENT & PLAN NOTE
Patient reports poor appetite and PO intake  Weight has remained stable  Will add ensure as patient states she drinks them at home as well  Continue to encourage PO intake     Body mass index is 27 78 kg/m²

## 2023-04-07 NOTE — ASSESSMENT & PLAN NOTE
Reported constipation initially but then episode of incontinence at appointment yesterday  Continue miralax PO daily PRN  Encourage fluid intake
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
## 2023-04-19 PROBLEM — D72.829 LEUKOCYTOSIS: Status: RESOLVED | Noted: 2021-07-11 | Resolved: 2023-04-19

## 2023-04-24 ENCOUNTER — PATIENT OUTREACH (OUTPATIENT)
Dept: CASE MANAGEMENT | Facility: OTHER | Age: 88
End: 2023-04-24

## 2023-05-01 ENCOUNTER — TELEPHONE (OUTPATIENT)
Dept: NEUROSURGERY | Facility: CLINIC | Age: 88
End: 2023-05-01

## 2023-05-01 NOTE — TELEPHONE ENCOUNTER
Called [facility] #[phone] ( pt discharged 4/12/23)     CONF DETAIL SLT APPT INFO & GAVE XRAY REM W DAUGHTER Lillian  5/1 FM--  ONE MONTH FOLLOW UP WITH X RAY-APPT SCHEDULE 5/5 W AP @ T

## 2023-05-03 ENCOUNTER — APPOINTMENT (OUTPATIENT)
Dept: RADIOLOGY | Age: 88
End: 2023-05-03

## 2023-05-03 DIAGNOSIS — G89.4 CHRONIC PAIN SYNDROME: ICD-10-CM

## 2023-05-03 DIAGNOSIS — S22.080D COMPRESSION FRACTURE OF T12 VERTEBRA WITH ROUTINE HEALING, SUBSEQUENT ENCOUNTER: ICD-10-CM

## 2023-05-05 ENCOUNTER — OFFICE VISIT (OUTPATIENT)
Dept: NEUROSURGERY | Facility: CLINIC | Age: 88
End: 2023-05-05

## 2023-05-05 VITALS
WEIGHT: 153 LBS | BODY MASS INDEX: 28.89 KG/M2 | RESPIRATION RATE: 18 BRPM | HEART RATE: 89 BPM | DIASTOLIC BLOOD PRESSURE: 82 MMHG | HEIGHT: 61 IN | SYSTOLIC BLOOD PRESSURE: 128 MMHG | TEMPERATURE: 98.6 F

## 2023-05-05 DIAGNOSIS — S22.089A CLOSED T12 FRACTURE (HCC): Primary | ICD-10-CM
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
## 2023-05-15 ENCOUNTER — PATIENT OUTREACH (OUTPATIENT)
Dept: CASE MANAGEMENT | Facility: OTHER | Age: 88
End: 2023-05-15

## 2023-05-15 ENCOUNTER — PATIENT OUTREACH (OUTPATIENT)
Dept: INTERNAL MEDICINE CLINIC | Facility: CLINIC | Age: 88
End: 2023-05-15

## 2023-05-15 NOTE — PROGRESS NOTES
"Chart review reveals that patient did complete an office visit with neurosurgery on 5/5/23 and x-rays on 5/3/23  X-ray revealed that T12 severe compression fracture was stable  Patient was also noted to have \"mild central depression of the superior endplate of I4\" vertebral body  Chart notes from 5/5/23 office visit reveal that patient reported minimal occasional discomfort in her low back  Patient was wearing her TLSO brace and ambulating with a walker  Patient denied any radicular symptoms or new bowel or bladder symptoms  Plan was to wean the back brace  No further work up regarding radiology  Patient was advised not to lift heavy objects, avoid twisting and excessive bending, fall precautions and to continue use of walker for ambulation  Patient may continue PT  No need for scheduled follow up  Patient is currently scheduled for an office visit with her PCP in October, 2023  Last lab work revealed BUN, creat WNL and eGFR>60  No need for further monitoring of lab work  Patient will be closed to care management through SNF/[facility] pathway  I removed myself from the care team and the care coordination note has been updated     "

## 2023-05-23 ENCOUNTER — TELEPHONE (OUTPATIENT)
Dept: FAMILY MEDICINE CLINIC | Facility: CLINIC | Age: 88
End: 2023-05-23

## 2023-05-23 NOTE — TELEPHONE ENCOUNTER
Daughter, Kat Fallon, called asking if you can write a letter of medical necessity stating patient needs more than current hours of home care which is 26 hours/week  Can you request 24 hours/day? No help early in morning or weekends, daughter is assisting, but it is hard  Daughter tearful on call and asked if you could call her for a brief conversation, she would appreciate it  Does not have to be today

## 2023-05-23 NOTE — TELEPHONE ENCOUNTER
Spoke to daughter Love Rivera, very concerned about her mother  She has fallen twice since she left rehab  She is unable to be with her at all times and is worried something will happen  She was evaluated and was given 26 hours a week for an aide, she feels this is not enough  She will get in touch with them again for a re-evaluation  She is asking if there is a way to have a 24 hour care /aide for her that is not in a nursing home  She is requesting for a letter of medical necessity regarding these hours

## 2023-05-25 NOTE — TELEPHONE ENCOUNTER
Please schedule appt with Grant Yeboah next week for memory test     I will send raised toilet seat as requested

## 2023-05-25 NOTE — TELEPHONE ENCOUNTER
OT has recommended a raised toilet seat for pt  She'd like a script faxed to [facility] [employer] [facility]  She does not want the type that sits on the toilet  Her dad had one that kind of looked like a walker that fit over over the toilet seat so it could be removed to clean the toilet  Kolby Beckford was told that if her mom has dementia diagnosis she would qualify for more hours  She states mom is confused upon waking  She is up at 6 am roaming around and very quiet  If pt needs an office visit for any of the above, Kolby Beckford is willing to bring her in

## 2023-05-26 LAB
DME PARACHUTE DELIVERY DATE REQUESTED: NORMAL
DME PARACHUTE DELIVERY DATE REQUESTED: NORMAL
DME PARACHUTE ITEM DESCRIPTION: NORMAL
DME PARACHUTE ITEM DESCRIPTION: NORMAL
DME PARACHUTE ORDER STATUS: NORMAL
DME PARACHUTE ORDER STATUS: NORMAL
DME PARACHUTE SUPPLIER NAME: NORMAL
DME PARACHUTE SUPPLIER NAME: NORMAL
DME PARACHUTE SUPPLIER PHONE: NORMAL
DME PARACHUTE SUPPLIER PHONE: NORMAL

## 2023-05-30 NOTE — TELEPHONE ENCOUNTER
Racquel Rivera said script needs to just say commode-not toilet seat  Call Racquel Foots when it is ready and she will p/u script

## 2023-05-31 ENCOUNTER — APPOINTMENT (EMERGENCY)
Dept: RADIOLOGY | Facility: HOSPITAL | Age: 88
DRG: 640 | End: 2023-05-31
Payer: MEDICARE

## 2023-05-31 ENCOUNTER — HOSPITAL ENCOUNTER (INPATIENT)
Facility: HOSPITAL | Age: 88
LOS: 4 days | Discharge: NURSING, REHAB OR LONG-TERM CARE | DRG: 640 | End: 2023-06-04
Attending: EMERGENCY MEDICINE | Admitting: GENERAL PRACTICE
Payer: MEDICARE

## 2023-05-31 ENCOUNTER — APPOINTMENT (EMERGENCY)
Dept: CT IMAGING | Facility: HOSPITAL | Age: 88
DRG: 640 | End: 2023-05-31
Payer: MEDICARE

## 2023-05-31 DIAGNOSIS — I48.92 ATRIAL FLUTTER, UNSPECIFIED TYPE (HCC): ICD-10-CM

## 2023-05-31 DIAGNOSIS — R42 VERTIGO: ICD-10-CM

## 2023-05-31 DIAGNOSIS — R41.89 COGNITIVE DECLINE: ICD-10-CM

## 2023-05-31 DIAGNOSIS — J18.9 PNEUMONIA: ICD-10-CM

## 2023-05-31 DIAGNOSIS — R29.90 STROKE-LIKE SYMPTOMS: ICD-10-CM

## 2023-05-31 DIAGNOSIS — B37.2 CANDIDAL INTERTRIGO: ICD-10-CM

## 2023-05-31 DIAGNOSIS — R55 SYNCOPE: Primary | ICD-10-CM

## 2023-05-31 DIAGNOSIS — I44.0 FIRST DEGREE AV BLOCK: ICD-10-CM

## 2023-05-31 LAB
2HR DELTA HS TROPONIN: -2 NG/L
ALBUMIN SERPL BCP-MCNC: 3.8 G/DL (ref 3.5–5)
ALP SERPL-CCNC: 62 U/L (ref 34–104)
ALT SERPL W P-5'-P-CCNC: 9 U/L (ref 7–52)
ANION GAP SERPL CALCULATED.3IONS-SCNC: 10 MMOL/L (ref 4–13)
AST SERPL W P-5'-P-CCNC: 15 U/L (ref 13–39)
BASOPHILS # BLD AUTO: 0.04 THOUSANDS/ÂΜL (ref 0–0.1)
BASOPHILS NFR BLD AUTO: 0 % (ref 0–1)
BILIRUB SERPL-MCNC: 0.73 MG/DL (ref 0.2–1)
BNP SERPL-MCNC: 242 PG/ML (ref 0–100)
BUN SERPL-MCNC: 25 MG/DL (ref 5–25)
CALCIUM SERPL-MCNC: 8.5 MG/DL (ref 8.4–10.2)
CARDIAC TROPONIN I PNL SERPL HS: 11 NG/L
CARDIAC TROPONIN I PNL SERPL HS: 9 NG/L
CHLORIDE SERPL-SCNC: 100 MMOL/L (ref 96–108)
CO2 SERPL-SCNC: 25 MMOL/L (ref 21–32)
CREAT SERPL-MCNC: 0.73 MG/DL (ref 0.6–1.3)
EOSINOPHIL # BLD AUTO: 0.06 THOUSAND/ÂΜL (ref 0–0.61)
EOSINOPHIL NFR BLD AUTO: 0 % (ref 0–6)
ERYTHROCYTE [DISTWIDTH] IN BLOOD BY AUTOMATED COUNT: 14.8 % (ref 11.6–15.1)
GFR SERPL CREATININE-BSD FRML MDRD: 69 ML/MIN/1.73SQ M
GLUCOSE SERPL-MCNC: 94 MG/DL (ref 65–140)
HCT VFR BLD AUTO: 39.3 % (ref 34.8–46.1)
HGB BLD-MCNC: 12.9 G/DL (ref 11.5–15.4)
IMM GRANULOCYTES # BLD AUTO: 0.09 THOUSAND/UL (ref 0–0.2)
IMM GRANULOCYTES NFR BLD AUTO: 1 % (ref 0–2)
LYMPHOCYTES # BLD AUTO: 3.69 THOUSANDS/ÂΜL (ref 0.6–4.47)
LYMPHOCYTES NFR BLD AUTO: 23 % (ref 14–44)
MAGNESIUM SERPL-MCNC: 1.7 MG/DL (ref 1.9–2.7)
MCH RBC QN AUTO: 29.9 PG (ref 26.8–34.3)
MCHC RBC AUTO-ENTMCNC: 32.8 G/DL (ref 31.4–37.4)
MCV RBC AUTO: 91 FL (ref 82–98)
MONOCYTES # BLD AUTO: 1.62 THOUSAND/ÂΜL (ref 0.17–1.22)
MONOCYTES NFR BLD AUTO: 10 % (ref 4–12)
NEUTROPHILS # BLD AUTO: 10.93 THOUSANDS/ÂΜL (ref 1.85–7.62)
NEUTS SEG NFR BLD AUTO: 66 % (ref 43–75)
NRBC BLD AUTO-RTO: 0 /100 WBCS
PLATELET # BLD AUTO: 162 THOUSANDS/UL (ref 149–390)
PMV BLD AUTO: 11.6 FL (ref 8.9–12.7)
POTASSIUM SERPL-SCNC: 3.8 MMOL/L (ref 3.5–5.3)
PROT SERPL-MCNC: 7.2 G/DL (ref 6.4–8.4)
RBC # BLD AUTO: 4.31 MILLION/UL (ref 3.81–5.12)
SODIUM SERPL-SCNC: 135 MMOL/L (ref 135–147)
WBC # BLD AUTO: 16.43 THOUSAND/UL (ref 4.31–10.16)

## 2023-05-31 PROCEDURE — 83880 ASSAY OF NATRIURETIC PEPTIDE: CPT | Performed by: EMERGENCY MEDICINE

## 2023-05-31 PROCEDURE — 85025 COMPLETE CBC W/AUTO DIFF WBC: CPT | Performed by: EMERGENCY MEDICINE

## 2023-05-31 PROCEDURE — 82607 VITAMIN B-12: CPT

## 2023-05-31 PROCEDURE — 72131 CT LUMBAR SPINE W/O DYE: CPT

## 2023-05-31 PROCEDURE — 73564 X-RAY EXAM KNEE 4 OR MORE: CPT

## 2023-05-31 PROCEDURE — 70498 CT ANGIOGRAPHY NECK: CPT

## 2023-05-31 PROCEDURE — 96366 THER/PROPH/DIAG IV INF ADDON: CPT

## 2023-05-31 PROCEDURE — 82746 ASSAY OF FOLIC ACID SERUM: CPT

## 2023-05-31 PROCEDURE — 84484 ASSAY OF TROPONIN QUANT: CPT | Performed by: EMERGENCY MEDICINE

## 2023-05-31 PROCEDURE — 71046 X-RAY EXAM CHEST 2 VIEWS: CPT

## 2023-05-31 PROCEDURE — 36415 COLL VENOUS BLD VENIPUNCTURE: CPT | Performed by: EMERGENCY MEDICINE

## 2023-05-31 PROCEDURE — 80061 LIPID PANEL: CPT

## 2023-05-31 PROCEDURE — 96365 THER/PROPH/DIAG IV INF INIT: CPT

## 2023-05-31 PROCEDURE — 99285 EMERGENCY DEPT VISIT HI MDM: CPT

## 2023-05-31 PROCEDURE — 93005 ELECTROCARDIOGRAM TRACING: CPT

## 2023-05-31 PROCEDURE — 70496 CT ANGIOGRAPHY HEAD: CPT

## 2023-05-31 PROCEDURE — 84443 ASSAY THYROID STIM HORMONE: CPT

## 2023-05-31 PROCEDURE — 80053 COMPREHEN METABOLIC PANEL: CPT | Performed by: EMERGENCY MEDICINE

## 2023-05-31 PROCEDURE — 83735 ASSAY OF MAGNESIUM: CPT | Performed by: EMERGENCY MEDICINE

## 2023-05-31 PROCEDURE — 73502 X-RAY EXAM HIP UNI 2-3 VIEWS: CPT

## 2023-05-31 RX ORDER — METOPROLOL SUCCINATE 25 MG/1
12.5 TABLET, EXTENDED RELEASE ORAL DAILY
Status: DISCONTINUED | OUTPATIENT
Start: 2023-06-01 | End: 2023-06-01

## 2023-05-31 RX ORDER — SODIUM CHLORIDE, SODIUM GLUCONATE, SODIUM ACETATE, POTASSIUM CHLORIDE, MAGNESIUM CHLORIDE, SODIUM PHOSPHATE, DIBASIC, AND POTASSIUM PHOSPHATE .53; .5; .37; .037; .03; .012; .00082 G/100ML; G/100ML; G/100ML; G/100ML; G/100ML; G/100ML; G/100ML
75 INJECTION, SOLUTION INTRAVENOUS CONTINUOUS
Status: DISCONTINUED | OUTPATIENT
Start: 2023-05-31 | End: 2023-06-01

## 2023-05-31 RX ORDER — LEVOTHYROXINE SODIUM 0.15 MG/1
150 TABLET ORAL
Status: DISCONTINUED | OUTPATIENT
Start: 2023-06-01 | End: 2023-06-04 | Stop reason: HOSPADM

## 2023-05-31 RX ORDER — AMLODIPINE BESYLATE 2.5 MG/1
2.5 TABLET ORAL 2 TIMES DAILY
Status: DISCONTINUED | OUTPATIENT
Start: 2023-06-01 | End: 2023-06-04 | Stop reason: HOSPADM

## 2023-05-31 RX ORDER — ESCITALOPRAM OXALATE 10 MG/1
5 TABLET ORAL
Status: DISCONTINUED | OUTPATIENT
Start: 2023-06-01 | End: 2023-06-04 | Stop reason: HOSPADM

## 2023-05-31 RX ORDER — FLUTICASONE PROPIONATE 50 MCG
1 SPRAY, SUSPENSION (ML) NASAL DAILY PRN
Status: DISCONTINUED | OUTPATIENT
Start: 2023-05-31 | End: 2023-06-04 | Stop reason: HOSPADM

## 2023-05-31 RX ORDER — MELATONIN
2000 DAILY
Status: DISCONTINUED | OUTPATIENT
Start: 2023-06-01 | End: 2023-06-04 | Stop reason: HOSPADM

## 2023-05-31 RX ORDER — ENOXAPARIN SODIUM 100 MG/ML
40 INJECTION SUBCUTANEOUS DAILY
Status: DISCONTINUED | OUTPATIENT
Start: 2023-06-01 | End: 2023-06-04 | Stop reason: HOSPADM

## 2023-05-31 RX ORDER — MAGNESIUM SULFATE HEPTAHYDRATE 40 MG/ML
2 INJECTION, SOLUTION INTRAVENOUS ONCE
Status: COMPLETED | OUTPATIENT
Start: 2023-05-31 | End: 2023-05-31

## 2023-05-31 RX ORDER — ASPIRIN 81 MG/1
81 TABLET, CHEWABLE ORAL DAILY
Status: DISCONTINUED | OUTPATIENT
Start: 2023-06-01 | End: 2023-06-04 | Stop reason: HOSPADM

## 2023-05-31 RX ORDER — FAMOTIDINE 20 MG/1
20 TABLET, FILM COATED ORAL DAILY PRN
Status: DISCONTINUED | OUTPATIENT
Start: 2023-05-31 | End: 2023-06-04 | Stop reason: HOSPADM

## 2023-05-31 RX ADMIN — MAGNESIUM SULFATE HEPTAHYDRATE 2 G: 40 INJECTION, SOLUTION INTRAVENOUS at 19:00

## 2023-05-31 RX ADMIN — IOHEXOL 85 ML: 350 INJECTION, SOLUTION INTRAVENOUS at 18:15

## 2023-05-31 NOTE — ED PROCEDURE NOTE
Procedure  POC Cardiac US    Date/Time: 5/31/2023 7:41 PM    Performed by: Shade Chaudhari MD  Authorized by: Shade Chaudhari MD    Patient location:  ED  Other Assisting Provider: Yes (comment) Juana Prime)    Procedure details:     Exam Type:  Diagnostic    Indications: chest pain and dyspnea      Assessment / Evaluation for: cardiac function and pericardial effusion      Exam Type: initial exam      Image quality: diagnostic      Image availability:  Images available in PACS  Patient Details:     Cardiac Rhythm:  Regular  Cardiac findings:     Echo technique: limited 2D      Views obtained: parasternal long axis, parasternal short axis and apical      Pericardial effusion: absent      Tamponade physiology: absent      Wall motion: normal      LV systolic function: normal    Pulmonary findings:     B-lines: 1 to 3    Interpretation:     Fluid Status:  Euvolemic       Persistent ASD    POC Lung US    Date/Time: 5/31/2023 7:42 PM    Performed by: Shade Chaudhari MD  Authorized by: Shade Chaudhari MD    Patient location:  ED  Other Assisting Provider: Yes (comment) Juana Prime)    Procedure details:     Exam Type:  Diagnostic    Indications: dyspnea      Assessment / Evaluation for:  Pneumothorax, hemothorax, pleural effusion and pneumonia    Structures Visualized: pleural line, rib, diaphragm, left hemithorax and right hemithorax      Exam Type: initial exam      Image quality: diagnostic      Image availability:  Images available in PACS  Left Hemithorax Findings:     Left pleura visualized:  Visualized    Left Hemithorax Findings: B-line pattern      Left lung findings: normal interstitium    Right Lung Findings:     Right pleural visualized:  Visualized    Right hemithorax findings: B-line pattern      Right lung findings: normal interstitium    Interpretation:     Findings: normal thoracic ultrasound                       Shade Chaudhari MD  05/31/23 1942

## 2023-05-31 NOTE — ED PROVIDER NOTES
History  Chief Complaint   Patient presents with   • Syncope     Pt had a syncopal episode in the bathroom; lives alone  States she fell on her buttocks, -HS, -THINNERs  Oriented to all except time  Denies dizziness, but was dizzy prior to syncopy  This is a 80-year-old female with a relevant past medical history of atrial fibrillation not on any anticoagulation, hypertension, heart failure with preserved ejection fraction, presenting to the ED today for complaint of a syncopal episode  Patient was vertiginous, fell, and then had a syncopal episode  She denies hitting her head, and states that she fell onto her butt  She denies any pain anywhere, denies any nausea vomiting, visual complaints, hearing abnormalities or any other significantly related symptoms  She is currently a resident of a independent living facility, but daughter who presents with her is concerned that she may have gotten to the point where she needs round-the-clock care  Prior to Admission Medications   Prescriptions Last Dose Informant Patient Reported? Taking?    Carboxymethylcellul-Glycerin 0 5-0 9 % SOLN 2023 Child Yes Yes   Sig: Apply to eye Drops to b/l eyes   Cholecalciferol (VITAMIN D-3) 1000 UNITS CAPS 2023 Child Yes Yes   Sig: Take 2,000 Units by mouth daily     amLODIPine (NORVASC) 2 5 mg tablet 2023 Child No Yes   Sig: Take 1 tablet (2 5 mg total) by mouth 2 (two) times a day   escitalopram (LEXAPRO) 5 mg tablet 2023 Child No Yes   Sig: Take 1 tablet (5 mg total) by mouth daily   famotidine (PEPCID) 20 mg tablet Unknown  No No   Sig: Take 1 tablet (20 mg total) by mouth daily as needed for heartburn   fluticasone (FLONASE) 50 mcg/act nasal spray More than a month  No No   Si spray into each nostril daily as needed for rhinitis   levothyroxine (Synthroid) 150 mcg tablet 2023  No Yes   Sig: Take 1 tab PO 5 days a week   metoprolol succinate (TOPROL-XL) 25 mg 24 hr tablet 2023  No Yes Si/2 tab daily   vitamin B-12 (VITAMIN B-12) 1,000 mcg tablet Past Week  No Yes   Sig: Take 2 days a week      Facility-Administered Medications: None       Past Medical History:   Diagnosis Date   • AAA (abdominal aortic aneurysm) (HCC)    • Acute medial meniscus tear    • Arthritis    • Aspiration pneumonia ([facility] [address] )     LAST ASSESSED: 14   • Breast CA ([facility] [address] )     left   • Cancer ([facility] [address] )    • Chest pain     RESOLVED: 17   • CTS (carpal tunnel syndrome)    • Depression    • Dermatitis     LAST ASSESSED: 13   • Disease of thyroid gland    • Fainting     LAST ASSESSED: 3/15/13   • GERD (gastroesophageal reflux disease)    • H  pylori infection 2009   • Hypertension    • Incomplete defecation     LAST ASSESSED: 13   • Macular degeneration    • Osteoporosis    • Pneumonia    • Vertigo        Past Surgical History:   Procedure Laterality Date   • APPENDECTOMY     • CHOLECYSTECTOMY     • COLONOSCOPY     • MASTECTOMY Left 2017    SIMPLE   • ME INTRAOP SENTINEL LYMPH NODE ID W/DYE INJECTION Left 2017    Procedure: INTRAOPERATIVE LYMPHATIC MAPPING; BLUE DYE ONLY; [facility] 9938 LYMPH NODE BIOPSY;  Surgeon: Asia Rebollar MD;  Location: [facility];  Service: Surgical Oncology   • ME MASTECTOMY SIMPLE COMPLETE Left 2017    Procedure: BREAST MASTECTOMY;  Surgeon: Asia Rebollar MD;  [facility];  Service: Surgical Oncology   • SENTINEL LYMPH NODE BIOPSY     • US GUIDED BREAST BIOPSY LEFT COMPLETE Left 2017       Family History   Problem Relation Age of Onset   • Angina Mother         PECTORIS   • Alcohol abuse Neg Hx    • Depression Neg Hx    • Drug abuse Neg Hx    • Substance Abuse Neg Hx      I have reviewed and agree with the history as documented      E-Cigarette/Vaping   • E-Cigarette Use Never User      E-Cigarette/Vaping Substances   • Nicotine No    • THC No    • CBD No    • Flavoring No    • Other No    • Unknown No      Social History     Tobacco Use   • Smoking status: Never   • Smokeless tobacco: Never   Vaping Use   • Vaping Use: Never used   Substance Use Topics   • Alcohol use: Not Currently   • Drug use: No       Review of Systems   Constitutional: Negative for activity change, chills and fever  HENT: Negative for congestion and rhinorrhea  Eyes: Negative for photophobia and visual disturbance  Respiratory: Negative for cough, chest tightness and shortness of breath  Cardiovascular: Negative for chest pain and leg swelling  Gastrointestinal: Negative for abdominal distention, nausea and vomiting  Genitourinary: Negative for dysuria and frequency  Musculoskeletal: Positive for back pain  Negative for neck stiffness  Skin: Negative for rash and wound  Neurological: Positive for dizziness  Negative for weakness  Psychiatric/Behavioral: Negative for agitation and suicidal ideas  Physical Exam  Physical Exam  Vitals and nursing note reviewed  Constitutional:       General: She is not in acute distress  Appearance: Normal appearance  She is normal weight  She is not ill-appearing or toxic-appearing  HENT:      Head: Normocephalic and atraumatic  Right Ear: External ear normal       Left Ear: External ear normal       Nose: Nose normal  No congestion or rhinorrhea  Mouth/Throat:      Mouth: Mucous membranes are moist       Pharynx: Oropharynx is clear  No oropharyngeal exudate  Eyes:      General: No scleral icterus  Extraocular Movements: Extraocular movements intact  Conjunctiva/sclera: Conjunctivae normal       Pupils: Pupils are equal, round, and reactive to light  Cardiovascular:      Rate and Rhythm: Normal rate and regular rhythm  Pulses: Normal pulses  Heart sounds: Normal heart sounds  No murmur heard  No gallop  Pulmonary:      Effort: Pulmonary effort is normal  No respiratory distress  Breath sounds: Normal breath sounds  No stridor  No wheezing or rhonchi     Abdominal:      General: Abdomen is flat  Bowel sounds are normal  There is no distension  Palpations: Abdomen is soft  There is no mass  Tenderness: There is no abdominal tenderness  Musculoskeletal:         General: Tenderness (In the right hip, with limited range of motion especially to flexion and external rotation as well as to the left knee, with limited range of motion to flexing) present  No swelling  Normal range of motion  Cervical back: Normal range of motion and neck supple  No tenderness  Right lower leg: No edema  Left lower leg: No edema  Comments: Tenderness to palpation in the lower thoracic spine as well as upper lumbar spine  No palpable step-offs  No bruising, erythema, fluctuance or masses noted  Skin:     General: Skin is warm and dry  Capillary Refill: Capillary refill takes less than 2 seconds  Coloration: Skin is not jaundiced  Findings: No bruising  Neurological:      General: No focal deficit present  Mental Status: She is alert  Mental status is at baseline  She is disoriented  Sensory: No sensory deficit  Motor: No weakness  Comments: Oriented to person, place, situation but not time   Psychiatric:         Mood and Affect: Mood normal          Behavior: Behavior normal          Thought Content:  Thought content normal          Judgment: Judgment normal          Vital Signs  ED Triage Vitals [05/31/23 1546]   Temperature Pulse Respirations Blood Pressure SpO2   98 °F (36 7 °C) 64 16 146/80 95 %      Temp Source Heart Rate Source Patient Position - Orthostatic VS BP Location FiO2 (%)   Oral Monitor Sitting Right arm --      Pain Score       No Pain           Vitals:    05/31/23 1901 05/31/23 2000 05/31/23 2100 05/31/23 2300   BP: 141/75 (!) 183/84 114/61 162/72   Pulse: 82 68 62 63   Patient Position - Orthostatic VS: Lying   Lying         Visual Acuity      ED Medications  Medications   aspirin chewable tablet 81 mg (has no administration in time range)   enoxaparin (LOVENOX) subcutaneous injection 40 mg (has no administration in time range)   multi-electrolyte (PLASMALYTE-A/ISOLYTE-S PH 7 4) IV solution (has no administration in time range)   magnesium sulfate 2 g/50 mL IVPB (premix) 2 g (0 g Intravenous Stopped 5/31/23 2100)   iohexol (OMNIPAQUE) 350 MG/ML injection (SINGLE-DOSE) 100 mL (85 mL Intravenous Given 5/31/23 1815)       Diagnostic Studies  Results Reviewed     Procedure Component Value Units Date/Time    Platelet count [[id number]]     Lab Status: No result Specimen: Blood     B-Type Natriuretic Peptide(BNP) [[id number]] Collected: 05/31/23 1613    Lab Status:  In process Specimen: Blood from Arm, Right Updated: 05/31/23 2030    HS Troponin I 2hr [[id number]]  (Normal) Collected: 05/31/23 1856    Lab Status: Final result Specimen: Blood from Arm, Right Updated: 05/31/23 1938     hs TnI 2hr 9 ng/L      Delta 2hr hsTnI -2 ng/L     HS Troponin 0hr (reflex protocol) [[id number]]  (Normal) Collected: 05/31/23 1613    Lab Status: Final result Specimen: Blood from Arm, Right Updated: 05/31/23 1659     hs TnI 0hr 11 ng/L     Comprehensive metabolic panel [[id number]] Collected: 05/31/23 1613    Lab Status: Final result Specimen: Blood from Arm, Right Updated: 05/31/23 1653     Sodium 135 mmol/L      Potassium 3 8 mmol/L      Chloride 100 mmol/L      CO2 25 mmol/L      ANION GAP 10 mmol/L      BUN 25 mg/dL      Creatinine 0 73 mg/dL      Glucose 94 mg/dL      Calcium 8 5 mg/dL      AST 15 U/L      ALT 9 U/L      Alkaline Phosphatase 62 U/L      Total Protein 7 2 g/dL      Albumin 3 8 g/dL      Total Bilirubin 0 73 mg/dL      eGFR 69 ml/min/1 73sq m     Narrative:      Caitlyn guidelines for Chronic Kidney Disease (CKD):   •  Stage 1 with normal or high GFR (GFR > 90 mL/min/1 73 square meters)  •  Stage 2 Mild CKD (GFR = 60-89 mL/min/1 73 square meters)  •  Stage 3A Moderate CKD (GFR = 45-59 mL/min/1 73 square meters)  •  Stage 3B Moderate CKD (GFR = 30-44 mL/min/1 73 square meters)  •  Stage 4 Severe CKD (GFR = 15-29 mL/min/1 73 square meters)  •  Stage 5 End Stage CKD (GFR <15 mL/min/1 73 square meters)  Note: GFR calculation is accurate only with a steady state creatinine    Magnesium [[id number]]  (Abnormal) Collected: 05/31/23 1613    Lab Status: Final result Specimen: Blood from Arm, Right Updated: 05/31/23 1653     Magnesium 1 7 mg/dL     CBC and differential [[id number]]  (Abnormal) Collected: 05/31/23 1613    Lab Status: Final result Specimen: Blood from Arm, Right Updated: 05/31/23 1631     WBC 16 43 Thousand/uL      RBC 4 31 Million/uL      Hemoglobin 12 9 g/dL      Hematocrit 39 3 %      MCV 91 fL      MCH 29 9 pg      MCHC 32 8 g/dL      RDW 14 8 %      MPV 11 6 fL      Platelets 880 Thousands/uL      nRBC 0 /100 WBCs      Neutrophils Relative 66 %      Immat GRANS % 1 %      Lymphocytes Relative 23 %      Monocytes Relative 10 %      Eosinophils Relative 0 %      Basophils Relative 0 %      Neutrophils Absolute 10 93 Thousands/µL      Immature Grans Absolute 0 09 Thousand/uL      Lymphocytes Absolute 3 69 Thousands/µL      Monocytes Absolute 1 62 Thousand/µL      Eosinophils Absolute 0 06 Thousand/µL      Basophils Absolute 0 04 Thousands/µL     UA w Reflex to Microscopic w Reflex to Culture [[id number]]     Lab Status: No result Specimen: Urine                  CTA head and neck with and without contrast   Final Result by Shazia Stewart MD (05/31 2009)         1  No evidence of acute infarct, intracranial hemorrhage or mass  2  No hemodynamically significant stenosis, dissection or occlusion of the carotid or vertebral arteries  Workstation performed: [id number]         CT spine lumbar without contrast   Final Result by Shelby Lopes MD (05/31 2027)      Progressive loss of vertebral body height at the T12 vertebral body following prior compression fracture noted on 3/20/2023   Similar degree of retropulsion of the posterior cortex  Other findings are stable throughout the lumbar spine including mild compression deformity at the L2 vertebral body  Mild multilevel degenerative disc disease as detailed above  Workstation performed: [id number]         CT recon only cervical spine (No Charge)    (Results Pending)   XR hip/pelv 2-3 vws right    (Results Pending)   XR knee 4+ views left injury    (Results Pending)   XR chest 2 views    (Results Pending)   MRI Inpatient Order    (Results Pending)              Procedures  Procedures         ED Course                                             Medical Decision Making  This is a 59-year-old female presenting to the ED for a complaint of a syncopal episode  Patient states that she became vertiginous, fell and lost consciousness  She states that she fell onto her butt, and did not strike her head  She denies any complaints  On exam I do palpate tenderness in her lower thoracic as well as upper lumbar spine  She does not have any bruising, obvious evidence for any type of trauma  Patient is no longer vertiginous at this time  She presents with her daughter, who is concerned that she may not be able to care for herself at the independent living facility where she resides  She has fallen twice in the past 24 hours, and 5 times in the past month  Her differential diagnosis includes: L-spine fracture versus urinary tract infection versus electrolyte abnormality versus other  Her EKG shows atrial flutter, which she is in chronically  She has gone up to the 120s, but comes down to the 60s without any therapy, without any change in position  She is not anticoagulated due to frequent falls  Her CBC, metabolic panel, only showed a magnesium which is slightly low, and that was replaced here in the ED  She also has a leukocytosis of 17,000  Her x-rays of the right hip and left knee are unremarkable for anything acute    She underwent a CT of the head and neck out of concern for potential posterior circulation stroke with her complaints of vertigo, and those are negative  No stroke alert was called due to her symptoms completely resolving by the time she arrived to the ED  I believe patient needs hospitalization for further work-up, stroke pathway, and will likely need placement as this is her fifth fall in the past few weeks  I spoke with her daughter, as well as her son and I do believe that she will likely need placement  I spoke with hospitalist who accepted hospitalization without any further orders requested  Amount and/or Complexity of Data Reviewed  Labs: ordered  Radiology: ordered  Risk  Prescription drug management  Decision regarding hospitalization  Disposition  Final diagnoses:   Syncope   Vertigo   Stroke-like symptoms     Time reflects when diagnosis was documented in both MDM as applicable and the Disposition within this note     Time User Action Codes Description Comment    5/31/2023  9:38 PM Alfred Schwartz Add [R55] Syncope     5/31/2023  9:38 PM Alfred Schwartz Add [R42] Vertigo     5/31/2023  9:38 PM Alfred Schwatrz Add [R29 90] Stroke-like symptoms       ED Disposition     ED Disposition   Admit    Condition   Stable    Date/Time   Wed May 31, 2023  9:38 PM    Comment   --         Follow-up Information    None         Patient's Medications   Discharge Prescriptions    No medications on file       No discharge procedures on file      PDMP Review     None          ED Provider  Electronically Signed by           Ligia Michael MD  05/31/23 3393

## 2023-06-01 ENCOUNTER — APPOINTMENT (INPATIENT)
Dept: NON INVASIVE DIAGNOSTICS | Facility: HOSPITAL | Age: 88
DRG: 640 | End: 2023-06-01
Payer: MEDICARE

## 2023-06-01 ENCOUNTER — APPOINTMENT (INPATIENT)
Dept: MRI IMAGING | Facility: HOSPITAL | Age: 88
DRG: 640 | End: 2023-06-01
Payer: MEDICARE

## 2023-06-01 PROBLEM — B37.9 CANDIDIASIS: Status: ACTIVE | Noted: 2023-06-01

## 2023-06-01 PROBLEM — B37.2 CANDIDAL INTERTRIGO: Status: ACTIVE | Noted: 2023-06-01

## 2023-06-01 LAB
ANION GAP SERPL CALCULATED.3IONS-SCNC: 7 MMOL/L (ref 4–13)
AORTIC ROOT: 3.3 CM
APICAL FOUR CHAMBER EJECTION FRACTION: 59 %
ASCENDING AORTA: 3.4 CM
ATRIAL RATE: 258 BPM
AV AREA PEAK VELOCITY: 1.5 CM2
AV LVOT MEAN GRADIENT: 1 MMHG
AV LVOT PEAK GRADIENT: 3 MMHG
AV PEAK GRADIENT: 7 MMHG
BACTERIA UR QL AUTO: ABNORMAL /HPF
BASOPHILS # BLD AUTO: 0.03 THOUSANDS/ÂΜL (ref 0–0.1)
BASOPHILS NFR BLD AUTO: 0 % (ref 0–1)
BILIRUB UR QL STRIP: NEGATIVE
BUN SERPL-MCNC: 19 MG/DL (ref 5–25)
CALCIUM SERPL-MCNC: 8.6 MG/DL (ref 8.4–10.2)
CHLORIDE SERPL-SCNC: 101 MMOL/L (ref 96–108)
CHOLEST SERPL-MCNC: 140 MG/DL
CLARITY UR: CLEAR
CO2 SERPL-SCNC: 29 MMOL/L (ref 21–32)
COLOR UR: ABNORMAL
CREAT SERPL-MCNC: 0.65 MG/DL (ref 0.6–1.3)
DOP CALC LVOT DIAMETER: 1.8 CM
DOP CALC LVOT PEAK VEL VTI: 16.38 CM
DOP CALC LVOT PEAK VEL: 0.79 M/S
DOP CALC LVOT STROKE INDEX: 25.7 ML/M2
DOP CALC LVOT STROKE VOLUME: 43
E WAVE DECELERATION TIME: 251 MS
E/A RATIO: 3.94
EOSINOPHIL # BLD AUTO: 0.05 THOUSAND/ÂΜL (ref 0–0.61)
EOSINOPHIL NFR BLD AUTO: 1 % (ref 0–6)
ERYTHROCYTE [DISTWIDTH] IN BLOOD BY AUTOMATED COUNT: 14.9 % (ref 11.6–15.1)
FRACTIONAL SHORTENING: 38 (ref 28–44)
GFR SERPL CREATININE-BSD FRML MDRD: 74 ML/MIN/1.73SQ M
GLUCOSE SERPL-MCNC: 108 MG/DL (ref 65–140)
GLUCOSE UR STRIP-MCNC: NEGATIVE MG/DL
HCT VFR BLD AUTO: 41.9 % (ref 34.8–46.1)
HDLC SERPL-MCNC: 38 MG/DL
HGB BLD-MCNC: 13.6 G/DL (ref 11.5–15.4)
HGB UR QL STRIP.AUTO: NEGATIVE
IMM GRANULOCYTES # BLD AUTO: 0.04 THOUSAND/UL (ref 0–0.2)
IMM GRANULOCYTES NFR BLD AUTO: 0 % (ref 0–2)
INTERVENTRICULAR SEPTUM IN DIASTOLE (PARASTERNAL SHORT AXIS VIEW): 1.1 CM
INTERVENTRICULAR SEPTUM: 1.1 CM (ref 0.6–1.1)
KETONES UR STRIP-MCNC: NEGATIVE MG/DL
LAAS-AP2: 27.8 CM2
LAAS-AP4: 21.1 CM2
LDLC SERPL CALC-MCNC: 82 MG/DL (ref 0–100)
LEFT ATRIUM AREA SYSTOLE SINGLE PLANE A4C: 20.9 CM2
LEFT ATRIUM SIZE: 4.4 CM
LEFT ATRIUM VOLUME INDEX (MOD BIPLANE): 48.5
LEFT INTERNAL DIMENSION IN SYSTOLE: 2.5 CM (ref 2.1–4)
LEFT VENTRICULAR INTERNAL DIMENSION IN DIASTOLE: 4 CM (ref 3.5–6)
LEFT VENTRICULAR POSTERIOR WALL IN END DIASTOLE: 1.1 CM
LEFT VENTRICULAR STROKE VOLUME: 50 ML
LEUKOCYTE ESTERASE UR QL STRIP: NEGATIVE
LVSV (TEICH): 50 ML
LYMPHOCYTES # BLD AUTO: 2.67 THOUSANDS/ÂΜL (ref 0.6–4.47)
LYMPHOCYTES NFR BLD AUTO: 26 % (ref 14–44)
MCH RBC QN AUTO: 29.8 PG (ref 26.8–34.3)
MCHC RBC AUTO-ENTMCNC: 32.5 G/DL (ref 31.4–37.4)
MCV RBC AUTO: 92 FL (ref 82–98)
MONOCYTES # BLD AUTO: 1.06 THOUSAND/ÂΜL (ref 0.17–1.22)
MONOCYTES NFR BLD AUTO: 10 % (ref 4–12)
MV E'TISSUE VEL-SEP: 9 CM/S
MV PEAK A VEL: 0.31 M/S
MV PEAK E VEL: 122 CM/S
MV STENOSIS PRESSURE HALF TIME: 73 MS
MV VALVE AREA P 1/2 METHOD: 3
NEUTROPHILS # BLD AUTO: 6.43 THOUSANDS/ÂΜL (ref 1.85–7.62)
NEUTS SEG NFR BLD AUTO: 63 % (ref 43–75)
NITRITE UR QL STRIP: NEGATIVE
NON-SQ EPI CELLS URNS QL MICRO: ABNORMAL /HPF
NRBC BLD AUTO-RTO: 0 /100 WBCS
P AXIS: 68 DEGREES
PH UR STRIP.AUTO: 6 [PH]
PLATELET # BLD AUTO: 222 THOUSANDS/UL (ref 149–390)
PLATELET # BLD AUTO: 232 THOUSANDS/UL (ref 149–390)
PMV BLD AUTO: 10 FL (ref 8.9–12.7)
PMV BLD AUTO: 10.4 FL (ref 8.9–12.7)
POTASSIUM SERPL-SCNC: 3.9 MMOL/L (ref 3.5–5.3)
PROT UR STRIP-MCNC: ABNORMAL MG/DL
QRS AXIS: -51 DEGREES
QRSD INTERVAL: 100 MS
QT INTERVAL: 452 MS
QTC INTERVAL: 477 MS
RBC # BLD AUTO: 4.57 MILLION/UL (ref 3.81–5.12)
RBC #/AREA URNS AUTO: ABNORMAL /HPF
RIGHT ATRIAL 2D VOLUME: 40 ML
RIGHT ATRIUM AREA SYSTOLE A4C: 18.1 CM2
RIGHT VENTRICLE ID DIMENSION: 3.6 CM
SL CV LEFT ATRIUM LENGTH A2C: 6.2 CM
SL CV LV EF: 70
SL CV PED ECHO LEFT VENTRICLE DIASTOLIC VOLUME (MOD BIPLANE) 2D: 71 ML
SL CV PED ECHO LEFT VENTRICLE SYSTOLIC VOLUME (MOD BIPLANE) 2D: 22 ML
SODIUM SERPL-SCNC: 137 MMOL/L (ref 135–147)
SP GR UR STRIP.AUTO: 1.04 (ref 1–1.03)
T WAVE AXIS: 15 DEGREES
TR MAX PG: 47 MMHG
TR PEAK VELOCITY: 3.4 M/S
TRICUSPID ANNULAR PLANE SYSTOLIC EXCURSION: 2.1 CM
TRICUSPID VALVE PEAK REGURGITATION VELOCITY: 3.44 M/S
TRIGL SERPL-MCNC: 99 MG/DL
TSH SERPL DL<=0.05 MIU/L-ACNC: 3.63 UIU/ML (ref 0.45–4.5)
UROBILINOGEN UR STRIP-ACNC: <2 MG/DL
VENTRICULAR RATE: 67 BPM
WBC # BLD AUTO: 10.28 THOUSAND/UL (ref 4.31–10.16)
WBC #/AREA URNS AUTO: ABNORMAL /HPF

## 2023-06-01 PROCEDURE — 93306 TTE W/DOPPLER COMPLETE: CPT

## 2023-06-01 PROCEDURE — 97116 GAIT TRAINING THERAPY: CPT

## 2023-06-01 PROCEDURE — 85049 AUTOMATED PLATELET COUNT: CPT

## 2023-06-01 PROCEDURE — 80048 BASIC METABOLIC PNL TOTAL CA: CPT

## 2023-06-01 PROCEDURE — 85025 COMPLETE CBC W/AUTO DIFF WBC: CPT

## 2023-06-01 PROCEDURE — 99223 1ST HOSP IP/OBS HIGH 75: CPT | Performed by: INTERNAL MEDICINE

## 2023-06-01 PROCEDURE — 81001 URINALYSIS AUTO W/SCOPE: CPT

## 2023-06-01 PROCEDURE — 97535 SELF CARE MNGMENT TRAINING: CPT

## 2023-06-01 PROCEDURE — 97167 OT EVAL HIGH COMPLEX 60 MIN: CPT

## 2023-06-01 PROCEDURE — 93010 ELECTROCARDIOGRAM REPORT: CPT | Performed by: INTERNAL MEDICINE

## 2023-06-01 PROCEDURE — 99223 1ST HOSP IP/OBS HIGH 75: CPT | Performed by: PSYCHIATRY & NEUROLOGY

## 2023-06-01 PROCEDURE — NC001 PR NO CHARGE: Performed by: PSYCHIATRY & NEUROLOGY

## 2023-06-01 PROCEDURE — 97163 PT EVAL HIGH COMPLEX 45 MIN: CPT

## 2023-06-01 PROCEDURE — 70551 MRI BRAIN STEM W/O DYE: CPT

## 2023-06-01 PROCEDURE — 93306 TTE W/DOPPLER COMPLETE: CPT | Performed by: INTERNAL MEDICINE

## 2023-06-01 PROCEDURE — 83036 HEMOGLOBIN GLYCOSYLATED A1C: CPT

## 2023-06-01 RX ORDER — LABETALOL HYDROCHLORIDE 5 MG/ML
10 INJECTION, SOLUTION INTRAVENOUS EVERY 6 HOURS PRN
Status: DISCONTINUED | OUTPATIENT
Start: 2023-06-01 | End: 2023-06-04 | Stop reason: HOSPADM

## 2023-06-01 RX ORDER — METOPROLOL SUCCINATE 25 MG/1
12.5 TABLET, EXTENDED RELEASE ORAL ONCE
Status: COMPLETED | OUTPATIENT
Start: 2023-06-01 | End: 2023-06-01

## 2023-06-01 RX ORDER — ACETAMINOPHEN 325 MG/1
650 TABLET ORAL EVERY 6 HOURS PRN
Status: DISCONTINUED | OUTPATIENT
Start: 2023-06-01 | End: 2023-06-04 | Stop reason: HOSPADM

## 2023-06-01 RX ORDER — METOPROLOL SUCCINATE 25 MG/1
25 TABLET, EXTENDED RELEASE ORAL DAILY
Status: DISCONTINUED | OUTPATIENT
Start: 2023-06-02 | End: 2023-06-04 | Stop reason: HOSPADM

## 2023-06-01 RX ORDER — NYSTATIN 100000 [USP'U]/G
POWDER TOPICAL 2 TIMES DAILY
Status: DISCONTINUED | OUTPATIENT
Start: 2023-06-01 | End: 2023-06-04 | Stop reason: HOSPADM

## 2023-06-01 RX ADMIN — AMLODIPINE BESYLATE 2.5 MG: 2.5 TABLET ORAL at 18:12

## 2023-06-01 RX ADMIN — METOPROLOL SUCCINATE 12.5 MG: 25 TABLET, EXTENDED RELEASE ORAL at 12:15

## 2023-06-01 RX ADMIN — ESCITALOPRAM OXALATE 5 MG: 10 TABLET ORAL at 22:10

## 2023-06-01 RX ADMIN — NYSTATIN: 100000 POWDER TOPICAL at 18:04

## 2023-06-01 RX ADMIN — ESCITALOPRAM OXALATE 5 MG: 10 TABLET ORAL at 00:54

## 2023-06-01 RX ADMIN — NYSTATIN 1 APPLICATION.: 100000 POWDER TOPICAL at 08:54

## 2023-06-01 RX ADMIN — SODIUM CHLORIDE, SODIUM GLUCONATE, SODIUM ACETATE, POTASSIUM CHLORIDE, MAGNESIUM CHLORIDE, SODIUM PHOSPHATE, DIBASIC, AND POTASSIUM PHOSPHATE 75 ML/HR: .53; .5; .37; .037; .03; .012; .00082 INJECTION, SOLUTION INTRAVENOUS at 00:53

## 2023-06-01 RX ADMIN — METOPROLOL SUCCINATE 12.5 MG: 25 TABLET, EXTENDED RELEASE ORAL at 08:53

## 2023-06-01 RX ADMIN — AMLODIPINE BESYLATE 2.5 MG: 2.5 TABLET ORAL at 00:54

## 2023-06-01 RX ADMIN — GLYCERIN 2 DROP: .002; .002; .01 SOLUTION/ DROPS OPHTHALMIC at 08:54

## 2023-06-01 RX ADMIN — AMLODIPINE BESYLATE 2.5 MG: 2.5 TABLET ORAL at 08:56

## 2023-06-01 RX ADMIN — ENOXAPARIN SODIUM 40 MG: 40 INJECTION SUBCUTANEOUS at 08:54

## 2023-06-01 RX ADMIN — GLYCERIN 2 DROP: .002; .002; .01 SOLUTION/ DROPS OPHTHALMIC at 18:04

## 2023-06-01 RX ADMIN — FLUTICASONE PROPIONATE 1 SPRAY: 50 SPRAY, METERED NASAL at 08:54

## 2023-06-01 RX ADMIN — ASPIRIN 81 MG 81 MG: 81 TABLET ORAL at 08:53

## 2023-06-01 RX ADMIN — Medication 2000 UNITS: at 08:54

## 2023-06-01 RX ADMIN — LEVOTHYROXINE SODIUM 150 MCG: 150 TABLET ORAL at 05:44

## 2023-06-01 NOTE — CASE MANAGEMENT
Case Management Discharge Planning Note    Patient name Kelby Amador  Location S /S -53 MRN [id number]  : 1925 Date 2023       Current Admission Date: 2023  Current Admission Diagnosis:Syncope   Patient Active Problem List    Diagnosis Date Noted   • Candidal intertrigo 2023   • Cognitive decline 2023   • Chronic pain syndrome 2023   • Moderate protein-calorie malnutrition ([address] ) 2023   • T12 compression fracture ([address] ) 2023   • Primary osteoarthritis of left knee 2022   • Seasonal allergic rhinitis due to pollen 2022   • Syncope 2021   • Leucocytosis 2021   • Interstitial cystitis 2021   • Anxiety 2021   • Atrial flutter ([address] ) 2021   • Vitamin B12 deficiency 09/10/2019   • Localized edema 09/10/2019   • Stage 2 chronic kidney disease 09/10/2019   • Hiatal hernia 2019   • Chronic diastolic heart failure ([address] ) 2019   • Ambulatory dysfunction 2019   • Encounter for follow-up examination after completed treatment for malignant neoplasm 2019   • Hemorrhoids 2018   • History of left breast cancer 2018   • S/P left mastectomy 2017   • Paroxysmal atrial fibrillation ([address] ) 2017   • Osteoporosis    • Essential hypertension    • GERD (gastroesophageal reflux disease)    • Constipation 2016   • Macular degeneration 2015   • Aneurysm of ascending aorta ([address] ) 2014   • First degree AV block 03/15/2013   • Benign paroxysmal vertigo 2013   • Vitamin D deficiency 10/01/2012   • Dyslipidemia 2012   • Acquired hypothyroidism 2012   • Vertigo 2012   • H  pylori infection 2009   • Advance directive in chart 2005      LOS (days): 1  Geometric Mean LOS (GMLOS) (days): 2 30  Days to GMLOS:1 5     OBJECTIVE:  Risk of Unplanned Readmission Score: 12 84         Current admission status: Inpatient   Preferred Pharmacy:   [facility]/pharmacy #[id number] - [place] - [address]  [address]  [address] [address]  Phone: [phone] Fax: [phone] - [place], [address]  [address] [address]  Phone: [phone] Fax: [phone] [facility], [facility] [address]   [address] [facility] [address] [facility],[address]  Phone: [phone] Fax: [phone]    Primary Care Provider: Lakesha Kilgore MD    Primary Insurance: MEDICARE  Secondary Insurance:     DISCHARGE DETAILS:    Discharge planning discussed with[de-identified] Daughter [place]                                     Other Referral/Resources/Interventions Provided:  Interventions: SNF  Referral Comments: Aubree FARFAN completed 3 facility tours today -- She would like to reserve the bed at [address] for LTC placement  She enjoyed her tour with admissions representative [facility] and was able to see where the pt will be living as a LTC resident  CM reserved referral via [facility] -- CM will arrange transportation upon medical stability  Pt does not need a covid test prior to transfer           Treatment Team Recommendation: SNF  Discharge Destination Plan[de-identified] SNF

## 2023-06-01 NOTE — ASSESSMENT & PLAN NOTE
· History of A-fib and a flutter  · [id number] 5 sticking of anticoagulation given history of multiple falls and age  · ECG Atrial flutter heart rate 62  · Continue Toprol-XL 25 milligrams once a day, increased from home 12 5 daily

## 2023-06-01 NOTE — ASSESSMENT & PLAN NOTE
· POA WBC 16 53  · Unclear etiology patient has remained afebrile denies any acute complain possibly reactive?   · CBC a m   · Monitor fever curve

## 2023-06-01 NOTE — PHYSICAL THERAPY NOTE
PHYSICAL THERAPY EVALUATION NOTE    Patient Name: Mary Mcknight  BKCFE'F Date: 6/1/2023  AGE:   80 y o  Mrn:   [id number]  ADMIT DX:  Syncope [R55]  Vertigo [R42]  Stroke-like symptoms [R29 90]    Past Medical History:   Diagnosis Date    AAA (abdominal aortic aneurysm) (HCC)     Acute medial meniscus tear     Arthritis 1980    Aspiration pneumonia ([facility] [address] )     LAST ASSESSED: 7/30/14    Breast CA ([facility] [address] )     left    Cancer ([facility] [address] ) 2017    Chest pain     RESOLVED: 1/31/17    CTS (carpal tunnel syndrome)     Depression     Dermatitis     LAST ASSESSED: 7/25/13    Disease of thyroid gland     Fainting     LAST ASSESSED: 3/15/13    GERD (gastroesophageal reflux disease)     H  pylori infection 03/17/2009    Hypertension     Incomplete defecation     LAST ASSESSED: 7/25/13    Macular degeneration     Osteoporosis     Pneumonia     Vertigo 2012     Length Of Stay: 1  PHYSICAL THERAPY EVALUATION :   06/01/23 1044   PT Last Visit   PT Visit Date 06/01/23   Pain Assessment   Pain Assessment Tool 0-10   Pain Score No Pain   Restrictions/Precautions   Other Precautions Chair Alarm; Bed Alarm;Multiple lines;Telemetry; Fall Risk;Limb alert;Cognitive  (L UE limb alert, Masimo)   Home Living   Type of Home Apartment  [facility])   Home Layout One level;Elevator   Additional Comments lives alone  ambulates w/ rollator (pt was ambulating short distances w/o device previously)  independent w/ ADLs (aide and daughter are supposed to assist pt but she often does alone)  receives assistance w/ IADLs  Prior Function   Falls in the last 6 months 5 to 10   General   Additional Pertinent History 6/1/23 at 3:00 blood pressure was 190/88  (minimal edema of LEs noted)   Family/Caregiver Present Yes   Cognition   Overall Cognitive Status Impaired   Arousal/Participation Cooperative   Orientation Level Oriented to person; Other (Comment)  (pt was able to state full name, also stated month and day of birth date but got year wrong )   Following Commands Follows one step commands with increased time or repetition  (pt was identified w/ full name and ID bracelet)   Comments room air resting pulse ox 94%, active 92%  blood pressure supine 144/77 and 63 BPM, seated 160/92 and 63 BPM, standing 174/114 and 130 BPM, standing after 3 minutes 180/97 and 78 BPM   (Melanie NSG performed manual blood pressure 158/78)   Subjective   Subjective pt seen supine in bed w/ daughter present  pt needed input for task focus  denied pain  reports having intermittent dizziness (further described as feeling unsteady)  input was needed for task focus  daughter assisted w/ providing social history  RLE Assessment   RLE Assessment WFL  (3+ to 4-/5)   LLE Assessment   LLE Assessment WFL  (3+ to 4-/5)   Bed Mobility   Supine to Sit 4  Minimal assistance   Additional items Assist x 1;HOB elevated; Increased time required;Verbal cues;LE management   Transfers   Sit to Stand 4  Minimal assistance   Additional items Assist x 1; Increased time required;Verbal cues  (for hand placement, LE positioning  manual assist was needed for brake management on rollator )   Stand to Sit 4  Minimal assistance   Additional items Assist x 1;Verbal cues  (for body positioning and safety  manual assist was needed for brake management on rollator )   Ambulation/Elevation   Gait pattern Foward flexed; Short stride; Excessively slow   Gait Assistance 4  Minimal assist   Additional items Assist x 1;Verbal cues; Tactile cues  (for walker positioning, breathing technique, safety)   Assistive Device 4-wheeled walker   Distance 4 feet  (additional not possible due to fatigue, lightheadedness)   Balance   Static Sitting Fair +   Static Standing Poor +  (w/ rollator)   Ambulatory Poor +  (w/ rollator)   Activity Tolerance   Activity Tolerance Patient limited by fatigue   Nurse Made Aware spoke to [facility]   Assessment Problem List Decreased strength;Decreased endurance; Impaired balance;Decreased mobility; Decreased cognition;Decreased safety awareness   Assessment Pt presents after unwitnessed syncopal episode  Dx: syncope, a-fib/flutter, ambulatory dysfunction, cognitive decline, essential HTN, and leukocytosis  order placed for PT eval and tx, w/ activity order of up and out of bed as tolerated  pt presents w/ comorbidities of a-fib, breast cancer, heart failure, BPPV, ambulatory dysfunction, osteoporosis, AAA, arthritis, pneumonia, and CKD and personal factors of advanced age, decreased cognition, limited home support, positive fall history, depression and inability to perform IADLs  pt presents w/ weakness, decreased endurance, impaired cognition, impaired balance, gait deviations, decreased safety awareness, fall risk and LE edema  these impairments are evident in findings from physical examination (weakness and edema of extremities), mobility assessment (need for min assist w/ all phases of mobility when usually mobilizing independently, tolerance to only 4 feet of ambulation and need for cueing for mobility technique), and Barthel Index: 45/100  pt needed input for task focus and mobility technique  pt is at risk for falls due to physical and safety awareness deficits  pt's clinical presentation is unstable/unpredictable (evident in poor blood pressure control, need for assist w/ all phases of mobility when usually mobilizing independently, tolerance to only 4 feet of ambulation and need for input for task focus and mobility technique)  pt needs inpatient PT tx to improve mobility deficits and progress mobility training as appropriate  discharge recommendation is for inpatient rehab to reduce fall risk and maximize level of functional independence  pt would benefit from Cardiology consult to address tachycardia w/ activity  Gerontology consult would benefit pt to address cognition and aging related issues     Goals Patient Goals go home   STG Expiration Date 06/11/23   Short Term Goal #1 pt will: Increase bilateral LE strength 1/2 grade to facilitate independent mobility, Perform bed mobility modified independent to increase level of independence, Perform all transfers w/ supervision to improve independence, Ambulate 150 ft  w/ rollator w/ supervision w/o LOB to improve functional independence, Increase ambulatory balance 1 grade to decrease risk for falls, Tolerate 3 hr OOB to faciliate upright tolerance, Improve Barthel Index score to 65 or greater to facilitate independence and Complete Timed Up and Go or Comfortable Gait Speed to further assess mobility and monitor progress   PT Treatment Day 1   Plan   Treatment/Interventions Functional transfer training;LE strengthening/ROM; Therapeutic exercise; Endurance training;Cognitive reorientation;Patient/family training;Equipment eval/education; Bed mobility;Gait training   PT Frequency 3-5x/wk   Recommendation   PT Discharge Recommendation Post acute rehabilitation services   Additional Comments (S)  pt would benefit from Cardiology consult to address tachycardia w/ activity  Gerontology consult would benefit pt to address cognition and aging related issues     AM-PAC Basic Mobility Inpatient   Turning in Flat Bed Without Bedrails 3   Lying on Back to Sitting on Edge of Flat Bed Without Bedrails 3   Moving Bed to Chair 3   Standing Up From Chair Using Arms 3   Walk in Room 3   Climb 3-5 Stairs With Railing 1   Basic Mobility Inpatient Raw Score 16   Basic Mobility Standardized Score 38 32   Highest Level Of Mobility   -HLM Goal 5: Stand one or more mins   -HLM Achieved 7: Walk 25 feet or more   Barthel Index   Feeding 10   Bathing 0   Grooming Score 5   Dressing Score 5   Bladder Score 5   Bowels Score 5   Toilet Use Score 5   Transfers (Bed/Chair) Score 10   Mobility (Level Surface) Score 0   Stairs Score 0   Barthel Index Score 45   Additional Treatment Session   Start Time 1044   End Time 1109   Treatment Assessment Pt agreed to participate in additional ambulation to address physical and mobility deficits noted during eval  Sit < - > stand transfer w/ minx1  Ambulated 10 feet x4 w/ roller walker w/ minx1  Seated rest breaks were needed due to lightheadedness  pt was noted to be tachycardic into 130s when reports of lightheadedness were reported  pt's symptoms improved w/ seated rest break  Therapist provided education to pt including walker management and transfer technique, w/ limited carryover noted  Pt shows improvement w/ increased activity tolerance but continued to need the same level of assistance  Further inpatient PT intervention is necessary to decrease fall risk and maximize functional independence  Equipment Use rollator   Additional Treatment Day 1   End of Consult   Patient Position at End of Consult Bedside chair;Bed/Chair alarm activated; All needs within reach     The patient's AM-PAC Basic Mobility Inpatient Short Form Raw Score is 16  A Raw score of less than or equal to 16 suggests the patient may benefit from discharge to post-acute rehabilitation services  Please also refer to the recommendation of the Physical Therapist for safe discharge planning  Skilled PT recommended while in hospital and upon DC to progress pt toward treatment goals       Michael Pham, PT
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
## 2023-06-01 NOTE — CASE MANAGEMENT
Case Management Assessment & Discharge Planning Note    Patient name Sang Joshi  Location S /S -28 MRN [id number]  : 1925 Date 2023       Current Admission Date: 2023  Current Admission Diagnosis:Syncope   Patient Active Problem List    Diagnosis Date Noted   • Candidal intertrigo 2023   • Cognitive decline 2023   • Chronic pain syndrome 2023   • Moderate protein-calorie malnutrition ([address] ) 2023   • T12 compression fracture ([address] ) 2023   • Primary osteoarthritis of left knee 2022   • Seasonal allergic rhinitis due to pollen 2022   • Syncope 2021   • Leucocytosis 2021   • Interstitial cystitis 2021   • Anxiety 2021   • Atrial flutter ([address] ) 2021   • Vitamin B12 deficiency 09/10/2019   • Localized edema 09/10/2019   • Stage 2 chronic kidney disease 09/10/2019   • Hiatal hernia 2019   • Chronic diastolic heart failure ([address] ) 2019   • Ambulatory dysfunction 2019   • Encounter for follow-up examination after completed treatment for malignant neoplasm 2019   • Hemorrhoids 2018   • History of left breast cancer 2018   • S/P left mastectomy 2017   • Paroxysmal atrial fibrillation ([address] ) 2017   • Osteoporosis    • Essential hypertension    • GERD (gastroesophageal reflux disease)    • Constipation 2016   • Macular degeneration 2015   • Aneurysm of ascending aorta ([address] ) 2014   • First degree AV block 03/15/2013   • Benign paroxysmal vertigo 2013   • Vitamin D deficiency 10/01/2012   • Dyslipidemia 2012   • Acquired hypothyroidism 2012   • Vertigo 2012   • H  pylori infection 2009   • Advance directive in chart 2005      LOS (days): 1  Geometric Mean LOS (GMLOS) (days): 2 30  Days to GMLOS:1 7     OBJECTIVE:    Risk of Unplanned Readmission Score: 12 81         Current admission status: Inpatient       [facility]erred Pharmacy: [id number] [place] - [address] [facility]  [address]  [address] [address]  Phone: [phone] Fax: [facility], [address]  [address] [address]  Phone: [phone] Fax: [address] [facility]   [address] [facility] [address]  [address] [facility],[address]  Phone: [phone] Fax: [phone]    Primary Care Provider: Mariaelena Owen MD    Primary Insurance: MEDICARE  Secondary Insurance: [facility]    ASSESSMENT:  [address] [facility], [address] [facility] [address] Representative - Daughter   Primary Phone: [phone] (Mobile)               Advance Directives  Does patient have a [address]?: Yes  Does patient have Advance Directives?: Yes  Advance Directives: Living will, Power of  for health care  Primary Contact: Daughter Brandy Dumont ([phone])              Patient Information  Admitted from[de-identified] Home  Mental Status: Alert  During Assessment patient was accompanied by: Daughter (Daughter, Brandy Dumont)  Assessment information provided by[de-identified] Daughter  Primary Caregiver: Other (Comment)  Caregiver's Name[de-identified] Nancye Hubbard Relationship to Patient[de-identified] Other (Specify) (Waiver Program)  Support Systems: Self, Daughter, Home care staff  What city do you live in?: [facility], [address] [place] [address] Street entry access options   Select all that apply : No steps to enter home  Type of Current Residence: Apartment ([address])  Upon entering residence, is there a bedroom on the main floor (no further steps)?: Yes  Upon entering residence, is there a bathroom on the main floor (no further steps)?: Yes  In the last 12 months, was there a time when you were not able to pay the mortgage or rent on time?: No  In the last 12 months, was there a time when you did not have a steady place to sleep or slept in a shelter (including now)?: No  Homeless/housing insecurity resource given?: N/A  Living Arrangements: Lives Alone    Activities of Daily Living Prior to Admission  Functional Status: Assistance (Pt receives 26 hours/week of HHA services via the waiver program )  Completes ADLs independently?: Yes  Ambulates independently?: Yes (Pt has had 3 falls over the past week, per daughter)  Does patient use assisted devices?: Yes  Assisted Devices (DME) used: Kelechi Jenningsator  Does patient currently own DME?: Yes  What DME does the patient currently own?: Huey Baker  Does patient have a history of Outpatient Therapy (PT/OT)?: Yes  Does the patient have a history of Short-Term Rehab?: Yes ([facility])  Does patient have a history of HHC?: Yes  Does patient currently have [facility]?: Yes    Current Home Health Care  Type of Current Home Care Services: Home health aide  Current Home Health Agency[de-identified] Other (please enter name in comment) (via Waiver Program)  [facility] Provider[de-identified] PCP    Patient Information Continued  Income Source: SSI/SSD  Does patient have prescription coverage?: Yes  Within the past 12 months, you worried that your food would run out before you got the money to buy more : Never true  Within the past 12 months, the food you bought just didn't last and you didn't have money to get more : Never true  Food insecurity resource given?: N/A  Does patient receive dialysis treatments?: No  Does patient have a history of substance abuse?: No  Does patient have a history of Mental Health Diagnosis?: No         Means of Transportation  Means of Transport to Appts[de-identified] Family transport  In the past 12 months, has lack of transportation kept you from medical appointments or from getting medications?: No  In the past 12 months, has lack of transportation kept you from meetings, work, or from getting things needed for daily living?: No  Was application for public transport provided?: N/A        DISCHARGE DETAILS:    Discharge planning discussed with[de-identified] Patient and daughter, Naomy Patel, at bedside  [place] of Choice: Yes  Comments - Freedom of Choice: Re: SNF referrals  CM contacted family/caregiver?: Yes  Were Treatment Team discharge recommendations reviewed with patient/caregiver?: Yes  Did patient/caregiver verbalize understanding of patient care needs?: Yes  Were patient/caregiver advised of the risks associated with not following Treatment Team discharge recommendations?: Yes    Contacts  Patient Contacts: Shirley Mckinney  Relationship to Patient[de-identified] Family  Contact Method: In Person, Phone  Phone Number: [phone]  Reason/Outcome: Discharge [address]         Is the patient interested in [facility] at discharge?: No    DME Referral Provided  Referral made for DME?: No    Other Referral/Resources/Interventions Provided:  Interventions: SNF         Treatment Team Recommendation: SNF  Discharge Destination Plan[de-identified] SNF                                         Additional Comments: CM spoke with patient and daughter at bedside to introduce role of CM and begin discharge planning  Daughter reports pt resides at [facility] where she receives 26 hrs/week of HHA hours via the waiver program ([facility])  Pt was generally independent at baseline, but is beginning to decline and would benefit from around-the-clock care, per daughter and therapy team  Daughter reports pt has had 3 falls over the past week and she likely will not qualify for more HHA hours  Daughter states she is in agreement with LTC placement and acknowledged the need for a [facility] facility -- Daughter states she is agreeable to a blanket SNF referral and would like to complete tours today  Odd referral placed -- Daughter states she will tour [facility], [facility], and [facility] today  Daughter is leaning towards [facility] as the first choice  CM will monitor Aidin responses and follow for daughter's final choice of facility

## 2023-06-01 NOTE — ASSESSMENT & PLAN NOTE
· As per daughter patient has had decrease mentation and cognitive decline  for the past 2 to 3 weeks  Unable to recollect events, waking up in the middle the night thinking that they started  Changes in social interactions with family and friends in independent living facility  Most recently over the past 2 weeks has required help of an aide during the day for ADLs  · Acute mild cognitive decline possibly age-related versus metabolic     Plan  · Delirium precaution  · Discussed that the patient may need to find a home living arrangement with greater supervision after rehabilitation

## 2023-06-01 NOTE — CONSULTS
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 imaging of the Brain without contrast   Post contrast imaging was performed after administration of iodinated contrast through the neck and brain  Post contrast axial 0 625 mm images timed to opacify the arterial system  3D rendering was performed on an independent workstation  MIP reconstructions performed  Coronal reconstructions were performed of the noncontrast portion of the brain  Radiation dose length product (DLP) for this visit:  1844 mGy-cm   This examination, like all CT scans performed in the [facility], was performed utilizing techniques to minimize radiation dose exposure, including the use of iterative reconstruction and automated exposure control   IV Contrast:  85 mL of iohexol (OMNIPAQUE) IMAGE QUALITY:   Diagnostic FINDINGS: NONCONTRAST BRAIN PARENCHYMA: Periventricular and subcortical hypoattenuating foci consistent with microangiopathic disease  The scattered chronic lacunar infarcts in the bilateral corona radiata  No acute intracranial hemorrhage or mass effect  VENTRICLES AND EXTRA-AXIAL SPACES: No hydrocephalus or extra-axial collection  VISUALIZED ORBITS: Intact globes and orbits PARANASAL SINUSES: Clear  CERVICAL VASCULATURE AORTIC ARCH AND GREAT VESSELS: Bovine configuration of the aortic arch  No stenosis in the subclavian arteries  RIGHT VERTEBRAL ARTERY CERVICAL SEGMENT:  Normal origin  The vessel is normal in caliber throughout the neck  LEFT VERTEBRAL ARTERY CERVICAL SEGMENT:  Normal origin  The vessel is normal in caliber throughout the neck  RIGHT EXTRACRANIAL CAROTID SEGMENT:  Normal caliber common carotid artery  Normal bifurcation and cervical internal carotid artery  No stenosis or dissection  LEFT EXTRACRANIAL CAROTID SEGMENT:  Normal caliber common carotid artery  Normal bifurcation and cervical internal carotid artery  No stenosis or dissection  NASCET criteria was used to determine the degree of internal carotid artery diameter stenosis  INTRACRANIAL VASCULATURE INTERNAL CAROTID ARTERIES: Minimal calcified plaque through the carotid siphons  Bilateral ectasia  No stenosis  ANTERIOR CIRCULATION: Single A1 trunk arising on the right  Anterior communicating artery identified  No stenosis in the anterior cerebral arteries  MIDDLE CEREBRAL ARTERY CIRCULATION:  M1 segment and middle cerebral artery branches demonstrate normal enhancement bilaterally  DISTAL VERTEBRAL ARTERIES:  Normal distal vertebral arteries  Posterior inferior cerebellar artery origins are normal  Normal vertebral basilar junction  BASILAR ARTERY:  Basilar artery is normal in caliber  Normal superior cerebellar arteries  POSTERIOR CEREBRAL ARTERIES: Fetal-type left posterior cerebral artery  Mild left P3 segment stenosis  VENOUS STRUCTURES: Patent dural venous sinuses   NON VASCULAR ANATOMY BONY STRUCTURES:  No acute osseous abnormality  SOFT TISSUES OF THE NECK:  Unremarkable  THORACIC INLET: Numerous small mediastinal lymph nodes, likely reactive  Clear lung apices  Impression: 1  No evidence of acute infarct, intracranial hemorrhage or mass  2  No hemodynamically significant stenosis, dissection or occlusion of the carotid or vertebral arteries  Workstation performed: [id number]     XR spine lumbar 2 or 3 views injury    Result Date: 5/3/2023  Narrative: LUMBAR SPINE INDICATION:   G89 4: Chronic pain syndrome S22 080D: Wedge compression fracture of t11-T12 vertebra, subsequent encounter for fracture with routine healing  COMPARISON: 3/31/2023 VIEWS:  XR SPINE LUMBAR 2 OR 3 VIEWS INJURY Images: 2 FINDINGS: There are 5 non rib bearing lumbar vertebral bodies  Stable severe compression fracture of the T12 vertebral body  Stable mild central depression of the superior endplate of L2  Mild scoliosis  Age-appropriate lumbar degenerative changes are seen  The pedicles appear intact  Soft tissues are unremarkable  Impression: Stable appearance of the severe T12 compression fracture and mild central depression of the superior endplate of L2  Workstation performed: [id number]           Counseling / Coordination of Care  Total floor / unit time spent today 45 minutes  Greater than 50% of total time was spent with the patient and / or family counseling and / or coordination of care  A description of the counseling / coordination of care: Review of history, current assessment, development of a plan  Code Status: Level 3 - DNAR and DNI    ** Please Note: Dragon 360 Dictation voice to text software may have been used in the creation of this document   **
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
## 2023-06-01 NOTE — PLAN OF CARE
Problem: PHYSICAL THERAPY ADULT  Goal: Performs mobility at highest level of function for planned discharge setting  See evaluation for individualized goals  Description: Treatment/Interventions: Functional transfer training, LE strengthening/ROM, Therapeutic exercise, Endurance training, Cognitive reorientation, Patient/family training, Equipment eval/education, Bed mobility, Gait training          See flowsheet documentation for full assessment, interventions and recommendations  Note:    Problem List: Decreased strength, Decreased endurance, Impaired balance, Decreased mobility, Decreased cognition, Decreased safety awareness  Assessment: Pt presents after unwitnessed syncopal episode  Dx: syncope, a-fib/flutter, ambulatory dysfunction, cognitive decline, essential HTN, and leukocytosis  order placed for PT eval and tx, w/ activity order of up and out of bed as tolerated  pt presents w/ comorbidities of a-fib, breast cancer, heart failure, BPPV, ambulatory dysfunction, osteoporosis, AAA, arthritis, pneumonia, and CKD and personal factors of advanced age, decreased cognition, limited home support, positive fall history, depression and inability to perform IADLs  pt presents w/ weakness, decreased endurance, impaired cognition, impaired balance, gait deviations, decreased safety awareness, fall risk and LE edema  these impairments are evident in findings from physical examination (weakness and edema of extremities), mobility assessment (need for min assist w/ all phases of mobility when usually mobilizing independently, tolerance to only 4 feet of ambulation and need for cueing for mobility technique), and Barthel Index: 45/100  pt needed input for task focus and mobility technique  pt is at risk for falls due to physical and safety awareness deficits   pt's clinical presentation is unstable/unpredictable (evident in poor blood pressure control, need for assist w/ all phases of mobility when usually mobilizing independently, tolerance to only 4 feet of ambulation and need for input for task focus and mobility technique)  pt needs inpatient PT tx to improve mobility deficits and progress mobility training as appropriate  discharge recommendation is for inpatient rehab to reduce fall risk and maximize level of functional independence  pt would benefit from Cardiology consult to address tachycardia w/ activity  Gerontology consult would benefit pt to address cognition and aging related issues  PT Discharge Recommendation: Post acute rehabilitation services    See flowsheet documentation for full assessment

## 2023-06-01 NOTE — ASSESSMENT & PLAN NOTE
· History of hypertension  · Home meds amlodipine 2 5 mg twice daily, Toprol-XL 12 5 mg once a day and  Blood Pressure: 166/86  · Toprol all XL increased to 25 mg once daily  · Acceptable

## 2023-06-01 NOTE — ASSESSMENT & PLAN NOTE
· History of A-fib and a flutter  · [id number] 5 sticking of anticoagulation given history of multiple falls and age  · ECG Atrial flutter heart rate 62  · Monitoring telemetry  · Continue Toprol-XL 12 5 milligrams once a day

## 2023-06-01 NOTE — ASSESSMENT & PLAN NOTE
Patient with a PMH of A-fib, Atrial flutter, HTN, and CHF with preserved EF presents due to syncopal event yesterday morning  She was in the bathroom at her independent living facility when she stood up, felt lightheaded and off balance with the room spinning and sounds feeling distant, and then passed out and fell on her bottom  She reports brief LOC and when she woke, she pressed her life alert bracelet for help to get up  Denies head strike  She is not on blood thinners  She denies having any chest pain, palpitations, weakness, numbness or tingling, visual changes, headache, ear pain, hearing loss, nausea, fevers/chills, or confusion  She denies having a bowel movement or straining prior to syncope  Per daughter, who is her POA, patient has had multiple falls over the past few months  She uses a walker at baseline  Patient states syncope and falls only occur after when going from a sitting to standing position, never when walking  She denies any dizziness when sitting  Daughter has noticed progressive cognitive and physical decline in patient over the past few months since first fall in March, and worsening in the past few weeks  Daughter reports patient having poor short term memory, frequently repeating self, increased fatigue, slower walking, significantly decreased appetite, minimal water intake, and needing more help with ADLs  Patient states she feels at baseline today and reports no complaints  Daughter requests to have patient evaluated for memory dysfunction  Daughter feels patient needs more hours of time with home health aids for her safety since she lives alone and has been declining  On exam, AAOx4  No focal deficit on neuro exam  CN 2-12 intact  Intact motor function, coordination, sensation, and strength 5/5  Gait deferred since walker was not with patient  DTRs are 2+ and symmetrical  Tachycardic      Workup:  Mild leukocytosis  Normal serum electrolytes  Low magnesium  Elevated BNP  CTA head and neck showed no acute intracranial abnormality  CT spine showed progressive loss of vertebral body height at T12 following prior compression fracture in March  MRI pending  Echo pending    Impression:  Presentation is most consistent with Orthostatic Hypotension, with hx of recurrent syncopal events occurring only when standing from a seated position  Differential diagnosis also includes but is not limited to: tachyarrhythmia, vasovagal syncope, dehydration, poor PO intake, and BPPV  Stroke is unlikely      Plan:  - Follow up with pending MRI results and Echocardiogram  - Orthostatic vitals  - PT/OT evaluation and recommendations  - Case management consult to assist patient in obtaining home health  - Counseled patient on increasing PO and fluid intake  - Follow-up with outpatient neurology for cognition and memory evaluation  - Follow-up with PCP

## 2023-06-01 NOTE — ASSESSMENT & PLAN NOTE
· As per daughter patient has had decrease mentation and cognitive decline  for the past 2 to 3 weeks  Unable to recollect events, waking up in the middle the night thinking that they started  Changes in social interactions with family and friends in independent living facility  Most recently over the past 2 weeks has required help of an aide during the day for ADLs    · Acute mild cognitive decline possibly age-related versus metabolic     · Plan    · TSH  · B12  · Folate  · Delirium precaution  · UA

## 2023-06-01 NOTE — SPEECH THERAPY NOTE
Speech Language/Pathology  Speech/Language Pathology  Assessment    Patient Name: Manuel Villa  [id number] Date: 6/1/2023     Problem List  Principal Problem:    Syncope  Active Problems:    Essential hypertension    Acquired hypothyroidism    Ambulatory dysfunction    Chronic diastolic heart failure ([facility] [address] )    Atrial flutter ([facility] [address] )    Leucocytosis    Cognitive decline    Candidal intertrigo    Manuel Villa is a 80 y o  female presents after unwitnessed syncopal episode  Patient has a history of multiple falls endorsed that fell earlier morning preceded by room spinning/dizziness subsequently fell in her buttocks and loss of consciousness however denied head strike, blurry vision, palpitations, chest pain, abdominal pain as well as new urinary symptoms or weakness      History provided mainly by daughter at bedside during my encounter  Endorsed that over the past 9 to 10 days patient has had decreasing mentation/cognitive decline has been noted more fatigued with poor oral intake and decreased socialization  Most recently has had aids visits to help with ADLs  Have been noted more confused recently not recalling if medications has been taken, waking up earlier around 2 am getting ready to start the day  Around March patient had a fall subsequently sent to rehab after Discharge from rehab patient was doing well    Consult received for speech/swallow eval on stroke pathway  Pt passed nsg swallow screen; tolerating regular diet w/o s/s dysphagia or aspiration  No speech/language deficits reported  NIH score is 1  MRI: pending    No need for formal speech/swallow eval at this time  Reconsult if needed      Kaylah Berrios CCC-SLP  Speech Pathologist  Available via  tiger text

## 2023-06-01 NOTE — PLAN OF CARE
Problem: OCCUPATIONAL THERAPY ADULT  Goal: Performs self-care activities at highest level of function for planned discharge setting  See evaluation for individualized goals  Description: Treatment Interventions: ADL retraining, Functional transfer training, UE strengthening/ROM, Endurance training, Cognitive reorientation, Patient/family training, Equipment evaluation/education, Compensatory technique education, Energy conservation, Activityengagement          See flowsheet documentation for full assessment, interventions and recommendations  Note: Limitation: Decreased ADL status, Decreased UE ROM, Decreased UE strength, Decreased cognition, Decreased endurance, Decreased self-care trans, Decreased high-level ADLs, Decreased Safe judgement during ADL  Prognosis: Good  Assessment: Patient is a 80 y o  female seen for OT evaluation and treatment  at [facility] following admission on 5/31/2023  s/p Syncope  Comorbidities and significant PMHx impacting functional performance include:  A-fib/a flutter with variable AV block, breast cancer status post left mastectomy, HFpEF, BPPV, Ambulatory dysfunction with multiple falls, osteoporosis, GERD, AAA, CKD stage II, hypothyroidism, falls   Patient presents with active orders for OT eval and treat and up and OOB as tolerated   Prior to admission, patient was independent with functional mobility with rollator walker , independent with ADLS and requiring assist for IADLS  Upon initial evaluation, patient requires min assist for UB ADLs, mod assist for LB ADLs, min assist for bed mobility, min assist for transfers and min assist for functional mobility household distance with RW  Based on functional eval, pt presents with intact  attention, impaired  safety awareness, impaired  problem solving skills, and impaired  memory   Occupational performance is affected by the following deficits: endurance ,  decreased muscular strength , decreased balance , decreased standing tolerance for self care tasks , decreased dynamic balance impacting functional reach, decreased activity tolerance , impaired memory , impaired judgement and problem solving , impaired safety awareness  and impaired global mental status The AM-PAC & Barthel Index outcome tools were used to assist in determining pt safety w/self care /mobility and appropriate d/c recommendations, see above for score  Personal factors impacting performance include: decreased (+) Hx of falls , decreased social/family support within the home, Assistance needed for ADL/IADLs, Assistance needed for ADLs and functional mobility, High fall risk , decreased insight toward deficits  and decreased recall of precautions   Patient would benefit from OT services within the acute care setting to maximize level of functional independence in the following occupational areas bathing/showering, toileting, dressing , personal hygiene/grooming , activity engagement , safety procedures , fall prevention , energy conservation , self-care transfers, bed mobility  and functional mobility   From OT standpoint, recommendation at time of D/C would be post-acute rehabilitation     OT Discharge Recommendation: Post acute rehabilitation services     Santos Ribera Bird [address] OTR/L   [place] Licensure# [id number]

## 2023-06-01 NOTE — PLAN OF CARE
Problem: MOBILITY - ADULT  Goal: Maintain or return to baseline ADL function  Description: INTERVENTIONS:  -  Assess patient's ability to carry out ADLs; assess patient's baseline for ADL function and identify physical deficits which impact ability to perform ADLs (bathing, care of mouth/teeth, toileting, grooming, dressing, etc )  - Assess/evaluate cause of self-care deficits   - Assess range of motion  - Assess patient's mobility; develop plan if impaired  - Assess patient's need for assistive devices and provide as appropriate  - Encourage maximum independence but intervene and supervise when necessary  - Involve family in performance of ADLs  - Assess for home care needs following discharge   - Consider OT consult to assist with ADL evaluation and planning for discharge  - Provide patient education as appropriate  Outcome: Not Progressing  Goal: Maintains/Returns to pre admission functional level  Description: INTERVENTIONS:  - Perform BMAT or MOVE assessment daily    - Set and communicate daily mobility goal to care team and patient/family/caregiver  - Collaborate with rehabilitation services on mobility goals if consulted  - Perform Range of Motion  times a day  - Reposition patient every  hours  - Dangle patient  times a day  - Stand patient  times a day  - Ambulate patient  times a day  - Out of bed to chair  times a day   - Out of bed for meals times a day  - Out of bed for toileting  - Record patient progress and toleration of activity level   Outcome: Not Progressing     Problem: Neurological Deficit  Goal: Neurological status is stable or improving  Description: Interventions:  - Monitor and assess patient's level of consciousness, motor function, sensory function, and level of assistance needed for ADLs  - Monitor and report changes from baseline  Collaborate with interdisciplinary team to initiate plan and implement interventions as ordered  - Provide and maintain a safe environment    - Consider seizure precautions  - Consider fall precautions  - Consider aspiration precautions  - Consider bleeding precautions  Outcome: Progressing     Problem: Activity Intolerance/Impaired Mobility  Goal: Mobility/activity is maintained at optimum level for patient  Description: Interventions:  - Assess and monitor patient  barriers to mobility and need for assistive/adaptive devices  - Assess patient's emotional response to limitations  - Collaborate with interdisciplinary team and initiate plans and interventions as ordered  - Encourage independent activity per ability   - Maintain proper body alignment  - Perform active/passive rom as tolerated/ordered  - Plan activities to conserve energy   - Turn patient as appropriate  Outcome: Not Progressing     Problem: Communication Impairment  Goal: Ability to express needs and understand communication  Description: Assess patient's communication skills and ability to understand information  Patient will demonstrate use of effective communication techniques, alternative methods of communication and understanding even if not able to speak  - Encourage communication and provide alternate methods of communication as needed  - Collaborate with case management/ for discharge needs  - Include patient/family/caregiver in decisions related to communication  Outcome: Progressing     Problem: Potential for Aspiration  Goal: Non-ventilated patient's risk of aspiration is minimized  Description: Assess and monitor vital signs, respiratory status, and labs (WBC)  Monitor for signs of aspiration (tachypnea, cough, rales, wheezing, cyanosis, fever)  - Assess and monitor patient's ability to swallow  - Place patient up in chair to eat if possible  - HOB up at 90 degrees to eat if unable to get patient up into chair   - Supervise patient during oral intake  - Instruct patient/ family to take small bites    - Instruct patient/ family to take small single sips when taking liquids  - Follow patient-specific strategies generated by speech pathologist   Outcome: Progressing  Goal: Ventilated patient's risk of aspiration is minimized  Description: Assess and monitor vital signs, respiratory status, airway cuff pressure, and labs (WBC)  Monitor for signs of aspiration (tachypnea, cough, rales, wheezing, cyanosis, fever)  - Elevate head of bed 30 degrees if patient has tube feeding   - Monitor tube feeding    Outcome: Progressing     Problem: Prexisting or High Potential for Compromised Skin Integrity  Goal: Skin integrity is maintained or improved  Description: INTERVENTIONS:  - Identify patients at risk for skin breakdown  - Assess and monitor skin integrity  - Assess and monitor nutrition and hydration status  - Monitor labs   - Assess for incontinence   - Turn and reposition patient  - Assist with mobility/ambulation  - Relieve pressure over bony prominences  - Avoid friction and shearing  - Provide appropriate hygiene as needed including keeping skin clean and dry  - Evaluate need for skin moisturizer/barrier cream  - Collaborate with interdisciplinary team   - Patient/family teaching  - Consider wound care consult   Outcome: Progressing

## 2023-06-01 NOTE — QUICK NOTE
Patient was evaluated this morning, and noted to be improving in her mental status closer to her normal self per her daughter at bedside  She was alert and oriented this morning, participating in conversation, though her daughter primarily reiterated the patient's history and HPI to me  The daughter was updated regarding the plan to continue following the stroke work-up, though expressed that the more likely candidate for the cause of her syncopal episode was vasovagal or orthostatic hypotension  Orthostatic vital signs were negative for orthostatic hypotension    MRI and echocardiography were still pending as of this afternoon  Her metoprolol was increased to 25 mg today due to the patient having tachycardia up to 130 while in persistent atrial flutter when working with physical therapy

## 2023-06-01 NOTE — OCCUPATIONAL THERAPY NOTE
Occupational Therapy Evaluation/Treatment     Patient Name: Roanna Hodgkin  ZMAWU'Z Date: 6/1/2023  Problem List  Principal Problem:    Syncope  Active Problems:    Essential hypertension    Acquired hypothyroidism    Ambulatory dysfunction    Chronic diastolic heart failure (HCC)    Atrial flutter (HCC)    Leucocytosis    Cognitive decline    Candidal intertrigo    Past Medical History  Past Medical History:   Diagnosis Date   • AAA (abdominal aortic aneurysm) ([facility] [address] )    • Acute medial meniscus tear    • Arthritis 1980   • Aspiration pneumonia ([facility] [address] )     LAST ASSESSED: 7/30/14   • Breast CA ([facility] [address] )     left   • Cancer ([facility] [address] ) 2017   • Chest pain     RESOLVED: 1/31/17   • CTS (carpal tunnel syndrome)    • Depression    • Dermatitis     LAST ASSESSED: 7/25/13   • Disease of thyroid gland    • Fainting     LAST ASSESSED: 3/15/13   • GERD (gastroesophageal reflux disease)    • H  pylori infection 03/17/2009   • Hypertension    • Incomplete defecation     LAST ASSESSED: 7/25/13   • Macular degeneration    • Osteoporosis    • Pneumonia    • Vertigo 2012     Past Surgical History  Past Surgical History:   Procedure Laterality Date   • APPENDECTOMY     • CHOLECYSTECTOMY     • COLONOSCOPY     • MASTECTOMY Left 09/2017    SIMPLE   • KY INTRAOP SENTINEL LYMPH NODE ID W/DYE INJECTION Left 9/5/2017    Procedure: INTRAOPERATIVE LYMPHATIC MAPPING; BLUE DYE ONLY; SENITNEL LYMPH NODE BIOPSY;  Surgeon: Joel Berriso MD;  [facility];  Service: Surgical Oncology   • [place] MASTECTOMY SIMPLE COMPLETE Left 9/5/2017    Procedure: BREAST MASTECTOMY;  Surgeon: Joel Berrios MD;  [facility];  Service: Surgical Oncology   • SENTINEL LYMPH NODE BIOPSY     • US GUIDED BREAST BIOPSY LEFT COMPLETE Left 8/14/2017 06/01/23 1110   OT Last Visit   OT Visit Date 06/01/23   Note Type   Note type Evaluation  (+treatment)   Pain Assessment   Pain Assessment Tool 0-10   Pain Score No Pain   Effect of Pain on Daily Activities N/A   Patient's Stated Pain Goal No pain   Hospital Pain Intervention(s) Repositioned; Ambulation/increased activity   Multiple Pain Sites No   Restrictions/Precautions   Weight Bearing Precautions Per Order No   Other Precautions Chair Alarm; Bed Alarm;Telemetry;Multiple lines;Limb alert;Cognitive  (LUE Limb Alert)   Home Living   Type of Home Apartment  [facility])   Home Layout One level;Elevator;Performs ADLs on one level; Able to live on main level with bedroom/bathroom   Bathroom Shower/Tub Walk-in shower   Bathroom Toilet Raised   Bathroom Equipment Grab bars in shower; Shower chair;Grab bars around toilet   Bathroom Accessibility Accessible via walker   Home Equipment Walker;Cane;Other (Comment)  (Rollator)   Prior Function   Level of [place] Independent with ADLs; Independent with functional mobility; Needs assistance with IADLS   Lives With Alone   Receives Help From Family;Home health  (HHA 26 hours per week)   IADLs Family/Friend/Other provides transportation; Family/Friend/Other provides meals; Family/Friend/Other provides medication management  (Family provides transportation and manages medication, uses pillbox that only gives access to that day )   Falls in the last 6 months (S)  5 to 10  (About 5 falls, per daughters report)   Vocational Retired   Comments Prior is a questionable historian at baseline  PLOF and home set up obtained from patient and daughter who was present t/o session  Daughter reports that pt has been needing increased assistance with ADLs, suppose to have assist from daughter and/or aid but pt often does it alone  Ambulates with rollator walker for short distances, previously was able to use no AD  Daughter reports a cognitive decline over the past couple of weeks, deficits noted in overall orientation to sitaution and time  Lifestyle   Autonomy At baseline pt is MARC for ADLs and functional mobility with rollator walker for short distances  Assist for IADLs  (-) driving   Lives ALONE in a "first floor apartment  Supportive daughter and HHA for 26hrs/week to assist with needs  Reciprocal Relationships Supportive family   Service to Others Retired   General   Additional Pertinent History Pt admitted 5/31/2023 with syncopal episode, stoke pathway  MRI pending    Family/Caregiver Present Yes  (Daughter)   Subjective   Subjective \"Oh no, I feel dizzy'   ADL   Eating Assistance 5  Supervision/Setup   Grooming Assistance 5  Supervision/Setup   UB Bathing Assistance 4  Minimal Assistance   LB Bathing Assistance 3  Moderate Assistance   UB [address]; Thread RUE; Thread LUE;Pull over head;Pull around back;Pull down in back  (Min assist to doff/[facility] gown)   LB Dressing Assistance 3  Moderate Assistance   LB Dressing Deficit Thread RLE into pants; Thread LLE into pants; Thread RLE into underwear; Thread LLE into underwear;Pull up over hips; Increased time to complete  (verbal cueing for sequencing of task to margarita underwear and pants)   Toileting Assistance  3  Moderate Assistance   Additional Comments Pt is limited by imapired cognition, decreased activity tolerance, generalized weakness with limited insight into current deficits  Bed Mobility   Supine to Sit 4  Minimal assistance   Additional items Assist x 1; Increased time required;HOB elevated;Verbal cues  (trunk managment)   Sit to Supine   (Unable to assess)   Additional Comments Supervision assist to scoot fwd towards EOB  Unable to assess sit to supine, pt OOB in bedside chair at the end of the session with chair alarm donned and all needs in reach  Transfers   Sit to Stand 4  Minimal assistance   Additional items Assist x 1; Increased time required;Verbal cues   Stand to Sit 4  Minimal assistance   Additional items Assist x 1; Increased time required;Verbal cues   Stand pivot 4  Minimal assistance   Additional items Assist x 1; Increased time required  (RW)   Additional Comments Verbal " cues for optimal hand placement and body mechanics, pt with fair application of cues  Cueing for safety awareness and proper use of rollator breaks  Functional Mobility   Functional Mobility 4  Minimal assistance   Additional Comments assist x 1 for functional household distance with use of rollator walker  Pt able to ambulate approx 5 feet before c/o of dizziness/lightheadedness  BP monitored t/o session, see below  Additional items   (Rollator)   Balance   Static Sitting Fair +   Dynamic Sitting Fair   Static Standing Fair -   Dynamic Standing Poor +   Activity Tolerance   Activity Tolerance Patient limited by fatigue  (limited by dizziness/lightheadedness)   Medical Staff Made Aware Spoke with care coordination, SLIM, PT   Nurse Made Aware Spoke with Melanie HELTON   RUE Assessment   RUE Assessment WFL  (MMt 3+/5 grossly, overall poor  strength)   LUE Assessment   LUE Assessment WFL  (MMt 3+/5 grossly, overall poor  strength)   Hand Function   Gross Motor Coordination Functional   Fine Motor Coordination Functional   Hand Function Comments Attempted thumb to finger coordination test, unsuccessful d/t pt with difficulty following multistep directions   Vision-Basic Assessment   Current Vision Does not wear glasses   Visual History Cataracts   Vision - Complex Assessment   Acuity Able to read employee name badge without difficulty   Cognition   Overall Cognitive Status Impaired   Arousal/Participation Alert; Responsive; Cooperative   Attention Attends with cues to redirect   Orientation Level Oriented to person;Oriented to place   Memory Decreased recall of precautions;Decreased recall of recent events;Decreased short term memory   Following Commands Follows one step commands with increased time or repetition   Assessment   Limitation Decreased ADL status; Decreased UE ROM; Decreased UE strength;Decreased cognition;Decreased endurance;Decreased self-care trans;Decreased high-level ADLs; Decreased Safe judgement during ADL   Prognosis Good   Assessment Patient is a 80 y o  female seen for OT evaluation and treatment  at [facility] following admission on 5/31/2023  s/p Syncope  Comorbidities and significant PMHx impacting functional performance include:  A-fib/a flutter with variable AV block, breast cancer status post left mastectomy, HFpEF, BPPV, Ambulatory dysfunction with multiple falls, osteoporosis, GERD, AAA, CKD stage II, hypothyroidism, falls   Patient presents with active orders for OT eval and treat and up and OOB as tolerated   Prior to admission, patient was independent with functional mobility with rollator walker , independent with ADLS and requiring assist for IADLS  Upon initial evaluation, patient requires min assist for UB ADLs, mod assist for LB ADLs, min assist for bed mobility, min assist for transfers and min assist for functional mobility household distance with RW  Based on functional eval, pt presents with intact  attention, impaired  safety awareness, impaired  problem solving skills, and impaired  memory  Occupational performance is affected by the following deficits: endurance ,  decreased muscular strength , decreased balance , decreased standing tolerance for self care tasks , decreased dynamic balance impacting functional reach, decreased activity tolerance , impaired memory , impaired judgement and problem solving , impaired safety awareness  and impaired global mental status The AM-PAC & Barthel Index outcome tools were used to assist in determining pt safety w/self care /mobility and appropriate d/c recommendations, see above for score  Personal factors impacting performance include: decreased (+) Hx of falls , decreased social/family support within the home, Assistance needed for ADL/IADLs, Assistance needed for ADLs and functional mobility, High fall risk , decreased insight toward deficits  and decreased recall of precautions    Patient would benefit from OT services within the acute care setting to maximize level of functional independence in the following occupational areas bathing/showering, toileting, dressing , personal hygiene/grooming , activity engagement , safety procedures , fall prevention , energy conservation , self-care transfers, bed mobility  and functional mobility  From OT standpoint, recommendation at time of D/C would be post-acute rehabilitation   Goals   Patient Goals to return back home   LTG Time Frame 10-14   Long Term Goal See below   Plan   Treatment Interventions ADL retraining;Functional transfer training;UE strengthening/ROM; Endurance training;Cognitive reorientation;Patient/family training;Equipment evaluation/education; Compensatory technique education; Energy conservation; Activityengagement   Goal Expiration Date 06/11/23   OT Treatment Day 1   OT Frequency 3-5x/wk   Recommendation   OT Discharge Recommendation Post acute rehabilitation services   Additional Comments  The patient's raw score on the AM-PAC Daily Activity Inpatient Short Form is    A raw score of less than 19 suggests the patient may benefit from discharge to post-acute rehabilitation services  Please refer to the recommendation of the Occupational Therapist for safe discharge planning     AM-PAC Daily Activity Inpatient   Lower Body Dressing 2   Bathing 2   Toileting 2   Upper Body Dressing 3   Grooming 3   Eating 3   Daily Activity Raw Score 15   Daily Activity Standardized Score (Calc for Raw Score >=11) 34 69   AM-PAC Applied Cognition Inpatient   Following a Speech/Presentation 1   Understanding Ordinary Conversation 3   Taking Medications 2   Remembering Where Things Are Placed or Put Away 2   Remembering List of 4-5 Errands 1   Taking Care of Complicated Tasks 1   Applied Cognition Raw Score 10   Applied Cognition Standardized Score 24 98   Barthel Index   Feeding 10   Bathing 0   Grooming Score 0   Dressing Score 5   Bladder Score 5   Bowels Score 5   Toilet Use Score 5 Transfers (Bed/Chair) Score 10   Mobility (Level Surface) Score 0   Stairs Score 0   Barthel Index Score 40   Modified [place] Scale   Modified [place] Scale 4   Additional Treatment Session   Start Time 1045   End Time 1100   Treatment Assessment Pt participated in tx session following IE for ADL training and to further challenge activity tolerance  Pt ambulated to/from the bathroom with rollator walker  Min assist to/from standard toilet transfer with assist for steadying while in stance to complete juma care and manage clothing up/down over hips  Pt required continued cueing for safety awareness and rollator break management during treatment session  Pt ambulates short distances before c/o of dizziness/lightheadeness and needing to return to a seated position  Once in a seated position pt reports recovery of symptoms fairly quickly (see chart below for formal orthostatic BP and tachycardic HR associated with onset of dizziness/lightheadedness)  From OT standpoint, patient would benefit from continued skilled intervention to maximize independence with ADLs and functional mobility  Goals remain appropriate, continue POC  At this time, recommending D/C to: post-acute rehabilitation    06/01/23 1110 -- -- -- 158/78 -- -- -- Lying   06/01/23 1044 -- 78 -- 180/97 Abnormal  -- -- -- Standing for 3 minutes - Orthostatic VS    Patient Position - Orthostatic VS: Melanie HELTON was notified and called to bedside at 06/01/23 1044   06/01/23 1042 -- 130 Abnormal  -- 174/114 Abnormal  -- -- -- Standing - Orthostatic VS   06/01/23 1041 -- 96 -- 160/92 -- -- -- Sitting - Orthostatic VS   06/01/23 1040 -- 63 -- 144/77 -- -- -- Lying - Orthostatic VS      End of Consult   Education Provided Yes;Family or social support of family present for education by provider   Patient Position at End of Consult Bedside chair;Bed/Chair alarm activated; All needs within reach   Nurse Communication Nurse aware of consult   Pt will complete UB ADLs Independent   for increased ADL independence within 10 days  Pt will complete LB ADLs Independent   for increased ADL independence within 10 days  Pt will complete toileting Independent   with use of DME for increased ADL independence within 10 days  Pt will demonstrate proper body mechanics to complete self-care transfers and functional mobility with Mod independent  and use of LRAD for increased safety and functional independence within 10 days  Pt will demonstrate standing tolerance of 2 min with Mod independent  and use of LRAD for increased activity tolerance during ADL/IADL tasks within 10 days  Pt will complete bed mobility Mod independent  for increased independence in repositioning, pressure offloading, and managing comfort  Pt will demonstrate proper body mechanics and fall prevention strategies during 100% of tx sessions for increased safety awareness during ADL/IADLs    Pt will demonstrate activity tolerance of 5 min in therapeutic tasks for increased participation in meaningful activities upon D/C  Pt will receive education on use of compensatory memory strategies for increased safety and independent with IADL tasks  Pt will participate in ongoing cognitive assessments to assist with safe D/C planning and supervision/assistance recommendations  Pt will verbalize and demonstrate understanding of B UE HEP program increase strength and endurance during self care transfers within 10 days  Pt benefited from co-session of skilled OT and PT therapists in order to most appropriately address functional deficits d/t regression from functioning level prior to admission  and decreased activity tolerance  OT/PT objectives were addressed separately; please see PT note for specific goal areas targeted      Santos Logan 87 OTR/L   [place] Licensure# [id number]

## 2023-06-01 NOTE — ASSESSMENT & PLAN NOTE
· History of multiple falls over the past week has had approximately 4 unwitnessed falls  · Ambulatory with the use of a rollator walker    · PT OT

## 2023-06-01 NOTE — ASSESSMENT & PLAN NOTE
· POA WBC 16 53  · Unclear etiology patient has remained afebrile denies any acute complain possibly reactive? · WBCs now 9 87

## 2023-06-01 NOTE — ASSESSMENT & PLAN NOTE
Wt Readings from Last 3 Encounters:   05/31/23 70 kg (154 lb 5 2 oz)   05/05/23 69 4 kg (153 lb)   04/11/23 66 7 kg (147 lb)     · History of HFpEF  · Last Echo July 2021 LVEF 65% G2DD  · Slight to euvolemic on exam

## 2023-06-01 NOTE — PLAN OF CARE
Problem: Neurological Deficit  Goal: Neurological status is stable or improving  Description: Interventions:  - Monitor and assess patient's level of consciousness, motor function, sensory function, and level of assistance needed for ADLs  - Monitor and report changes from baseline  Collaborate with interdisciplinary team to initiate plan and implement interventions as ordered  - Provide and maintain a safe environment  - Consider seizure precautions  - Consider fall precautions  - Consider aspiration precautions  - Consider bleeding precautions  Outcome: Progressing     Problem: Activity Intolerance/Impaired Mobility  Goal: Mobility/activity is maintained at optimum level for patient  Description: Interventions:  - Assess and monitor patient  barriers to mobility and need for assistive/adaptive devices  - Assess patient's emotional response to limitations  - Collaborate with interdisciplinary team and initiate plans and interventions as ordered  - Encourage independent activity per ability   - Maintain proper body alignment  - Perform active/passive rom as tolerated/ordered  - Plan activities to conserve energy   - Turn patient as appropriate  Outcome: Progressing     Problem: Communication Impairment  Goal: Ability to express needs and understand communication  Description: Assess patient's communication skills and ability to understand information  Patient will demonstrate use of effective communication techniques, alternative methods of communication and understanding even if not able to speak  - Encourage communication and provide alternate methods of communication as needed  - Collaborate with case management/ for discharge needs  - Include patient/family/caregiver in decisions related to communication    Outcome: Progressing     Problem: Potential for Aspiration  Goal: Non-ventilated patient's risk of aspiration is minimized  Description: Assess and monitor vital signs, respiratory status, and labs (WBC)  Monitor for signs of aspiration (tachypnea, cough, rales, wheezing, cyanosis, fever)  - Assess and monitor patient's ability to swallow  - Place patient up in chair to eat if possible  - HOB up at 90 degrees to eat if unable to get patient up into chair   - Supervise patient during oral intake  - Instruct patient/ family to take small bites  - Instruct patient/ family to take small single sips when taking liquids  - Follow patient-specific strategies generated by speech pathologist   Outcome: Progressing     Problem: Potential for Aspiration  Goal: Ventilated patient's risk of aspiration is minimized  Description: Assess and monitor vital signs, respiratory status, airway cuff pressure, and labs (WBC)  Monitor for signs of aspiration (tachypnea, cough, rales, wheezing, cyanosis, fever)  - Elevate head of bed 30 degrees if patient has tube feeding   - Monitor tube feeding    Outcome: Progressing

## 2023-06-01 NOTE — ASSESSMENT & PLAN NOTE
· History hypothyroidism  Lab Results   Component Value Date    [id number] 2 637 03/21/2023   ·   · Continue home meds levothyroxine 150 mcg Monday through Friday  · TSH

## 2023-06-01 NOTE — ASSESSMENT & PLAN NOTE
"· POA presented with the company of daughter provided majority of history after unwitnessed syncopal episode without head strike apparently landing on her buttocks  Patient endorsed to ED provider that prior to syncope head vertiginous symptoms such as room spinning  However denied any palpitation, chest pain, headedness or dizziness prior to the fact  · As per daughter over the past 2 weeks patient has had cognitive decline more fatigue with poor oral intake as well as multiple falls approximately 4 for which she has been found by independent facility staff  · CTA H/N \" No acute infarcts, no significant stenoses or occlusions, microangiopathic disease  The scattered chronic lacunar infarcts in the bilateral corona radiata  \"  · PE Upon my evaluation patient somnolent however oriented to self, not to place or situation was able to recognize daughter at bedside denied recent fall  No significant focal deficit on exam however patient intermittently closes her left eye  · Had Holter Monitor January 2022 with no pauses identified however persistent Afib with ectopic episodes of PVC    · Syncopal episode multifactorial in nature possibly due to poor oral intake versus BPPV, cardiogenic doubt acute ischemic stroke at this time    · Plan  · Stroke pathway with  · MRI brain  · Echo  · Lipid panel,  · Aspirin  · We will defer statin due to intolerance in the past  · PT OT  · Neurology consult  · Given cardiac history can consider consult cardiology however patient has refused pacemaker/ablation in the past   · Monitor on telemetry  · Neurochecks per protocol  "

## 2023-06-01 NOTE — ASSESSMENT & PLAN NOTE
· History of hypertension  · Home meds amlodipine 2 5 mg twice daily, Toprol-XL 12 5 mg once a day and  Blood Pressure: 166/86  ·   · Acceptable
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
## 2023-06-01 NOTE — ASSESSMENT & PLAN NOTE
"· POA presented with the company of daughter provided majority of history after unwitnessed syncopal episode without head strike apparently landing on her buttocks  Patient endorsed to ED provider that prior to syncope head vertiginous symptoms such as room spinning  However denied any palpitation, chest pain, headedness or dizziness prior to the fact  · As per daughter over the past 2 weeks patient has had cognitive decline more fatigue with poor oral intake as well as multiple falls approximately 4 for which she has been found by independent facility staff  · CTA H/N \" No acute infarcts, no significant stenoses or occlusions, microangiopathic disease  The scattered chronic lacunar infarcts in the bilateral corona radiata  \"  · PE Upon my evaluation patient somnolent however oriented to self, not to place or situation was able to recognize daughter at bedside denied recent fall  No significant focal deficit on exam however patient intermittently closes her left eye  · Had Holter Monitor January 2022 with no pauses identified however persistent Afib with ectopic episodes of PVC  · Syncopal episode multifactorial in nature possibly due to poor oral intake versus BPPV, cardiogenic doubt acute ischemic stroke at this time    MRI brain: No evidence of acute ischemia  Echo: showed good systolic function, LVEF 41% no diastolic dysfunction noted  Lipid panel - unremarkable, defer statin due to intolerance in the past    Plan  · Stroke pathway with  · Aspirin  · PT OT: Post acute rehabilitation recommended,  Has placement arranged and can be transported on 6/3 around noon  · PMR consulted, appreciate recommendations  · Neurology consult  · Cardiology consulted, appreciate recommendations  · Telemetry discontinued    · Neurochecks per protocol  "

## 2023-06-01 NOTE — ASSESSMENT & PLAN NOTE
· History of multiple falls over the past week has had approximately 4 unwitnessed falls  · Ambulatory with the use of a rollator walker  · Post acute rehabilitation

## 2023-06-01 NOTE — ASSESSMENT & PLAN NOTE
Wt Readings from Last 3 Encounters:   05/31/23 70 kg (154 lb 5 2 oz)   05/05/23 69 4 kg (153 lb)   04/11/23 66 7 kg (147 lb)     · History of HFpEF  · Last Echo July 2021 LVEF 65% G2DD  · Repeat echo results as above

## 2023-06-01 NOTE — ASSESSMENT & PLAN NOTE
· History hypothyroidism  Lab Results   Component Value Date    [id number] 3 628 05/31/2023     · Continue home meds levothyroxine 150 mcg Monday through Friday  · TSH

## 2023-06-02 ENCOUNTER — APPOINTMENT (INPATIENT)
Dept: VASCULAR ULTRASOUND | Facility: HOSPITAL | Age: 88
DRG: 640 | End: 2023-06-02
Payer: MEDICARE

## 2023-06-02 PROBLEM — D72.829 LEUCOCYTOSIS: Status: RESOLVED | Noted: 2021-07-11 | Resolved: 2023-06-02

## 2023-06-02 LAB
ERYTHROCYTE [DISTWIDTH] IN BLOOD BY AUTOMATED COUNT: 14.5 % (ref 11.6–15.1)
EST. AVERAGE GLUCOSE BLD GHB EST-MCNC: 117 MG/DL
FOLATE SERPL-MCNC: >22.3 NG/ML
HBA1C MFR BLD: 5.7 %
HCT VFR BLD AUTO: 37.5 % (ref 34.8–46.1)
HGB BLD-MCNC: 12.2 G/DL (ref 11.5–15.4)
MCH RBC QN AUTO: 29.6 PG (ref 26.8–34.3)
MCHC RBC AUTO-ENTMCNC: 32.5 G/DL (ref 31.4–37.4)
MCV RBC AUTO: 91 FL (ref 82–98)
PLATELET # BLD AUTO: 206 THOUSANDS/UL (ref 149–390)
PMV BLD AUTO: 10.4 FL (ref 8.9–12.7)
RBC # BLD AUTO: 4.12 MILLION/UL (ref 3.81–5.12)
VIT B12 SERPL-MCNC: 417 PG/ML (ref 180–914)
WBC # BLD AUTO: 9.87 THOUSAND/UL (ref 4.31–10.16)

## 2023-06-02 PROCEDURE — 99223 1ST HOSP IP/OBS HIGH 75: CPT | Performed by: STUDENT IN AN ORGANIZED HEALTH CARE EDUCATION/TRAINING PROGRAM

## 2023-06-02 PROCEDURE — 93970 EXTREMITY STUDY: CPT

## 2023-06-02 PROCEDURE — 97530 THERAPEUTIC ACTIVITIES: CPT

## 2023-06-02 PROCEDURE — 85027 COMPLETE CBC AUTOMATED: CPT

## 2023-06-02 PROCEDURE — 99232 SBSQ HOSP IP/OBS MODERATE 35: CPT | Performed by: INTERNAL MEDICINE

## 2023-06-02 RX ADMIN — ASPIRIN 81 MG 81 MG: 81 TABLET ORAL at 09:19

## 2023-06-02 RX ADMIN — Medication 2000 UNITS: at 09:19

## 2023-06-02 RX ADMIN — NYSTATIN: 100000 POWDER TOPICAL at 09:20

## 2023-06-02 RX ADMIN — CYANOCOBALAMIN TAB 500 MCG 1000 MCG: 500 TAB at 09:19

## 2023-06-02 RX ADMIN — AMLODIPINE BESYLATE 2.5 MG: 2.5 TABLET ORAL at 09:19

## 2023-06-02 RX ADMIN — LEVOTHYROXINE SODIUM 150 MCG: 150 TABLET ORAL at 04:47

## 2023-06-02 RX ADMIN — ENOXAPARIN SODIUM 40 MG: 40 INJECTION SUBCUTANEOUS at 09:19

## 2023-06-02 RX ADMIN — NYSTATIN: 100000 POWDER TOPICAL at 18:18

## 2023-06-02 RX ADMIN — METOPROLOL SUCCINATE 25 MG: 25 TABLET, EXTENDED RELEASE ORAL at 09:19

## 2023-06-02 RX ADMIN — ESCITALOPRAM OXALATE 5 MG: 10 TABLET ORAL at 21:24

## 2023-06-02 RX ADMIN — ACETAMINOPHEN 650 MG: 325 TABLET ORAL at 16:35

## 2023-06-02 RX ADMIN — AMLODIPINE BESYLATE 2.5 MG: 2.5 TABLET ORAL at 19:34

## 2023-06-02 RX ADMIN — FLUTICASONE PROPIONATE 1 SPRAY: 50 SPRAY, METERED NASAL at 16:48

## 2023-06-02 NOTE — PHYSICAL THERAPY NOTE
PHYSICAL THERAPY TREATMENT NOTE    Patient Name: Kelby HOYOS Date: 6/2/2023 06/02/23 1655   PT Last Visit   PT Visit Date 06/02/23   Pain Assessment   Pain Assessment Tool FLACC   Pain Location/Orientation Other (Comment)  (back, left knee/calf)   Hospital Pain Intervention(s) Repositioned; Ambulation/increased activity; Other (Comment)   Pain Rating: FLACC (Rest) - Face 1   Pain Rating: FLACC (Rest) - Legs 0   Pain Rating: FLACC (Rest) - Activity 0   Pain Rating: FLACC (Rest) - Cry 0   Pain Rating: FLACC (Rest) - Consolability 0   Score: FLACC (Rest) 1   Pain Rating: FLACC (Activity) - Face 1   Pain Rating: FLACC (Activity) - Legs 1   Pain Rating: FLACC (Activity) - Activity 1   Pain Rating: FLACC (Activity) - Cry 1   Pain Rating: FLACC (Activity) - Consolability 1   Score: FLACC (Activity) 5   Restrictions/Precautions   Other Precautions Chair Alarm; Bed Alarm;Multiple lines;O2;Fall Risk;Pain;Hard of hearing;Cognitive  (L UE limb alert, Masimo)   General   Chart Reviewed Yes   Additional Pertinent History room air resting pulse ox 86% and 81 BPM  pt was placed on 2L oxygen via nasal cannula by Mary Muñiz  resting pulse ox 95% and 72 BPM, active 92% and 108 BPM    Family/Caregiver Present Yes  (daughter present at bedside)   Cognition   Arousal/Participation Cooperative   Attention Attends with cues to redirect   Orientation Level Oriented to person; Other (Comment)  (pt was identified w/ full name, birth date)   Following Commands Follows one step commands with increased time or repetition   Subjective   Subjective pt seen supine in bed w/ daughter and Mary Muñiz present at bedside  pt denied pain at rest, but had left knee and low back pain w/ activity  input was needed for task focus  Bed Mobility   Supine to Sit 3  Moderate assistance   Additional items Assist x 1;HOB elevated; Bedrails; Increased time required;Verbal cues;LE management  (for trunk/LE positioning)   Sit to Supine 3  Moderate assistance   Additional items Assist x 1;HOB elevated; Increased time required;Verbal cues;LE management  (for trunk/LE positioning, breathing technique)   Transfers   Sit to Stand 3  Moderate assistance   Additional items Assist x 1; Increased time required;Verbal cues  (for hand placement, LE positioning)   Stand to Sit 3  Moderate assistance  (poorly controlled descent to sitting surface)   Additional items Assist x 1; Impulsive;Verbal cues  (for hand placement, safety)   Stand pivot Unable to assess   Additional Comments pt stood 20 seconds and 30 seconds x2 w/ roller walker and modx1  pt needed seated rest breaks  pt had limited ability to shift weight onto L LE  pt was unable to complete pregait activities or mobilize to bedside chair  pt currently needs dependent means for out of bed mobilization  Ambulation/Elevation   Gait pattern Not appropriate   Assistive Device Rolling walker   Balance   Static Sitting Fair -   Static Standing Poor  (w/ roller walker)   Ambulatory Zero   Activity Tolerance   Activity Tolerance Patient limited by fatigue;Patient limited by pain   Nurse Made Aware spoke to Barry De Jesus   Assessment   Problem List Decreased strength;Decreased endurance; Impaired balance;Decreased mobility; Decreased cognition;Decreased safety awareness;Pain; Impaired hearing   Assessment pt shows decline in status from previous session w/ increased level of assistance needed to mobilize and inability to ambulate or mobilize out of bed  decreased activity tolerance is likely related to pt's reports of pain, though pt needed use of supplemental oxygen to maintain saturation  pt remains at risk for falling and continued inpatient PT is needed to reduce risk and resume ambulation as appropriate  discharge recommendation is for inpatient rehab to maximize level of mobility     Goals   Patient Goals take a nap   STG Expiration Date 06/11/23 Short Term Goal #1 pt will: Increase bilateral LE strength 1/2 grade to facilitate independent mobility, Perform bed mobility modified independent to increase level of independence, Perform all transfers w/ supervision to improve independence, Ambulate 150 ft  w/ rollator w/ supervision w/o LOB to improve functional independence, Increase ambulatory balance 1 grade to decrease risk for falls, Tolerate 3 hr OOB to faciliate upright tolerance, Improve Barthel Index score to 65 or greater to facilitate independence and Complete Timed Up and Go or Comfortable Gait Speed to further assess mobility and monitor progress   PT Treatment Day 2   Plan   Treatment/Interventions Functional transfer training;LE strengthening/ROM; Therapeutic exercise; Endurance training;Cognitive reorientation;Patient/family training;Equipment eval/education; Bed mobility;Gait training   Progress Slow progress, decreased activity tolerance   PT Frequency 3-5x/wk   Recommendation   PT Discharge Recommendation Post acute rehabilitation services   Additional Comments Gerontology consult would benefit pt to address cognition and aging related issues  AM-PAC Basic Mobility Inpatient   Turning in Flat Bed Without Bedrails 2   Lying on Back to Sitting on Edge of Flat Bed Without Bedrails 2   Moving Bed to Chair 1   Standing Up From Chair Using Arms 2   Walk in Room 1   Climb 3-5 Stairs With Railing 1   Basic Mobility Inpatient Raw Score 9   Turning Head Towards Sound 4   Follow Simple Instructions 3   Low Function Basic Mobility Raw Score  16   Low Function Basic Mobility Standardized Score  25 72   Highest Level Of Mobility   -[facility] Goal 3: Sit at edge of bed   -HL Achieved 3: Sit at edge of bed   End of Consult   Patient Position at End of Consult Supine;Bed/Chair alarm activated; All needs within reach   The patient's AM-PAC Basic Mobility Inpatient Short Form Raw Score is 9    A Raw score of less than or equal to 16 suggests the patient may benefit from discharge to post-acute rehabilitation services  Please also refer to the recommendation of the Physical Therapist for safe discharge planning  Skilled inpatient PT recommended while in hospital to progress pt toward treatment goals      Kassie Lopez, PT

## 2023-06-02 NOTE — PLAN OF CARE
Problem: PHYSICAL THERAPY ADULT  Goal: Performs mobility at highest level of function for planned discharge setting  See evaluation for individualized goals  Description: Treatment/Interventions: Functional transfer training, LE strengthening/ROM, Therapeutic exercise, Endurance training, Cognitive reorientation, Patient/family training, Equipment eval/education, Bed mobility, Gait training          See flowsheet documentation for full assessment, interventions and recommendations  Outcome: Not Progressing  Note:    Problem List: Decreased strength, Decreased endurance, Impaired balance, Decreased mobility, Decreased cognition, Decreased safety awareness, Pain, Impaired hearing  Assessment: pt shows decline in status from previous session w/ increased level of assistance needed to mobilize and inability to ambulate or mobilize out of bed  decreased activity tolerance is likely related to pt's reports of pain, though pt needed use of supplemental oxygen to maintain saturation  pt remains at risk for falling and continued inpatient PT is needed to reduce risk and resume ambulation as appropriate  discharge recommendation is for inpatient rehab to maximize level of mobility  PT Discharge Recommendation: Post acute rehabilitation services    See flowsheet documentation for full assessment

## 2023-06-02 NOTE — PROGRESS NOTES
RN had patient sit on side of bed then to standing position  Pt reported feeling dizzy upon standing  Patient attempted to  Take steps forward with her walker and reported left leg pain  Pt was assisted back to bed  Upon getting patient in bed, RN noticed patient's SpO2 was 87 percent and patient was tachycardic  Patient was placed on 2L02  PT then came in to assess patient

## 2023-06-02 NOTE — CASE MANAGEMENT
COLONOSCOPY: SUTAB     Re: Padmini Velazquez   [address] [facility] [address]    Provider: Jazzmine Fan MD    Your colon must be cleaned of stool to have a good view. You should follow these directions to have a successful colonoscopy. Your exam is scheduled as an outpatient procedure on:     Day: Monday    Date: DECEMBER 4 2023    Arrival Time: 9:15 AM(You will receive a confirmation message 3 days before your appointment, if you do not receive a message or have questions, contact [phone] or visit the patient portal for details. ). Be aware your procedure time is subject to change.)     You will be receiving sedation for your procedure and MUST have an adult over 18 to drive you home and be with you after procedure. Location: [address] [place] [address]. [facility], [address] [place] [address] - Directions: Come all the way into the main lobby of the building and take the interior elevator down to the lower level. Turn left off the elevator and walk straight ahead to the Endoscopy reception area. You will need the following in order to complete your prep:                  1. SUTAB Bowel Prep QTY 24 TABLETS       2. Two Simethicone tablets (OVER THE COUNTER)       3. Two Dulcolax (Bisacodyl) tablets (OVER THE COUNTER)    Your prep kit has been sent to   0 [address], [address] [place] [id number]  Phone: [phone] Fax: [phone]  . Please  the kit today. If not picked up within 7 days contact your pharmacy directly as prescription would been placed back and re-stock. If you are diabetic: Not applicable      7 days before colonoscopy: Review your colonoscopy instructions.  (Instructions can also be found on [employer] under the letters tab under communication)  â¢ Stop eating popcorn, nuts, flax/sesame seeds, or any food that contains seeds    3 BUSINESS DAYS BEFORE your procedure:  â¢ Stop taking all Case Management Discharge Planning Note    Patient name Kelby Amador  Location S /S -31 MRN [id number]  : 1925 Date 2023       Current Admission Date: 2023  Current Admission Diagnosis:Syncope   Patient Active Problem List    Diagnosis Date Noted   • Candidal intertrigo 2023   • Cognitive decline 2023   • Chronic pain syndrome 2023   • Moderate protein-calorie malnutrition ([address] ) 2023   • T12 compression fracture ([address] ) 2023   • Primary osteoarthritis of left knee 2022   • Seasonal allergic rhinitis due to pollen 2022   • Syncope 2021   • Interstitial cystitis 2021   • Anxiety 2021   • Atrial flutter ([address] ) 2021   • Vitamin B12 deficiency 09/10/2019   • Localized edema 09/10/2019   • Stage 2 chronic kidney disease 09/10/2019   • Hiatal hernia 2019   • Chronic diastolic heart failure ([address] ) 2019   • Ambulatory dysfunction 2019   • Encounter for follow-up examination after completed treatment for malignant neoplasm 2019   • Hemorrhoids 2018   • History of left breast cancer 2018   • S/P left mastectomy 2017   • Paroxysmal atrial fibrillation ([address] ) 2017   • Osteoporosis    • Essential hypertension    • GERD (gastroesophageal reflux disease)    • Constipation 2016   • Macular degeneration 2015   • Aneurysm of ascending aorta ([address] ) 2014   • First degree AV block 03/15/2013   • Benign paroxysmal vertigo 2013   • Vitamin D deficiency 10/01/2012   • Dyslipidemia 2012   • Acquired hypothyroidism 2012   • Vertigo 2012   • H  pylori infection 2009   • Advance directive in chart 2005      LOS (days): 2  Geometric Mean LOS (GMLOS) (days): 2 30  Days to GMLOS:0 6     OBJECTIVE:  Risk of Unplanned Readmission Score: 12 59         Current admission status: Inpatient   Preferred Pharmacy:   [facility]/pharmacy #[id number]- [place]ertas - [address]  [address] [facility]  [address] [address]  Phone: [phone] Fax: [address], [address]  [address] [address]  Phone: [phone] Fax: [address] [facility]   [address] [facility] [address]  [address] [facility],[address]  Phone: [phone] Fax: [phone]    Primary Care Provider: Lakesha Kilgore MD    Primary Insurance: MEDICARE  Secondary Insurance:     DISCHARGE DETAILS:    Discharge planning discussed with[de-identified] Daughter Nitesh Steel via phone                                     Other Referral/Resources/Interventions Provided:  Interventions: SNF  Referral Comments: Per provider, pt will be medically stable for discharge tomorrow, 6/3/2023  CM spoke with daughter via phone to discuss same -- She confirmed understanding, but would like to speak with the doctor to discuss medications  Provider made aware  Treatment Team Recommendation: SNF  Discharge Destination Plan[de-identified] SNF  Transport at Discharge : [facility]  Dispatcher Contacted: Yes     Transported by [employer] and Unit #): [facility]  ETA of Transport (Date): 06/03/23  ETA of Transport (Time): 1230     Transfer Mode: Stretcher        IMM Given (Date):: 06/02/23 (Reviewed w/ daughter via phone -- Copy given at bedside )  IMM Given to[de-identified] [address] [facility] [address] Name, [address] : [address]  Receiving Facility/Agency Phone Number: [phone]  Facility/Agency Fax Number: [phone]       Provider, family, bedside RN, and facility aware of 12:30pm transport tomorrow via [facility]  No further CM needs identified  CM will remain available  anti-inflammatory medicines. These include Advil, Aleve, Naprosyn, (Tylenol is okay to take)    1 DAY BEFORE the procedure:     â¢Start a strict, clear liquid diet from the moment you wake. A clear liquid diet can include: Apple juice, white grape juice, and white cranberry juice; Beef or chicken broths that are clear - no noodles, vegetables, rice, etc.Tea and coffee without milk; -Soda pop, Gatorade, Stefano-Aid and various -Jell-O Flavors (any color except red or purple)  â¢Avoid: juices with pulp, milk, cream, solid food, alcohol, Gum, and hard candy. 6:00pm:  Open one bottle of 12 tablets. Fill the provided container with 16 ounces of water (up to the fill line). Swallow each tablet every 1 to 2 minutes with a sip of water and drink the entire amount over 20 minutes. You should finish the 12 tablets and the entire 16 ounces of water within 20 minutes  Approximately one hour after the last tablet is ingested, fill the provided container a second time with 16 ounces of water (up to the fill line) and drink the entire amount over 30 minutes. Approximately 30 minutes after finishing the second container of water, fill the provided container again with 16 ounces of water (up to the fill line) and drink the entire amount over 30 minutes. Chew one Simethicone (GasX) tablet. If you experience preparation-related symptoms (e.g. nausea, bloating, cramping), pause or slow the rate of drinking the additional water until symptoms diminish. Continue to drink clear liquids throughout the evening but do not eat any solid food. 7 hours prior to arrival time: Open one bottle of 12 tablets. Fill the provided container with 16 ounces of water (up to the fill line). Swallow each tablet every 1 to 2 minutes with a sip of water and drink the entire amount over 20 minutes.  You should finish the 12 tablets and the entire 16 ounces of water within 20 minutes  Approximately one hour after the last tablet is ingested, fill the provided container a second time with 16 ounces of water (up to the fill line) and drink the entire amount over 30 minutes. Approximately 30 minutes after finishing the second container of water, fill the provided container again with 16 ounces of water (up to the fill line) and drink the entire amount over 30 minutes. Chew one Simethicone (GasX) tablet and swallow two Dulcolax (Bisacodyl) tablets. 4 hours prior to your arrival time, STOP ALL LIQUIDS INCLUDING WATER AT THIS TIME. }    â¢ Take your medications; None, with a sip of water 4 hours prior to your arrival time. STOP ALL LIQUIDS INCLUDING WATER AT THIS TIME. If you are still having solid/formed stools or trouble completing this preparation, please call the doctor's office at [phone].

## 2023-06-02 NOTE — PLAN OF CARE
Problem: MOBILITY - ADULT  Goal: Maintain or return to baseline ADL function  Description: INTERVENTIONS:  -  Assess patient's ability to carry out ADLs; assess patient's baseline for ADL function and identify physical deficits which impact ability to perform ADLs (bathing, care of mouth/teeth, toileting, grooming, dressing, etc )  - Assess/evaluate cause of self-care deficits   - Assess range of motion  - Assess patient's mobility; develop plan if impaired  - Assess patient's need for assistive devices and provide as appropriate  - Encourage maximum independence but intervene and supervise when necessary  - Involve family in performance of ADLs  - Assess for home care needs following discharge   - Consider OT consult to assist with ADL evaluation and planning for discharge  - Provide patient education as appropriate  Outcome: Progressing  Goal: Maintains/Returns to pre admission functional level  Description: INTERVENTIONS:  - Perform BMAT or MOVE assessment daily    - Set and communicate daily mobility goal to care team and patient/family/caregiver  - Collaborate with rehabilitation services on mobility goals if consulted  - Ambulate patient 2 times a day  - Out of bed to chair 2 times a day   - Out of bed for meals 2 times a day  - Out of bed for toileting  - Record patient progress and toleration of activity level   Outcome: Progressing     Problem: Neurological Deficit  Goal: Neurological status is stable or improving  Description: Interventions:  - Monitor and assess patient's level of consciousness, motor function, sensory function, and level of assistance needed for ADLs  - Monitor and report changes from baseline  Collaborate with interdisciplinary team to initiate plan and implement interventions as ordered  - Provide and maintain a safe environment  - Consider seizure precautions  - Consider fall precautions  - Consider aspiration precautions  - Consider bleeding precautions    Outcome: Progressing     Problem: Activity Intolerance/Impaired Mobility  Goal: Mobility/activity is maintained at optimum level for patient  Description: Interventions:  - Assess and monitor patient  barriers to mobility and need for assistive/adaptive devices  - Assess patient's emotional response to limitations  - Collaborate with interdisciplinary team and initiate plans and interventions as ordered  - Encourage independent activity per ability   - Maintain proper body alignment  - Perform active/passive rom as tolerated/ordered  - Plan activities to conserve energy   - Turn patient as appropriate  Outcome: Progressing     Problem: Communication Impairment  Goal: Ability to express needs and understand communication  Description: Assess patient's communication skills and ability to understand information  Patient will demonstrate use of effective communication techniques, alternative methods of communication and understanding even if not able to speak  - Encourage communication and provide alternate methods of communication as needed  - Collaborate with case management/ for discharge needs  - Include patient/family/caregiver in decisions related to communication  Outcome: Progressing     Problem: Potential for Aspiration  Goal: Non-ventilated patient's risk of aspiration is minimized  Description: Assess and monitor vital signs, respiratory status, and labs (WBC)  Monitor for signs of aspiration (tachypnea, cough, rales, wheezing, cyanosis, fever)  - Assess and monitor patient's ability to swallow  - Place patient up in chair to eat if possible  - HOB up at 90 degrees to eat if unable to get patient up into chair   - Supervise patient during oral intake  - Instruct patient/ family to take small bites  - Instruct patient/ family to take small single sips when taking liquids    - Follow patient-specific strategies generated by speech pathologist   Outcome: Progressing  Goal: Ventilated patient's risk of aspiration is minimized  Description: Assess and monitor vital signs, respiratory status, airway cuff pressure, and labs (WBC)  Monitor for signs of aspiration (tachypnea, cough, rales, wheezing, cyanosis, fever)  - Elevate head of bed 30 degrees if patient has tube feeding   - Monitor tube feeding  Outcome: Progressing     Problem: Prexisting or High Potential for Compromised Skin Integrity  Goal: Skin integrity is maintained or improved  Description: INTERVENTIONS:  - Identify patients at risk for skin breakdown  - Assess and monitor skin integrity  - Assess and monitor nutrition and hydration status  - Monitor labs   - Assess for incontinence   - Turn and reposition patient  - Assist with mobility/ambulation  - Relieve pressure over bony prominences  - Avoid friction and shearing  - Provide appropriate hygiene as needed including keeping skin clean and dry  - Evaluate need for skin moisturizer/barrier cream  - Collaborate with interdisciplinary team   - Patient/family teaching  - Consider wound care consult   Outcome: Progressing     Problem: Nutrition/Hydration-ADULT  Goal: Nutrient/Hydration intake appropriate for improving, restoring or maintaining nutritional needs  Description: Monitor and assess patient's nutrition/hydration status for malnutrition  Collaborate with interdisciplinary team and initiate plan and interventions as ordered  Monitor patient's weight and dietary intake as ordered or per policy  Utilize nutrition screening tool and intervene as necessary  Determine patient's food preferences and provide high-protein, high-caloric foods as appropriate       INTERVENTIONS:  - Monitor oral intake, urinary output, labs, and treatment plans  - Assess nutrition and hydration status and recommend course of action  - Evaluate amount of meals eaten  - Assist patient with eating if necessary   - Allow adequate time for meals  - Recommend/ encourage appropriate diets, oral nutritional supplements, and vitamin/mineral supplements  - Order, calculate, and assess calorie counts as needed  - Recommend, monitor, and adjust tube feedings and TPN/PPN based on assessed needs  - Assess need for intravenous fluids  - Provide specific nutrition/hydration education as appropriate  - Include patient/family/caregiver in decisions related to nutrition  Outcome: Progressing

## 2023-06-02 NOTE — QUICK NOTE
Notified by pt's nurse that she has new O2 requirement of 2L due to desat to 80s% and pain in left leg  Pt reports that she normally does not have edema but has noted that her legs feel swollen  She also has been having pain in her legs with L>R    On exam, she is CTA BL, does have some LE edema that is non-pitting with R>L  She has 2L NC and is satting 95%   With new O2 requirement and leg findings, will check CT chest PE study stat and venous dopplers stat of BL LE

## 2023-06-02 NOTE — PLAN OF CARE
Problem: MOBILITY - ADULT  Goal: Maintain or return to baseline ADL function  Description: INTERVENTIONS:  -  Assess patient's ability to carry out ADLs; assess patient's baseline for ADL function and identify physical deficits which impact ability to perform ADLs (bathing, care of mouth/teeth, toileting, grooming, dressing, etc )  - Assess/evaluate cause of self-care deficits   - Assess range of motion  - Assess patient's mobility; develop plan if impaired  - Assess patient's need for assistive devices and provide as appropriate  - Encourage maximum independence but intervene and supervise when necessary  - Involve family in performance of ADLs  - Assess for home care needs following discharge   - Consider OT consult to assist with ADL evaluation and planning for discharge  - Provide patient education as appropriate  Outcome: Progressing  Goal: Maintains/Returns to pre admission functional level  Description: INTERVENTIONS:  - Perform BMAT or MOVE assessment daily    - Set and communicate daily mobility goal to care team and patient/family/caregiver  - Collaborate with rehabilitation services on mobility goals if consulted  - Perform Range of Motion  times a day  - Reposition patient every  hours  - Dangle patient  times a day  - Stand patient times a day  - Ambulate patient  times a day  - Out of bed to chair  times a day   - Out of bed for meals  times a day  - Out of bed for toileting  - Record patient progress and toleration of activity level   Outcome: Progressing     Problem: Neurological Deficit  Goal: Neurological status is stable or improving  Description: Interventions:  - Monitor and assess patient's level of consciousness, motor function, sensory function, and level of assistance needed for ADLs  - Monitor and report changes from baseline  Collaborate with interdisciplinary team to initiate plan and implement interventions as ordered  - Provide and maintain a safe environment    - Consider seizure precautions  - Consider fall precautions  - Consider aspiration precautions  - Consider bleeding precautions  Outcome: Progressing     Problem: Activity Intolerance/Impaired Mobility  Goal: Mobility/activity is maintained at optimum level for patient  Description: Interventions:  - Assess and monitor patient  barriers to mobility and need for assistive/adaptive devices  - Assess patient's emotional response to limitations  - Collaborate with interdisciplinary team and initiate plans and interventions as ordered  - Encourage independent activity per ability   - Maintain proper body alignment  - Perform active/passive rom as tolerated/ordered  - Plan activities to conserve energy   - Turn patient as appropriate  Outcome: Progressing     Problem: Communication Impairment  Goal: Ability to express needs and understand communication  Description: Assess patient's communication skills and ability to understand information  Patient will demonstrate use of effective communication techniques, alternative methods of communication and understanding even if not able to speak  - Encourage communication and provide alternate methods of communication as needed  - Collaborate with case management/ for discharge needs  - Include patient/family/caregiver in decisions related to communication  Outcome: Progressing     Problem: Potential for Aspiration  Goal: Non-ventilated patient's risk of aspiration is minimized  Description: Assess and monitor vital signs, respiratory status, and labs (WBC)  Monitor for signs of aspiration (tachypnea, cough, rales, wheezing, cyanosis, fever)  - Assess and monitor patient's ability to swallow  - Place patient up in chair to eat if possible  - HOB up at 90 degrees to eat if unable to get patient up into chair   - Supervise patient during oral intake  - Instruct patient/ family to take small bites    - Instruct patient/ family to take small single sips when taking liquids  - Follow patient-specific strategies generated by speech pathologist   Outcome: Progressing  Goal: Ventilated patient's risk of aspiration is minimized  Description: Assess and monitor vital signs, respiratory status, airway cuff pressure, and labs (WBC)  Monitor for signs of aspiration (tachypnea, cough, rales, wheezing, cyanosis, fever)  - Elevate head of bed 30 degrees if patient has tube feeding   - Monitor tube feeding  Outcome: Progressing     Problem: Prexisting or High Potential for Compromised Skin Integrity  Goal: Skin integrity is maintained or improved  Description: INTERVENTIONS:  - Identify patients at risk for skin breakdown  - Assess and monitor skin integrity  - Assess and monitor nutrition and hydration status  - Monitor labs   - Assess for incontinence   - Turn and reposition patient  - Assist with mobility/ambulation  - Relieve pressure over bony prominences  - Avoid friction and shearing  - Provide appropriate hygiene as needed including keeping skin clean and dry  - Evaluate need for skin moisturizer/barrier cream  - Collaborate with interdisciplinary team   - Patient/family teaching  - Consider wound care consult   Outcome: Progressing     Problem: Nutrition/Hydration-ADULT  Goal: Nutrient/Hydration intake appropriate for improving, restoring or maintaining nutritional needs  Description: Monitor and assess patient's nutrition/hydration status for malnutrition  Collaborate with interdisciplinary team and initiate plan and interventions as ordered  Monitor patient's weight and dietary intake as ordered or per policy  Utilize nutrition screening tool and intervene as necessary  Determine patient's food preferences and provide high-protein, high-caloric foods as appropriate       INTERVENTIONS:  - Monitor oral intake, urinary output, labs, and treatment plans  - Assess nutrition and hydration status and recommend course of action  - Evaluate amount of meals eaten  - Assist patient with eating if necessary   - Allow adequate time for meals  - Recommend/ encourage appropriate diets, oral nutritional supplements, and vitamin/mineral supplements  - Order, calculate, and assess calorie counts as needed  - Recommend, monitor, and adjust tube feedings and TPN/PPN based on assessed needs  - Assess need for intravenous fluids  - Provide specific nutrition/hydration education as appropriate  - Include patient/family/caregiver in decisions related to nutrition  Outcome: Progressing

## 2023-06-02 NOTE — QUICK NOTE
Echocardiogram reviewed that shows EF of 70% with no WMA, G2 DD, normal RV function, mild MR, mild-moderate TR  Continue to encourage oral hydration as syncope likely 6 orthostatic in nature secondary to poor oral intake  Patient continues to refuse anticoagulation  Continue current medications  No further recommendations from a cardiac standpoint  Cardiology will sign off  Please call with questions

## 2023-06-02 NOTE — PROGRESS NOTES
"[facility]  Progress Note  Name: Dru Al  MRN: [id number]  Unit/Bed#: S -01 I Date of Admission: 5/31/2023   Date of Service: 6/2/2023 I Hospital Day: 2    Assessment/Plan   * Syncope  Assessment & Plan  · POA presented with the company of daughter provided majority of history after unwitnessed syncopal episode without head strike apparently landing on her buttocks  Patient endorsed to ED provider that prior to syncope head vertiginous symptoms such as room spinning  However denied any palpitation, chest pain, headedness or dizziness prior to the fact  · As per daughter over the past 2 weeks patient has had cognitive decline more fatigue with poor oral intake as well as multiple falls approximately 4 for which she has been found by independent facility staff  · CTA H/N \" No acute infarcts, no significant stenoses or occlusions, microangiopathic disease  The scattered chronic lacunar infarcts in the bilateral corona radiata  \"  · PE Upon my evaluation patient somnolent however oriented to self, not to place or situation was able to recognize daughter at bedside denied recent fall  No significant focal deficit on exam however patient intermittently closes her left eye  · Had Holter Monitor January 2022 with no pauses identified however persistent Afib with ectopic episodes of PVC  · Syncopal episode multifactorial in nature possibly due to poor oral intake versus BPPV, cardiogenic doubt acute ischemic stroke at this time    MRI brain: No evidence of acute ischemia  Echo: showed good systolic function, LVEF 17% no diastolic dysfunction noted  Lipid panel - unremarkable, defer statin due to intolerance in the past    Plan  · Stroke pathway with  · Aspirin  · PT OT: Post acute rehabilitation recommended,  Has placement arranged and can be transported on 6/3 around noon  · PMR consulted, appreciate recommendations    · Neurology consult  · Cardiology consulted, appreciate " recommendations  · Telemetry discontinued  · Neurochecks per protocol    Cognitive decline  Assessment & Plan  · As per daughter patient has had decrease mentation and cognitive decline  for the past 2 to 3 weeks  Unable to recollect events, waking up in the middle the night thinking that they started  Changes in social interactions with family and friends in independent living facility  Most recently over the past 2 weeks has required help of an aide during the day for ADLs  · Acute mild cognitive decline possibly age-related versus metabolic     Plan  · Delirium precaution  · Discussed that the patient may need to find a home living arrangement with greater supervision after rehabilitation  Ambulatory dysfunction  Assessment & Plan  · History of multiple falls over the past week has had approximately 4 unwitnessed falls  · Ambulatory with the use of a rollator walker  · Post acute rehabilitation  Atrial flutter (HCC)  Assessment & Plan  · History of A-fib and a flutter  · [id number] 5 sticking of anticoagulation given history of multiple falls and age  · ECG Atrial flutter heart rate 62  · Continue Toprol-XL 25 milligrams once a day, increased from home 12 5 daily  Essential hypertension  Assessment & Plan  · History of hypertension  · Home meds amlodipine 2 5 mg twice daily, Toprol-XL 12 5 mg once a day and  Blood Pressure: 166/86  · Toprol all XL increased to 25 mg once daily  · Acceptable    Chronic diastolic heart failure (HCC)  Assessment & Plan  Wt Readings from Last 3 Encounters:   05/31/23 70 kg (154 lb 5 2 oz)   05/05/23 69 4 kg (153 lb)   04/11/23 66 7 kg (147 lb)     · History of HFpEF  · Last Echo July 2021 LVEF 65% G2DD  · Repeat echo results as above          Leucocytosis-resolved as of 6/2/2023  Assessment & Plan  · POA WBC 16 53  · Unclear etiology patient has remained afebrile denies any acute complain possibly reactive? · WBCs now 9 87      Candidal intertrigo  Assessment & Plan  · Skin fold erythema abdomen  · Nystatin powder    Acquired hypothyroidism  Assessment & Plan  · History hypothyroidism  Lab Results   Component Value Date    [id number] 3 628 2023     · Continue home meds levothyroxine 150 mcg Monday through Friday  · TSH             VTE Pharmacologic Prophylaxis: VTE Score: 5 High Risk (Score >/= 5) - Pharmacological DVT Prophylaxis Ordered: enoxaparin (Lovenox)  Sequential Compression Devices Ordered  Patient Centered Rounds: I performed bedside rounds with nursing staff today  Discussions with Specialists or Other Care Team Provider: Neurology    Education and Discussions with Family / Patient: Attempted to update  (daughter) via phone  Left voicemail  Current Length of Stay: 2 day(s)  Current Patient Status: Inpatient   Discharge Plan: Anticipate discharge tomorrow to rehab facility  Code Status: Level 3 - DNAR and DNI    Subjective: This morning, the patient was feeling well, and was alert and oriented to person place and time, able to correctly read the time from the clock  She denied any dizziness, headaches, chest pain, shortness of breath, abdominal pain, or problems with urination  We discussed her home situation, and that she may need closer supervision considering her memory lapses, and she seemed to comprehend and agree with this  Objective:     Vitals:   Temp (24hrs), Av 3 °F (36 8 °C), Min:98 °F (36 7 °C), Max:98 6 °F (37 °C)    Temp:  [98 °F (36 7 °C)-98 6 °F (37 °C)] 98 °F (36 7 °C)  HR:  [] 92  Resp:  [16-19] 16  BP: (148-164)/(80-97) 164/97  SpO2:  [89 %-94 %] 89 %  Body mass index is 27 91 kg/m²  Input and Output Summary (last 24 hours): Intake/Output Summary (Last 24 hours) at 2023 1301  Last data filed at 2023 0900  Gross per 24 hour   Intake 90 ml   Output 600 ml   Net -510 ml       Physical Exam:   Physical Exam  Vitals and nursing note reviewed     Constitutional:       General: She is not in acute distress  Appearance: She is well-developed  She is not toxic-appearing  HENT:      Head: Normocephalic and atraumatic  Nose: Nose normal  No rhinorrhea  Mouth/Throat:      Mouth: Mucous membranes are moist    Eyes:      General: No scleral icterus  Conjunctiva/sclera: Conjunctivae normal    Cardiovascular:      Rate and Rhythm: Normal rate and regular rhythm  Heart sounds: No murmur heard  Pulmonary:      Effort: Pulmonary effort is normal  No respiratory distress  Breath sounds: Normal breath sounds  Abdominal:      Palpations: Abdomen is soft  Tenderness: There is no abdominal tenderness  Musculoskeletal:         General: No swelling  Cervical back: Neck supple  Right lower leg: No edema  Left lower leg: No edema  Skin:     General: Skin is warm and dry  Capillary Refill: Capillary refill takes less than 2 seconds  Neurological:      Mental Status: She is alert and oriented to person, place, and time     Psychiatric:         Mood and Affect: Mood normal          Behavior: Behavior normal           Additional Data:     Labs:  Results from last 7 days   Lab Units 06/02/23  0448 06/01/23  0505   EOS PCT %  --  1   HEMATOCRIT % 37 5 41 9   HEMOGLOBIN g/dL 12 2 13 6   LYMPHS PCT %  --  26   MONOS PCT %  --  10   NEUTROS PCT %  --  63   PLATELETS Thousands/uL 206 232   WBC Thousand/uL 9 87 10 28*     Results from last 7 days   Lab Units 06/01/23  0505 05/31/23  1613   ANION GAP mmol/L 7 10   ALBUMIN g/dL  --  3 8   ALK PHOS U/L  --  62   ALT U/L  --  9   AST U/L  --  15   BUN mg/dL 19 25   CALCIUM mg/dL 8 6 8 5   CHLORIDE mmol/L 101 100   CO2 mmol/L 29 25   CREATININE mg/dL 0 65 0 73   GLUCOSE RANDOM mg/dL 108 94   POTASSIUM mmol/L 3 9 3 8   SODIUM mmol/L 137 135   TOTAL BILIRUBIN mg/dL  --  0 73             Results from last 7 days   Lab Units 06/01/23  0146   HEMOGLOBIN A1C % 5 7*           Lines/Drains:  Invasive Devices     Peripheral Intravenous Line  Duration           Peripheral IV 05/31/23 Right;Medial Forearm 1 day                      Imaging: Reviewed radiology reports from this admission including: MRI brain    Recent Cultures (last 7 days):         Last 24 Hours Medication List:   Current Facility-Administered Medications   Medication Dose Route Frequency Provider Last Rate   • acetaminophen  650 mg Oral Q6H PRN Calin Martinez MD     • amLODIPine  2 5 mg Oral BID Calin Martinez MD     • aspirin  81 mg Oral Daily Calin Martinez MD     • cholecalciferol  2,000 Units Oral Daily Calin Martinez MD     • vitamin B-12  1,000 mcg Oral Daily Calin Martinez MD     • enoxaparin  40 mg Subcutaneous Daily Calin Martinez MD     • escitalopram  5 mg Oral HS Calin Martinez MD     • famotidine  20 mg Oral Daily PRN Calin Martinez MD     • fluticasone  1 spray Nasal Daily PRN Calin Martinez MD     • glycerin-hypromellose-  2 drop Both Eyes BID Calin Martinez MD     • labetalol  10 mg Intravenous Q6H PRN Calin Martinez MD     • levothyroxine  150 mcg Oral Once per day on Mon Tue Wed Thu Fri Calin Martinez MD     • metoprolol succinate  25 mg Oral Daily Kaley Morse MD     • nystatin   Topical BID Calin Martinez MD          Today, Patient Was Seen By: Jono Yates MD    **Please Note: This note may have been constructed using a voice recognition system  **

## 2023-06-03 ENCOUNTER — APPOINTMENT (INPATIENT)
Dept: CT IMAGING | Facility: HOSPITAL | Age: 88
DRG: 640 | End: 2023-06-03
Payer: MEDICARE

## 2023-06-03 LAB
ANION GAP SERPL CALCULATED.3IONS-SCNC: 11 MMOL/L (ref 4–13)
BUN SERPL-MCNC: 14 MG/DL (ref 5–25)
CALCIUM SERPL-MCNC: 8.2 MG/DL (ref 8.4–10.2)
CHLORIDE SERPL-SCNC: 100 MMOL/L (ref 96–108)
CO2 SERPL-SCNC: 24 MMOL/L (ref 21–32)
CREAT SERPL-MCNC: 0.55 MG/DL (ref 0.6–1.3)
GFR SERPL CREATININE-BSD FRML MDRD: 78 ML/MIN/1.73SQ M
GLUCOSE SERPL-MCNC: 101 MG/DL (ref 65–140)
POTASSIUM SERPL-SCNC: 3.4 MMOL/L (ref 3.5–5.3)
SODIUM SERPL-SCNC: 135 MMOL/L (ref 135–147)

## 2023-06-03 PROCEDURE — 93970 EXTREMITY STUDY: CPT | Performed by: SURGERY

## 2023-06-03 PROCEDURE — 71275 CT ANGIOGRAPHY CHEST: CPT

## 2023-06-03 PROCEDURE — 99232 SBSQ HOSP IP/OBS MODERATE 35: CPT | Performed by: INTERNAL MEDICINE

## 2023-06-03 PROCEDURE — G1004 CDSM NDSC: HCPCS

## 2023-06-03 PROCEDURE — 80048 BASIC METABOLIC PNL TOTAL CA: CPT

## 2023-06-03 RX ORDER — LIDOCAINE 50 MG/G
1 PATCH TOPICAL DAILY
Status: DISCONTINUED | OUTPATIENT
Start: 2023-06-03 | End: 2023-06-04 | Stop reason: HOSPADM

## 2023-06-03 RX ORDER — AZITHROMYCIN 250 MG/1
500 TABLET, FILM COATED ORAL EVERY 24 HOURS
Status: DISCONTINUED | OUTPATIENT
Start: 2023-06-03 | End: 2023-06-04 | Stop reason: HOSPADM

## 2023-06-03 RX ORDER — POTASSIUM CHLORIDE 20 MEQ/1
20 TABLET, EXTENDED RELEASE ORAL ONCE
Status: COMPLETED | OUTPATIENT
Start: 2023-06-03 | End: 2023-06-03

## 2023-06-03 RX ORDER — POTASSIUM CHLORIDE 20 MEQ/1
40 TABLET, EXTENDED RELEASE ORAL ONCE
Status: COMPLETED | OUTPATIENT
Start: 2023-06-03 | End: 2023-06-03

## 2023-06-03 RX ADMIN — ENOXAPARIN SODIUM 40 MG: 40 INJECTION SUBCUTANEOUS at 08:05

## 2023-06-03 RX ADMIN — CYANOCOBALAMIN TAB 500 MCG 1000 MCG: 500 TAB at 08:05

## 2023-06-03 RX ADMIN — IOHEXOL 50 ML: 350 INJECTION, SOLUTION INTRAVENOUS at 09:22

## 2023-06-03 RX ADMIN — ASPIRIN 81 MG 81 MG: 81 TABLET ORAL at 08:05

## 2023-06-03 RX ADMIN — METOPROLOL SUCCINATE 25 MG: 25 TABLET, EXTENDED RELEASE ORAL at 08:05

## 2023-06-03 RX ADMIN — AMLODIPINE BESYLATE 2.5 MG: 2.5 TABLET ORAL at 18:07

## 2023-06-03 RX ADMIN — POTASSIUM CHLORIDE 40 MEQ: 1500 TABLET, EXTENDED RELEASE ORAL at 08:05

## 2023-06-03 RX ADMIN — NYSTATIN: 100000 POWDER TOPICAL at 18:08

## 2023-06-03 RX ADMIN — ESCITALOPRAM OXALATE 5 MG: 10 TABLET ORAL at 20:27

## 2023-06-03 RX ADMIN — NYSTATIN: 100000 POWDER TOPICAL at 08:06

## 2023-06-03 RX ADMIN — AMLODIPINE BESYLATE 2.5 MG: 2.5 TABLET ORAL at 08:05

## 2023-06-03 RX ADMIN — AZITHROMYCIN MONOHYDRATE 500 MG: 250 TABLET ORAL at 11:46

## 2023-06-03 RX ADMIN — LIDOCAINE 5% 1 PATCH: 700 PATCH TOPICAL at 11:46

## 2023-06-03 RX ADMIN — POTASSIUM CHLORIDE 20 MEQ: 1500 TABLET, EXTENDED RELEASE ORAL at 08:07

## 2023-06-03 RX ADMIN — CEFTRIAXONE SODIUM 1000 MG: 10 INJECTION, POWDER, FOR SOLUTION INTRAVENOUS at 11:48

## 2023-06-03 RX ADMIN — Medication 2000 UNITS: at 08:05

## 2023-06-03 RX ADMIN — FLUTICASONE PROPIONATE 1 SPRAY: 50 SPRAY, METERED NASAL at 08:07

## 2023-06-03 NOTE — ASSESSMENT & PLAN NOTE
· History of hypertension  · Home meds amlodipine 2 5 mg twice daily, Toprol-XL 12 5 mg once a day and  Blood Pressure: (!) 175/93  · Toprol all XL increased to 25 mg once daily  · Consider additional scheduled antihypertensive if persistent hypertensive near systolic 635Q

## 2023-06-03 NOTE — PLAN OF CARE
Problem: Neurological Deficit  Goal: Neurological status is stable or improving  Description: Interventions:  - Monitor and assess patient's level of consciousness, motor function, sensory function, and level of assistance needed for ADLs  - Monitor and report changes from baseline  Collaborate with interdisciplinary team to initiate plan and implement interventions as ordered  - Provide and maintain a safe environment  - Consider seizure precautions  - Consider fall precautions  - Consider aspiration precautions  - Consider bleeding precautions  Outcome: Progressing     Problem: Activity Intolerance/Impaired Mobility  Goal: Mobility/activity is maintained at optimum level for patient  Description: Interventions:  - Assess and monitor patient  barriers to mobility and need for assistive/adaptive devices  - Assess patient's emotional response to limitations  - Collaborate with interdisciplinary team and initiate plans and interventions as ordered  - Encourage independent activity per ability   - Maintain proper body alignment  - Perform active/passive rom as tolerated/ordered  - Plan activities to conserve energy   - Turn patient as appropriate  Outcome: Progressing     Problem: Communication Impairment  Goal: Ability to express needs and understand communication  Description: Assess patient's communication skills and ability to understand information  Patient will demonstrate use of effective communication techniques, alternative methods of communication and understanding even if not able to speak  - Encourage communication and provide alternate methods of communication as needed  - Collaborate with case management/ for discharge needs  - Include patient/family/caregiver in decisions related to communication    Outcome: Progressing     Problem: Potential for Aspiration  Goal: Non-ventilated patient's risk of aspiration is minimized  Description: Assess and monitor vital signs, respiratory status, and labs (WBC)  Monitor for signs of aspiration (tachypnea, cough, rales, wheezing, cyanosis, fever)  - Assess and monitor patient's ability to swallow  - Place patient up in chair to eat if possible  - HOB up at 90 degrees to eat if unable to get patient up into chair   - Supervise patient during oral intake  - Instruct patient/ family to take small bites  - Instruct patient/ family to take small single sips when taking liquids  - Follow patient-specific strategies generated by speech pathologist   Outcome: Progressing  Goal: Ventilated patient's risk of aspiration is minimized  Description: Assess and monitor vital signs, respiratory status, airway cuff pressure, and labs (WBC)  Monitor for signs of aspiration (tachypnea, cough, rales, wheezing, cyanosis, fever)  - Elevate head of bed 30 degrees if patient has tube feeding   - Monitor tube feeding  Outcome: Progressing     Problem: Prexisting or High Potential for Compromised Skin Integrity  Goal: Skin integrity is maintained or improved  Description: INTERVENTIONS:  - Identify patients at risk for skin breakdown  - Assess and monitor skin integrity  - Assess and monitor nutrition and hydration status  - Monitor labs   - Assess for incontinence   - Turn and reposition patient  - Assist with mobility/ambulation  - Relieve pressure over bony prominences  - Avoid friction and shearing  - Provide appropriate hygiene as needed including keeping skin clean and dry  - Evaluate need for skin moisturizer/barrier cream  - Collaborate with interdisciplinary team   - Patient/family teaching  - Consider wound care consult   Outcome: Progressing     Problem: Nutrition/Hydration-ADULT  Goal: Nutrient/Hydration intake appropriate for improving, restoring or maintaining nutritional needs  Description: Monitor and assess patient's nutrition/hydration status for malnutrition   Collaborate with interdisciplinary team and initiate plan and interventions as ordered  Monitor patient's weight and dietary intake as ordered or per policy  Utilize nutrition screening tool and intervene as necessary  Determine patient's food preferences and provide high-protein, high-caloric foods as appropriate       INTERVENTIONS:  - Monitor oral intake, urinary output, labs, and treatment plans  - Assess nutrition and hydration status and recommend course of action  - Evaluate amount of meals eaten  - Assist patient with eating if necessary   - Allow adequate time for meals  - Recommend/ encourage appropriate diets, oral nutritional supplements, and vitamin/mineral supplements  - Order, calculate, and assess calorie counts as needed  - Recommend, monitor, and adjust tube feedings and TPN/PPN based on assessed needs  - Assess need for intravenous fluids  - Provide specific nutrition/hydration education as appropriate  - Include patient/family/caregiver in decisions related to nutrition  Outcome: Progressing     Problem: CARDIOVASCULAR - ADULT  Goal: Maintains optimal cardiac output and hemodynamic stability  Description: INTERVENTIONS:  - Monitor I/O, vital signs and rhythm  - Monitor for S/S and trends of decreased cardiac output  - Administer and titrate ordered vasoactive medications to optimize hemodynamic stability  - Assess quality of pulses, skin color and temperature  - Assess for signs of decreased coronary artery perfusion  - Instruct patient to report change in severity of symptoms  Outcome: Progressing     Problem: RESPIRATORY - ADULT  Goal: Achieves optimal ventilation and oxygenation  Description: INTERVENTIONS:  - Assess for changes in respiratory status  - Assess for changes in mentation and behavior  - Position to facilitate oxygenation and minimize respiratory effort  - Oxygen administered by appropriate delivery if ordered  - Initiate smoking cessation education as indicated  - Encourage broncho-pulmonary hygiene including cough, deep breathe, Incentive Spirometry  - Assess the need for suctioning and aspirate as needed  - Assess and instruct to report SOB or any respiratory difficulty  - Respiratory Therapy support as indicated  Outcome: Progressing

## 2023-06-03 NOTE — ASSESSMENT & PLAN NOTE
"· POA presented with the company of daughter provided majority of history after unwitnessed syncopal episode without head strike apparently landing on her buttocks  Patient endorsed to ED provider that prior to syncope head vertiginous symptoms such as room spinning  However denied any palpitation, chest pain, headedness or dizziness prior to the fact  · As per daughter over the past 2 weeks patient has had cognitive decline more fatigue with poor oral intake as well as multiple falls approximately 4 for which she has been found by independent facility staff  · CTA H/N \" No acute infarcts, no significant stenoses or occlusions, microangiopathic disease  The scattered chronic lacunar infarcts in the bilateral corona radiata  \"  · PE Upon my evaluation patient somnolent however oriented to self, not to place or situation was able to recognize daughter at bedside denied recent fall  No significant focal deficit on exam however patient intermittently closes her left eye  · Had Holter Monitor January 2022 with no pauses identified however persistent Afib with ectopic episodes of PVC  · Syncopal episode multifactorial in nature possibly due to poor oral intake versus BPPV, cardiogenic doubt acute ischemic stroke at this time  Likely contributing factors include dehydration and pneumonia  MRI brain: No evidence of acute ischemia  Echo: showed good systolic function, LVEF 53% no diastolic dysfunction noted  Lipid panel - unremarkable, defer statin due to intolerance in the past    Plan  · Stroke pathway with  · Aspirin  · PT OT: Post acute rehabilitation recommended  · PMR consulted, appreciate recommendations    · Neurology consult  · Cardiology consult  · Neurochecks per protocol  "

## 2023-06-03 NOTE — ASSESSMENT & PLAN NOTE
· History of A-fib and a flutter  · [id number] 5 sticking of anticoagulation given history of multiple falls and age  · ECG Atrial flutter heart rate 62  · Continue Toprol-XL 25 milligrams once a day, increased from home 12 5 daily

## 2023-06-03 NOTE — CASE MANAGEMENT
Case Management Discharge Planning Note    Patient name Vic Dunbarn  Location S /S -11 MRN [id number]  : 1925 Date 6/3/2023       Current Admission Date: 2023  Current Admission Diagnosis:Syncope   Patient Active Problem List    Diagnosis Date Noted   • Candidal intertrigo 2023   • Cognitive decline 2023   • Chronic pain syndrome 2023   • Moderate protein-calorie malnutrition ([address] ) 2023   • T12 compression fracture ([address] ) 2023   • Primary osteoarthritis of left knee 2022   • Seasonal allergic rhinitis due to pollen 2022   • Syncope 2021   • Interstitial cystitis 2021   • Anxiety 2021   • Atrial flutter ([address] ) 2021   • Vitamin B12 deficiency 09/10/2019   • Localized edema 09/10/2019   • Stage 2 chronic kidney disease 09/10/2019   • Hiatal hernia 2019   • Chronic diastolic heart failure ([address] ) 2019   • Ambulatory dysfunction 2019   • Encounter for follow-up examination after completed treatment for malignant neoplasm 2019   • Hemorrhoids 2018   • History of left breast cancer 2018   • S/P left mastectomy 2017   • Paroxysmal atrial fibrillation ([address] ) 2017   • Osteoporosis    • Essential hypertension    • GERD (gastroesophageal reflux disease)    • Constipation 2016   • Macular degeneration 2015   • Aneurysm of ascending aorta ([address] ) 2014   • First degree AV block 03/15/2013   • Benign paroxysmal vertigo 2013   • Vitamin D deficiency 10/01/2012   • Dyslipidemia 2012   • Acquired hypothyroidism 2012   • Vertigo 2012   • H  pylori infection 2009   • Advance directive in chart 2005      LOS (days): 3  Geometric Mean LOS (GMLOS) (days): 2 30  Days to GMLOS:-0 2     OBJECTIVE:  Risk of Unplanned Readmission Score: 14 87         Current admission status: Inpatient   Preferred Pharmacy:   [facility]/pharmacy #[id number] [facility]] [address]  [address] [facility]  [address] [address]  Phone: [phone] Fax: [address] [place], [address]  [address] [address]  Phone: [phone] Fax: [address] [facility] [address]   [address] [facility] [place] [address]  [address] [facility],[address]  Phone: [phone] Fax: [phone]    Primary Care Provider: Lakesha Kilgore MD    Primary Insurance: MEDICARE  Secondary Insurance:     DISCHARGE DETAILS:    Transport at Discharge : [facility]        Transported by [employer] and Unit #): [facility]  ETA of Transport (Date): 06/04/23

## 2023-06-03 NOTE — PROGRESS NOTES
"[facility]  Progress Note  Name: Cameron Simons  MRN: [id number]  Unit/Bed#: S -01 I Date of Admission: 5/31/2023   Date of Service: 6/3/2023 I Hospital Day: 3    Assessment/Plan   * Syncope  Assessment & Plan  · POA presented with the company of daughter provided majority of history after unwitnessed syncopal episode without head strike apparently landing on her buttocks  Patient endorsed to ED provider that prior to syncope head vertiginous symptoms such as room spinning  However denied any palpitation, chest pain, headedness or dizziness prior to the fact  · As per daughter over the past 2 weeks patient has had cognitive decline more fatigue with poor oral intake as well as multiple falls approximately 4 for which she has been found by independent facility staff  · CTA H/N \" No acute infarcts, no significant stenoses or occlusions, microangiopathic disease  The scattered chronic lacunar infarcts in the bilateral corona radiata  \"  · PE Upon my evaluation patient somnolent however oriented to self, not to place or situation was able to recognize daughter at bedside denied recent fall  No significant focal deficit on exam however patient intermittently closes her left eye  · Had Holter Monitor January 2022 with no pauses identified however persistent Afib with ectopic episodes of PVC  · Syncopal episode multifactorial in nature possibly due to poor oral intake versus BPPV, cardiogenic doubt acute ischemic stroke at this time  Likely contributing factors include dehydration and pneumonia  MRI brain: No evidence of acute ischemia  Echo: showed good systolic function, LVEF 67% no diastolic dysfunction noted  Lipid panel - unremarkable, defer statin due to intolerance in the past    Plan  · Stroke pathway with  · Aspirin  · PT OT: Post acute rehabilitation recommended  · PMR consulted, appreciate recommendations    · Neurology consult  · Cardiology consult  · Neurochecks per " protocol    Pneumonia  Assessment & Plan  Patient has a history of persistent cough, however considering her persistent dizziness on standing which is outside of her baseline, and an episode of oxygen desaturation to 87% on 6/2 evening when standing, a CT PE was performed for further evaluation  Patient does not have leukocytosis at this time    On 6/3/2023, the patient had blood-streaked sputum, she reported this had been going on daily in the mornings, which was not typical of her chronic cough  CT PE :No acute pulmonary embolism  Cardiomegaly  Mild interstitial edema and small effusions  New small consolidation in the left upper lobe suspicious for pneumonia  Mild adenopathy likely reactive  Plan:  -Starting patient on ceftriaxone and azithromycin on 6/3  Can convert to cefdinir and oral azithromycin on discharge if improving tomorrow   -Follow-up CBC daily  -Monitor for fevers    Cognitive decline  Assessment & Plan  · As per daughter patient has had decrease mentation and cognitive decline  for the past 2 to 3 weeks  Unable to recollect events, waking up in the middle the night thinking that they started  Changes in social interactions with family and friends in independent living facility  Most recently over the past 2 weeks has required help of an aide during the day for ADLs  · Acute mild cognitive decline possibly age-related versus metabolic     Plan  · Delirium precaution  · Discussed that the patient may need to find a home living arrangement with greater supervision after rehabilitation  Ambulatory dysfunction  Assessment & Plan  · History of multiple falls over the past week has had approximately 4 unwitnessed falls  · Ambulatory with the use of a rollator walker  · Post acute rehabilitation      Atrial flutter (HCC)  Assessment & Plan    · History of A-fib and a flutter  · HCO0TD2-KVPe 5 sticking of anticoagulation given history of multiple falls and age  · ECG Atrial flutter heart rate 62  · Continue Toprol-XL 25 milligrams once a day, increased from home 12 5 daily  Essential hypertension  Assessment & Plan  · History of hypertension  · Home meds amlodipine 2 5 mg twice daily, Toprol-XL 12 5 mg once a day and  Blood Pressure: (!) 175/93  · Toprol all XL increased to 25 mg once daily  · Consider additional scheduled antihypertensive if persistent hypertensive near systolic 741N    Chronic diastolic heart failure (HCC)  Assessment & Plan  Wt Readings from Last 3 Encounters:   06/03/23 66 9 kg (147 lb 7 8 oz)   06/01/23 71 kg (156 lb 8 4 oz)   05/05/23 69 4 kg (153 lb)     · History of HFpEF  · Last Echo July 2021 LVEF 65% G2DD  · Repeat echo results as above    Candidal intertrigo  Assessment & Plan  · Skin fold erythema abdomen  · Nystatin powder    Acquired hypothyroidism  Assessment & Plan  · History hypothyroidism  Lab Results   Component Value Date    [id number] 3 628 05/31/2023     · Continue home meds levothyroxine 150 mcg Monday through Friday  · TSH             VTE Pharmacologic Prophylaxis: VTE Score: 5 High Risk (Score >/= 5) - Pharmacological DVT Prophylaxis Ordered: enoxaparin (Lovenox)  Sequential Compression Devices Ordered  Patient Centered Rounds: I performed bedside rounds with nursing staff today  Discussions with Specialists or Other Care Team Provider: None    Education and Discussions with Family / Patient: Updated  (Daughter, and son) at bedside  Current Length of Stay: 3 day(s)  Current Patient Status: Inpatient   Discharge Plan: Anticipate discharge in 24-48 hrs to rehab facility  Code Status: Level 3 - DNAR and DNI    Subjective:   Last night nursing reported that the patient had dizziness and weakness on standing, accompanied by hypoxia at approximately 87%, which improved with 2 L nasal cannula oxygen  This morning, the patient was not feeling dizzy or weak while lying in bed    She did report an episode of blood-streaked sputum, seen at bedside is a dime size clear/blood-streaked sputum sample  She stated that she had a similar episode each morning this past week  She denied any fevers or chills, worsening cough, abdominal pain, or changes in appetite  Objective:     Vitals:   Temp (24hrs), Av 6 °F (37 °C), Min:98 °F (36 7 °C), Max:98 9 °F (37 2 °C)    Temp:  [98 °F (36 7 °C)-98 9 °F (37 2 °C)] 98 9 °F (37 2 °C)  HR:  [79-92] 86  Resp:  [18] 18  BP: (129-175)/(73-95) 175/93  SpO2:  [91 %-97 %] 96 %  Body mass index is 27 87 kg/m²  Input and Output Summary (last 24 hours): Intake/Output Summary (Last 24 hours) at 6/3/2023 1311  Last data filed at 6/3/2023 0452  Gross per 24 hour   Intake --   Output 400 ml   Net -400 ml       Physical Exam:   Physical Exam  Vitals and nursing note reviewed  Constitutional:       General: She is not in acute distress  Appearance: She is well-developed  She is not toxic-appearing  HENT:      Head: Normocephalic and atraumatic  Nose: Nose normal  No rhinorrhea  Mouth/Throat:      Mouth: Mucous membranes are moist    Eyes:      General: No scleral icterus  Conjunctiva/sclera: Conjunctivae normal    Cardiovascular:      Rate and Rhythm: Normal rate and regular rhythm  Heart sounds: No murmur heard  Pulmonary:      Effort: Pulmonary effort is normal  No respiratory distress  Breath sounds: Normal breath sounds  Comments: Blood-streaked sputum sample approximately the size of a dime present in a tissue at bedside  Abdominal:      Palpations: Abdomen is soft  Tenderness: There is no abdominal tenderness  Musculoskeletal:         General: No swelling  Cervical back: Neck supple  Right lower leg: No edema  Left lower leg: No edema  Skin:     General: Skin is warm and dry  Capillary Refill: Capillary refill takes less than 2 seconds  Neurological:      Mental Status: She is alert and oriented to person, place, and time  Psychiatric:         Mood and Affect: Mood normal          Behavior: Behavior normal           Additional Data:     Labs:  Results from last 7 days   Lab Units 06/02/23  0448 06/01/23  0505   EOS PCT %  --  1   HEMATOCRIT % 37 5 41 9   HEMOGLOBIN g/dL 12 2 13 6   LYMPHS PCT %  --  26   MONOS PCT %  --  10   NEUTROS PCT %  --  63   PLATELETS Thousands/uL 206 232   WBC Thousand/uL 9 87 10 28*     Results from last 7 days   Lab Units 06/03/23  0451 06/01/23  0505 05/31/23  1613   ANION GAP mmol/L 11   < > 10   ALBUMIN g/dL  --   --  3 8   ALK PHOS U/L  --   --  62   ALT U/L  --   --  9   AST U/L  --   --  15   BUN mg/dL 14   < > 25   CALCIUM mg/dL 8 2*   < > 8 5   CHLORIDE mmol/L 100   < > 100   CO2 mmol/L 24   < > 25   CREATININE mg/dL 0 55*   < > 0 73   GLUCOSE RANDOM mg/dL 101   < > 94   POTASSIUM mmol/L 3 4*   < > 3 8   SODIUM mmol/L 135   < > 135   TOTAL BILIRUBIN mg/dL  --   --  0 73    < > = values in this interval not displayed               Results from last 7 days   Lab Units 06/01/23  0146   HEMOGLOBIN A1C % 5 7*           Lines/Drains:  Invasive Devices     Peripheral Intravenous Line  Duration           Peripheral IV 05/31/23 Right;Medial Forearm 2 days                      Imaging: Reviewed radiology reports from this admission including: chest CT scan    Recent Cultures (last 7 days):         Last 24 Hours Medication List:   Current Facility-Administered Medications   Medication Dose Route Frequency Provider Last Rate   • acetaminophen  650 mg Oral Q6H PRN Maxwell Serrano MD     • amLODIPine  2 5 mg Oral BID Maxwell Serrano MD     • aspirin  81 mg Oral Daily Maxwell Serrano MD     • azithromycin  500 mg Oral Q24H Nneka Matthews MD     • cefTRIAXone  1,000 mg Intravenous Q24H Nneka Matthews MD 1,000 mg (06/03/23 1148)   • cholecalciferol  2,000 Units Oral Daily Maxwell Serrano MD     • vitamin B-12  1,000 mcg Oral Daily [place] Enrique Cass Doty MD     • enoxaparin  40 mg Subcutaneous Daily Falguni Pacheco MD     • escitalopram  5 mg Oral HS Falguni Pacheco MD     • famotidine  20 mg Oral Daily PRN Falguni Pacheco MD     • fluticasone  1 spray Nasal Daily PRN Falguni Pacheco MD     • glycerin-hypromellose-  2 drop Both Eyes BID Falguni Pacheco MD     • labetalol  10 mg Intravenous Q6H PRN Falguni Pacheco MD     • levothyroxine  150 mcg Oral Once per day on Mon Tue Wed Thu Fri Falguni Pacheco MD     • lidocaine  1 patch Topical Daily Hannah Vilchis MD     • metoprolol succinate  25 mg Oral Daily Michell Martinez MD     • nystatin   Topical BID Falguni Pacheco MD          Today, Patient Was Seen By: Hannah Vilchis MD    **Please Note: This note may have been constructed using a voice recognition system  **

## 2023-06-03 NOTE — ASSESSMENT & PLAN NOTE
· History hypothyroidism  Lab Results   Component Value Date    [id number] 3 628 05/31/2023     · Continue home meds levothyroxine 150 mcg Monday through Friday  · TSH

## 2023-06-03 NOTE — ASSESSMENT & PLAN NOTE
Patient has a history of persistent cough, however considering her persistent dizziness on standing which is outside of her baseline, and an episode of oxygen desaturation to 87% on 6/2 evening when standing, a CT PE was performed for further evaluation  Patient does not have leukocytosis at this time    On 6/3/2023, the patient had blood-streaked sputum, she reported this had been going on daily in the mornings, which was not typical of her chronic cough  CT PE :No acute pulmonary embolism  Cardiomegaly  Mild interstitial edema and small effusions  New small consolidation in the left upper lobe suspicious for pneumonia  Mild adenopathy likely reactive  Plan:  -Starting patient on ceftriaxone and azithromycin on 6/3    Can convert to cefdinir and oral azithromycin on discharge if improving tomorrow   -Follow-up CBC daily  -Monitor for fevers

## 2023-06-03 NOTE — ASSESSMENT & PLAN NOTE
Wt Readings from Last 3 Encounters:   06/03/23 66 9 kg (147 lb 7 8 oz)   06/01/23 71 kg (156 lb 8 4 oz)   05/05/23 69 4 kg (153 lb)     · History of HFpEF  · Last Echo July 2021 LVEF 65% G2DD  · Repeat echo results as above

## 2023-06-04 ENCOUNTER — TELEPHONE (OUTPATIENT)
Dept: OTHER | Facility: OTHER | Age: 88
End: 2023-06-04

## 2023-06-04 VITALS
RESPIRATION RATE: 18 BRPM | SYSTOLIC BLOOD PRESSURE: 159 MMHG | WEIGHT: 148.15 LBS | TEMPERATURE: 98.2 F | OXYGEN SATURATION: 94 % | HEIGHT: 61 IN | DIASTOLIC BLOOD PRESSURE: 84 MMHG | HEART RATE: 66 BPM | BODY MASS INDEX: 27.97 KG/M2

## 2023-06-04 LAB
ANION GAP SERPL CALCULATED.3IONS-SCNC: 9 MMOL/L (ref 4–13)
BASOPHILS # BLD AUTO: 0.03 THOUSANDS/ÂΜL (ref 0–0.1)
BASOPHILS NFR BLD AUTO: 0 % (ref 0–1)
BUN SERPL-MCNC: 13 MG/DL (ref 5–25)
CALCIUM SERPL-MCNC: 8.3 MG/DL (ref 8.4–10.2)
CHLORIDE SERPL-SCNC: 101 MMOL/L (ref 96–108)
CO2 SERPL-SCNC: 25 MMOL/L (ref 21–32)
CREAT SERPL-MCNC: 0.57 MG/DL (ref 0.6–1.3)
EOSINOPHIL # BLD AUTO: 0.17 THOUSAND/ÂΜL (ref 0–0.61)
EOSINOPHIL NFR BLD AUTO: 2 % (ref 0–6)
ERYTHROCYTE [DISTWIDTH] IN BLOOD BY AUTOMATED COUNT: 13.8 % (ref 11.6–15.1)
GFR SERPL CREATININE-BSD FRML MDRD: 77 ML/MIN/1.73SQ M
GLUCOSE SERPL-MCNC: 97 MG/DL (ref 65–140)
HCT VFR BLD AUTO: 39.2 % (ref 34.8–46.1)
HGB BLD-MCNC: 13 G/DL (ref 11.5–15.4)
IMM GRANULOCYTES # BLD AUTO: 0.06 THOUSAND/UL (ref 0–0.2)
IMM GRANULOCYTES NFR BLD AUTO: 1 % (ref 0–2)
LYMPHOCYTES # BLD AUTO: 2.09 THOUSANDS/ÂΜL (ref 0.6–4.47)
LYMPHOCYTES NFR BLD AUTO: 26 % (ref 14–44)
MCH RBC QN AUTO: 30 PG (ref 26.8–34.3)
MCHC RBC AUTO-ENTMCNC: 33.2 G/DL (ref 31.4–37.4)
MCV RBC AUTO: 90 FL (ref 82–98)
MONOCYTES # BLD AUTO: 1 THOUSAND/ÂΜL (ref 0.17–1.22)
MONOCYTES NFR BLD AUTO: 13 % (ref 4–12)
NEUTROPHILS # BLD AUTO: 4.59 THOUSANDS/ÂΜL (ref 1.85–7.62)
NEUTS SEG NFR BLD AUTO: 58 % (ref 43–75)
NRBC BLD AUTO-RTO: 0 /100 WBCS
PLATELET # BLD AUTO: 236 THOUSANDS/UL (ref 149–390)
PMV BLD AUTO: 10.5 FL (ref 8.9–12.7)
POTASSIUM SERPL-SCNC: 3.5 MMOL/L (ref 3.5–5.3)
RBC # BLD AUTO: 4.34 MILLION/UL (ref 3.81–5.12)
SODIUM SERPL-SCNC: 135 MMOL/L (ref 135–147)
WBC # BLD AUTO: 7.94 THOUSAND/UL (ref 4.31–10.16)

## 2023-06-04 PROCEDURE — 99239 HOSP IP/OBS DSCHRG MGMT >30: CPT | Performed by: INTERNAL MEDICINE

## 2023-06-04 PROCEDURE — 85025 COMPLETE CBC W/AUTO DIFF WBC: CPT

## 2023-06-04 PROCEDURE — 80048 BASIC METABOLIC PNL TOTAL CA: CPT

## 2023-06-04 RX ORDER — NYSTATIN 100000 [USP'U]/G
POWDER TOPICAL 2 TIMES DAILY
Qty: 15 G | Refills: 0
Start: 2023-06-04

## 2023-06-04 RX ORDER — ASPIRIN 81 MG/1
81 TABLET, CHEWABLE ORAL DAILY
Qty: 30 TABLET | Refills: 0
Start: 2023-06-05 | End: 2023-07-05

## 2023-06-04 RX ORDER — CEFDINIR 300 MG/1
300 CAPSULE ORAL EVERY 12 HOURS SCHEDULED
Qty: 6 CAPSULE | Refills: 0
Start: 2023-06-05 | End: 2023-06-08

## 2023-06-04 RX ORDER — AZITHROMYCIN 500 MG/1
500 TABLET, FILM COATED ORAL EVERY 24 HOURS
Qty: 3 TABLET | Refills: 0
Start: 2023-06-05 | End: 2023-06-08

## 2023-06-04 RX ORDER — METOPROLOL SUCCINATE 25 MG/1
25 TABLET, EXTENDED RELEASE ORAL DAILY
Qty: 30 TABLET | Refills: 0
Start: 2023-06-04 | End: 2023-07-04

## 2023-06-04 RX ADMIN — AMLODIPINE BESYLATE 2.5 MG: 2.5 TABLET ORAL at 08:00

## 2023-06-04 RX ADMIN — NYSTATIN: 100000 POWDER TOPICAL at 08:01

## 2023-06-04 RX ADMIN — Medication 2000 UNITS: at 07:59

## 2023-06-04 RX ADMIN — CYANOCOBALAMIN TAB 500 MCG 1000 MCG: 500 TAB at 08:00

## 2023-06-04 RX ADMIN — ASPIRIN 81 MG 81 MG: 81 TABLET ORAL at 08:00

## 2023-06-04 RX ADMIN — CEFTRIAXONE SODIUM 1000 MG: 10 INJECTION, POWDER, FOR SOLUTION INTRAVENOUS at 10:47

## 2023-06-04 RX ADMIN — ENOXAPARIN SODIUM 40 MG: 40 INJECTION SUBCUTANEOUS at 08:00

## 2023-06-04 RX ADMIN — LIDOCAINE 5% 1 PATCH: 700 PATCH TOPICAL at 08:00

## 2023-06-04 RX ADMIN — AZITHROMYCIN MONOHYDRATE 500 MG: 250 TABLET ORAL at 10:47

## 2023-06-04 RX ADMIN — METOPROLOL SUCCINATE 25 MG: 25 TABLET, EXTENDED RELEASE ORAL at 07:51

## 2023-06-04 NOTE — DISCHARGE INSTR - AVS FIRST PAGE
Dear Manuel Villa,     It was our pleasure to care for you here at [facility] [employer]  It is our hope that we were always able to exceed the expected standards for your care during your stay  You were hospitalized due to syncope due to vaso-vagal   You were cared for on the 3rd  floor by Yeny Diamond MD under the service of Carmela Leon MD with the [facility] who covers for your primary care physician (PCP), Kim Roa MD, while you were hospitalized  If you have any questions or concerns related to this hospitalization, you may contact us at [phone]  For follow up as well as any medication refills, we recommend that you follow up with your primary care physician  A registered nurse will reach out to you by phone within a few days after your discharge to answer any additional questions that you may have after going home  However, at this time we provide for you here, the most important instructions / recommendations at discharge:     Notable Medication Adjustments -   Please start taking Aspirin 81 mg   Please start taking metoprolol 25 mg daily instead of 12 5  Continue nystatin power in the groin area  Testing Required after Discharge -   None  Important follow up information -   Please see your PCP as soon as possible  Please have the gerontology specialist at the facility to perform memory and cognitive evalutation  Other Instructions -   Recommended to wear stockings to prevent further dizziness with getting up from bed  Keep yourself hydrated  Please consider applying some O2 supplementation if there saturation goes low  Please review this entire after visit summary as additional general instructions including medication list, appointments, activity, diet, any pertinent wound care, and other additional recommendations from your care team that may be provided for you        Sincerely,     Yeny Diamond MD

## 2023-06-04 NOTE — PLAN OF CARE
Problem: MOBILITY - ADULT  Goal: Maintain or return to baseline ADL function  Description: INTERVENTIONS:  -  Assess patient's ability to carry out ADLs; assess patient's baseline for ADL function and identify physical deficits which impact ability to perform ADLs (bathing, care of mouth/teeth, toileting, grooming, dressing, etc )  - Assess/evaluate cause of self-care deficits   - Assess range of motion  - Assess patient's mobility; develop plan if impaired  - Assess patient's need for assistive devices and provide as appropriate  - Encourage maximum independence but intervene and supervise when necessary  - Involve family in performance of ADLs  - Assess for home care needs following discharge   - Consider OT consult to assist with ADL evaluation and planning for discharge  - Provide patient education as appropriate  Outcome: Progressing  Goal: Maintains/Returns to pre admission functional level  Description: INTERVENTIONS:  - Perform BMAT or MOVE assessment daily    - Set and communicate daily mobility goal to care team and patient/family/caregiver  - Collaborate with rehabilitation services on mobility goals if consulted  - Reposition patient every 2 hours  - Ambulate patient 2 times a day  - Out of bed to chair 2 times a day   - Out of bed for meals 2 times a day  - Out of bed for toileting  - Record patient progress and toleration of activity level   Outcome: Progressing     Problem: Neurological Deficit  Goal: Neurological status is stable or improving  Description: Interventions:  - Monitor and assess patient's level of consciousness, motor function, sensory function, and level of assistance needed for ADLs  - Monitor and report changes from baseline  Collaborate with interdisciplinary team to initiate plan and implement interventions as ordered  - Provide and maintain a safe environment  - Consider seizure precautions  - Consider fall precautions  - Consider aspiration precautions    - Consider bleeding precautions  Outcome: Progressing     Problem: Activity Intolerance/Impaired Mobility  Goal: Mobility/activity is maintained at optimum level for patient  Description: Interventions:  - Assess and monitor patient  barriers to mobility and need for assistive/adaptive devices  - Assess patient's emotional response to limitations  - Collaborate with interdisciplinary team and initiate plans and interventions as ordered  - Encourage independent activity per ability   - Maintain proper body alignment  - Perform active/passive rom as tolerated/ordered  - Plan activities to conserve energy   - Turn patient as appropriate  Outcome: Progressing     Problem: Communication Impairment  Goal: Ability to express needs and understand communication  Description: Assess patient's communication skills and ability to understand information  Patient will demonstrate use of effective communication techniques, alternative methods of communication and understanding even if not able to speak  - Encourage communication and provide alternate methods of communication as needed  - Collaborate with case management/ for discharge needs  - Include patient/family/caregiver in decisions related to communication  Outcome: Progressing     Problem: Potential for Aspiration  Goal: Non-ventilated patient's risk of aspiration is minimized  Description: Assess and monitor vital signs, respiratory status, and labs (WBC)  Monitor for signs of aspiration (tachypnea, cough, rales, wheezing, cyanosis, fever)  - Assess and monitor patient's ability to swallow  - Place patient up in chair to eat if possible  - HOB up at 90 degrees to eat if unable to get patient up into chair   - Supervise patient during oral intake  - Instruct patient/ family to take small bites  - Instruct patient/ family to take small single sips when taking liquids    - Follow patient-specific strategies generated by speech pathologist   Outcome: Progressing  Goal: Ventilated patient's risk of aspiration is minimized  Description: Assess and monitor vital signs, respiratory status, airway cuff pressure, and labs (WBC)  Monitor for signs of aspiration (tachypnea, cough, rales, wheezing, cyanosis, fever)  - Elevate head of bed 30 degrees if patient has tube feeding   - Monitor tube feeding  Outcome: Progressing     Problem: Prexisting or High Potential for Compromised Skin Integrity  Goal: Skin integrity is maintained or improved  Description: INTERVENTIONS:  - Identify patients at risk for skin breakdown  - Assess and monitor skin integrity  - Assess and monitor nutrition and hydration status  - Monitor labs   - Assess for incontinence   - Turn and reposition patient  - Assist with mobility/ambulation  - Relieve pressure over bony prominences  - Avoid friction and shearing  - Provide appropriate hygiene as needed including keeping skin clean and dry  - Evaluate need for skin moisturizer/barrier cream  - Collaborate with interdisciplinary team   - Patient/family teaching  - Consider wound care consult   Outcome: Progressing     Problem: Nutrition/Hydration-ADULT  Goal: Nutrient/Hydration intake appropriate for improving, restoring or maintaining nutritional needs  Description: Monitor and assess patient's nutrition/hydration status for malnutrition  Collaborate with interdisciplinary team and initiate plan and interventions as ordered  Monitor patient's weight and dietary intake as ordered or per policy  Utilize nutrition screening tool and intervene as necessary  Determine patient's food preferences and provide high-protein, high-caloric foods as appropriate       INTERVENTIONS:  - Monitor oral intake, urinary output, labs, and treatment plans  - Assess nutrition and hydration status and recommend course of action  - Evaluate amount of meals eaten  - Assist patient with eating if necessary   - Allow adequate time for meals  - Recommend/ encourage appropriate diets, oral nutritional supplements, and vitamin/mineral supplements  - Order, calculate, and assess calorie counts as needed  - Recommend, monitor, and adjust tube feedings and TPN/PPN based on assessed needs  - Assess need for intravenous fluids  - Provide specific nutrition/hydration education as appropriate  - Include patient/family/caregiver in decisions related to nutrition  Outcome: Progressing     Problem: CARDIOVASCULAR - ADULT  Goal: Maintains optimal cardiac output and hemodynamic stability  Description: INTERVENTIONS:  - Monitor I/O, vital signs and rhythm  - Monitor for S/S and trends of decreased cardiac output  - Administer and titrate ordered vasoactive medications to optimize hemodynamic stability  - Assess quality of pulses, skin color and temperature  - Assess for signs of decreased coronary artery perfusion  - Instruct patient to report change in severity of symptoms  Outcome: Progressing     Problem: RESPIRATORY - ADULT  Goal: Achieves optimal ventilation and oxygenation  Description: INTERVENTIONS:  - Assess for changes in respiratory status  - Assess for changes in mentation and behavior  - Position to facilitate oxygenation and minimize respiratory effort  - Oxygen administered by appropriate delivery if ordered  - Initiate smoking cessation education as indicated  - Encourage broncho-pulmonary hygiene including cough, deep breathe, Incentive Spirometry  - Assess the need for suctioning and aspirate as needed  - Assess and instruct to report SOB or any respiratory difficulty  - Respiratory Therapy support as indicated  Outcome: Progressing

## 2023-06-04 NOTE — ASSESSMENT & PLAN NOTE
· History of A-fib and a flutter  · [id number] 5 sticking of anticoagulation given history of multiple falls and age  · ECG Atrial flutter heart rate 62  · Continue Toprol-XL 25 milligrams once a day, increased from home 12 5 daily

## 2023-06-04 NOTE — DISCHARGE SUMMARY
"[facility]  Discharge- Peoa LobRUST 7/17/1925, 80 y o  female MRN: [id number]  Unit/Bed#: S -01 Encounter: [id number]  Primary Care Provider: Valdez Carrero MD   Date and time admitted to hospital: 5/31/2023  3:34 PM    * Syncope  Assessment & Plan  · POA presented with the company of daughter provided majority of history after unwitnessed syncopal episode without head strike apparently landing on her buttocks  Patient endorsed to ED provider that prior to syncope head vertiginous symptoms such as room spinning  However denied any palpitation, chest pain, headedness or dizziness prior to the fact      · CTA H/N \" No acute infarcts, no significant stenoses or occlusions, microangiopathic disease  The scattered chronic lacunar infarcts in the bilateral corona radiata  \"  · PE Upon my evaluation patient somnolent however oriented to self, not to place or situation was able to recognize daughter at bedside denied recent fall  No significant focal deficit on exam however patient intermittently closes her left eye  · Had Holter Monitor January 2022 with no pauses identified however persistent Afib with ectopic episodes of PVC  · Syncopal episode multifactorial in nature possibly due to poor oral intake versus BPPV, cardiogenic doubt acute ischemic stroke at this time  Likely contributing factors include dehydration and pneumonia  MRI brain: No evidence of acute ischemia  Echo: showed good systolic function, LVEF 50% no diastolic dysfunction noted  Lipid panel - unremarkable, defer statin due to intolerance in the past    Plan  · Stroke pathway with  · Aspirin  · PT OT: Post acute rehabilitation recommended  · PMR consulted, appreciate recommendations no recommendation for acute rehab    · Neurology consult follow-up with the neurology to assess cognitive and mental status  · Cardiology consult: no postdischarge follow-up      Candidal intertrigo  Assessment & Plan  · Skin fold " erythema abdomen  · Nystatin powder    Cognitive decline  Assessment & Plan  · As per daughter patient has had decrease mentation and cognitive decline  for the past 2 to 3 weeks  Unable to recollect events, waking up in the middle the night thinking that they started  Changes in social interactions with family and friends in independent living facility  Most recently over the past 2 weeks has required help of an aide during the day for ADLs  · Acute mild cognitive decline possibly age-related versus metabolic     Plan  · Delirium precaution  · Discussed that the patient may need to find a home living arrangement with greater supervision after rehabilitation  · Recommended gerontology assessment or neurology follow up      Atrial flutter ([address] )  Assessment & Plan    · History of A-fib and a flutter  · [id number] 5 sticking of anticoagulation given history of multiple falls and age  · ECG Atrial flutter heart rate 62  · Continue Toprol-XL 25 milligrams once a day, increased from home 12 5 daily  Chronic diastolic heart failure (HCC)  Assessment & Plan  Wt Readings from Last 3 Encounters:   06/03/23 66 9 kg (147 lb 7 8 oz)   06/01/23 71 kg (156 lb 8 4 oz)   05/05/23 69 4 kg (153 lb)     · History of HFpEF  · Last Echo July 2021 LVEF 65% G2DD  · Repeat echo results as above    Ambulatory dysfunction  Assessment & Plan  · History of multiple falls over the past week has had approximately 4 unwitnessed falls  · Ambulatory with the use of a rollator walker  · Post acute rehabilitation  Pneumonia  Assessment & Plan  Patient has a history of persistent cough, however considering her persistent dizziness on standing which is outside of her baseline, and an episode of oxygen desaturation to 87% on 6/2 evening when standing, a CT PE was performed for further evaluation      Patient does not have leukocytosis at this time    On 6/3/2023, the patient had blood-streaked sputum, she reported this had been going on daily in the mornings, which was not typical of her chronic cough  CT PE :No acute pulmonary embolism  Cardiomegaly  Mild interstitial edema and small effusions  New small consolidation in the left upper lobe suspicious for pneumonia  Mild adenopathy likely reactive  As of today 6/4/2023 patient on room air tolerating above 90%  Denies shortness of breath, cough  Plan:  -Continue ceftriaxone and azithromycin day 2/5 Will convert to cefdinir and oral azithromycin on discharge if improving tomorrow for 5-day course    -Follow-up CBC daily  -Monitor for fevers    Acquired hypothyroidism  Assessment & Plan  · History hypothyroidism  Lab Results   Component Value Date    [id number] 3 628 05/31/2023     · Continue home meds levothyroxine 150 mcg Monday through Friday  · TSH    Essential hypertension  Assessment & Plan  · History of hypertension  · Home meds amlodipine 2 5 mg twice daily, Toprol-XL 12 5 mg once a day and  Blood Pressure: 163/91  · Toprol all XL increased to 25 mg once daily  · Consider additional scheduled antihypertensive if persistent hypertensive near systolic 750B      Medical Problems     Resolved Problems  Date Reviewed: 6/1/2023          Resolved    Leucocytosis 6/2/2023     Resolved by  Eduard Beltran MD        Discharging Resident: Ham Cardenas MD  Discharging Attending: Chris Elizondo MD  PCP: Barbi Garcia MD  Admission Date:   Admission Orders (From admission, onward)     Ordered        05/31/23 2139  INPATIENT ADMISSION  Once                      Discharge Date: 06/04/23    Consultations During Hospital Stay:  · Cardiology, neurology, PMR, PT/OT    Procedures Performed:   · None    Significant Findings / Test Results:   Results Reviewed     Procedure Component Value Units Date/Time    Hemoglobin A1c w/EAG Estimation [[id number]]  (Abnormal) Collected: 06/01/23 0146    Lab Status: Final result Specimen: Blood from Arm, Left Updated: 06/02/23 6954 Hemoglobin A1C 5 7 %       mg/dl     Folate [[id number]]  (Normal) Collected: 05/31/23 1613    Lab Status: Final result Specimen: Blood from Arm, Right Updated: 06/02/23 0209     Folate >22 3 ng/mL     Vitamin B12 [[id number]]  (Normal) Collected: 05/31/23 1613    Lab Status: Final result Specimen: Blood from Arm, Right Updated: 06/02/23 0209     Vitamin B-12 417 pg/mL     Lipid Panel with Direct LDL reflex [[id number]]  (Abnormal) Collected: 05/31/23 1613    Lab Status: Final result Specimen: Blood from Arm, Right Updated: 06/01/23 0424     Cholesterol 140 mg/dL      Triglycerides 99 mg/dL      HDL, Direct 38 mg/dL      LDL Calculated 82 mg/dL     Platelet count [[id number]]  (Normal) Collected: 06/01/23 0146    Lab Status: Final result Specimen: Blood from Arm, Left Updated: 06/01/23 0200     Platelets 645 Thousands/uL      MPV 10 0 fL     TSH, 3rd generation with Free T4 reflex [[id number]]  (Normal) Collected: 05/31/23 1613    Lab Status: Final result Specimen: Blood from Arm, Right Updated: 06/01/23 0027     TSH 3RD GENERATON 3 628 uIU/mL     B-Type Natriuretic Peptide(BNP) [[id number]]  (Abnormal) Collected: 05/31/23 1613    Lab Status: Final result Specimen: Blood from Arm, Right Updated: 05/31/23 2326      pg/mL     HS Troponin I 2hr [[id number]]  (Normal) Collected: 05/31/23 1856    Lab Status: Final result Specimen: Blood from Arm, Right Updated: 05/31/23 1938     hs TnI 2hr 9 ng/L      Delta 2hr hsTnI -2 ng/L     HS Troponin 0hr (reflex protocol) [[id number]]  (Normal) Collected: 05/31/23 1613    Lab Status: Final result Specimen: Blood from Arm, Right Updated: 05/31/23 1659     hs TnI 0hr 11 ng/L     Comprehensive metabolic panel [[id number]] Collected: 05/31/23 1613    Lab Status: Final result Specimen: Blood from Arm, Right Updated: 05/31/23 1653     Sodium 135 mmol/L      Potassium 3 8 mmol/L      Chloride 100 mmol/L      CO2 25 mmol/L      ANION GAP 10 mmol/L      BUN 25 mg/dL      Creatinine 0 73 mg/dL      Glucose 94 mg/dL      Calcium 8 5 mg/dL      AST 15 U/L      ALT 9 U/L      Alkaline Phosphatase 62 U/L      Total Protein 7 2 g/dL      Albumin 3 8 g/dL      Total Bilirubin 0 73 mg/dL      eGFR 69 ml/min/1 73sq m     Narrative:      [place] guidelines for Chronic Kidney Disease (CKD):   •  Stage 1 with normal or high GFR (GFR > 90 mL/min/1 73 square meters)  •  Stage 2 Mild CKD (GFR = 60-89 mL/min/1 73 square meters)  •  Stage 3A Moderate CKD (GFR = 45-59 mL/min/1 73 square meters)  •  Stage 3B Moderate CKD (GFR = 30-44 mL/min/1 73 square meters)  •  Stage 4 Severe CKD (GFR = 15-29 mL/min/1 73 square meters)  •  Stage 5 End Stage CKD (GFR <15 mL/min/1 73 square meters)  Note: GFR calculation is accurate only with a steady state creatinine    Magnesium [[id number]]  (Abnormal) Collected: 05/31/23 1613    Lab Status: Final result Specimen: Blood from Arm, Right Updated: 05/31/23 1653     Magnesium 1 7 mg/dL     CBC and differential [[id number]]  (Abnormal) Collected: 05/31/23 1613    Lab Status: Final result Specimen: Blood from Arm, Right Updated: 05/31/23 1631     WBC 16 43 Thousand/uL      RBC 4 31 Million/uL      Hemoglobin 12 9 g/dL      Hematocrit 39 3 %      MCV 91 fL      MCH 29 9 pg      MCHC 32 8 g/dL      RDW 14 8 %      MPV 11 6 fL      Platelets 012 Thousands/uL      nRBC 0 /100 WBCs      Neutrophils Relative 66 %      Immat GRANS % 1 %      Lymphocytes Relative 23 %      Monocytes Relative 10 %      Eosinophils Relative 0 %      Basophils Relative 0 %      Neutrophils Absolute 10 93 Thousands/µL      Immature Grans Absolute 0 09 Thousand/uL      Lymphocytes Absolute 3 69 Thousands/µL      Monocytes Absolute 1 62 Thousand/µL      Eosinophils Absolute 0 06 Thousand/µL      Basophils Absolute 0 04 Thousands/µL         CTA chest pe study   Final Result by LINDA Mantilla MD (06/03 1011)      No acute pulmonary embolism  Cardiomegaly   Mild interstitial edema and small effusions  New small consolidation in the left upper lobe suspicious for pneumonia  Mild adenopathy likely reactive  Workstation performed: [id number]         VAS lower limb venous duplex study, complete bilateral   Final Result by Barry Titus MD (06/03 1804)      MRI brain wo contrast   Final Result by Asad Nguyen MD (06/01 2339)      No evidence of acute ischemia  Chronic microangiopathy and old lacunar infarcts  Workstation performed: [id number]         CTA head and neck with and without contrast   Final Result by Timoteo Ohara MD (05/31 2009)         1  No evidence of acute infarct, intracranial hemorrhage or mass  2  No hemodynamically significant stenosis, dissection or occlusion of the carotid or vertebral arteries  Workstation performed: [id number]         CT recon only cervical spine (No Charge)   Final Result by Libby Barger DO (06/01 0201)      No fracture or traumatic subluxation  Workstation performed: [id number]         CT spine lumbar without contrast   Final Result by Geeta Tipton MD (05/31 2027)      Progressive loss of vertebral body height at the T12 vertebral body following prior compression fracture noted on 3/20/2023  Similar degree of retropulsion of the posterior cortex  Other findings are stable throughout the lumbar spine including mild compression deformity at the L2 vertebral body  Mild multilevel degenerative disc disease as detailed above  Workstation performed: [id number]         XR chest 2 views   Final Result by Tao Bang MD (06/01 1105)      Mild left perihilar opacity, not present previously  This could represent pulmonary contusion given the history of fall  Pneumonia is not excluded  The study was marked in [facility] for immediate notification              Workstation performed: [id number]         XR knee 4+ views left injury   Final Result by Tao Bang MD (06/01 1101)      No acute osseous abnormality  Degenerative changes as described  Workstation performed: [id number]         XR hip/pelv 2-3 vws right   Final Result by Yudi Lynn MD (06/01 1100)      No acute osseous abnormality  Workstation performed: [id number]             Incidental Findings:   · None  ·     Test Results Pending at Discharge (will require follow up): · None     Outpatient Tests Requested:  · None    Complications:  None    Reason for Admission: Syncope    Hospital Course:   Brabra Cuello is a 80 y o  female patient who originally presented to the hospital on 5/31/2023 due to syncopal episode without head strike and dizziness  Besides syncopal work up was also initiated stroke pathway due to concern for stroke  CTA and MRI head ruled out acute changes  PAtient was assessed by neurology and consider likely vaso-vagal  Patient was assessed by PMR and is not a candidate for acute rehab  Cardiology was also consulted due to recurrent history of syncope and Afib as well as transient second degree AV block  (hx of holter monitoring in 2022 )  Per cardiology the syncope most likely related to orthostatic/gal-vagal, however patient not interested in [facility] and pacemaker if indicated  Metoprolol increased to 25 mg from 12 5 which was her home dose  Patient had also an episode of desaturation requiring 2 L O2  Was ordered CT PE study that ruled out acute PE  Was noted a new consolidation in the left upper lobe suspicion for pneumonia  Per family the patient had some episodes of hemophthisis and considering the persistent dizziness and the rest of the symptoms was decided to treat the pneumonia with ceftriaxone IV and azithromycin oral   Upon discharge was transitioned to cefdinir and oral azithromycin to continue for a total of 5-day course of antibiotic  Patient was assessed by PT/OT and was recommeded post acute rehab   Patient  with improvement in clinical status and stable for "discharge           Please see above list of diagnoses and related plan for additional information  Condition at Discharge: good    Discharge Day Visit / Exam:   Subjective: Patient lying in bed comfortable  Denied any acute overnight events denies chest pain, shortness of breath, cough fever, chills  Vitals: Blood Pressure: 159/84 (06/04/23 0916)  Pulse: 66 (06/04/23 0916)  Temperature: 98 2 °F (36 8 °C) (06/04/23 0839)  Temp Source: Oral (06/04/23 0839)  Respirations: 18 (06/04/23 0839)  Height: 5' 1\" (154 9 cm) (06/01/23 0940)  Weight - Scale: 67 2 kg (148 lb 2 4 oz) (06/04/23 0600)  SpO2: 94 % (06/04/23 0916)  Exam:   Physical Exam  Vitals and nursing note reviewed  Constitutional:       General: She is not in acute distress  Appearance: She is well-developed  She is not ill-appearing  HENT:      Head: Normocephalic and atraumatic  Eyes:      Conjunctiva/sclera: Conjunctivae normal    Cardiovascular:      Rate and Rhythm: Normal rate and regular rhythm  Heart sounds: Normal heart sounds  No murmur heard  Pulmonary:      Effort: Pulmonary effort is normal  No respiratory distress  Breath sounds: Normal breath sounds  Abdominal:      Palpations: Abdomen is soft  Tenderness: There is no abdominal tenderness  Musculoskeletal:         General: No swelling  Cervical back: Neck supple  Right lower leg: No edema  Left lower leg: No edema  Skin:     General: Skin is warm and dry  Capillary Refill: Capillary refill takes less than 2 seconds  Neurological:      General: No focal deficit present  Mental Status: She is alert and oriented to person, place, and time  Mental status is at baseline  Psychiatric:         Mood and Affect: Mood normal           Discussion with Family: Updated  (daughter) via phone  Discharge instructions/Information to patient and family:   See after visit summary for information provided to patient and family    " Provisions for Follow-Up Care:  See after visit summary for information related to follow-up care and any pertinent home health orders  Disposition:   Other [place] [facility] Readmission: None    Discharge Medications:  See after visit summary for reconciled discharge medications provided to patient and/or family        **Please Note: This note may have been constructed using a voice recognition system**

## 2023-06-04 NOTE — ASSESSMENT & PLAN NOTE
Patient has a history of persistent cough, however considering her persistent dizziness on standing which is outside of her baseline, and an episode of oxygen desaturation to 87% on 6/2 evening when standing, a CT PE was performed for further evaluation  Patient does not have leukocytosis at this time    On 6/3/2023, the patient had blood-streaked sputum, she reported this had been going on daily in the mornings, which was not typical of her chronic cough  CT PE :No acute pulmonary embolism  Cardiomegaly  Mild interstitial edema and small effusions  New small consolidation in the left upper lobe suspicious for pneumonia  Mild adenopathy likely reactive  As of today 6/4/2023 patient on room air tolerating above 90%  Denies shortness of breath, cough  Plan:  -Continue ceftriaxone and azithromycin day 2/5 Will convert to cefdinir and oral azithromycin on discharge if improving tomorrow for 5-day course    -Follow-up CBC daily  -Monitor for fevers

## 2023-06-04 NOTE — ASSESSMENT & PLAN NOTE
· As per daughter patient has had decrease mentation and cognitive decline  for the past 2 to 3 weeks  Unable to recollect events, waking up in the middle the night thinking that they started  Changes in social interactions with family and friends in independent living facility  Most recently over the past 2 weeks has required help of an aide during the day for ADLs  · Acute mild cognitive decline possibly age-related versus metabolic     Plan  · Delirium precaution  · Discussed that the patient may need to find a home living arrangement with greater supervision after rehabilitation    · Recommended gerontology assessment or neurology follow up

## 2023-06-04 NOTE — CASE MANAGEMENT
Case Management Discharge Planning Note    Patient name Alanna Bui  Location S /S -96 MRN [id number]  : 1925 Date 2023       Current Admission Date: 2023  Current Admission Diagnosis:Syncope   Patient Active Problem List    Diagnosis Date Noted   • Candidal intertrigo 2023   • Cognitive decline 2023   • Chronic pain syndrome 2023   • Moderate protein-calorie malnutrition ([address] ) 2023   • T12 compression fracture ([address] ) 2023   • Primary osteoarthritis of left knee 2022   • Seasonal allergic rhinitis due to pollen 2022   • Syncope 2021   • Interstitial cystitis 2021   • Anxiety 2021   • Atrial flutter ([address] ) 2021   • Vitamin B12 deficiency 09/10/2019   • Localized edema 09/10/2019   • Stage 2 chronic kidney disease 09/10/2019   • Hiatal hernia 2019   • Chronic diastolic heart failure ([address] ) 2019   • Ambulatory dysfunction 2019   • Encounter for follow-up examination after completed treatment for malignant neoplasm 2019   • Hemorrhoids 2018   • History of left breast cancer 2018   • Pneumonia 10/13/2017   • S/P left mastectomy 2017   • Paroxysmal atrial fibrillation ([address] ) 2017   • Osteoporosis    • Essential hypertension    • GERD (gastroesophageal reflux disease)    • Constipation 2016   • Macular degeneration 2015   • Aneurysm of ascending aorta ([address] ) 2014   • First degree AV block 03/15/2013   • Benign paroxysmal vertigo 2013   • Vitamin D deficiency 10/01/2012   • Dyslipidemia 2012   • Acquired hypothyroidism 2012   • Vertigo 2012   • H  pylori infection 2009   • Advance directive in chart 2005      LOS (days): 4  Geometric Mean LOS (GMLOS) (days): 2 30  Days to GMLOS:-1 2     OBJECTIVE:  Risk of Unplanned Readmission Score: 15 75         Current admission status: Inpatient   Preferred Pharmacy:   [facility]/pharmacy #[id number] - [address] [facility] [address]  [address]  [address] [address]  Phone: [phone] Fax: [address] - Ernesto Morin, [address]  [address] [place] [address]  Phone: [phone] Fax: [phone] [facility] [address]   [address] [facility] [place] [address]  [address] [facility],[address]  Phone: [phone] Fax: [phone]    Primary Care Provider: Lakesha Kilgore MD    Primary Insurance: MEDICARE  Secondary Insurance:     DISCHARGE DETAILS:    Discharge planning discussed with[de-identified] Patient's daughter Consuelo Villatoro  Freedom of Choice: Yes  Comments - Freedom of Choice: Choice is for placement @  CM contacted family/caregiver?: Yes  Were Treatment Team discharge recommendations reviewed with patient/caregiver?: Yes  Did patient/caregiver verbalize understanding of patient care needs?: N/A- going to facility  Were patient/caregiver advised of the risks associated with not following Treatment Team discharge recommendations?: Yes    Contacts  Patient Contacts: Bowen Rangel  Relationship to Patient[de-identified] Family  Contact Method: Phone  Phone Number: [phone]  Reason/Outcome: Emergency Contact, Discharge [address]         Is the patient interested in [address] at discharge?: No    DME Referral Provided  Referral made for DME?: No    Would you like to participate in our [facility] service program?  : No - Declined    Treatment Team Recommendation: Short Term Rehab, SNF  Discharge Destination Plan[de-identified] Short Term Rehab, SNF ([facility])  Transport at Discharge : [facility]  Dispatcher Contacted: Yes  Number/Name of Dispatcher: ROUNDTRIP  Transported by [employer] and Unit #): [facility]  ETA of Transport (Date): 06/04/23  ETA of Transport (Time): [facility] Name, [facility] ED & State : [place], [place]  Receiving Facility/Agency Phone Number: [phone]  Facility/Agency Fax Number: Danis Rowe Number: [email or web address]

## 2023-06-04 NOTE — ASSESSMENT & PLAN NOTE
· History hypothyroidism  Lab Results   Component Value Date    [id number] 3 628 05/31/2023     · Continue home meds levothyroxine 150 mcg Monday through Friday  · TSH

## 2023-06-04 NOTE — ASSESSMENT & PLAN NOTE
· History of hypertension  · Home meds amlodipine 2 5 mg twice daily, Toprol-XL 12 5 mg once a day and  Blood Pressure: 163/91  · Toprol all XL increased to 25 mg once daily  · Consider additional scheduled antihypertensive if persistent hypertensive near systolic 408G

## 2023-06-04 NOTE — ASSESSMENT & PLAN NOTE
"· POA presented with the company of daughter provided majority of history after unwitnessed syncopal episode without head strike apparently landing on her buttocks  Patient endorsed to ED provider that prior to syncope head vertiginous symptoms such as room spinning  However denied any palpitation, chest pain, headedness or dizziness prior to the fact      · CTA H/N \" No acute infarcts, no significant stenoses or occlusions, microangiopathic disease  The scattered chronic lacunar infarcts in the bilateral corona radiata  \"  · PE Upon my evaluation patient somnolent however oriented to self, not to place or situation was able to recognize daughter at bedside denied recent fall  No significant focal deficit on exam however patient intermittently closes her left eye  · Had Holter Monitor January 2022 with no pauses identified however persistent Afib with ectopic episodes of PVC  · Syncopal episode multifactorial in nature possibly due to poor oral intake versus BPPV, cardiogenic doubt acute ischemic stroke at this time  Likely contributing factors include dehydration and pneumonia  MRI brain: No evidence of acute ischemia  Echo: showed good systolic function, LVEF 69% no diastolic dysfunction noted  Lipid panel - unremarkable, defer statin due to intolerance in the past    Plan  · Stroke pathway with  · Aspirin  · PT OT: Post acute rehabilitation recommended  · PMR consulted, appreciate recommendations no recommendation for acute rehab    · Neurology consult follow-up with the neurology to assess cognitive and mental status  · Cardiology consult: no postdischarge follow-up    "

## 2023-06-05 ENCOUNTER — PATIENT OUTREACH (OUTPATIENT)
Dept: CASE MANAGEMENT | Facility: OTHER | Age: 88
End: 2023-06-05

## 2023-06-05 ENCOUNTER — NURSING HOME VISIT (OUTPATIENT)
Dept: GERIATRICS | Facility: OTHER | Age: 88
End: 2023-06-05
Payer: MEDICARE

## 2023-06-05 VITALS
HEART RATE: 76 BPM | DIASTOLIC BLOOD PRESSURE: 88 MMHG | WEIGHT: 148.4 LBS | SYSTOLIC BLOOD PRESSURE: 128 MMHG | TEMPERATURE: 97.5 F | OXYGEN SATURATION: 95 % | RESPIRATION RATE: 18 BRPM | BODY MASS INDEX: 28.04 KG/M2

## 2023-06-05 DIAGNOSIS — I48.19 PERSISTENT ATRIAL FIBRILLATION (HCC): ICD-10-CM

## 2023-06-05 DIAGNOSIS — E03.9 ACQUIRED HYPOTHYROIDISM: ICD-10-CM

## 2023-06-05 DIAGNOSIS — B37.2 CANDIDAL INTERTRIGO: ICD-10-CM

## 2023-06-05 DIAGNOSIS — J18.9 PNEUMONIA OF LEFT LOWER LOBE DUE TO INFECTIOUS ORGANISM: ICD-10-CM

## 2023-06-05 DIAGNOSIS — R55 SYNCOPE, UNSPECIFIED SYNCOPE TYPE: Primary | ICD-10-CM

## 2023-06-05 DIAGNOSIS — I50.32 CHRONIC DIASTOLIC HEART FAILURE (HCC): Chronic | ICD-10-CM

## 2023-06-05 DIAGNOSIS — I10 ESSENTIAL HYPERTENSION: ICD-10-CM

## 2023-06-05 DIAGNOSIS — M17.12 PRIMARY OSTEOARTHRITIS OF LEFT KNEE: ICD-10-CM

## 2023-06-05 PROCEDURE — 99306 1ST NF CARE HIGH MDM 50: CPT | Performed by: FAMILY MEDICINE

## 2023-06-05 NOTE — PROGRESS NOTES
Outpatient Care Management [facility]/SNF Pathway  Discharged 6/4/2023 to [address] [place] [address]  Email sent to facility to inform them the patient is on the CARLOS Pathway and I will be following them during their skilled stay  This Admin Coordinator will continue to monitor via chart review
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
## 2023-06-07 ENCOUNTER — NURSING HOME VISIT (OUTPATIENT)
Dept: GERIATRICS | Facility: OTHER | Age: 88
End: 2023-06-07
Payer: MEDICARE

## 2023-06-07 DIAGNOSIS — J18.9 PNEUMONIA OF LEFT LOWER LOBE DUE TO INFECTIOUS ORGANISM: ICD-10-CM

## 2023-06-07 DIAGNOSIS — R29.6 UNWITNESSED FALL: Primary | ICD-10-CM

## 2023-06-07 DIAGNOSIS — E03.9 ACQUIRED HYPOTHYROIDISM: ICD-10-CM

## 2023-06-07 DIAGNOSIS — I50.32 CHRONIC DIASTOLIC HEART FAILURE (HCC): Chronic | ICD-10-CM

## 2023-06-07 DIAGNOSIS — I48.92 ATRIAL FLUTTER, UNSPECIFIED TYPE (HCC): ICD-10-CM

## 2023-06-07 DIAGNOSIS — R55 SYNCOPE, UNSPECIFIED SYNCOPE TYPE: ICD-10-CM

## 2023-06-07 PROCEDURE — 99309 SBSQ NF CARE MODERATE MDM 30: CPT | Performed by: NURSE PRACTITIONER
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
## 2023-06-08 ENCOUNTER — PATIENT OUTREACH (OUTPATIENT)
Dept: CASE MANAGEMENT | Facility: OTHER | Age: 88
End: 2023-06-08

## 2023-06-10 ENCOUNTER — TELEPHONE (OUTPATIENT)
Dept: OTHER | Facility: OTHER | Age: 88
End: 2023-06-10

## 2023-06-12 ENCOUNTER — NURSING HOME VISIT (OUTPATIENT)
Dept: GERIATRICS | Facility: OTHER | Age: 88
End: 2023-06-12
Payer: MEDICARE

## 2023-06-12 VITALS
RESPIRATION RATE: 18 BRPM | TEMPERATURE: 97.9 F | DIASTOLIC BLOOD PRESSURE: 68 MMHG | OXYGEN SATURATION: 96 % | SYSTOLIC BLOOD PRESSURE: 142 MMHG | HEART RATE: 80 BPM | BODY MASS INDEX: 27.59 KG/M2 | WEIGHT: 146 LBS

## 2023-06-12 VITALS
TEMPERATURE: 97.9 F | OXYGEN SATURATION: 96 % | HEART RATE: 80 BPM | SYSTOLIC BLOOD PRESSURE: 142 MMHG | DIASTOLIC BLOOD PRESSURE: 68 MMHG | BODY MASS INDEX: 27.59 KG/M2 | RESPIRATION RATE: 18 BRPM | WEIGHT: 146 LBS

## 2023-06-12 DIAGNOSIS — I10 ESSENTIAL HYPERTENSION: ICD-10-CM
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
## 2023-06-12 DIAGNOSIS — R55 SYNCOPE, UNSPECIFIED SYNCOPE TYPE: ICD-10-CM
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
## 2023-06-12 DIAGNOSIS — R41.89 COGNITIVE DECLINE: ICD-10-CM

## 2023-06-12 DIAGNOSIS — I50.32 CHRONIC DIASTOLIC HEART FAILURE (HCC): Chronic | ICD-10-CM

## 2023-06-12 DIAGNOSIS — I48.92 ATRIAL FLUTTER, UNSPECIFIED TYPE (HCC): ICD-10-CM

## 2023-06-12 PROBLEM — R29.6 UNWITNESSED FALL: Status: ACTIVE | Noted: 2023-03-20

## 2023-06-12 PROCEDURE — 99309 SBSQ NF CARE MODERATE MDM 30: CPT | Performed by: NURSE PRACTITIONER

## 2023-06-12 NOTE — ASSESSMENT & PLAN NOTE
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

## 2023-06-12 NOTE — ASSESSMENT & PLAN NOTE
· Patient states she got dizzy and fell onto her buttocks at home  · Her CTA and MRI negative for acute infarcts, no significant stenosis or occlusions   · MRI did show chronic lacunar infarcts in the bilateral corona radiata  · Etiology thought to be multifactorial with poor oral intake vs BPPV vs cardiogenic   · Contributing is dehydration and pnumonia  · She improved and was discharged to rehab  · Continue to trend vitals  · Fall Precautions  · PT/OT

## 2023-06-12 NOTE — ASSESSMENT & PLAN NOTE
· Hx of Afib/Aflutter  · HR irregular but controlled on exam   · Continue Toprol-XL 25 mg daily  · No AC due to recurrent falls, high risk of falls, age

## 2023-06-12 NOTE — ASSESSMENT & PLAN NOTE
Wt Readings from Last 3 Encounters:   06/12/23 66 2 kg (146 lb)   06/07/23 66 2 kg (146 lb)   06/05/23 67 3 kg (148 lb 6 4 oz)     · Last Echo July 2021 EF 65 % with G2DD  · Continue to monitor weights  · Not currently on diuretics  · Does not appear fluid overloaded on exam

## 2023-06-12 NOTE — ASSESSMENT & PLAN NOTE
· Presented to ED with cough and sputum   · CT PE study with new small consolidation in the Left upper lobe suspicious for pnuemonia   · Mild Bibasilar dependent atelactasis   · Continue Cefdinir and Azithromycin daily through 6/8/23  · Encourage oob for meals  · Encourage use of Incentive Spirometry
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
## 2023-06-12 NOTE — ASSESSMENT & PLAN NOTE
· Continue to trend vitals at rehab  · Continue Amlodipine 2 5 mg BID   · Continue Toprol-XL 25 mg daily   · Last BP documented at rehab 142/68- I have requested daily vitals to be checked

## 2023-06-12 NOTE — ASSESSMENT & PLAN NOTE
· Presented to ED s/p unwitnessed fall about 6 days prior to ED presentation  · She had c/o worsening back pain - denies headstrike or LOC  · High Risk for falls due to age, prior hx of falls, deconditioning, debility  · Continue PT/OT  · Fall Precautions

## 2023-06-12 NOTE — ASSESSMENT & PLAN NOTE
"· Hx of recurrent falls  · Presented to ED s/p fall   · Per Nursing home records:  ·  \"Resident found on the left side of her bed with her back against the bed  Said that she was going to the bathroom, got dizzy and slipped out of her wheelchair  She denied pain or hitting head  Neuro checks initiated  ROM WNL no new skin alterations noted  Resident assisted to bathroom and then back to bed  \"    · On exam patient denies pain, states she slipped while trying to get to w/c, states she had some dizziness, denies syncope,cp/sob     · Vitals Daily to monitor   · Encouraged to call for assistance   · Continue PT/OT  · Fall Precautions   · Check CBC and BMP tomorrow 6/13/23  "

## 2023-06-12 NOTE — ASSESSMENT & PLAN NOTE
· Last TSH 3 6 (5/31/23) - WNL  · Continue home dose Levothyroxine 150 mcg Daily Monday through Friday

## 2023-06-14 ENCOUNTER — NURSING HOME VISIT (OUTPATIENT)
Dept: GERIATRICS | Facility: OTHER | Age: 88
End: 2023-06-14
Payer: MEDICARE

## 2023-06-14 VITALS
OXYGEN SATURATION: 95 % | RESPIRATION RATE: 18 BRPM | WEIGHT: 146 LBS | TEMPERATURE: 97.8 F | DIASTOLIC BLOOD PRESSURE: 80 MMHG | SYSTOLIC BLOOD PRESSURE: 169 MMHG | HEART RATE: 65 BPM | BODY MASS INDEX: 27.59 KG/M2

## 2023-06-14 DIAGNOSIS — J18.9 PNEUMONIA OF LEFT LOWER LOBE DUE TO INFECTIOUS ORGANISM: ICD-10-CM

## 2023-06-14 DIAGNOSIS — I48.92 ATRIAL FLUTTER, UNSPECIFIED TYPE (HCC): ICD-10-CM

## 2023-06-14 DIAGNOSIS — R41.89 COGNITIVE DECLINE: ICD-10-CM

## 2023-06-14 DIAGNOSIS — I50.32 CHRONIC DIASTOLIC HEART FAILURE (HCC): Chronic | ICD-10-CM

## 2023-06-14 DIAGNOSIS — E03.9 ACQUIRED HYPOTHYROIDISM: Primary | ICD-10-CM

## 2023-06-14 PROCEDURE — 99309 SBSQ NF CARE MODERATE MDM 30: CPT | Performed by: NURSE PRACTITIONER
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
## 2023-06-15 ENCOUNTER — TELEPHONE (OUTPATIENT)
Dept: NEUROLOGY | Facility: CLINIC | Age: 88
End: 2023-06-15

## 2023-06-15 NOTE — TELEPHONE ENCOUNTER
Jenny Terry,       Is there any way I can offer this patient the soonest HFU Hold as she is 80 is requesting per the [facility] to be scheduled only in [place]    Per Below notes Pt should be seen 1- 2 weeks  Thank you for your time and help,     Halie Madrid will need follow up in 1-2 weeks with general neurology attending/AP for cognitive/memory testing  She will not require outpatient neurological testing  HFU/ [facility]/ [facility]    DC- FACILITY= 6/04/2023  [address] Name and Phone Number  [address] Name, YANI/ Zander [address] [place] [address]  Receiving Facility/Agency Phone  Number  [phone]    [facility]- [phone] TO SCHEDULE APPT

## 2023-06-16 ENCOUNTER — TELEPHONE (OUTPATIENT)
Dept: NEUROLOGY | Facility: CLINIC | Age: 88
End: 2023-06-16

## 2023-06-16 NOTE — TELEPHONE ENCOUNTER
I called davidson at [facility] with appointment for patient  I also stated that she can call back if appointment does not work for them

## 2023-06-16 NOTE — TELEPHONE ENCOUNTER
I called Alley Jackson at [facility] and LVM to Alley Jackson that I made  appointment with Dr Corado and if appointment does not work please call back to reschedule

## 2023-06-20 ENCOUNTER — NURSING HOME VISIT (OUTPATIENT)
Dept: GERIATRICS | Facility: OTHER | Age: 88
End: 2023-06-20
Payer: MEDICARE

## 2023-06-20 VITALS
HEART RATE: 77 BPM | DIASTOLIC BLOOD PRESSURE: 88 MMHG | RESPIRATION RATE: 18 BRPM | BODY MASS INDEX: 26.74 KG/M2 | TEMPERATURE: 97.3 F | SYSTOLIC BLOOD PRESSURE: 155 MMHG | WEIGHT: 141.5 LBS | OXYGEN SATURATION: 97 %

## 2023-06-20 DIAGNOSIS — I48.92 ATRIAL FLUTTER, UNSPECIFIED TYPE (HCC): ICD-10-CM

## 2023-06-20 DIAGNOSIS — I10 ESSENTIAL HYPERTENSION: ICD-10-CM

## 2023-06-20 DIAGNOSIS — I50.32 CHRONIC DIASTOLIC HEART FAILURE (HCC): Chronic | ICD-10-CM

## 2023-06-20 DIAGNOSIS — R26.2 AMBULATORY DYSFUNCTION: ICD-10-CM

## 2023-06-20 DIAGNOSIS — E53.8 VITAMIN B12 DEFICIENCY: ICD-10-CM

## 2023-06-20 DIAGNOSIS — R41.89 COGNITIVE DECLINE: ICD-10-CM

## 2023-06-20 DIAGNOSIS — R55 SYNCOPE, UNSPECIFIED SYNCOPE TYPE: Primary | ICD-10-CM

## 2023-06-20 DIAGNOSIS — E03.9 ACQUIRED HYPOTHYROIDISM: ICD-10-CM

## 2023-06-20 PROCEDURE — 99309 SBSQ NF CARE MODERATE MDM 30: CPT | Performed by: NURSE PRACTITIONER

## 2023-06-20 RX ORDER — LANOLIN ALCOHOL/MO/W.PET/CERES
CREAM (GRAM) TOPICAL
Qty: 30 TABLET | Refills: 0
Start: 2023-06-20

## 2023-06-20 NOTE — ASSESSMENT & PLAN NOTE
Wt Readings from Last 3 Encounters:   06/20/23 64 2 kg (141 lb 8 oz)   06/14/23 66 2 kg (146 lb)   06/12/23 66 2 kg (146 lb)     · Last Echo July 2021 EF 65 % with G2DD  · Continue to monitor weights- have been stable   · Not currently on diuretics  · Does not appear fluid overloaded on exam
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
## 2023-06-20 NOTE — ASSESSMENT & PLAN NOTE
• Multifactorial  • Continue PT/OT  • Fall Precautions  • Ensure adequate nutrition/hydration   • Monitor CBC/BMP ( reviewed from 6/13/23 unremarkable)   •  following for d/c planning

## 2023-06-20 NOTE — ASSESSMENT & PLAN NOTE
· /88 today - reviewed BP trends - has been ranging 128/84 to 160/80  · Continue Amlodipine 2 5 mg BID   · Continue Toprol-XL 25 mg daily   · Continue to monitor vitals and consider increase in Amlodipine if remains above goal of less than 140/90

## 2023-06-20 NOTE — ASSESSMENT & PLAN NOTE
· Patient states she got dizzy and fell onto her buttocks at home  · Her CTA and MRI negative for acute infarcts, no significant stenosis or occlusions   · MRI did show chronic lacunar infarcts in the bilateral corona radiata  · Etiology thought to be multifactorial with poor oral intake vs BPPV vs cardiogenic   · Contributing is dehydration and pnumonia  · She improved and was discharged to rehab  · Continue to trend vitals  · Fall Precautions  · PT/OT  · OP f/u with Neurology as scheduled

## 2023-06-21 NOTE — TELEPHONE ENCOUNTER
Cristina Calderon from [facility] post [facility] called to confirm patient appointment with Dr Tim Esparza on 6/23/23 at 11:30

## 2023-06-21 NOTE — TELEPHONE ENCOUNTER
2nd attempt to speak to davidson left a detailed message , as Misha Lee did on 6/16/2023 to call us back and inform if the date and time given works for the patient  And nursing home  No answer bvut again left a detailed message  I then called the main number the  stated Lucian Torres has been off and will return on Thursday       Thank you,     Marcin Romero

## 2023-06-23 ENCOUNTER — OFFICE VISIT (OUTPATIENT)
Dept: NEUROLOGY | Facility: CLINIC | Age: 88
End: 2023-06-23
Payer: MEDICARE

## 2023-06-23 VITALS
DIASTOLIC BLOOD PRESSURE: 60 MMHG | HEART RATE: 66 BPM | BODY MASS INDEX: 26.62 KG/M2 | WEIGHT: 141 LBS | HEIGHT: 61 IN | SYSTOLIC BLOOD PRESSURE: 115 MMHG

## 2023-06-23 DIAGNOSIS — I48.19 PERSISTENT ATRIAL FIBRILLATION (HCC): Primary | ICD-10-CM

## 2023-06-23 DIAGNOSIS — R55 SYNCOPE: ICD-10-CM

## 2023-06-23 PROCEDURE — 99214 OFFICE O/P EST MOD 30 MIN: CPT | Performed by: PSYCHIATRY & NEUROLOGY

## 2023-06-23 NOTE — PROGRESS NOTES
Patient ID: Delicia Shah is a 80 y o  female  Assessment/Plan: This is a 55-year-old female who is here as a hospital follow-up for syncope related to possible pneumonia  Plan:     Diagnoses and all orders for this visit:    Persistent atrial fibrillation (HCC)    Syncope  -likely vasovagal at this time  No issues, no new symptoms  C/w aspirin  No neurologic workup needed  Follow up -as needed    I would be happy to see the patient sooner if any new questions/concerns arise  Patient/Guardian was advised to the call the office if they have any questions and concerns in the meantime  Patient/Guardian does understand that if they have any new stroke like symptoms such as facial droop on one side, weakness/paralysis on either side, speech trouble, numbness on one side, balance issues, any vision changes, extreme dizziness or any new headache, to call 9-1-1 immediately or to proceed to the nearest ER immediately  Subjective:    HPI    55-year-old female retention, CHF, A-fib not on AC or antiplatelets who is here as a hospital follow-up  Presented with a syncopal episode after urinating it was associated with a fall and loss of consciousness so she was brought to the ER  She had stroke work-up while she was in the ER she had a CT head which was reviewed which is negative for any acute findings  She had a CTA head and neck which was also reviewed which was negative for any acute findings  He had an MRI brain which I reviewed did not show any acute strokes  Started on aspirin after  She is currently at a nursing facility and is doing well  Not have any other issues at this time she is doing well her daughter is with her and wanted to know why she had to come here today  Has had no other questions        The following portions of the patient's history were reviewed and updated as appropriate:   She  has a past medical history of AAA (abdominal aortic aneurysm) ([facility] ), Acute medial meniscus tear, Arthritis (1980), Aspiration pneumonia ([facility] ), Breast CA ([facility] ), Cancer ([facility] ) (2017), Chest pain, CTS (carpal tunnel syndrome), Depression, Dermatitis, Disease of thyroid gland, Fainting, GERD (gastroesophageal reflux disease), H  pylori infection (03/17/2009), Hypertension, Incomplete defecation, Macular degeneration, Osteoporosis, Pneumonia, and Vertigo (2012)    She   Patient Active Problem List    Diagnosis Date Noted   • Fall at nursing home 06/12/2023   • Candidal intertrigo 06/01/2023   • Cognitive decline 05/31/2023   • Chronic pain syndrome 04/05/2023   • Moderate protein-calorie malnutrition ([facility] ) 03/22/2023   • Unwitnessed fall 03/20/2023   • T12 compression fracture ([place] [address] ) 03/20/2023   • Primary osteoarthritis of left knee 05/27/2022   • Seasonal allergic rhinitis due to pollen 05/27/2022   • Syncope 07/11/2021   • Interstitial cystitis 06/03/2021   • Anxiety 06/03/2021   • Atrial flutter ([facility] ) 05/06/2021   • Vitamin B12 deficiency 09/10/2019   • Localized edema 09/10/2019   • Stage 2 chronic kidney disease 09/10/2019   • Hiatal hernia 05/09/2019   • Chronic diastolic heart failure ([facility] ) 04/30/2019   • Ambulatory dysfunction 04/11/2019   • Encounter for follow-up examination after completed treatment for malignant neoplasm 03/12/2019   • Hemorrhoids 08/14/2018   • History of left breast cancer 01/30/2018   • Pneumonia 10/13/2017   • S/P left mastectomy 09/06/2017   • Persistent atrial fibrillation ([facility] ) 09/05/2017   • Osteoporosis    • Essential hypertension    • GERD (gastroesophageal reflux disease)    • Constipation 05/03/2016   • Macular degeneration 11/30/2015   • Aneurysm of ascending aorta ([facility] ) 07/30/2014   • First degree AV block 03/15/2013   • Benign paroxysmal vertigo 01/16/2013   • Vitamin D deficiency 10/01/2012   • Dyslipidemia 06/09/2012   • Acquired hypothyroidism 06/09/2012   • Vertigo 06/09/2012   • H  pylori infection 03/17/2009   • Advance directive in chart 08/25/2005 She  has a past surgical history that includes Appendectomy; Cholecystectomy; Colonoscopy; pr mastectomy simple complete (Left, 9/5/2017); pr intraop sentinel lymph node id w/dye injection (Left, 9/5/2017); [place] lymph node biopsy; US guided breast biopsy left complete (Left, 8/14/2017); and Mastectomy (Left, 09/2017)  Her family history includes Angina in her mother  She  reports that she has never smoked  She has never used smokeless tobacco  She reports that she does not currently use alcohol  She reports that she does not use drugs  Current Outpatient Medications   Medication Sig Dispense Refill   • amLODIPine (NORVASC) 2 5 mg tablet Take 1 tablet (2 5 mg total) by mouth 2 (two) times a day 180 tablet 1   • aspirin 81 mg chewable tablet Chew 1 tablet (81 mg total) daily Do not start before June 5, 2023  30 tablet 0   • Carboxymethylcellul-Glycerin 0 5-0 9 % SOLN Apply to eye Drops to b/l eyes     • Cholecalciferol (VITAMIN D-3) 1000 UNITS CAPS Take 2,000 Units by mouth daily       • escitalopram (LEXAPRO) 5 mg tablet Take 1 tablet (5 mg total) by mouth daily 90 tablet 1   • levothyroxine (Synthroid) 150 mcg tablet Take 1 tab PO 5 days a week 80 tablet 1   • metoprolol succinate (TOPROL-XL) 25 mg 24 hr tablet Take 1 tablet (25 mg total) by mouth daily 30 tablet 0   • vitamin B-12 (VITAMIN B-12) 1,000 mcg tablet Take 2 days a week- Mon and Fri 30 tablet 0   • nystatin (MYCOSTATIN) powder Apply topically 2 (two) times a day (Patient not taking: Reported on 6/23/2023) 15 g 0     No current facility-administered medications for this visit  Current Outpatient Medications on File Prior to Visit   Medication Sig   • amLODIPine (NORVASC) 2 5 mg tablet Take 1 tablet (2 5 mg total) by mouth 2 (two) times a day   • aspirin 81 mg chewable tablet Chew 1 tablet (81 mg total) daily Do not start before June 5, 2023     • Carboxymethylcellul-Glycerin 0 5-0 9 % SOLN Apply to eye Drops to b/l eyes   • Cholecalciferol "(VITAMIN D-3) 1000 UNITS CAPS Take 2,000 Units by mouth daily     • escitalopram (LEXAPRO) 5 mg tablet Take 1 tablet (5 mg total) by mouth daily   • levothyroxine (Synthroid) 150 mcg tablet Take 1 tab PO 5 days a week   • metoprolol succinate (TOPROL-XL) 25 mg 24 hr tablet Take 1 tablet (25 mg total) by mouth daily   • vitamin B-12 (VITAMIN B-12) 1,000 mcg tablet Take 2 days a week- Mon and Fri   • nystatin (MYCOSTATIN) powder Apply topically 2 (two) times a day (Patient not taking: Reported on 6/23/2023)     No current facility-administered medications on file prior to visit  She is allergic to atorvastatin and bisphosphonates            Objective:    Blood pressure 115/60, pulse 66, height 5' 1\" (1 549 m), weight 64 kg (141 lb), not currently breastfeeding  Physical Exam  General - patient is alert   Speech - no dysarthria noted, no aphasia noted  Neuro:   Cranial nerves: PERRL, EOMI, facial sensation intact to soft touch in V1, V2 and V3, no facial asymmetry noted, uvula/palate midline, tongue midline  Motor: 5/5 throughout, normal tone, no pronator drift noted  Sensory - intact to soft touch throughout  Reflexes - 2+ throughout  Coordination - no ataxia/dysmetria noted  Gait - normal     ROS:  Reviewed ROS   Review of Systems   Constitutional: Negative for appetite change, fatigue and fever  HENT: Negative  Negative for hearing loss, tinnitus, trouble swallowing and voice change  Eyes: Negative  Negative for photophobia, pain and visual disturbance  Respiratory: Negative  Negative for shortness of breath  Cardiovascular: Negative  Negative for palpitations  Gastrointestinal: Negative  Negative for nausea and vomiting  Endocrine: Negative  Negative for cold intolerance  Genitourinary: Negative  Negative for dysuria, frequency and urgency  Musculoskeletal: Positive for gait problem  Negative for back pain, myalgias and neck pain  Skin: Negative  Negative for rash   " Allergic/Immunologic: Negative  Neurological: Positive for light-headedness  Negative for dizziness, tremors, seizures, syncope, facial asymmetry, speech difficulty, weakness, numbness and headaches  Patient's daughter stated she gets light headed when they walk her  Hematological: Negative  Does not bruise/bleed easily  Psychiatric/Behavioral: Negative  Negative for confusion, hallucinations and sleep disturbance

## 2023-06-24 NOTE — ASSESSMENT & PLAN NOTE
Family is noticed a slow decline in patient's cognitive function however significantly noted over the past 3 weeks  Family states she has had inability to recall events, she has had changes with her social interactions with family  She is also requiring more assistance with ADLs  Family is looking into long-term care options considering staying at current facility  Speech therapy did cognitive assessment upon admission to rehab with a B CAT score of 28/50 indicating mild dementia  Not currently on any medications for this-we will discuss with family should she stay long-term care  We will need to align with patient and family goals as patient is 80years old and will be residing in long-term care  Can consider palliative care in the future  Currently patient is eating and drinking well  Delirium precautions  Avoid benzodiazepines, anticholinergics, Benadryl, tramadol  Encourage nutrition/hydration  Implement sleep hygiene, limit nighttime interruptions  Encourage participation in group activities when appropriate

## 2023-06-24 NOTE — ASSESSMENT & PLAN NOTE
Resolved on exam  Patient initially presented to ED with cough and sputum  CT PE study with new small consolidation in the left upper lobe suspicious for pneumonia also noted with mild bibasilar dependent atelectasis  Patient treated with cefdinir and azithromycin completed on 6/8/2023  Denies any diarrhea  Denies cough  Patient on room air and stable  Encourage out of bed for meals  Encourage use of incentive spirometer  PT/OT

## 2023-06-24 NOTE — ASSESSMENT & PLAN NOTE
Appears euvolemic on exam  Last echo was in July 2021 with an EF of 65% with a grade 2 diastolic dysfunction  Not currently on any diuretics  Continue beta-blocker  Continue to monitor weights  Cardiac diet

## 2023-06-24 NOTE — ASSESSMENT & PLAN NOTE
Chronic history of A-fib/a flutter  Heart rate is irregular but controlled on exam  Continue Toprol-XL 25 mg daily  No anticoagulation due to recurrent falls high risk for falls and age

## 2023-06-28 ENCOUNTER — NURSING HOME VISIT (OUTPATIENT)
Dept: GERIATRICS | Facility: OTHER | Age: 88
End: 2023-06-28
Payer: MEDICARE

## 2023-06-28 VITALS
BODY MASS INDEX: 35.71 KG/M2 | OXYGEN SATURATION: 95 % | HEART RATE: 82 BPM | RESPIRATION RATE: 18 BRPM | WEIGHT: 189 LBS | DIASTOLIC BLOOD PRESSURE: 88 MMHG | TEMPERATURE: 98.5 F | SYSTOLIC BLOOD PRESSURE: 155 MMHG

## 2023-06-28 DIAGNOSIS — R55 SYNCOPE, UNSPECIFIED SYNCOPE TYPE: ICD-10-CM

## 2023-06-28 DIAGNOSIS — F02.A0 MILD LATE ONSET ALZHEIMER'S DEMENTIA WITHOUT BEHAVIORAL DISTURBANCE, PSYCHOTIC DISTURBANCE, MOOD DISTURBANCE, OR ANXIETY (HCC): ICD-10-CM

## 2023-06-28 DIAGNOSIS — J18.9 PNEUMONIA OF LEFT LOWER LOBE DUE TO INFECTIOUS ORGANISM: ICD-10-CM

## 2023-06-28 DIAGNOSIS — G30.1 MILD LATE ONSET ALZHEIMER'S DEMENTIA WITHOUT BEHAVIORAL DISTURBANCE, PSYCHOTIC DISTURBANCE, MOOD DISTURBANCE, OR ANXIETY (HCC): ICD-10-CM

## 2023-06-28 DIAGNOSIS — I48.92 ATRIAL FLUTTER, UNSPECIFIED TYPE (HCC): Primary | ICD-10-CM

## 2023-06-28 DIAGNOSIS — I50.32 CHRONIC DIASTOLIC HEART FAILURE (HCC): Chronic | ICD-10-CM

## 2023-06-28 DIAGNOSIS — R26.2 AMBULATORY DYSFUNCTION: ICD-10-CM

## 2023-06-28 PROCEDURE — 99309 SBSQ NF CARE MODERATE MDM 30: CPT | Performed by: NURSE PRACTITIONER
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
## 2023-07-08 ENCOUNTER — TELEPHONE (OUTPATIENT)
Dept: OTHER | Facility: OTHER | Age: 88
End: 2023-07-08

## 2023-07-08 PROBLEM — F02.A0 MILD LATE ONSET ALZHEIMER'S DEMENTIA WITHOUT BEHAVIORAL DISTURBANCE, PSYCHOTIC DISTURBANCE, MOOD DISTURBANCE, OR ANXIETY (HCC): Status: ACTIVE | Noted: 2023-05-31

## 2023-07-08 PROBLEM — G30.1 MILD LATE ONSET ALZHEIMER'S DEMENTIA WITHOUT BEHAVIORAL DISTURBANCE, PSYCHOTIC DISTURBANCE, MOOD DISTURBANCE, OR ANXIETY (HCC): Status: ACTIVE | Noted: 2023-05-31

## 2023-07-08 NOTE — ASSESSMENT & PLAN NOTE
Wt Readings from Last 3 Encounters:   06/28/23 85.7 kg (189 lb)   06/23/23 64 kg (141 lb)   06/20/23 64.2 kg (141 lb 8 oz)     · Last Echo July 2021 EF 65 % with G2DD  · Continue to monitor weights- have been stable   · Not currently on diuretics  · Does not appear fluid overloaded on exam

## 2023-07-08 NOTE — ASSESSMENT & PLAN NOTE
· Per review of hospital records, daughter noted a decline in cognitive function for past 3 weeks prior to hospital stay, including inability to recollect events, changes with social interactions with family, requiring more assistance with ADLs. · Cognitive Assessment done by Speech Therapy at rehab with a BCAT 28/50 indicating mild dementia. · Deficits noted with attention 5/7, problem solving 0/4, stm delayed recall 0/4, visuospatial 0/4, paragraph retention 1/2, delayed naming recall 1/3, delayed paragraph retention 0/2, choice 2/5      Family has decided it's time to transition their loved one to LTC. Patient will be staying LTC at [address] [place] [address] upon completing short term rehab.    Plan:   Provide redirection, reorientation, distraction techniques  Fall Precautions  Assist with ADLs/IADLs  Avoid deliriogenic medications such as tramadol, benzodiazepines, anticholinergics, benadryl  Encourage Hydration/ Nutrition  Implement sleep hygiene, limit night time interuptions   Encourage participation in group activities when appropriate

## 2023-08-02 PROBLEM — J18.9 PNEUMONIA: Status: RESOLVED | Noted: 2017-10-13 | Resolved: 2023-08-02

## 2023-08-13 ENCOUNTER — TELEPHONE (OUTPATIENT)
Dept: OTHER | Facility: OTHER | Age: 88
End: 2023-08-13

## 2023-08-13 NOTE — TELEPHONE ENCOUNTER
[phone]/Harrison from [facility] [employer]. Pt fell and is on Asprin.     Dr Nkechi Badillo was called and TC

## 2023-08-22 ENCOUNTER — TELEPHONE (OUTPATIENT)
Dept: OTHER | Facility: OTHER | Age: 88
End: 2023-08-22

## 2023-08-22 NOTE — TELEPHONE ENCOUNTER
ROBYN/[phone]/Devyn from [address] [employer]. Pt fell. No injuries.     Heraclio Murphy was paged via [email or web address]

## 2023-08-24 ENCOUNTER — NURSING HOME VISIT (OUTPATIENT)
Dept: GERIATRICS | Facility: OTHER | Age: 88
End: 2023-08-24
Payer: MEDICARE

## 2023-08-24 DIAGNOSIS — I48.19 PERSISTENT ATRIAL FIBRILLATION (HCC): ICD-10-CM

## 2023-08-24 DIAGNOSIS — E03.9 ACQUIRED HYPOTHYROIDISM: ICD-10-CM

## 2023-08-24 DIAGNOSIS — I10 ESSENTIAL HYPERTENSION: ICD-10-CM

## 2023-08-24 DIAGNOSIS — F02.A0 MILD LATE ONSET ALZHEIMER'S DEMENTIA WITHOUT BEHAVIORAL DISTURBANCE, PSYCHOTIC DISTURBANCE, MOOD DISTURBANCE, OR ANXIETY (HCC): Primary | ICD-10-CM

## 2023-08-24 DIAGNOSIS — G30.1 MILD LATE ONSET ALZHEIMER'S DEMENTIA WITHOUT BEHAVIORAL DISTURBANCE, PSYCHOTIC DISTURBANCE, MOOD DISTURBANCE, OR ANXIETY (HCC): Primary | ICD-10-CM

## 2023-08-24 DIAGNOSIS — R26.2 AMBULATORY DYSFUNCTION: ICD-10-CM

## 2023-08-24 PROCEDURE — 99309 SBSQ NF CARE MODERATE MDM 30: CPT | Performed by: FAMILY MEDICINE

## 2023-08-24 NOTE — PROGRESS NOTES
[address]  [phone]  [facility]  POS 32      NAME: Sabas Richards  AGE: 80 y.o. SEX: female [id number]    DATE OF ENCOUNTER: 9/4/2023    Assessment and Plan     1. 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 behavioral disturbance, psychotic disturbance, mood disturbance, or anxiety (HCC)  - redirection, reorientation  - assistance with ADLs   - cont Escitalopram 5 mg po qd    2. Acquired hypothyroidism  - cont Levothyroxine 150 mcg po qam  - TSH ordered    3. Ambulatory dysfunction  - Fall precautions in place    4. Essential hypertension  - cont Metoprolol succinate 25 mg po qd  - cont Amlodipine 2.5 mg po bid  - cont ASA 81 mg po qd    5. Persistent atrial fibrillation (HCC)  - not on anticoagulation  - cont Metoprolol succinate 25 mg po qd  - cont ASA 81 mg po qd    - Counseling Documentation: patient was counseled regarding: prognosis    Chief Complaint     Routine Long term follow-up visit. History of Present Illness     HPI  Sabas Richards, a 81 y/o female is a long term resident at [facility], [place], seen and examined at bedside, stable. She found sitting in wheelchair next to bed. She is able to give some history, pleasant and cooperative. She needs max assistance with ADLs. She had a fall recently, but no injuries. Staff have no concerns at this time. The following portions of the patient's history were reviewed and updated as appropriate: allergies, current medications, past family history, past medical history, past social history, past surgical history and problem list.    Review of Systems     Review of Systems   HENT: Positive for dental problem and hearing loss. Negative for trouble swallowing. Respiratory: Negative. Cardiovascular: Negative. Gastrointestinal: Negative. Musculoskeletal: Positive for gait problem. Neurological: Positive for weakness. As in HPI.     Active Problem List     Patient Active Problem List   Diagnosis   • Osteoporosis   • Essential hypertension   • GERD (gastroesophageal reflux disease)   • Persistent atrial fibrillation (HCC)   • S/P left mastectomy   • History of left breast cancer   • Aneurysm of ascending aorta (HCC)   • Constipation   • Dyslipidemia   • First degree AV block   • Acquired hypothyroidism   • Macular degeneration   • Vertigo   • Vitamin D deficiency   • Benign paroxysmal vertigo   • H. pylori infection   • Hemorrhoids   • Advance directive in chart   • Encounter for follow-up examination after completed treatment for malignant neoplasm   • Ambulatory dysfunction   • Chronic diastolic heart failure ([address])   • Hiatal hernia   • Vitamin B12 deficiency   • Localized edema   • Stage 2 chronic kidney disease   • Atrial flutter (HCC)   • Interstitial cystitis   • Anxiety   • Syncope   • Primary osteoarthritis of left knee   • Seasonal allergic rhinitis due to pollen   • Unwitnessed fall   • T12 compression fracture (HCC)   • Moderate protein-calorie malnutrition (HCC)   • Chronic pain syndrome   • 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 behavioral disturbance, psychotic disturbance, mood disturbance, or anxiety (HCC)   • Candidal intertrigo   • Fall at nursing home       Objective     Vitals: T: 97.3, P: 72, R: 16, BP: 152/74, 97% on RA, Wt: 145.5 lbs    Physical Exam  Vitals and nursing note reviewed. Constitutional:       General: She is not in acute distress. Appearance: She is well-developed. She is obese. She is not diaphoretic. HENT:      Head: Normocephalic and atraumatic. Nose: Nose normal.      Mouth/Throat:      Mouth: Mucous membranes are moist.      Pharynx: Oropharynx is clear. No oropharyngeal exudate. Eyes:      General: No scleral icterus. Right eye: No discharge. Left eye: No discharge. Extraocular Movements: Extraocular movements intact.       Conjunctiva/sclera: Conjunctivae normal.   Cardiovascular:      Rate and Rhythm: Normal rate and regular rhythm. Heart sounds: Normal heart sounds. No murmur heard. Pulmonary:      Effort: Pulmonary effort is normal. No respiratory distress. Breath sounds: Normal breath sounds. No wheezing. Chest:      Chest wall: No tenderness. Abdominal:      General: Bowel sounds are normal.      Palpations: Abdomen is soft. Tenderness: There is no abdominal tenderness. There is no guarding or rebound. Musculoskeletal:         General: No tenderness or deformity. Normal range of motion. Cervical back: Normal range of motion and neck supple. Right lower leg: No edema. Left lower leg: No edema. Skin:     General: Skin is warm and dry. Neurological:      Mental Status: She is alert. Cranial Nerves: No cranial nerve deficit. Comments: Oriented to self  Able to follow simple commands. Psychiatric:         Mood and Affect: Mood normal.         Behavior: Behavior normal.         Pertinent Laboratory/Diagnostic Studies:  Refer to facility chart. Current Medications   Medications reviewed and updated in facility chart.

## 2023-09-13 ENCOUNTER — TELEPHONE (OUTPATIENT)
Dept: OTHER | Facility: OTHER | Age: 88
End: 2023-09-13

## 2023-10-19 VITALS
SYSTOLIC BLOOD PRESSURE: 148 MMHG | HEART RATE: 70 BPM | BODY MASS INDEX: 28.08 KG/M2 | TEMPERATURE: 97.3 F | OXYGEN SATURATION: 96 % | DIASTOLIC BLOOD PRESSURE: 57 MMHG | RESPIRATION RATE: 19 BRPM | WEIGHT: 148.6 LBS
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
## 2023-10-20 ENCOUNTER — NURSING HOME VISIT (OUTPATIENT)
Dept: GERIATRICS | Facility: OTHER | Age: 88
End: 2023-10-20
Payer: MEDICARE

## 2023-10-20 DIAGNOSIS — F02.A0 MILD LATE ONSET ALZHEIMER'S DEMENTIA WITHOUT BEHAVIORAL DISTURBANCE, PSYCHOTIC DISTURBANCE, MOOD DISTURBANCE, OR ANXIETY (HCC): ICD-10-CM

## 2023-10-20 DIAGNOSIS — G30.1 MILD LATE ONSET ALZHEIMER'S DEMENTIA WITHOUT BEHAVIORAL DISTURBANCE, PSYCHOTIC DISTURBANCE, MOOD DISTURBANCE, OR ANXIETY (HCC): ICD-10-CM

## 2023-10-20 DIAGNOSIS — R26.2 AMBULATORY DYSFUNCTION: ICD-10-CM

## 2023-10-20 DIAGNOSIS — E03.9 ACQUIRED HYPOTHYROIDISM: Primary | ICD-10-CM

## 2023-10-20 DIAGNOSIS — I10 ESSENTIAL HYPERTENSION: ICD-10-CM

## 2023-10-20 DIAGNOSIS — I48.19 PERSISTENT ATRIAL FIBRILLATION (HCC): ICD-10-CM

## 2023-10-20 DIAGNOSIS — E55.9 VITAMIN D DEFICIENCY: ICD-10-CM

## 2023-10-20 PROCEDURE — 99309 SBSQ NF CARE MODERATE MDM 30: CPT

## 2023-10-20 RX ORDER — ACETAMINOPHEN 325 MG/1
975 TABLET ORAL EVERY 8 HOURS
COMMUNITY

## 2023-10-22 NOTE — ASSESSMENT & PLAN NOTE
Multifactorial in the setting of advanced age, fall risk, and chronic medical conditions  Continue PT/OT as needed  Fall Precautions  Ensure adequate nutrition/hydration   Periodic monitoring of CBC/BMP   Continue 24/7 supportive care per LTC

## 2023-10-22 NOTE — ASSESSMENT & PLAN NOTE
Last documented /57  BP has been above goal  Will order BP x 5 days  May adjust amlodipine dose based on results  Continue amlodipine 2.5 mg BID  Continue metoprolol ER 25 mg daily

## 2023-10-22 NOTE — ASSESSMENT & PLAN NOTE
Mood stable  Alert to self, month, forgetful at times  Redirect/reorient as needed  Encourage adequate po hydration/nutrition  Maintain fall/safety precautions  Assist with ADL's/IADL's as needed  Ensure adequate sleep/wake cycle with limited nighttime interruptions  Avid deliriogenic medications  Encourage OOB for meals and participation in group activities  Continue Lexapro 5 mg daily

## 2023-10-22 NOTE — ASSESSMENT & PLAN NOTE
HR controlled, 70  Denies cp/palpitations  Continue metoprolol ER 25 mg daily  Not on [facility] d/t age and fall risk  Continue ASA 81 mg

## 2023-10-25 ENCOUNTER — TELEPHONE (OUTPATIENT)
Dept: OTHER | Facility: HOSPITAL | Age: 88
End: 2023-10-25

## 2023-11-24 ENCOUNTER — NURSING HOME VISIT (OUTPATIENT)
Dept: GERIATRICS | Facility: OTHER | Age: 88
End: 2023-11-24
Payer: MEDICARE

## 2023-11-24 VITALS
SYSTOLIC BLOOD PRESSURE: 148 MMHG | WEIGHT: 142.5 LBS | BODY MASS INDEX: 26.93 KG/M2 | TEMPERATURE: 97.5 F | RESPIRATION RATE: 18 BRPM | DIASTOLIC BLOOD PRESSURE: 82 MMHG | HEART RATE: 70 BPM

## 2023-11-24 DIAGNOSIS — R26.2 AMBULATORY DYSFUNCTION: ICD-10-CM

## 2023-11-24 DIAGNOSIS — E55.9 VITAMIN D DEFICIENCY: ICD-10-CM

## 2023-11-24 DIAGNOSIS — E03.9 ACQUIRED HYPOTHYROIDISM: ICD-10-CM

## 2023-11-24 DIAGNOSIS — F02.A0 MILD LATE ONSET ALZHEIMER'S DEMENTIA WITHOUT BEHAVIORAL DISTURBANCE, PSYCHOTIC DISTURBANCE, MOOD DISTURBANCE, OR ANXIETY (HCC): Primary | ICD-10-CM

## 2023-11-24 DIAGNOSIS — N18.2 STAGE 2 CHRONIC KIDNEY DISEASE: ICD-10-CM

## 2023-11-24 DIAGNOSIS — I10 ESSENTIAL HYPERTENSION: ICD-10-CM

## 2023-11-24 DIAGNOSIS — I48.19 PERSISTENT ATRIAL FIBRILLATION (HCC): ICD-10-CM

## 2023-11-24 DIAGNOSIS — G30.1 MILD LATE ONSET ALZHEIMER'S DEMENTIA WITHOUT BEHAVIORAL DISTURBANCE, PSYCHOTIC DISTURBANCE, MOOD DISTURBANCE, OR ANXIETY (HCC): Primary | ICD-10-CM

## 2023-11-24 PROCEDURE — 99309 SBSQ NF CARE MODERATE MDM 30: CPT | Performed by: FAMILY MEDICINE

## 2023-11-24 NOTE — PROGRESS NOTES
[address]  [phone]  [facility]  POS 32      NAME: Lia Leon  AGE: 80 y.o. SEX: female [id number]    DATE OF ENCOUNTER: 11/24/2023    Assessment and Plan     Diagnoses and all orders for this visit:    Acquired hypothyroidism  Comments:  -9/6/23 TSH 2.86  -Continue Levothyroxine 150 mcg M-F    Essential hypertension  Comments:  -BP- 148/82  -BP acceptable range given age  -Continue amlodipine 5 mg BID  -Continue metoprolol ER 25 mg daily    Persistent atrial fibrillation (HCC)  Comments:  -Rate controlled with metoprolol ER 25 mg daily  -Denies palpitations  -Not on [facility] d/t age and fall risk  -Continue ASA 81 mg    Ambulatory dysfunction  Comments:  -Multifactorial in the setting of advanced age, fall risk, and chronic medical conditions  -Continue PT/OT as needed  -Fall Precautions    Vitamin D deficiency  Comments:  -Continue cholecalciferol 2000 units daily    Stage 2 chronic kidney disease  Comments:  - Latest GFR > 60  -Avoid nephrotoxic drugs    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 behavioral disturbance, psychotic disturbance, mood disturbance, or anxiety (HCC)  Comments:  -Mood stable  -Alert to self, situation, forgetful at times  -Redirect/reorient as needed  -Encourage adequate po hydration/nutrition  -Assist with ADL's/IADL's as needed  -Ensure adequate sleep/wake cycle with limited nighttime interruptions  -Avid deliriogenic medications  -Encourage OOB for meals and participation in group activities  -Continue Lexapro 5 mg daily           Chief Complaint     Routine Long term follow-up visit. Denies any acute concerns today. History of Present Illness     Lia Leon is a 80year old female, she is a LTC resident of [facility] since 6/4/23. Past Medical Hx including but not limited to GERD, hiatal hernia, hypothyroidism, CHF, Afib, osteoporosis, alzheimer's dementia, OA, CKD, Breast CA,left mastectomy, HLD, vertigo. She was seen in collaboration with nursing for medical mgmt and LTC follow up. Adrian Jimenez was seen and examined at bedside today. Patient is very pleasant. She states that she is doing very well. She denies any current concerns today. Patient is awake and alert, sitting comfortably on a chair, oriented to self, time, situation but unsure of year. She does not appear to be in distress. She denies discomfort, difficulty sleeping, and has a good appetite. She requires assistance with ADL's/IADL's. Patient appears happy today, she states that she visited her family for Thanksgiving. She denies chest pain, sob, abdominal pain, n/v.  Denies lightheadedness, dizziness, headaches, vision changes. Patient states they are eating well and staying hydrated. Denies any bowel or bladder issues. The following portions of the patient's history were reviewed and updated as appropriate: allergies, current medications, past family history, past medical history, past social history, past surgical history and problem list.    Review of Systems     Review of Systems   Constitutional:  Negative for chills and fever. HENT:  Negative for ear pain and sore throat. Eyes:  Negative for pain and visual disturbance. Respiratory:  Negative for cough and shortness of breath. Cardiovascular:  Negative for chest pain and palpitations. Gastrointestinal:  Negative for abdominal pain and vomiting. Genitourinary:  Negative for dysuria and hematuria. Musculoskeletal:  Negative for arthralgias and back pain. Skin:  Negative for color change and rash. Neurological:  Negative for seizures and syncope. All other systems reviewed and are negative.       Active Problem List     Patient Active Problem List   Diagnosis    Osteoporosis    Essential hypertension    GERD (gastroesophageal reflux disease)    Persistent atrial fibrillation (HCC)    S/P left mastectomy    History of left breast cancer    Aneurysm of ascending aorta ([address])    Constipation    Dyslipidemia    First degree AV block    Acquired hypothyroidism    Macular degeneration    Vertigo    Vitamin D deficiency    Benign paroxysmal vertigo    H. pylori infection    Hemorrhoids    Advance directive in chart    Encounter for follow-up examination after completed treatment for malignant neoplasm    Ambulatory dysfunction    Chronic diastolic heart failure (HCC)    Hiatal hernia    Vitamin B12 deficiency    Localized edema    Stage 2 chronic kidney disease    Atrial flutter (HCC)    Interstitial cystitis    Anxiety    Syncope    Primary osteoarthritis of left knee    Seasonal allergic rhinitis due to pollen    Unwitnessed fall    T12 compression fracture (HCC)    Moderate protein-calorie malnutrition (HCC)    Chronic pain syndrome    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 behavioral disturbance, psychotic disturbance, mood disturbance, or anxiety (HCC)    Candidal intertrigo    Fall at nursing home       Objective     Vitals:   Vitals:    11/24/23 1144   BP: 148/82   Pulse: 70   Resp: 18   Temp: 97.5 °F (36.4 °C)        Physical Exam  Vitals and nursing note reviewed. Constitutional:       General: She is not in acute distress. HENT:      Head: Normocephalic and atraumatic. Mouth/Throat:      Mouth: Mucous membranes are moist.   Eyes:      Conjunctiva/sclera: Conjunctivae normal.   Cardiovascular:      Rate and Rhythm: Normal rate and regular rhythm. Heart sounds: No murmur heard. Pulmonary:      Breath sounds: Normal breath sounds. No wheezing. Abdominal:      General: Bowel sounds are normal.      Palpations: Abdomen is soft. Tenderness: There is no abdominal tenderness. Musculoskeletal:      Cervical back: Neck supple. Right lower leg: No edema. Left lower leg: No edema. Skin:     General: Skin is warm and dry. Neurological:      Gait: Gait abnormal (uses walker).       Comments: Oriented x 2   Psychiatric:         Mood and Affect: Mood normal.         Behavior: Behavior normal.         Pertinent Laboratory/Diagnostic Studies:  Refer to facility chart. Current Medications   Medications reviewed and updated in facility chart.        Current Outpatient Medications:     acetaminophen (TYLENOL) 325 mg tablet, Take 975 mg by mouth every 8 (eight) hours, Disp: , Rfl:     amLODIPine (NORVASC) 2.5 mg tablet, Take 1 tablet (2.5 mg total) by mouth 2 (two) times a day, Disp: 180 tablet, Rfl: 1    aspirin 81 mg chewable tablet, Chew 1 tablet (81 mg total) daily Do not start before June 5, 2023., Disp: 30 tablet, Rfl: 0    Carboxymethylcellul-Glycerin 0.5-0.9 % SOLN, Apply to eye Drops to b/l eyes, Disp: , Rfl:     Cholecalciferol (VITAMIN D-3) 1000 UNITS CAPS, Take 2,000 Units by mouth daily  , Disp: , Rfl:     escitalopram (LEXAPRO) 5 mg tablet, Take 1 tablet (5 mg total) by mouth daily, Disp: 90 tablet, Rfl: 1    levothyroxine (Synthroid) 150 mcg tablet, Take 1 tab PO 5 days a week, Disp: 80 tablet, Rfl: 1    metoprolol succinate (TOPROL-XL) 25 mg 24 hr tablet, Take 1 tablet (25 mg total) by mouth daily, Disp: 30 tablet, Rfl: 0    vitamin B-12 (VITAMIN B-12) 1,000 mcg tablet, Take 2 days a week- Mon and Fri, Disp: 30 tablet, Rfl: 0

## 2023-12-05 ENCOUNTER — TELEPHONE (OUTPATIENT)
Dept: OTHER | Facility: OTHER | Age: 88
End: 2023-12-05

## 2023-12-05 NOTE — TELEPHONE ENCOUNTER
Monserrat Zurita from [address] [place] [address] called to report that the pt fell. Contacted on call via TC.

## 2023-12-07 ENCOUNTER — TELEPHONE (OUTPATIENT)
Dept: OTHER | Facility: HOSPITAL | Age: 88
End: 2023-12-07

## 2023-12-08 ENCOUNTER — NURSING HOME VISIT (OUTPATIENT)
Dept: GERIATRICS | Facility: OTHER | Age: 88
End: 2023-12-08
Payer: MEDICARE

## 2023-12-08 VITALS
RESPIRATION RATE: 18 BRPM | WEIGHT: 140 LBS | TEMPERATURE: 97.3 F | BODY MASS INDEX: 26.45 KG/M2 | DIASTOLIC BLOOD PRESSURE: 78 MMHG | HEART RATE: 73 BPM | OXYGEN SATURATION: 96 % | SYSTOLIC BLOOD PRESSURE: 139 MMHG

## 2023-12-08 DIAGNOSIS — G30.1 MILD LATE ONSET ALZHEIMER'S DEMENTIA WITHOUT BEHAVIORAL DISTURBANCE, PSYCHOTIC DISTURBANCE, MOOD DISTURBANCE, OR ANXIETY (HCC): ICD-10-CM

## 2023-12-08 DIAGNOSIS — F02.A0 MILD LATE ONSET ALZHEIMER'S DEMENTIA WITHOUT BEHAVIORAL DISTURBANCE, PSYCHOTIC DISTURBANCE, MOOD DISTURBANCE, OR ANXIETY (HCC): ICD-10-CM
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
## 2023-12-08 DIAGNOSIS — I48.19 PERSISTENT ATRIAL FIBRILLATION (HCC): ICD-10-CM

## 2023-12-08 DIAGNOSIS — I50.32 CHRONIC DIASTOLIC HEART FAILURE (HCC): Chronic | ICD-10-CM

## 2023-12-08 PROCEDURE — 99310 SBSQ NF CARE HIGH MDM 45: CPT | Performed by: NURSE PRACTITIONER

## 2023-12-08 NOTE — ASSESSMENT & PLAN NOTE
BCAT 28/50 indicating mild dementia  She is AAOx2 on exam -forgetful  Clear coherent speech, able to make her needs known  Reviewed last speech cognitive eval:   Deficits noted with attention 5/7, problem solving 0/4, stm delayed recall 0/4, visuospatial 0/4, paragraph retention 1/2, delayed naming recall 1/3, delayed paragraph retention 0/2, choice 2/5  Delirum/Dementia Protocol:   Provide redirection, reorientation, distraction techniques  Fall Precautions  Assist with ADLs/IADLs  Avoid deliriogenic medications such as tramadol, benzodiazepines, anticholinergics, benadryl  Encourage Hydration/ Nutrition  Implement sleep hygiene, limit night time interuptions   Encourage participation in group activities when appropriate             Plan:   Provide redirection, reorientation, distraction techniques  Fall Precautions  Assist with ADLs/IADLs  Avoid deliriogenic medications such as tramadol, benzodiazepines, anticholinergics, benadryl  Encourage Hydration/ Nutrition  Implement sleep hygiene, limit night time interuptions   Encourage participation in group activities when appropriate

## 2023-12-08 NOTE — ASSESSMENT & PLAN NOTE
HR regular and controlled on exam   Denies dizziness on exam - denies dizziness at time of fall   Continue Metoprolol ER 25 mg Daily   No AC due to age and fall risk   Continue Aspirin 81 mg daily

## 2023-12-08 NOTE — ASSESSMENT & PLAN NOTE
Wt Readings from Last 3 Encounters:   12/08/23 63.5 kg (140 lb)   11/24/23 64.6 kg (142 lb 8 oz)   10/19/23 67.4 kg (148 lb 9.6 oz)     Last Echo July 2021 EF 65 % with G2DD  Continue to monitor weights- have been stable   Not currently on diuretics  Does not appear fluid overloaded on exam

## 2023-12-08 NOTE — ASSESSMENT & PLAN NOTE
Hx of recurrent falls  Notified by nursing patient ambulated herself to her dressing and fell down onto her buttocks. It was unwitnessed and this was patient's report. Initally no c/o pain, later c/o back pain. Known hx of prior Lumbar and T12 fx's noted on prior imaging and we started Lidocaine Patch  Daughter and staff noted next day was not eating well and "was not herself"   Ordered Lumbosacral and Pelvic x rays- reviewed today- unremarkable, no new fractures, no dislocation. Again Old L2, L3 and T12 fx's seen with joint space narrowing.    Continue Tylenol Scheduled and Lidocaine  Patient states she feels better on exam today Patient discharged to home after ID bands verified and 's code alert removed. Discharge teaching complete, patient verbalizes understanding; questions encouraged. Prescriptions given. Infant placed in car seat correctly. Stable at discharge.

## 2023-12-08 NOTE — PROGRESS NOTES
[address]  [phone]  FACILITY: [facility]   Code 32  ACUTE VISIT    Assessment/Plan:    1. Fall at nursing home, initial encounter  Assessment & Plan:  Hx of recurrent falls  Notified by nursing patient ambulated herself to her dressing and fell down onto her buttocks. It was unwitnessed and this was patient's report. Initally no c/o pain, later c/o back pain. Known hx of prior Lumbar and T12 fx's noted on prior imaging and we started Lidocaine Patch  Daughter and staff noted next day was not eating well and "was not herself"   Ordered Lumbosacral and Pelvic x rays- reviewed today- unremarkable, no new fractures, no dislocation. Again Old L2, L3 and T12 fx's seen with joint space narrowing. Continue Tylenol Scheduled and Lidocaine  Patient states she feels better on exam today       2.  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 behavioral disturbance, psychotic disturbance, mood disturbance, or anxiety ([facility])  Assessment & Plan:  BCAT 28/50 indicating mild dementia  She is AAOx2 on exam -forgetful  Clear coherent speech, able to make her needs known  Reviewed last speech cognitive eval:   Deficits noted with attention 5/7, problem solving 0/4, stm delayed recall 0/4, visuospatial 0/4, paragraph retention 1/2, delayed naming recall 1/3, delayed paragraph retention 0/2, choice 2/5  Delirum/Dementia Protocol:   Provide redirection, reorientation, distraction techniques  Fall Precautions  Assist with ADLs/IADLs  Avoid deliriogenic medications such as tramadol, benzodiazepines, anticholinergics, benadryl  Encourage Hydration/ Nutrition  Implement sleep hygiene, limit night time interuptions   Encourage participation in group activities when appropriate             Plan:   Provide redirection, reorientation, distraction techniques  Fall Precautions  Assist with ADLs/IADLs  Avoid deliriogenic medications such as tramadol, benzodiazepines, anticholinergics, benadryl  Encourage Hydration/ Nutrition  Implement sleep hygiene, limit night time interuptions   Encourage participation in group activities when appropriate         3. Persistent atrial fibrillation (HCC)  Assessment & Plan:  HR regular and controlled on exam   Denies dizziness on exam - denies dizziness at time of fall   Continue Metoprolol ER 25 mg Daily   No AC due to age and fall risk   Continue Aspirin 81 mg daily       4. Chronic diastolic heart failure [facility])  Assessment & Plan:  Wt Readings from Last 3 Encounters:   12/08/23 63.5 kg (140 lb)   11/24/23 64.6 kg (142 lb 8 oz)   10/19/23 67.4 kg (148 lb 9.6 oz)     Last Echo July 2021 EF 65 % with G2DD  Continue to monitor weights- have been stable   Not currently on diuretics  Does not appear fluid overloaded on exam                      Subjective:      Patient ID: Alka Goodwin is a 80 y.o. female. CODE STATUS: No CPR   Patient is a LTC resident at [facility]. Seen and evaluated at bedside today for Acute visit s/p mechanical fall with back pain.      PAST MEDICAL HISTORY:  Past Medical History:   Diagnosis Date    AAA (abdominal aortic aneurysm) ([address])     Acute medial meniscus tear     Arthritis 1980    Aspiration pneumonia ([address])     LAST ASSESSED: 7/30/14    Breast CA ([address])     left    Cancer ([address]) 2017    Chest pain     RESOLVED: 1/31/17    CTS (carpal tunnel syndrome)     Depression     Dermatitis     LAST ASSESSED: 7/25/13    Disease of thyroid gland     Fainting     LAST ASSESSED: 3/15/13    GERD (gastroesophageal reflux disease)     H. pylori infection 03/17/2009    Hypertension     Incomplete defecation     LAST ASSESSED: 7/25/13    Macular degeneration     Osteoporosis     Pneumonia     Vertigo 2012     Past Surgical History:   Procedure Laterality Date    APPENDECTOMY      CHOLECYSTECTOMY      COLONOSCOPY      MASTECTOMY Left 09/2017    SIMPLE    OR INTRAOP SENTINEL LYMPH NODE ID W/DYE INJECTION Left 9/5/2017    Procedure: INTRAOPERATIVE LYMPHATIC MAPPING; BLUE DYE ONLY; [address] LYMPH NODE BIOPSY;  Surgeon: Armida Mcmillan MD;  [facility];  Service: Surgical Oncology    UT MASTECTOMY SIMPLE COMPLETE Left 9/5/2017    Procedure: BREAST MASTECTOMY;  Surgeon: Armida Mcmillan MD;  [facility];  Service: Surgical Oncology    SENTINEL LYMPH NODE BIOPSY      US GUIDED BREAST BIOPSY LEFT COMPLETE Left 8/14/2017         Joseph Yancey is a 80 y.o. female, she is a LTC resident of [address] [place] [address]. Patient has a past medical Hx including but not limited to Recurrent Falls, HTN, Hypothyroidism, Afib not on AC, Macular Degeneration, Osteoporosis, ambulatory dysfunction. Patient is seen in collaboration with nursing for medical mgmt and ACUTE visit. Notified by nursing patient found on floor 12/5/23 at [address] Per nursing she was found sitting on floor with legs extended in front of her. She was smiling and able to move all extremities without difficulty. Initially denied pain. Patient states she took herself out of bed to her dresser to get ready for the day and states, "I just fell". Later patient c/o low back pain. Reviewed prior imaging, known L2, L3 and T12 fractures. Started lidocaine patch. Family noted on 12/7 that patient was "not herself" and not eating well which is a change from baseline. Ordered X ray lumbosacral and pelvis and labs. Xray unremarkable except for known old fractures, no new fractures or dislocation. On exam today patient is resting in bed. She is smiling. She states her pain is improved. Discussed poor appetite with patient who states, "I think it was because of the pain." She states she is eating well now. Patient denies any dizziness during event, she denies dizziness on exam. She denies fever/chills. She denies abdominal pain/CP/SOB/N/V/D. Review of Systems   Musculoskeletal:  Positive for back pain and gait problem. All other systems reviewed and are negative. Objective:    VITALS:   Vitals:    12/08/23 1351   BP: 139/78   Pulse: 73   Resp: 18   Temp: (!) 97.3 °F (36.3 °C)   SpO2: 96%          Physical Exam  Vitals and nursing note reviewed. Constitutional:       General: She is not in acute distress. Appearance: Normal appearance. Comments: Pleasant elderly female, resting in bed, does not appear in any distress   HENT:      Head: Normocephalic and atraumatic. Nose: No congestion or rhinorrhea. Mouth/Throat:      Mouth: Mucous membranes are moist.   Eyes:      General: No scleral icterus. Conjunctiva/sclera: Conjunctivae normal.      Pupils: Pupils are equal, round, and reactive to light. Cardiovascular:      Rate and Rhythm: Normal rate and regular rhythm. Pulses: Normal pulses. Heart sounds: Normal heart sounds. No murmur heard. Pulmonary:      Effort: Pulmonary effort is normal. No respiratory distress. Breath sounds: Normal breath sounds. No wheezing, rhonchi or rales. Comments: Diminished at bases but clear   Abdominal:      General: Bowel sounds are normal. There is no distension. Palpations: Abdomen is soft. There is no mass. Tenderness: There is no abdominal tenderness. Hernia: No hernia is present. Musculoskeletal:         General: No swelling or tenderness. Lymphadenopathy:      Cervical: No cervical adenopathy. Skin:     General: Skin is warm and dry. Coloration: Skin is not pale. Findings: No rash. Neurological:      General: No focal deficit present. Mental Status: She is alert and oriented to person, place, and time. Mental status is at baseline. Motor: Weakness present.       Gait: Gait abnormal.      Comments: AAOx2  Forgetful  Clear Coherent Speech    Psychiatric:         Mood and Affect: Mood normal.         Behavior: Behavior normal.             Current Outpatient Medications:     acetaminophen (TYLENOL) 325 mg tablet, Take 975 mg by mouth every 8 (eight) hours, Disp: , Rfl:     amLODIPine (NORVASC) 2.5 mg tablet, Take 1 tablet (2.5 mg total) by mouth 2 (two) times a day, Disp: 180 tablet, Rfl: 1    aspirin 81 mg chewable tablet, Chew 1 tablet (81 mg total) daily Do not start before June 5, 2023., Disp: 30 tablet, Rfl: 0    Carboxymethylcellul-Glycerin 0.5-0.9 % SOLN, Apply to eye Drops to b/l eyes, Disp: , Rfl:     Cholecalciferol (VITAMIN D-3) 1000 UNITS CAPS, Take 2,000 Units by mouth daily  , Disp: , Rfl:     escitalopram (LEXAPRO) 5 mg tablet, Take 1 tablet (5 mg total) by mouth daily, Disp: 90 tablet, Rfl: 1    levothyroxine (Synthroid) 150 mcg tablet, Take 1 tab PO 5 days a week, Disp: 80 tablet, Rfl: 1    metoprolol succinate (TOPROL-XL) 25 mg 24 hr tablet, Take 1 tablet (25 mg total) by mouth daily, Disp: 30 tablet, Rfl: 0    vitamin B-12 (VITAMIN B-12) 1,000 mcg tablet, Take 2 days a week- Mon and Fri, Disp: 30 tablet, Rfl: 0      Please note:  Voice-recognition software may have been used in the preparation of this document. Occasional wrong word or "sound-alike" substitutions may have occurred due to the inherent limitations of voice recognition software. Interpretation should be guided by context.        FLOYD Webster  12/08/23  2:07 PM

## 2023-12-09 ENCOUNTER — TELEPHONE (OUTPATIENT)
Dept: OTHER | Facility: OTHER | Age: 88
End: 2023-12-09

## 2023-12-09 ENCOUNTER — APPOINTMENT (EMERGENCY)
Dept: CT IMAGING | Facility: HOSPITAL | Age: 88
DRG: 092 | End: 2023-12-09
Payer: MEDICARE

## 2023-12-09 ENCOUNTER — HOSPITAL ENCOUNTER (INPATIENT)
Facility: HOSPITAL | Age: 88
LOS: 2 days | Discharge: NURSING, REHAB OR LONG-TERM CARE | DRG: 092 | End: 2023-12-12
Attending: EMERGENCY MEDICINE | Admitting: FAMILY MEDICINE
Payer: MEDICARE

## 2023-12-09 DIAGNOSIS — R26.2 AMBULATORY DYSFUNCTION: ICD-10-CM

## 2023-12-09 DIAGNOSIS — R53.1 GENERALIZED WEAKNESS: ICD-10-CM

## 2023-12-09 DIAGNOSIS — I10 ESSENTIAL HYPERTENSION: ICD-10-CM

## 2023-12-09 DIAGNOSIS — M54.9 BACK PAIN: Primary | ICD-10-CM

## 2023-12-09 PROBLEM — M54.50 ACUTE MIDLINE LOW BACK PAIN WITHOUT SCIATICA: Status: ACTIVE | Noted: 2023-12-09

## 2023-12-09 PROBLEM — R06.02 SOB (SHORTNESS OF BREATH): Status: ACTIVE | Noted: 2023-12-09

## 2023-12-09 LAB
ALBUMIN SERPL BCP-MCNC: 3.5 G/DL (ref 3.5–5)
ALP SERPL-CCNC: 66 U/L (ref 34–104)
ALT SERPL W P-5'-P-CCNC: 10 U/L (ref 7–52)
ANION GAP SERPL CALCULATED.3IONS-SCNC: 8 MMOL/L
AST SERPL W P-5'-P-CCNC: 16 U/L (ref 13–39)
BASOPHILS # BLD AUTO: 0.03 THOUSANDS/ÂΜL (ref 0–0.1)
BASOPHILS NFR BLD AUTO: 0 % (ref 0–1)
BILIRUB SERPL-MCNC: 0.56 MG/DL (ref 0.2–1)
BUN SERPL-MCNC: 24 MG/DL (ref 5–25)
CALCIUM SERPL-MCNC: 8.3 MG/DL (ref 8.4–10.2)
CHLORIDE SERPL-SCNC: 105 MMOL/L (ref 96–108)
CO2 SERPL-SCNC: 24 MMOL/L (ref 21–32)
CREAT SERPL-MCNC: 0.68 MG/DL (ref 0.6–1.3)
EOSINOPHIL # BLD AUTO: 0.21 THOUSAND/ÂΜL (ref 0–0.61)
EOSINOPHIL NFR BLD AUTO: 3 % (ref 0–6)
ERYTHROCYTE [DISTWIDTH] IN BLOOD BY AUTOMATED COUNT: 14.6 % (ref 11.6–15.1)
FLUAV RNA RESP QL NAA+PROBE: NEGATIVE
FLUBV RNA RESP QL NAA+PROBE: NEGATIVE
GFR SERPL CREATININE-BSD FRML MDRD: 73 ML/MIN/1.73SQ M
GLUCOSE SERPL-MCNC: 83 MG/DL (ref 65–140)
HCT VFR BLD AUTO: 38 % (ref 34.8–46.1)
HGB BLD-MCNC: 12.5 G/DL (ref 11.5–15.4)
IMM GRANULOCYTES # BLD AUTO: 0.03 THOUSAND/UL (ref 0–0.2)
IMM GRANULOCYTES NFR BLD AUTO: 0 % (ref 0–2)
LYMPHOCYTES # BLD AUTO: 2.79 THOUSANDS/ÂΜL (ref 0.6–4.47)
LYMPHOCYTES NFR BLD AUTO: 34 % (ref 14–44)
MCH RBC QN AUTO: 30.2 PG (ref 26.8–34.3)
MCHC RBC AUTO-ENTMCNC: 32.9 G/DL (ref 31.4–37.4)
MCV RBC AUTO: 92 FL (ref 82–98)
MONOCYTES # BLD AUTO: 0.89 THOUSAND/ÂΜL (ref 0.17–1.22)
MONOCYTES NFR BLD AUTO: 11 % (ref 4–12)
NEUTROPHILS # BLD AUTO: 4.18 THOUSANDS/ÂΜL (ref 1.85–7.62)
NEUTS SEG NFR BLD AUTO: 52 % (ref 43–75)
NRBC BLD AUTO-RTO: 0 /100 WBCS
PLATELET # BLD AUTO: 235 THOUSANDS/UL (ref 149–390)
PMV BLD AUTO: 10.3 FL (ref 8.9–12.7)
POTASSIUM SERPL-SCNC: 3.8 MMOL/L (ref 3.5–5.3)
PROT SERPL-MCNC: 6.8 G/DL (ref 6.4–8.4)
RBC # BLD AUTO: 4.14 MILLION/UL (ref 3.81–5.12)
RSV RNA RESP QL NAA+PROBE: NEGATIVE
SARS-COV-2 RNA RESP QL NAA+PROBE: NEGATIVE
SODIUM SERPL-SCNC: 137 MMOL/L (ref 135–147)
WBC # BLD AUTO: 8.13 THOUSAND/UL (ref 4.31–10.16)

## 2023-12-09 PROCEDURE — 99285 EMERGENCY DEPT VISIT HI MDM: CPT | Performed by: EMERGENCY MEDICINE

## 2023-12-09 PROCEDURE — 85025 COMPLETE CBC W/AUTO DIFF WBC: CPT

## 2023-12-09 PROCEDURE — 71260 CT THORAX DX C+: CPT

## 2023-12-09 PROCEDURE — 99222 1ST HOSP IP/OBS MODERATE 55: CPT | Performed by: INTERNAL MEDICINE

## 2023-12-09 PROCEDURE — 99284 EMERGENCY DEPT VISIT MOD MDM: CPT

## 2023-12-09 PROCEDURE — 80053 COMPREHEN METABOLIC PANEL: CPT

## 2023-12-09 PROCEDURE — 87081 CULTURE SCREEN ONLY: CPT

## 2023-12-09 PROCEDURE — 74177 CT ABD & PELVIS W/CONTRAST: CPT

## 2023-12-09 PROCEDURE — 0241U HB NFCT DS VIR RESP RNA 4 TRGT: CPT | Performed by: INTERNAL MEDICINE

## 2023-12-09 PROCEDURE — G1004 CDSM NDSC: HCPCS

## 2023-12-09 PROCEDURE — 36415 COLL VENOUS BLD VENIPUNCTURE: CPT

## 2023-12-09 RX ORDER — SODIUM CHLORIDE 9 MG/ML
50 INJECTION, SOLUTION INTRAVENOUS CONTINUOUS
Status: DISPENSED | OUTPATIENT
Start: 2023-12-09 | End: 2023-12-10

## 2023-12-09 RX ORDER — ENOXAPARIN SODIUM 100 MG/ML
40 INJECTION SUBCUTANEOUS DAILY
Status: DISCONTINUED | OUTPATIENT
Start: 2023-12-10 | End: 2023-12-12 | Stop reason: HOSPADM

## 2023-12-09 RX ORDER — ACETAMINOPHEN 325 MG/1
975 TABLET ORAL ONCE
Status: COMPLETED | OUTPATIENT
Start: 2023-12-09 | End: 2023-12-09

## 2023-12-09 RX ORDER — MUSCLE RUB CREAM 100; 150 MG/G; MG/G
CREAM TOPICAL 4 TIMES DAILY PRN
Status: DISCONTINUED | OUTPATIENT
Start: 2023-12-09 | End: 2023-12-12 | Stop reason: HOSPADM

## 2023-12-09 RX ORDER — DOCUSATE SODIUM 100 MG/1
100 CAPSULE, LIQUID FILLED ORAL DAILY
Status: DISCONTINUED | OUTPATIENT
Start: 2023-12-10 | End: 2023-12-12 | Stop reason: HOSPADM

## 2023-12-09 RX ORDER — ESCITALOPRAM OXALATE 10 MG/1
5 TABLET ORAL
Status: DISCONTINUED | OUTPATIENT
Start: 2023-12-09 | End: 2023-12-12 | Stop reason: HOSPADM

## 2023-12-09 RX ORDER — LEVOTHYROXINE SODIUM 0.15 MG/1
150 TABLET ORAL
Status: DISCONTINUED | OUTPATIENT
Start: 2023-12-11 | End: 2023-12-12 | Stop reason: HOSPADM

## 2023-12-09 RX ORDER — ACETAMINOPHEN 325 MG/1
975 TABLET ORAL EVERY 8 HOURS
Status: DISCONTINUED | OUTPATIENT
Start: 2023-12-09 | End: 2023-12-11

## 2023-12-09 RX ORDER — MELATONIN
1000 DAILY
Status: DISCONTINUED | OUTPATIENT
Start: 2023-12-10 | End: 2023-12-12 | Stop reason: HOSPADM

## 2023-12-09 RX ORDER — METOPROLOL SUCCINATE 25 MG/1
25 TABLET, EXTENDED RELEASE ORAL DAILY
Status: DISCONTINUED | OUTPATIENT
Start: 2023-12-10 | End: 2023-12-12 | Stop reason: HOSPADM

## 2023-12-09 RX ORDER — AMLODIPINE BESYLATE 2.5 MG/1
2.5 TABLET ORAL 2 TIMES DAILY
Status: DISCONTINUED | OUTPATIENT
Start: 2023-12-09 | End: 2023-12-10

## 2023-12-09 RX ORDER — CALCIUM CARBONATE 500 MG/1
1000 TABLET, CHEWABLE ORAL DAILY PRN
Status: DISCONTINUED | OUTPATIENT
Start: 2023-12-09 | End: 2023-12-12 | Stop reason: HOSPADM

## 2023-12-09 RX ADMIN — ACETAMINOPHEN 975 MG: 325 TABLET, FILM COATED ORAL at 18:07

## 2023-12-09 RX ADMIN — SODIUM CHLORIDE 50 ML/HR: 0.9 INJECTION, SOLUTION INTRAVENOUS at 20:02

## 2023-12-09 RX ADMIN — AMLODIPINE BESYLATE 2.5 MG: 2.5 TABLET ORAL at 22:15

## 2023-12-09 RX ADMIN — ESCITALOPRAM OXALATE 5 MG: 10 TABLET ORAL at 22:15

## 2023-12-09 RX ADMIN — IOHEXOL 100 ML: 350 INJECTION, SOLUTION INTRAVENOUS at 15:42

## 2023-12-09 NOTE — TELEPHONE ENCOUNTER
Dorothy Fernandes from [address] [place] [address] called to speak to on call provider regarding pt's pain management. Contacted on call via TC.

## 2023-12-09 NOTE — ED ATTENDING ATTESTATION
12/9/2023  I, Margaux Benjamin DO, saw and evaluated the patient. I have discussed the patient with the resident/non-physician practitioner and agree with the resident's/non-physician practitioner's findings, Plan of Care, and MDM as documented in the resident's/non-physician practitioner's note, except where noted. All available labs and Radiology studies were reviewed.  I was present for key portions of any procedure(s) performed by the resident/non-physician practitioner and I was immediately available to provide assistance.       At this point I agree with the current assessment done in the Emergency Department.  I have conducted an independent evaluation of this patient a history and physical is as follows:    History  Patient is a 89 y.o. year old female with a past medical history of hypertension, hypothyroidism, breast cancer, osteoporosis, thoracic aortic aneurysm, and previous episodes of pneumonia who presents for evaluation with low back pain.  Per patient's daughter, she had a reported fall on Tuesday.  Patient had told her daughter that she had fallen while trying to walk to the bathroom and landed on her buttocks. No reported head strike. Patient's daughter states that since then, she has had progressively worsening low back pain.  Patient is also not been able to ambulate and has had decreased appetite.  She states that the patient does have somewhat frequent falls because she forgets to press her call button for help at the nursing facility.  She states that this morning, the patient was noted to be belching and had endorsed some nausea.  She also had an episode of coughing productive of clear sputum.  She did have an episode of fecal incontinence this morning, but has not had any issues with urination or other bowel or bladder problems since the fall.  Patient has been receiving Tylenol and lidocaine patches without significant relief in symptoms.  Patient currently endorses low back pain as well as  some abdominal pain.  She denies any chest pain or shortness of breath.  No known fevers recently.  Patient's daughter does state that she does seem slightly confused currently, but that she does sometimes become confused.     Current Outpatient Medications   Medication Instructions    acetaminophen (TYLENOL) 975 mg, Oral, Every 8 hours    amLODIPine (NORVASC) 2.5 mg, Oral, 2 times daily    aspirin 81 mg, Oral, Daily    Carboxymethylcellul-Glycerin 0.5-0.9 % SOLN Ophthalmic, Drops to b/l eyes    escitalopram (LEXAPRO) 5 mg, Oral, Daily    levothyroxine (Synthroid) 150 mcg tablet Take 1 tab PO 5 days a week    metoprolol succinate (TOPROL-XL) 25 mg, Oral, Daily    vitamin B-12 (VITAMIN B-12) 1,000 mcg tablet Take 2 days a week- Mon and Fri    Vitamin D-3 2,000 Units, Oral, Daily     Past Medical History:   Diagnosis Date    AAA (abdominal aortic aneurysm) (HCC)     Acute medial meniscus tear     Arthritis 1980    Aspiration pneumonia (HCC)     LAST ASSESSED: 7/30/14    Breast CA (HCC)     left    Cancer (HCC) 2017    Chest pain     RESOLVED: 1/31/17    CTS (carpal tunnel syndrome)     Depression     Dermatitis     LAST ASSESSED: 7/25/13    Disease of thyroid gland     Fainting     LAST ASSESSED: 3/15/13    GERD (gastroesophageal reflux disease)     H. pylori infection 03/17/2009    Hypertension     Incomplete defecation     LAST ASSESSED: 7/25/13    Macular degeneration     Osteoporosis     Pneumonia     Vertigo 2012     Past Surgical History:   Procedure Laterality Date    APPENDECTOMY      CHOLECYSTECTOMY      COLONOSCOPY      MASTECTOMY Left 09/2017    SIMPLE    MD INTRAOP SENTINEL LYMPH NODE ID W/DYE INJECTION Left 9/5/2017    Procedure: INTRAOPERATIVE LYMPHATIC MAPPING; BLUE DYE ONLY; SENITNEL LYMPH NODE BIOPSY;  Surgeon: Marcelo Reddy MD;  [facility];  Service: Surgical Oncology    MD MASTECTOMY SIMPLE COMPLETE Left 9/5/2017    Procedure: BREAST MASTECTOMY;  Surgeon: Marcelo Reddy MD;  [facility];   Service: Surgical Oncology    SENTINEL LYMPH NODE BIOPSY      US GUIDED BREAST BIOPSY LEFT COMPLETE Left 8/14/2017       Objective  Vitals:    12/09/23 1359 12/09/23 1445 12/09/23 1719 12/09/23 1730   BP:  134/63 152/82 152/82   BP Location:  Right arm Right arm Right arm   Pulse:  68 90 86   Resp:  16 18 18   Temp:       TempSrc:       SpO2: 95% 92% 93% 93%   Weight:           General: VSS, NAD, awake, alert.    Head: Normocephalic, atraumatic.  Eyes: PERRL, EOM-I.   ENT: Atraumatic external nose and ears. Mucous membranes mildly dry. No malocclusion. No stridor.   Neck: Symmetric, supple, trachea midline.  CV: RRR. +S1/S2. No murmurs. Peripheral pulses +2 throughout.   Lungs: Respirations unlabored, no tachypnea. CTAB, lungs sounds equal bilateral.   Abd: soft, ND. Mild generalized tenderness without guarding. No peritoneal signs.   MSK: Extremities without tenderness or gross deformity. No lower extremity edema. Midline tenderness of the thoracic and lumbar spine without step-offs or deformities.   Skin: Dry, intact. No lesions.  Neuro: Confused, GCS 14, CN II-XII grossly intact. Motor grossly intact. Sensory grossly intact  Psychiatric/Behavioral: Appropriate mood and affect. Behavior normal.    Results Reviewed       Procedure Component Value Units Date/Time    Comprehensive metabolic panel [[id number]]  (Abnormal) Collected: 12/09/23 1435    Lab Status: Final result Specimen: Blood from Arm, Right Updated: 12/09/23 1455     Sodium 137 mmol/L      Potassium 3.8 mmol/L      Chloride 105 mmol/L      CO2 24 mmol/L      ANION GAP 8 mmol/L      BUN 24 mg/dL      Creatinine 0.68 mg/dL      Glucose 83 mg/dL      Calcium 8.3 mg/dL      AST 16 U/L      ALT 10 U/L      Alkaline Phosphatase 66 U/L      Total Protein 6.8 g/dL      Albumin 3.5 g/dL      Total Bilirubin 0.56 mg/dL      eGFR 73 ml/min/1.73sq m     Narrative:      National Kid[employer] Disease Foundation guidelines for Chronic Kidney Disease (CKD):     Stage 1 with  normal or high GFR (GFR > 90 mL/min/1.73 square meters)    Stage 2 Mild CKD (GFR = 60-89 mL/min/1.73 square meters)    Stage 3A Moderate CKD (GFR = 45-59 mL/min/1.73 square meters)    Stage 3B Moderate CKD (GFR = 30-44 mL/min/1.73 square meters)    Stage 4 Severe CKD (GFR = 15-29 mL/min/1.73 square meters)    Stage 5 End Stage CKD (GFR <15 mL/min/1.73 square meters)  Note: GFR calculation is accurate only with a steady state creatinine    CBC and differential [[id number]] Collected: 12/09/23 1435    Lab Status: Final result Specimen: Blood from Arm, Right Updated: 12/09/23 1442     WBC 8.13 Thousand/uL      RBC 4.14 Million/uL      Hemoglobin 12.5 g/dL      Hematocrit 38.0 %      MCV 92 fL      MCH 30.2 pg      MCHC 32.9 g/dL      RDW 14.6 %      MPV 10.3 fL      Platelets 235 Thousands/uL      nRBC 0 /100 WBCs      Neutrophils Relative 52 %      Immat GRANS % 0 %      Lymphocytes Relative 34 %      Monocytes Relative 11 %      Eosinophils Relative 3 %      Basophils Relative 0 %      Neutrophils Absolute 4.18 Thousands/µL      Immature Grans Absolute 0.03 Thousand/uL      Lymphocytes Absolute 2.79 Thousands/µL      Monocytes Absolute 0.89 Thousand/µL      Eosinophils Absolute 0.21 Thousand/µL      Basophils Absolute 0.03 Thousands/µL     UA w Reflex to Microscopic w Reflex to Culture [[id number]]     Lab Status: No result Specimen: Urine           CT chest abdomen pelvis w contrast   Final Result by Ab Zapien MD (12/09 1646)      1.  Minimal interstitial edema with trace bilateral pleural effusions.   2.  No acute findings in the abdomen or pelvis.               Workstation performed: [id number]           Medications   acetaminophen (TYLENOL) tablet 975 mg (has no administration in time range)   iohexol (OMNIPAQUE) 350 MG/ML injection (MULTI-DOSE) 100 mL (100 mL Intravenous Given 12/9/23 3242)          MDM  89 year old female presents with worsening back pain, difficulty with ambulating, decreased oral  intake, and increased confusion after a fall a few days ago. On exam, patient is mildly confused, but otherwise with normal vitals, in no acute distress. Patient does have thoracic and lumbar spinal tenderness on exam, as well as some mild generalized abdominal tenderness. Exam otherwise unremarkable. Differential diagnosis includes, but is not limited to, electrolyte disturbances, UTI, dehydration, spinal fracture, AAA, gastroenteritis, blunt abdominal injury, pneumonia, or other acute pathology. Patient evaluated with CBC, CMP, UA, and CT scan of the chest, abdomen, and pelvis.     Patient's labs unremarkable. UA not obtained while patient was in the emergency department. CT scan negative for acute pathology. However, given that patient has been unable to ambulate on her own, and has not been eating or drinking a normal amount, there is concern about sending the patient back to her nursing facility at this time. Patient therefore admitted to the hospitalist service for further evaluation, physical therapy, and possible rehab placement. Patient admitted in stable condition.       ED Course         Critical Care Time  Procedures

## 2023-12-09 NOTE — LETTER
Thank you for allowing us to participate in the care of your patient, Kyra Christianson, who was hospitalized from 12/9/2023 through 12/12/2023 with the admitting diagnosis of ambulatory dysfunction after a fall.  Patient also had slightly low potassium and magnesium in the hospital. They were repleted, but Mg was 0.1 off normal limits on the day of discharge. Mg infusion was discontinued prior to discharge as patient and her daughter prepared for discharge. You may consider monitoring her Mg and K and replete as needed. Thank you!      If you have any additional questions or would like to discuss further, please feel free to contact me.    Pamela Barfield DO  [facility] Internal Medicine, Hospitalist  [phone]

## 2023-12-09 NOTE — ED NOTES
Updated [place] from Nursing Facility on Patients Disposition @[phone]     Terrie Salter RN  12/09/23 2443

## 2023-12-09 NOTE — ED PROVIDER NOTES
History  Chief Complaint   Patient presents with    Back Pain     Pt arrives [facility] [facility]. C/o lower back pain.      89-year-old female with past medical history of hypertension, GERD, known AAA, presents today from acute rehab facility following recent fall.  Family reports that patient fell a few days ago on 12/5/23 and was initially not complaining of any pain.  Patient has a history of known L2, L3 and T12 compression fractures.  They repeated lumbosacral and pelvic x-rays, which were negative for acute fractures.  Patient began complaining of lower back pain for which she was provided with lidocaine patches.  Family was concerned that patient was not acting like herself and had a decreased appetite.  They took her to the ED today for further evaluation.  Currently in the ED, patient only complaining of bilateral lower back pain.  She arrives with lidocaine patch in place.  She does report that since falling she has had dysuria.  Denies fevers, recent illness, chest pain, shortness of breath, abdominal pain, vomiting, numbness, weakness, bowel or bladder incontinence.      Back Pain  Associated symptoms: dysuria    Associated symptoms: no abdominal pain, no chest pain and no fever        Prior to Admission Medications   Prescriptions Last Dose Informant Patient Reported? Taking?   Carboxymethylcellul-Glycerin 0.5-0.9 % SOLN  Child Yes No   Sig: Apply to eye Drops to b/l eyes   Cholecalciferol (VITAMIN D-3) 1000 UNITS CAPS  Child Yes No   Sig: Take 2,000 Units by mouth daily     acetaminophen (TYLENOL) 325 mg tablet   Yes No   Sig: Take 975 mg by mouth every 8 (eight) hours   amLODIPine (NORVASC) 2.5 mg tablet  Child No No   Sig: Take 1 tablet (2.5 mg total) by mouth 2 (two) times a day   aspirin 81 mg chewable tablet   No No   Sig: Chew 1 tablet (81 mg total) daily Do not start before June 5, 2023.   escitalopram (LEXAPRO) 5 mg tablet  Child No No   Sig: Take 1 tablet (5 mg total) by mouth daily    levothyroxine (Synthroid) 150 mcg tablet   No No   Sig: Take 1 tab PO 5 days a week   metoprolol succinate (TOPROL-XL) 25 mg 24 hr tablet   No No   Sig: Take 1 tablet (25 mg total) by mouth daily   vitamin B-12 (VITAMIN B-12) 1,000 mcg tablet   No No   Sig: Take 2 days a week- Mon and Fri      Facility-Administered Medications: None       Past Medical History:   Diagnosis Date    AAA (abdominal aortic aneurysm) (HCC)     Acute medial meniscus tear     Arthritis 1980    Aspiration pneumonia (HCC)     LAST ASSESSED: 7/30/14    Breast CA (HCC)     left    Cancer (HCC) 2017    Chest pain     RESOLVED: 1/31/17    CTS (carpal tunnel syndrome)     Depression     Dermatitis     LAST ASSESSED: 7/25/13    Disease of thyroid gland     Fainting     LAST ASSESSED: 3/15/13    GERD (gastroesophageal reflux disease)     H. pylori infection 03/17/2009    Hypertension     Incomplete defecation     LAST ASSESSED: 7/25/13    Macular degeneration     Osteoporosis     Pneumonia     Vertigo 2012       Past Surgical History:   Procedure Laterality Date    APPENDECTOMY      CHOLECYSTECTOMY      COLONOSCOPY      MASTECTOMY Left 09/2017    SIMPLE    NC INTRAOP SENTINEL LYMPH NODE ID W/DYE INJECTION Left 9/5/2017    Procedure: INTRAOPERATIVE LYMPHATIC MAPPING; BLUE DYE ONLY; SENITNEL LYMPH NODE BIOPSY;  Surgeon: Marcelo Reddy MD;  Location: [facility];  Service: Surgical Oncology    NC MASTECTOMY SIMPLE COMPLETE Left 9/5/2017    Procedure: BREAST MASTECTOMY;  Surgeon: Marcelo Reddy MD;  Location: [facility];  Service: Surgical Oncology    SENTINEL LYMPH NODE BIOPSY      US GUIDED BREAST BIOPSY LEFT COMPLETE Left 8/14/2017       Family History   Problem Relation Age of Onset    Angina Mother         PECTORIS    Alcohol abuse Neg Hx     Depression Neg Hx     Drug abuse Neg Hx     Substance Abuse Neg Hx      I have reviewed and agree with the history as documented.    E-Cigarette/Vaping    E-Cigarette Use Never User      E-Cigarette/Vaping Substances     Nicotine No     THC No     CBD No     Flavoring No     Other No     Unknown No      Social History     Tobacco Use    Smoking status: Never    Smokeless tobacco: Never   Vaping Use    Vaping status: Never Used   Substance Use Topics    Alcohol use: Not Currently    Drug use: No        Review of Systems   Constitutional:  Positive for appetite change. Negative for chills and fever.   HENT:  Negative for ear pain and sore throat.    Eyes:  Negative for pain and visual disturbance.   Respiratory:  Negative for cough and shortness of breath.    Cardiovascular:  Negative for chest pain and palpitations.   Gastrointestinal:  Negative for abdominal pain and vomiting.   Genitourinary:  Positive for dysuria. Negative for hematuria.   Musculoskeletal:  Positive for back pain. Negative for arthralgias.   Skin:  Negative for color change and rash.   Neurological:  Negative for seizures and syncope.   All other systems reviewed and are negative.      Physical Exam  ED Triage Vitals [12/09/23 1357]   Temperature Pulse Respirations Blood Pressure SpO2   97.6 °F (36.4 °C) 75 16 (!) 178/92 94 %      Temp Source Heart Rate Source Patient Position - Orthostatic VS BP Location FiO2 (%)   Oral Monitor Lying Right arm --      Pain Score       No Pain             Orthostatic Vital Signs  Vitals:    12/11/23 0916 12/11/23 1700 12/11/23 2127 12/12/23 0911   BP: 134/85 142/88 138/84 149/76   Pulse: 77 78 60 74   Patient Position - Orthostatic VS:   Lying        Physical Exam  Vitals and nursing note reviewed.   Constitutional:       General: She is not in acute distress.     Appearance: Normal appearance. She is well-developed.      Comments: Pleasant, resting comfortably in exam bed    HENT:      Head: Normocephalic and atraumatic.   Eyes:      Conjunctiva/sclera: Conjunctivae normal.   Cardiovascular:      Rate and Rhythm: Normal rate and regular rhythm.      Heart sounds: No murmur heard.  Pulmonary:      Effort: Pulmonary effort is  normal. No respiratory distress.      Breath sounds: Normal breath sounds.   Abdominal:      General: Abdomen is flat.      Palpations: Abdomen is soft.      Tenderness: There is no abdominal tenderness. There is no right CVA tenderness, left CVA tenderness, guarding or rebound.   Musculoskeletal:      Cervical back: Neck supple.      Comments: Diffuse tenderness down lumbosacral spine, no step-offs or deformities, no overlying skin changes, lidocaine patch in place across lower back    Skin:     General: Skin is warm and dry.      Capillary Refill: Capillary refill takes less than 2 seconds.   Neurological:      General: No focal deficit present.      Mental Status: She is alert.      Comments: Moving all extremities, equal strength distally, sensation to light touch intact; no reported saddle anesthesia          ED Medications  Medications   sodium chloride 0.9 % infusion (0 mL/hr Intravenous Stopped 12/10/23 0554)   iohexol (OMNIPAQUE) 350 MG/ML injection (MULTI-DOSE) 100 mL (100 mL Intravenous Given 12/9/23 1542)   acetaminophen (TYLENOL) tablet 975 mg (975 mg Oral Given 12/9/23 1807)   magnesium sulfate 2 g/50 mL IVPB (premix) 2 g (2 g Intravenous New Bag 12/11/23 0915)   potassium chloride (K-DUR,KLOR-CON) CR tablet 20 mEq (20 mEq Oral Given 12/12/23 1431)       Diagnostic Studies  Results Reviewed       Procedure Component Value Units Date/Time    Comprehensive metabolic panel [[id number]]  (Abnormal) Collected: 12/09/23 1435    Lab Status: Final result Specimen: Blood from Arm, Right Updated: 12/09/23 1455     Sodium 137 mmol/L      Potassium 3.8 mmol/L      Chloride 105 mmol/L      CO2 24 mmol/L      ANION GAP 8 mmol/L      BUN 24 mg/dL      Creatinine 0.68 mg/dL      Glucose 83 mg/dL      Calcium 8.3 mg/dL      AST 16 U/L      ALT 10 U/L      Alkaline Phosphatase 66 U/L      Total Protein 6.8 g/dL      Albumin 3.5 g/dL      Total Bilirubin 0.56 mg/dL      eGFR 73 ml/min/1.73sq m     Narrative:      National  Kidney Disease Foundation guidelines for Chronic Kidney Disease (CKD):     Stage 1 with normal or high GFR (GFR > 90 mL/min/1.73 square meters)    Stage 2 Mild CKD (GFR = 60-89 mL/min/1.73 square meters)    Stage 3A Moderate CKD (GFR = 45-59 mL/min/1.73 square meters)    Stage 3B Moderate CKD (GFR = 30-44 mL/min/1.73 square meters)    Stage 4 Severe CKD (GFR = 15-29 mL/min/1.73 square meters)    Stage 5 End Stage CKD (GFR <15 mL/min/1.73 square meters)  Note: GFR calculation is accurate only with a steady state creatinine    CBC and differential [[id number]] Collected: 12/09/23 1435    Lab Status: Final result Specimen: Blood from Arm, Right Updated: 12/09/23 1442     WBC 8.13 Thousand/uL      RBC 4.14 Million/uL      Hemoglobin 12.5 g/dL      Hematocrit 38.0 %      MCV 92 fL      MCH 30.2 pg      MCHC 32.9 g/dL      RDW 14.6 %      MPV 10.3 fL      Platelets 235 Thousands/uL      nRBC 0 /100 WBCs      Neutrophils Relative 52 %      Immat GRANS % 0 %      Lymphocytes Relative 34 %      Monocytes Relative 11 %      Eosinophils Relative 3 %      Basophils Relative 0 %      Neutrophils Absolute 4.18 Thousands/µL      Immature Grans Absolute 0.03 Thousand/uL      Lymphocytes Absolute 2.79 Thousands/µL      Monocytes Absolute 0.89 Thousand/µL      Eosinophils Absolute 0.21 Thousand/µL      Basophils Absolute 0.03 Thousands/µL                    CT chest abdomen pelvis w contrast   Final Result by Ab Zapien MD (12/09 1646)      1.  Minimal interstitial edema with trace bilateral pleural effusions.   2.  No acute findings in the abdomen or pelvis.               Workstation performed: [id number]               Procedures  Procedures      ED Course                                       Medical Decision Making  89-year-old female with past medical history of hypertension, GERD, known AAA, presents today from acute rehab facility following recent fall, and recently began complaining of lower back pain.  Family at  bedside concerned about patient returning back to facility given that she is forgetful on when to use the call bell and subsequently has falls.  CT scan was negative for acute pathology while in the ED.  Given ongoing pain, difficulty ambulating, and need for possible placement in SNF, patient will require admission for further management, possible rehab/SNF, and PT/OT evaluation.  Family agreeable with this plan.     Amount and/or Complexity of Data Reviewed  Labs: ordered.  Radiology: ordered.    Risk  OTC drugs.  Prescription drug management.  Decision regarding hospitalization.          Disposition  Final diagnoses:   Back pain   Generalized weakness   Ambulatory dysfunction     Time reflects when diagnosis was documented in both MDM as applicable and the Disposition within this note       Time User Action Codes Description Comment    12/9/2023  5:44 PM Danika Horton Add [M54.9] Back pain     12/9/2023  5:44 PM Danika Horton Add [R53.1] Generalized weakness     12/9/2023  5:44 PM Danika Horton Add [R26.2] Ambulatory dysfunction     12/11/2023  3:57 PM Pamela Barfield Add [I10] Essential hypertension           ED Disposition       ED Disposition   Admit    Condition   Stable    Date/Time   Sat Dec 9, 2023  5:44 PM    Comment   Case was discussed with SLIM and the patient's admission status was agreed to be Admission Status: observation status to the service of Dr. Leal .               MD Documentation      Flowsheet Row Most Recent Value   Accepting Facility Name, [facility], [facility]    (Name & Tel number) Roundtrip   Transported by (Company and Unit #) [employer]          RN Documentation      Flowsheet Row Most Recent Value   Accepting Facility Name, [facility]    (Name & Tel number) Roundtrip   Transported by (Company and Unit #) [employer]   Transfer Date 12/12/23    Transfer Time 1500          Follow-up Information    None         Discharge Medication List as of 12/12/2023  2:55 PM        CONTINUE these medications which have CHANGED    Details   amLODIPine (NORVASC) 2.5 mg tablet Take 1 tablet (2.5 mg total) by mouth daily Do not start before December 12, 2023., Starting Tue 12/12/2023, No Print           CONTINUE these medications which have NOT CHANGED    Details   acetaminophen (TYLENOL) 325 mg tablet Take 975 mg by mouth every 8 (eight) hours, Historical Med      aspirin 81 mg chewable tablet Chew 1 tablet (81 mg total) daily Do not start before June 5, 2023., Starting Mon 6/5/2023, Until Fri 10/20/2023, No Print      Carboxymethylcellul-Glycerin 0.5-0.9 % SOLN Apply to eye Drops to b/l eyes, Historical Med      Cholecalciferol (VITAMIN D-3) 1000 UNITS CAPS Take 2,000 Units by mouth daily  , Starting Thu 8/20/2009, Historical Med      escitalopram (LEXAPRO) 5 mg tablet Take 1 tablet (5 mg total) by mouth daily, Starting Wed 2/22/2023, Normal      levothyroxine (Synthroid) 150 mcg tablet Take 1 tab PO 5 days a week, No Print      metoprolol succinate (TOPROL-XL) 25 mg 24 hr tablet Take 1 tablet (25 mg total) by mouth daily, Starting Sun 6/4/2023, Until Fri 10/20/2023, No Print      vitamin B-12 (VITAMIN B-12) 1,000 mcg tablet Take 2 days a week- Mon and Fri, No Print           Outpatient Discharge Orders   Physical Therapy Eval And Treat     Occupational Therapy Eval and Treat     Activity:  Per Rehab Recommendations       PDMP Review       None             ED Provider  Attending physically available and evaluated Kyra Christianson. I managed the patient along with the ED Attending.    Electronically Signed by           Danika Horton MD  12/17/23 7861

## 2023-12-10 LAB
ALBUMIN SERPL BCP-MCNC: 3.5 G/DL (ref 3.5–5)
ALP SERPL-CCNC: 64 U/L (ref 34–104)
ALT SERPL W P-5'-P-CCNC: 7 U/L (ref 7–52)
ANION GAP SERPL CALCULATED.3IONS-SCNC: 8 MMOL/L
AST SERPL W P-5'-P-CCNC: 11 U/L (ref 13–39)
BASOPHILS # BLD AUTO: 0.04 THOUSANDS/ÂΜL (ref 0–0.1)
BASOPHILS NFR BLD AUTO: 1 % (ref 0–1)
BILIRUB SERPL-MCNC: 0.61 MG/DL (ref 0.2–1)
BUN SERPL-MCNC: 19 MG/DL (ref 5–25)
CALCIUM SERPL-MCNC: 8.4 MG/DL (ref 8.4–10.2)
CHLORIDE SERPL-SCNC: 107 MMOL/L (ref 96–108)
CO2 SERPL-SCNC: 24 MMOL/L (ref 21–32)
CREAT SERPL-MCNC: 0.62 MG/DL (ref 0.6–1.3)
EOSINOPHIL # BLD AUTO: 0.29 THOUSAND/ÂΜL (ref 0–0.61)
EOSINOPHIL NFR BLD AUTO: 4 % (ref 0–6)
ERYTHROCYTE [DISTWIDTH] IN BLOOD BY AUTOMATED COUNT: 14.6 % (ref 11.6–15.1)
GFR SERPL CREATININE-BSD FRML MDRD: 75 ML/MIN/1.73SQ M
GLUCOSE SERPL-MCNC: 95 MG/DL (ref 65–140)
HCT VFR BLD AUTO: 37.2 % (ref 34.8–46.1)
HGB BLD-MCNC: 11.9 G/DL (ref 11.5–15.4)
IMM GRANULOCYTES # BLD AUTO: 0.04 THOUSAND/UL (ref 0–0.2)
IMM GRANULOCYTES NFR BLD AUTO: 1 % (ref 0–2)
LYMPHOCYTES # BLD AUTO: 2.8 THOUSANDS/ÂΜL (ref 0.6–4.47)
LYMPHOCYTES NFR BLD AUTO: 39 % (ref 14–44)
MCH RBC QN AUTO: 29.8 PG (ref 26.8–34.3)
MCHC RBC AUTO-ENTMCNC: 32 G/DL (ref 31.4–37.4)
MCV RBC AUTO: 93 FL (ref 82–98)
MONOCYTES # BLD AUTO: 0.77 THOUSAND/ÂΜL (ref 0.17–1.22)
MONOCYTES NFR BLD AUTO: 11 % (ref 4–12)
NEUTROPHILS # BLD AUTO: 3.3 THOUSANDS/ÂΜL (ref 1.85–7.62)
NEUTS SEG NFR BLD AUTO: 44 % (ref 43–75)
NRBC BLD AUTO-RTO: 0 /100 WBCS
PLATELET # BLD AUTO: 240 THOUSANDS/UL (ref 149–390)
PMV BLD AUTO: 10.1 FL (ref 8.9–12.7)
POTASSIUM SERPL-SCNC: 3.7 MMOL/L (ref 3.5–5.3)
PROT SERPL-MCNC: 6.6 G/DL (ref 6.4–8.4)
RBC # BLD AUTO: 3.99 MILLION/UL (ref 3.81–5.12)
SODIUM SERPL-SCNC: 139 MMOL/L (ref 135–147)
WBC # BLD AUTO: 7.24 THOUSAND/UL (ref 4.31–10.16)

## 2023-12-10 PROCEDURE — 99232 SBSQ HOSP IP/OBS MODERATE 35: CPT | Performed by: FAMILY MEDICINE

## 2023-12-10 PROCEDURE — 85025 COMPLETE CBC W/AUTO DIFF WBC: CPT

## 2023-12-10 PROCEDURE — 80053 COMPREHEN METABOLIC PANEL: CPT

## 2023-12-10 RX ORDER — AMLODIPINE BESYLATE 2.5 MG/1
2.5 TABLET ORAL DAILY
Status: DISCONTINUED | OUTPATIENT
Start: 2023-12-11 | End: 2023-12-10

## 2023-12-10 RX ADMIN — GLYCERIN 1 DROP: .002; .002; .01 SOLUTION/ DROPS OPHTHALMIC at 05:08

## 2023-12-10 RX ADMIN — DOCUSATE SODIUM 100 MG: 100 CAPSULE, LIQUID FILLED ORAL at 09:38

## 2023-12-10 RX ADMIN — ACETAMINOPHEN 975 MG: 325 TABLET, FILM COATED ORAL at 17:46

## 2023-12-10 RX ADMIN — ESCITALOPRAM OXALATE 5 MG: 10 TABLET ORAL at 21:53

## 2023-12-10 RX ADMIN — ACETAMINOPHEN 975 MG: 325 TABLET, FILM COATED ORAL at 09:37

## 2023-12-10 RX ADMIN — ENOXAPARIN SODIUM 40 MG: 40 INJECTION SUBCUTANEOUS at 09:37

## 2023-12-10 RX ADMIN — AMLODIPINE BESYLATE 2.5 MG: 2.5 TABLET ORAL at 09:42

## 2023-12-10 RX ADMIN — ACETAMINOPHEN 975 MG: 325 TABLET, FILM COATED ORAL at 05:07

## 2023-12-10 RX ADMIN — METOPROLOL SUCCINATE 25 MG: 25 TABLET, EXTENDED RELEASE ORAL at 09:42

## 2023-12-10 RX ADMIN — Medication 1000 UNITS: at 09:38

## 2023-12-10 NOTE — ASSESSMENT & PLAN NOTE
Low back pain after fall on 12/4 where patient was using her walker then trying to open a drawer when she fell backwards. No head strike, no LOC. Xray of pelvis and back obtained at nursing facility per daughter and showed no acute abnormalities but showed prior T12 compression fracture from fall in 3/2023 (unable to see imaging). Tylenol 975 mg q8hrs at nursing facility and without relief. Patient able to ambulate after fall but pain progressively worse and less appetite and ambulation since 12/6.   PT/OT evaluation ordered  Tylenol 975 mg q8 hours scheduled   Orthostatic VS ordered, maintenance fluid initiated due to possible dehydration, will reassess orthostatic VS tomorrow am   B[place] ordered prn for pain relief

## 2023-12-10 NOTE — PLAN OF CARE
Problem: PAIN - ADULT  Goal: Verbalizes/displays adequate comfort level or baseline comfort level  Description: Interventions:  - Encourage patient to monitor pain and request assistance  - Assess pain using appropriate pain scale  - Administer analgesics based on type and severity of pain and evaluate response  - Implement non-pharmacological measures as appropriate and evaluate response  - Consider cultural and social influences on pain and pain management  - Notify physician/advanced practitioner if interventions unsuccessful or patient reports new pain  Outcome: Progressing     Problem: INFECTION - ADULT  Goal: Absence or prevention of progression during hospitalization  Description: INTERVENTIONS:  - Assess and monitor for signs and symptoms of infection  - Monitor lab/diagnostic results  - Monitor all insertion sites, i.e. indwelling lines, tubes, and drains  - Monitor endotracheal if appropriate and nasal secretions for changes in amount and color  - [place] appropriate cooling/warming therapies per order  - Administer medications as ordered  - Instruct and encourage patient and family to use good hand hygiene technique  - Identify and instruct in appropriate isolation precautions for identified infection/condition  Outcome: Progressing  Goal: Absence of fever/infection during neutropenic period  Description: INTERVENTIONS:  - Monitor WBC    Outcome: Progressing     Problem: SAFETY ADULT  Goal: Patient will remain free of falls  Description: INTERVENTIONS:  - Educate patient/family on patient safety including physical limitations  - Instruct patient to call for assistance with activity   - Consult OT/PT to assist with strengthening/mobility   - Keep Call bell within reach  - Keep bed low and locked with side rails adjusted as appropriate  - Keep care items and personal belongings within reach  - Initiate and maintain comfort rounds  - Make Fall Risk Sign visible to staff  - Offer Toileting every  Hours,  in advance of need  - Initiate/Maintain alarm  - Obtain necessary fall risk management equipment:   - Apply yellow socks and bracelet for high fall risk patients  - Consider moving patient to room near nurses station  Outcome: Progressing  Goal: Maintain or return to baseline ADL function  Description: INTERVENTIONS:  -  Assess patient's ability to carry out ADLs; assess patient's baseline for ADL function and identify physical deficits which impact ability to perform ADLs (bathing, care of mouth/teeth, toileting, grooming, dressing, etc.)  - Assess/evaluate cause of self-care deficits   - Assess range of motion  - Assess patient's mobility; develop plan if impaired  - Assess patient's need for assistive devices and provide as appropriate  - Encourage maximum independence but intervene and supervise when necessary  - Involve family in performance of ADLs  - Assess for home care needs following discharge   - Consider OT consult to assist with ADL evaluation and planning for discharge  - Provide patient education as appropriate  Outcome: Progressing  Goal: Maintains/Returns to pre admission functional level  Description: INTERVENTIONS:  - Perform AM-PAC 6 Click Basic Mobility/ Daily Activity assessment daily.  - Set and communicate daily mobility goal to care team and patient/family/caregiver.   - Collaborate with rehabilitation services on mobility goals if consulted  - Perform Range of Motion  times a day.  - Reposition patient every  hours.  - Dangle patient  times a day  - Stand patient  times a day  - Ambulate patient  times a day  - Out of bed to chair  times a day   - Out of bed for meals  times a day  - Out of bed for toileting  - Record patient progress and toleration of activity level   Outcome: Progressing     Problem: DISCHARGE PLANNING  Goal: Discharge to home or other facility with appropriate resources  Description: INTERVENTIONS:  - Identify barriers to discharge w/patient and caregiver  - Arrange for  needed discharge resources and transportation as appropriate  - Identify discharge learning needs (meds, wound care, etc.)  - Arrange for interpretive services to assist at discharge as needed  - Refer to Case Management Department for coordinating discharge planning if the patient needs post-hospital services based on physician/advanced practitioner order or complex needs related to functional status, cognitive ability, or social support system  Outcome: Progressing     Problem: Knowledge Deficit  Goal: Patient/family/caregiver demonstrates understanding of disease process, treatment plan, medications, and discharge instructions  Description: Complete learning assessment and assess knowledge base.  Interventions:  - Provide teaching at level of understanding  - Provide teaching via preferred learning methods  Outcome: Progressing

## 2023-12-10 NOTE — PROGRESS NOTES
[facility]  Progress Note  Name: Kyra Christianson I  MRN: [id number]  Unit/Bed#: W -01 I Date of Admission: 12/9/2023   Date of Service: 12/10/2023 I Hospital Day: 0    Assessment/Plan   * Acute midline low back pain without sciatica  Assessment & Plan  Low back pain after fall on 12/4 where patient was using her walker then trying to open a drawer when she fell backwards. No head strike, no LOC. Xray of pelvis and back obtained at nursing facility per daughter and showed no acute abnormalities but showed prior T12 compression fracture from fall in 3/2023 (unable to see imaging). Tylenol 975 mg q8hrs at nursing facility and without relief. Patient able to ambulate after fall but pain progressively worse and less appetite and ambulation since 12/6.   PT/OT evaluation ordered  Tylenol 975 mg q8 hours scheduled   Orthostatic VS ordered, maintenance fluid initiated due to possible dehydration, will reassess orthostatic VS tomorrow am   Bengay ordered prn for pain relief     SOB (shortness of breath)  Assessment & Plan  Patient had some SOB and coughing with clear mucous production today; since resolved; patient thinks it may have been related to her being in bed with less ambulation the past couple of days  B/l basilar expiratory wheezing upon admission   O2 saturation 94%, on continuous o2 monitoring   CT chest in ED showed minimal interstitial edema with trace b/l pleural effusions   Incentive spirometer ordered  COVID/Flu/RSV negative  WBC upon admission wnl, will continue to trend   Could be related to infection vs reactive from decreased ambulation for past several days  Continue to monitor respiratory status and oxygenation saturation     Fall at nursing home  Assessment & Plan  9 falls in past year with 5 falls being in the last 6 months; according to daughter first one was due to syncope and others due to imbalance when patient was trying to reach for things in higher cabinets  Will  discontinue amiodarone and monitor patient's BP   See acute midline low back pain without sciatica    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 behavioral disturbance, psychotic disturbance, mood disturbance, or anxiety (HCC)  Assessment & Plan  Baseline dementia and slight confusion waxing and waning in nature per nursing     Moderate protein-calorie malnutrition (HCC)  Assessment & Plan  Malnutrition Findings:   - Albumin 3.5  - Possible dehydration on admission and poor oral intake since 12/6    BMI Findings:     Body mass index is 27.49 kg/m².     Ordered magic cups and regular diet   Encourage adequate hydration and oral intake     T12 compression fracture (HCC)  Assessment & Plan  Previous T12 compression fracture due to fall in 3/2024   Per daughter, xray of back and pelvis performed at nursing facility this past week, which showed no new acute fractures but revealed prior T12 compression fracture  See acute low back pain for further details    Vitamin B12 deficiency  Assessment & Plan  Continue vitamin B12 1000 mcg tablet twice per week ONLY M-F     Vitamin D deficiency  Assessment & Plan  Continue vitamin D once daily     Acquired hypothyroidism  Assessment & Plan  Continue levothyroxine 150 mcg orally once per day ONLY M-F    Persistent atrial fibrillation (HCC)  Assessment & Plan  HR 60-90 since admission  Continue metoprolol 25 mg oral daily     Essential hypertension  Assessment & Plan  BP on admission: 178/92, SBP came down between 130-150s, current /94  Likely related to missed medication today, as well as reaction to low back pain from fall   Continue to monitor BP per protocol   Continue amlodipine 2.5 mg oral twice daily   Ordered orthostatic VS, initiated maintenance fluids for 10 hours, will recheck orthostatic VS tomorrow am       VTE Pharmacologic Prophylaxis: VTE Score: 6 High Risk (Score >/= 5) - Pharmacological DVT Prophylaxis Ordered: enoxaparin (Lovenox). Sequential Compression  Devices Ordered.    Mobility:   Basic Mobility Inpatient Raw Score: 16  -HLM Goal: 5: Stand one or more mins  -HLM Achieved: 2: Bed activities/Dependent transfer (Eating breakfast)  HLM Goal NOT achieved. Continue with multidisciplinary rounding and encourage appropriate mobility to improve upon HLM goals.    Patient Centered Rounds: I performed bedside rounds with nursing staff today.   Discussions with Specialists or Other Care Team Provider: Attending Physician, PT/OT    Education and Discussions with Family / Patient: Updated  (son and daughter) at bedside.    Total Time Spent on Date of Encounter in care of patient: 45 mins. This time was spent on one or more of the following: performing physical exam; counseling and coordination of care; obtaining or reviewing history; documenting in the medical record; reviewing/ordering tests, medications or procedures; communicating with other healthcare professionals and discussing with patient's family/caregivers.    Current Length of Stay: 0 day(s)  Current Patient Status: Inpatient   Certification Statement: The patient will continue to require additional inpatient hospital stay due to PT/OT evaluation pending for disposition  Discharge Plan: Anticipate discharge tomorrow to prior assisted or independent living facility.    Code Status: Level 3 - DNAR and DNI    Subjective:   Patient seen and examined at bedside this morning. Patient reports her pain is currently well controlled. She denies any fevers, chills, chest pain, SOB, abdominal pain, N/V/D/C, or pain in her legs. Patient's son and daughter are at bedside, and they report patients appears improved from prior to admission.  Patient's daughter reports no concern about stool incontinence.     Overnight, no acute events reported by night team.      Nursing reports no concerns.      Objective:     Vitals:   Temp (24hrs), Av.3 °F (36.3 °C), Min:97.1 °F (36.2 °C), Max:97.6 °F (36.4 °C)    Temp:   [97.1 °F (36.2 °C)-97.6 °F (36.4 °C)] 97.1 °F (36.2 °C)  HR:  [68-90] 79  Resp:  [16-18] 17  BP: (134-178)/() 149/84  SpO2:  [92 %-95 %] 95 %  Body mass index is 27.49 kg/m².     Input and Output Summary (last 24 hours):   No intake or output data in the 24 hours ending 12/10/23 1322    Physical Exam:   Physical Exam  Vitals and nursing note reviewed.   Constitutional:       General: She is not in acute distress.     Appearance: Normal appearance. She is not ill-appearing, toxic-appearing or diaphoretic.   HENT:      Head: Normocephalic and atraumatic.      Right Ear: External ear normal.      Left Ear: External ear normal.      Nose: Nose normal. No congestion or rhinorrhea.      Mouth/Throat:      Mouth: Mucous membranes are moist.   Eyes:      General: No scleral icterus.        Right eye: No discharge.         Left eye: No discharge.      Conjunctiva/sclera: Conjunctivae normal.   Cardiovascular:      Rate and Rhythm: Normal rate and regular rhythm.      Pulses: Normal pulses.      Heart sounds: Normal heart sounds.   Pulmonary:      Effort: No respiratory distress.      Breath sounds: Wheezing present. No rhonchi or rales.      Comments: Expiratory wheezing bilateral base of lungs   Abdominal:      General: Abdomen is flat. There is no distension.      Palpations: Abdomen is soft.      Tenderness: There is no right CVA tenderness, left CVA tenderness or guarding.      Comments: Mild pain upon palpation to abdominal diffusely, chronic per patient, daughter believes only painful to palpation due to pressure    Musculoskeletal:         General: No swelling, tenderness, deformity or signs of injury. Normal range of motion.      Right lower leg: No edema.      Left lower leg: No edema.      Comments: Tenderness to palpation of lower spine/back   Skin:     General: Skin is warm and dry.      Capillary Refill: Capillary refill takes less than 2 seconds.      Coloration: Skin is not jaundiced or pale.       Findings: No bruising, erythema, lesion or rash.   Neurological:      General: No focal deficit present.      Mental Status: She is alert and oriented to person, place, and time.      Motor: No weakness.   Psychiatric:         Mood and Affect: Mood normal.         Behavior: Behavior normal.       Additional Data:     Labs:  Results from last 7 days   Lab Units 12/10/23  0508   WBC Thousand/uL 7.24   HEMOGLOBIN g/dL 11.9   HEMATOCRIT % 37.2   PLATELETS Thousands/uL 240   NEUTROS PCT % 44   LYMPHS PCT % 39   MONOS PCT % 11   EOS PCT % 4     Results from last 7 days   Lab Units 12/10/23  0508   SODIUM mmol/L 139   POTASSIUM mmol/L 3.7   CHLORIDE mmol/L 107   CO2 mmol/L 24   BUN mg/dL 19   CREATININE mg/dL 0.62   ANION GAP mmol/L 8   CALCIUM mg/dL 8.4   ALBUMIN g/dL 3.5   TOTAL BILIRUBIN mg/dL 0.61   ALK PHOS U/L 64   ALT U/L 7   AST U/L 11*   GLUCOSE RANDOM mg/dL 95     Lines/Drains:  Invasive Devices       Peripheral Intravenous Line  Duration             Peripheral IV 12/09/23 Right Antecubital 1 day                  Imaging: Reviewed radiology reports from this admission including: chest CT scan and abdominal/pelvic CT  CT chest abdomen pelvis w contrast   Final Result by Ab Zapine MD (12/09 1646)      1.  Minimal interstitial edema with trace bilateral pleural effusions.   2.  No acute findings in the abdomen or pelvis.               Workstation performed: [id number]           Recent Cultures (last 7 days): none        Last 24 Hours Medication List:   Current Facility-Administered Medications   Medication Dose Route Frequency Provider Last Rate    acetaminophen  975 mg Oral Q8H Pamela Sostorecz, DO      calcium carbonate  1,000 mg Oral Daily PRN Pamela Sostorecz, DO      cholecalciferol  1,000 Units Oral Daily Pamela Sostorecz, DO      [START ON 12/11/2023] vitamin B-12  1,000 mcg Oral Once per day on Mon Fri Pamela Sostorecz, DO      docusate sodium  100 mg Oral Daily Pamela Sostorecz, DO       enoxaparin  40 mg Subcutaneous Daily Pamela Sostorecz, DO      escitalopram  5 mg Oral HS Pamela Sostorecz, DO      glycerin-hypromellose-  1 drop Both Eyes Early Morning Pamela Sostorecz, DO      [START ON 12/11/2023] levothyroxine  150 mcg Oral Once per day on Mon Tue Wed Thu Fri Pamela Sostorecz, DO      menthol-methyl salicylate   Apply externally 4x Daily PRN Pamela Sostorecz, DO      metoprolol succinate  25 mg Oral Daily Pamela Sostorecz, DO          Today, Patient Was Seen By: Nanette Morgan MD    **Please Note: This note may have been constructed using a voice recognition system.**
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
## 2023-12-10 NOTE — ASSESSMENT & PLAN NOTE
BP on admission: 178/92, SBP came down between 130-150s, current /94  Likely related to missed medication today, as well as reaction to low back pain from fall   Continue to monitor BP per protocol   Continue amlodipine 2.5 mg oral twice daily   Ordered orthostatic VS, initiated maintenance fluids for 10 hours, will recheck orthostatic VS tomorrow am
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
## 2023-12-10 NOTE — ASSESSMENT & PLAN NOTE
Patient had some SOB and coughing with clear mucous production today; since resolved; patient thinks it may have been related to her being in bed with less ambulation the past couple of days  B/l basilar expiratory wheezing upon admission   O2 saturation 94%, on continuous o2 monitoring   CT chest in ED showed minimal interstitial edema with trace b/l pleural effusions   Incentive spirometer ordered  COVID/Flu/RSV negative  WBC upon admission wnl, will continue to trend   Could be related to infection vs reactive from decreased ambulation for past several days  Continue to monitor respiratory status and oxygenation saturation

## 2023-12-10 NOTE — ASSESSMENT & PLAN NOTE
Malnutrition Findings:   - Albumin 3.5  - Possible dehydration on admission and poor oral intake since 12/6    BMI Findings:     Body mass index is 27.49 kg/m².     Ordered magic cups and regular diet   Encourage adequate hydration and oral intake

## 2023-12-10 NOTE — ASSESSMENT & PLAN NOTE
Patient had some SOB and coughing with clear mucous production today; since resolved; patient thinks it may have been related to her being in bed with less ambulation the past couple of days  B/l basilar expiratory wheezing upon admission   O2 saturation 94%, on continuous o2 monitoring   CT chest in ED showed minimal interstitial edema with trace b/l pleural effusions   Incentive spirometer ordered  Ordered COVID swab, results pending, will f/u   WBC upon admission wnl, will continue to trend   Could be related to infection vs reactive from decreased ambulation for past several days  Continue to monitor respiratory status and oxygenation saturation

## 2023-12-10 NOTE — ASSESSMENT & PLAN NOTE
9 falls in past year with 5 falls being in the last 6 months; according to daughter first one was due to syncope and others due to imbalance when patient was trying to reach for things in higher cabinets  Will discontinue amiodarone and monitor patient's BP   See acute midline low back pain without sciatica

## 2023-12-10 NOTE — ASSESSMENT & PLAN NOTE
9 falls in past year with 5 falls being in the last 6 months; according to daughter first one was due to syncope and others due to imbalance when patient was trying to reach for things in higher cabinets  See acute midline low back pain without sciatica

## 2023-12-10 NOTE — ASSESSMENT & PLAN NOTE
Previous T12 compression fracture due to fall in 3/2024   Per daughter, xray of back and pelvis performed at nursing facility this past week, which showed no new acute fractures but revealed prior T12 compression fracture  See acute low back pain for further details

## 2023-12-11 LAB
ANION GAP SERPL CALCULATED.3IONS-SCNC: 9 MMOL/L
BUN SERPL-MCNC: 18 MG/DL (ref 5–25)
CALCIUM SERPL-MCNC: 8.5 MG/DL (ref 8.4–10.2)
CHLORIDE SERPL-SCNC: 106 MMOL/L (ref 96–108)
CO2 SERPL-SCNC: 23 MMOL/L (ref 21–32)
CREAT SERPL-MCNC: 0.57 MG/DL (ref 0.6–1.3)
ERYTHROCYTE [DISTWIDTH] IN BLOOD BY AUTOMATED COUNT: 14.4 % (ref 11.6–15.1)
GFR SERPL CREATININE-BSD FRML MDRD: 77 ML/MIN/1.73SQ M
GLUCOSE SERPL-MCNC: 97 MG/DL (ref 65–140)
HCT VFR BLD AUTO: 38.6 % (ref 34.8–46.1)
HGB BLD-MCNC: 12.7 G/DL (ref 11.5–15.4)
MAGNESIUM SERPL-MCNC: 1.5 MG/DL (ref 1.9–2.7)
MCH RBC QN AUTO: 30.2 PG (ref 26.8–34.3)
MCHC RBC AUTO-ENTMCNC: 32.9 G/DL (ref 31.4–37.4)
MCV RBC AUTO: 92 FL (ref 82–98)
MRSA NOSE QL CULT: NORMAL
PLATELET # BLD AUTO: 229 THOUSANDS/UL (ref 149–390)
PMV BLD AUTO: 10.3 FL (ref 8.9–12.7)
POTASSIUM SERPL-SCNC: 3.8 MMOL/L (ref 3.5–5.3)
RBC # BLD AUTO: 4.2 MILLION/UL (ref 3.81–5.12)
SODIUM SERPL-SCNC: 138 MMOL/L (ref 135–147)
WBC # BLD AUTO: 6.37 THOUSAND/UL (ref 4.31–10.16)

## 2023-12-11 PROCEDURE — 83735 ASSAY OF MAGNESIUM: CPT

## 2023-12-11 PROCEDURE — 97116 GAIT TRAINING THERAPY: CPT

## 2023-12-11 PROCEDURE — 92610 EVALUATE SWALLOWING FUNCTION: CPT

## 2023-12-11 PROCEDURE — 80048 BASIC METABOLIC PNL TOTAL CA: CPT

## 2023-12-11 PROCEDURE — 97535 SELF CARE MNGMENT TRAINING: CPT

## 2023-12-11 PROCEDURE — 97163 PT EVAL HIGH COMPLEX 45 MIN: CPT

## 2023-12-11 PROCEDURE — 97167 OT EVAL HIGH COMPLEX 60 MIN: CPT

## 2023-12-11 PROCEDURE — 85027 COMPLETE CBC AUTOMATED: CPT

## 2023-12-11 RX ORDER — ACETAMINOPHEN 325 MG/1
975 TABLET ORAL EVERY 8 HOURS
Status: DISCONTINUED | OUTPATIENT
Start: 2023-12-11 | End: 2023-12-12 | Stop reason: HOSPADM

## 2023-12-11 RX ORDER — AMLODIPINE BESYLATE 2.5 MG/1
2.5 TABLET ORAL DAILY
Qty: 30 TABLET | Refills: 0
Start: 2023-12-12

## 2023-12-11 RX ORDER — AMLODIPINE BESYLATE 2.5 MG/1
2.5 TABLET ORAL DAILY
Status: DISCONTINUED | OUTPATIENT
Start: 2023-12-11 | End: 2023-12-12 | Stop reason: HOSPADM

## 2023-12-11 RX ORDER — MAGNESIUM SULFATE HEPTAHYDRATE 40 MG/ML
2 INJECTION, SOLUTION INTRAVENOUS ONCE
Status: COMPLETED | OUTPATIENT
Start: 2023-12-11 | End: 2023-12-11

## 2023-12-11 RX ORDER — ACETAMINOPHEN 325 MG/1
975 TABLET ORAL EVERY 6 HOURS PRN
Status: DISCONTINUED | OUTPATIENT
Start: 2023-12-11 | End: 2023-12-11

## 2023-12-11 RX ADMIN — LEVOTHYROXINE SODIUM 150 MCG: 150 TABLET ORAL at 05:10

## 2023-12-11 RX ADMIN — ESCITALOPRAM OXALATE 5 MG: 10 TABLET ORAL at 21:37

## 2023-12-11 RX ADMIN — ENOXAPARIN SODIUM 40 MG: 40 INJECTION SUBCUTANEOUS at 09:26

## 2023-12-11 RX ADMIN — CYANOCOBALAMIN TAB 500 MCG 1000 MCG: 500 TAB at 09:16

## 2023-12-11 RX ADMIN — ACETAMINOPHEN 975 MG: 325 TABLET, FILM COATED ORAL at 21:37

## 2023-12-11 RX ADMIN — ACETAMINOPHEN 975 MG: 325 TABLET, FILM COATED ORAL at 15:06

## 2023-12-11 RX ADMIN — AMLODIPINE BESYLATE 2.5 MG: 2.5 TABLET ORAL at 09:17

## 2023-12-11 RX ADMIN — GLYCERIN 1 DROP: .002; .002; .01 SOLUTION/ DROPS OPHTHALMIC at 05:10

## 2023-12-11 RX ADMIN — MAGNESIUM SULFATE HEPTAHYDRATE 2 G: 40 INJECTION, SOLUTION INTRAVENOUS at 09:15

## 2023-12-11 RX ADMIN — Medication 1000 UNITS: at 09:16

## 2023-12-11 RX ADMIN — METOPROLOL SUCCINATE 25 MG: 25 TABLET, EXTENDED RELEASE ORAL at 09:17

## 2023-12-11 RX ADMIN — DOCUSATE SODIUM 100 MG: 100 CAPSULE, LIQUID FILLED ORAL at 09:16

## 2023-12-11 NOTE — PROGRESS NOTES
[facility]  Progress Note  Name: Kyra Christianson I  MRN: [id number]  Unit/Bed#: W -01 I Date of Admission: 12/9/2023   Date of Service: 12/11/2023 I Hospital Day: 1    Assessment/Plan   * Ambulatory dysfunction  Assessment & Plan  Low back pain after fall on 12/4 where patient was using her walker then trying to open a drawer when she fell backwards. No head strike, no LOC. Xray of pelvis and back obtained at nursing facility per daughter and showed no acute abnormalities but showed prior T12 compression fracture from fall in 3/2023 (unable to see imaging). Tylenol 975 mg q8hrs at nursing facility and without relief. Patient able to ambulate after fall but pain progressively worse and less appetite and ambulation since 12/6.   12/9 CT CAP: Diffuse osseous demineralization. 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 Chronic appearing moderate compression of L3 (previously mild in 3/2023).   PT/OT evaluation- pending   Tylenol 975 mg q8 hours prn   Orthostatic VS negative, received maintenance fluid due to possible dehydration, since discontinued fluids  Bengay ordered prn for pain relief     Acute midline low back pain without sciatica  Assessment & Plan  See ambulatory dysfunction    SOB (shortness of breath)  Assessment & Plan  Patient had some SOB and coughing with clear mucous production today; since resolved; patient thinks it may have been related to her being in bed with less ambulation the past couple of days  B/l basilar expiratory wheezing upon admission, still present on exam as of 12/11  O2 saturation 94%, on continuous o2 monitoring   CT chest in ED showed minimal interstitial edema with trace b/l pleural effusions, secretions/debris in mid R trachea  Incentive spirometer ordered  COVID/Flu/RSV negative  WBC upon admission wnl and repeat, will continue to trend   Could be related to infection vs reactive from decreased ambulation for  past several days  Continue to monitor respiratory status and oxygenation saturation     Fall at nursing home  Assessment & Plan  9 falls in past year with 5 falls being in the last 6 months; according to daughter first one was due to syncope and others due to imbalance when patient was trying to reach for things in higher cabinets  Will discontinue amiodarone and monitor patient's BP   See acute midline low back pain without sciatica    Essential hypertension  Assessment & Plan  BP on admission: 178/92, SBP came down between 130-150s, then back to 176/94 night of 12/9  Likely related to missed medication today, as well as reaction to low back pain from fall   Continue to monitor BP per protocol   Amlodipine 2.5 mg oral twice daily previously, trialed holding amlodipine due to concern for low bp related to fall but high BP overnight, recontinued amlodipine but changed to 2.5 mg once per day only  Orthostatic VS negative    Persistent atrial fibrillation (HCC)  Assessment & Plan  HR 60-90 since admission  Continue metoprolol 25 mg oral daily     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 behavioral disturbance, psychotic disturbance, mood disturbance, or anxiety (HCC)  Assessment & Plan  Baseline dementia and slight confusion waxing and waning in nature per nursing     Moderate protein-calorie malnutrition (HCC)  Assessment & Plan  Malnutrition Findings:   - Albumin 3.5 12/9, 3.5 12/10  - Possible dehydration on admission and poor oral intake since 12/6    BMI Findings:     Body mass index is 27.49 kg/m².     Ordered magic cups and regular diet   Encourage adequate hydration and oral intake     T12 compression fracture (HCC)  Assessment & Plan  Previous T12 compression fracture due to fall in 3/2024   Per daughter, xray of back and pelvis performed at nursing facility this past week, which showed no new acute fractures but revealed prior T12 compression fracture  See acute low back pain for further  details    Acquired hypothyroidism  Assessment & Plan  Continue levothyroxine 150 mcg orally once per day ONLY M-F    Vitamin D deficiency  Assessment & Plan  Continue vitamin D once daily     Vitamin B12 deficiency  Assessment & Plan  Continue vitamin B12 1000 mcg tablet twice per week ONLY M-F            VTE Pharmacologic Prophylaxis: VTE Score: 6 High Risk (Score >/= 5) - Pharmacological DVT Prophylaxis Ordered: enoxaparin (Lovenox). Sequential Compression Devices Ordered.    Mobility:   Basic Mobility Inpatient Raw Score: 16  -HLM Goal: 5: Stand one or more mins  -HLM Achieved: 2: Bed activities/Dependent transfer  HLM Goal NOT achieved. Continue with multidisciplinary rounding and encourage appropriate mobility to improve upon HLM goals.    Patient Centered Rounds: I performed bedside rounds with nursing staff today.   Discussions with Specialists or Other Care Team Provider: PT/OT, SLP, case management     Education and Discussions with Family / Patient: Updated  (son and daughter) via phone.    Total Time Spent on Date of Encounter in care of patient: 45 mins. This time was spent on one or more of the following: performing physical exam; counseling and coordination of care; obtaining or reviewing history; documenting in the medical record; reviewing/ordering tests, medications or procedures; communicating with other healthcare professionals and discussing with patient's family/caregivers.    Current Length of Stay: 1 day(s)  Current Patient Status: Inpatient   Certification Statement: The patient will continue to require additional inpatient hospital stay due to PT/OT and speech eval pending due to possible aspiration   Discharge Plan: Anticipate discharge tomorrow to discharge location to be determined pending rehab evaluations.    Code Status: Level 3 - DNAR and DNI    Subjective:   Back pain still present per patient, no SOB, had 2 soft Bms since yesterday per nursing, no acute events or  issues overnight per nursing     Objective:     Vitals:   Temp (24hrs), Av.5 °F (36.4 °C), Min:97.3 °F (36.3 °C), Max:97.7 °F (36.5 °C)    Temp:  [97.3 °F (36.3 °C)-97.7 °F (36.5 °C)] 97.5 °F (36.4 °C)  HR:  [62-80] 77  Resp:  [18] 18  BP: (134-188)/(81-95) 134/85  SpO2:  [93 %-94 %] 94 %  Body mass index is 27.49 kg/m².     Input and Output Summary (last 24 hours):   No intake or output data in the 24 hours ending 23 1352      Physical Exam:   Physical Exam  Vitals reviewed.   Constitutional:       Appearance: Normal appearance.      Comments: Body mass index is 27.49 kg/m².    HENT:      Head: Normocephalic and atraumatic.      Right Ear: External ear normal.      Left Ear: External ear normal.      Nose: Nose normal.      Mouth/Throat:      Mouth: Mucous membranes are moist.      Pharynx: Oropharynx is clear.   Eyes:      Extraocular Movements: Extraocular movements intact.      Conjunctiva/sclera: Conjunctivae normal.      Pupils: Pupils are equal, round, and reactive to light.   Cardiovascular:      Rate and Rhythm: Normal rate and regular rhythm.      Pulses: Normal pulses.      Heart sounds: Normal heart sounds.   Pulmonary:      Effort: Pulmonary effort is normal. No respiratory distress.      Breath sounds: Wheezing present.      Comments: B/l expiratory wheezing  Abdominal:      General: Abdomen is flat.      Palpations: Abdomen is soft.      Tenderness: There is no abdominal tenderness.   Musculoskeletal:         General: Normal range of motion.      Cervical back: Normal range of motion and neck supple.      Right lower leg: No edema.      Left lower leg: No edema.   Skin:     General: Skin is warm.      Capillary Refill: Capillary refill takes less than 2 seconds.   Neurological:      General: No focal deficit present.      Mental Status: She is alert. Mental status is at baseline.      Comments: Mild dementia per family   Psychiatric:         Mood and Affect: Mood normal.         Behavior:  Behavior normal.          Additional Data:     Labs:  Results from last 7 days   Lab Units 12/11/23  0448 12/10/23  0508   WBC Thousand/uL 6.37 7.24   HEMOGLOBIN g/dL 12.7 11.9   HEMATOCRIT % 38.6 37.2   PLATELETS Thousands/uL 229 240   NEUTROS PCT %  --  44   LYMPHS PCT %  --  39   MONOS PCT %  --  11   EOS PCT %  --  4     Results from last 7 days   Lab Units 12/11/23  0448 12/10/23  0508   SODIUM mmol/L 138 139   POTASSIUM mmol/L 3.8 3.7   CHLORIDE mmol/L 106 107   CO2 mmol/L 23 24   BUN mg/dL 18 19   CREATININE mg/dL 0.57* 0.62   ANION GAP mmol/L 9 8   CALCIUM mg/dL 8.5 8.4   ALBUMIN g/dL  --  3.5   TOTAL BILIRUBIN mg/dL  --  0.61   ALK PHOS U/L  --  64   ALT U/L  --  7   AST U/L  --  11*   GLUCOSE RANDOM mg/dL 97 95                       Lines/Drains:  Invasive Devices       Peripheral Intravenous Line  Duration             Peripheral IV 12/09/23 Right Antecubital 2 days                          Imaging: Reviewed radiology reports from this admission including: chest CT scan and abdominal/pelvic CT    Recent Cultures (last 7 days):         Last 24 Hours Medication List:   Current Facility-Administered Medications   Medication Dose Route Frequency Provider Last Rate    acetaminophen  975 mg Oral Q8H Pamela Sostorecz, DO      amLODIPine  2.5 mg Oral Daily Hannah Donato MD      calcium carbonate  1,000 mg Oral Daily PRN Pamela Sostorecz, DO      cholecalciferol  1,000 Units Oral Daily Pamela Sostorecz, DO      vitamin B-12  1,000 mcg Oral Once per day on Mon Fri Pamela Sostorecz, DO      docusate sodium  100 mg Oral Daily Pamela Sostorecz, DO      enoxaparin  40 mg Subcutaneous Daily Pamela Sostorecz, DO      escitalopram  5 mg Oral HS Pamela Sostorecz, DO      glycerin-hypromellose-  1 drop Both Eyes Early Morning Pamela Sostorecz, DO      levothyroxine  150 mcg Oral Once per day on Mon Tue Wed Thu Fri Pamela Sostorecz, DO      menthol-methyl salicylate   Apply externally 4x Daily PRN  Pamela Barfield DO      metoprolol succinate  25 mg Oral Daily Pamela Barfield DO          Today, Patient Was Seen By: Pamela Barfield DO    **Please Note: This note may have been constructed using a voice recognition system.**

## 2023-12-11 NOTE — PLAN OF CARE
Problem: PAIN - ADULT  Goal: Verbalizes/displays adequate comfort level or baseline comfort level  Description: Interventions:  - Encourage patient to monitor pain and request assistance  - Assess pain using appropriate pain scale  - Administer analgesics based on type and severity of pain and evaluate response  - Implement non-pharmacological measures as appropriate and evaluate response  - Consider cultural and social influences on pain and pain management  - Notify physician/advanced practitioner if interventions unsuccessful or patient reports new pain  Outcome: Progressing     Problem: INFECTION - ADULT  Goal: Absence or prevention of progression during hospitalization  Description: INTERVENTIONS:  - Assess and monitor for signs and symptoms of infection  - Monitor lab/diagnostic results  - Monitor all insertion sites, i.e. indwelling lines, tubes, and drains  - Monitor endotracheal if appropriate and nasal secretions for changes in amount and color  - [place] appropriate cooling/warming therapies per order  - Administer medications as ordered  - Instruct and encourage patient and family to use good hand hygiene technique  - Identify and instruct in appropriate isolation precautions for identified infection/condition  Outcome: Progressing  Goal: Absence of fever/infection during neutropenic period  Description: INTERVENTIONS:  - Monitor WBC    Outcome: Progressing     Problem: SAFETY ADULT  Goal: Patient will remain free of falls  Description: INTERVENTIONS:  - Educate patient/family on patient safety including physical limitations  - Instruct patient to call for assistance with activity   - Consult OT/PT to assist with strengthening/mobility   - Keep Call bell within reach  - Keep bed low and locked with side rails adjusted as appropriate  - Keep care items and personal belongings within reach  - Initiate and maintain comfort rounds  - Make Fall Risk Sign visible to staff  - Offer Toileting every  Hours,  in advance of need  - Initiate/Maintain alarm  - Obtain necessary fall risk management equipment:   - Apply yellow socks and bracelet for high fall risk patients  - Consider moving patient to room near nurses station  Outcome: Progressing  Goal: Maintain or return to baseline ADL function  Description: INTERVENTIONS:  -  Assess patient's ability to carry out ADLs; assess patient's baseline for ADL function and identify physical deficits which impact ability to perform ADLs (bathing, care of mouth/teeth, toileting, grooming, dressing, etc.)  - Assess/evaluate cause of self-care deficits   - Assess range of motion  - Assess patient's mobility; develop plan if impaired  - Assess patient's need for assistive devices and provide as appropriate  - Encourage maximum independence but intervene and supervise when necessary  - Involve family in performance of ADLs  - Assess for home care needs following discharge   - Consider OT consult to assist with ADL evaluation and planning for discharge  - Provide patient education as appropriate  Outcome: Progressing  Goal: Maintains/Returns to pre admission functional level  Description: INTERVENTIONS:  - Perform AM-PAC 6 Click Basic Mobility/ Daily Activity assessment daily.  - Set and communicate daily mobility goal to care team and patient/family/caregiver.   - Collaborate with rehabilitation services on mobility goals if consulted  - Perform Range of Motion  times a day.  - Reposition patient every  hours.  - Dangle patient  times a day  - Stand patient  times a day  - Ambulate patient  times a day  - Out of bed to chair  times a day   - Out of bed for meals  times a day  - Out of bed for toileting  - Record patient progress and toleration of activity level   Outcome: Progressing     Problem: DISCHARGE PLANNING  Goal: Discharge to home or other facility with appropriate resources  Description: INTERVENTIONS:  - Identify barriers to discharge w/patient and caregiver  - Arrange for  needed discharge resources and transportation as appropriate  - Identify discharge learning needs (meds, wound care, etc.)  - Arrange for interpretive services to assist at discharge as needed  - Refer to Case Management Department for coordinating discharge planning if the patient needs post-hospital services based on physician/advanced practitioner order or complex needs related to functional status, cognitive ability, or social support system  Outcome: Progressing     Problem: Knowledge Deficit  Goal: Patient/family/caregiver demonstrates understanding of disease process, treatment plan, medications, and discharge instructions  Description: Complete learning assessment and assess knowledge base.  Interventions:  - Provide teaching at level of understanding  - Provide teaching via preferred learning methods  Outcome: Progressing

## 2023-12-11 NOTE — ASSESSMENT & PLAN NOTE
Baseline dementia and slight confusion waxing and waning in nature per nursing   Follow up with PCP outpatient

## 2023-12-11 NOTE — PHYSICAL THERAPY NOTE
"PHYSICAL THERAPY EVALUATION  NAME:  Kyra Christianson  DATE: 12/11/23    AGE:   89 y.o.  Mrn:   [id number]  Principal problem: Principal Problem:    Ambulatory dysfunction  Active Problems:    Essential hypertension    Persistent atrial fibrillation (HCC)    Acquired hypothyroidism    Vitamin D deficiency    Vitamin B12 deficiency    T12 compression fracture (HCC)    Moderate protein-calorie malnutrition (HCC)    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 behavioral disturbance, psychotic disturbance, mood disturbance, or anxiety (HCC)    Fall at nursing home    Acute midline low back pain without sciatica    SOB (shortness of breath)      Vitals:    12/11/23 0721 12/11/23 0916 12/11/23 1154 12/11/23 1700   BP: 167/95 134/85  142/88   Pulse: 72 77  78   Resp:    16   Temp: 97.5 °F (36.4 °C)   97.8 °F (36.6 °C)   TempSrc:    Oral   SpO2: 94% 94%     Weight:       Height:   5' 1\" (1.549 m)        Length Of Stay: 1  Performed at least 2 patient identifiers during session: Name and Birthday  PHYSICAL THERAPY EVALUATION :    12/11/23 1501   PT Last Visit   PT Visit Date 12/11/23   Note Type   Note type Evaluation   Pain Assessment   Pain Assessment Tool FLACC   Pain Location/Orientation Location: Back;Orientation: Lower;Orientation: Bilateral   Effect of Pain on Daily Activities limits speed and indep of mobility, trip transitional movement; Flexion> extension   Patient's Stated Pain Goal No pain   Hospital Pain Intervention(s) Repositioned;Ambulation/increased activity;Emotional support  (RN present to address pain medication request @ end of session)   Multiple Pain Sites No   Pain Rating: FLACC (Rest) - Face 0   Pain Rating: FLACC (Rest) - Legs 1   Pain Rating: FLACC (Rest) - Activity 1   Pain Rating: FLACC (Rest) - Cry 0   Pain Rating: FLACC (Rest) - Consolability 0   Score: FLACC (Rest) 2   Pain Rating: FLACC (Activity) - Face 1   Pain Rating: FLACC (Activity) - Legs 1   Pain Rating: FLACC (Activity) - Activity 1 "   Pain Rating: FLACC (Activity) - Cry 1   Pain Rating: FLACC (Activity) - Consolability 1   Score: FLACC (Activity) 5   Restrictions/Precautions   Weight Bearing Precautions Per Order No   Other Precautions Chair Alarm;Bed Alarm;Fall Risk;Pain;Cognitive   Home Living   Type of Home SNF  ([facility])   Home Equipment Walker;Cane;Wheelchair-manual   Prior Function   Level of [place] Needs assistance with ADLs;Needs assistance with IADLS;Needs assistance with functional mobility   Lives With Facility staff   Receives Help From Personal care attendant   IADLs Family/Friend/Other provides transportation;Family/Friend/Other provides medication management;Family/Friend/Other provides meals   Falls in the last 6 months 5 to 10  (8-9 falls)   Vocational Retired   General   Additional Pertinent History daughter reports pt recently had 2 braces c/w what she described as a TLSO back pack and high back LSO. Daughter reports pt discontinued wearing it, but reports less c/o when wearing LSO.   Family/Caregiver Present Yes  (daughter)   Cognition   Overall Cognitive Status Impaired   Attention Attends with cues to redirect   Orientation Level Oriented to person   Memory Decreased recall of precautions;Decreased recall of recent events;Decreased short term memory   Following Commands Follows one step commands with increased time or repetition  (except for difficulty following MMT commands consistently)   Subjective   Subjective Notified by OT that pt was requesting to return to bed( OT reports pt OOB x 1 hour)   RUE Assessment   RUE Assessment   (shoulder flexion/abd AAROM to>90)   LUE Assessment   LUE Assessment   (shoulder flexion/abd AAROM to>90)   RLE Assessment   RLE Assessment   (inconsistent w/ MMT instructions)   Strength RLE   R Hip Flexion 3+/5   R Knee Extension 3+/5   R Ankle Dorsiflexion 4/5   LLE Assessment   LLE Assessment   (inconsistent w/ MMT instructions)   Strength LLE   L Hip Flexion 3+/5   L  "Knee Extension 3+/5   L Ankle Dorsiflexion 4/5   Vision-Basic Assessment   Current Vision Does not wear glasses   Light Touch   RLE Light Touch Not tested  (? reliability)   LLE Light Touch Not tested  (? reliability)   Transfers   Sit to Stand 3  Moderate assistance   Additional items Assist x 1;Increased time required;Verbal cues;Armrests   Stand to Sit 4  Minimal assistance   Additional items Assist x 1;Increased time required;Verbal cues;Armrests  (ibnstruction for hand placement)   Ambulation/Elevation   Gait pattern Step through pattern;Excessively slow;Ataxia;Forward Flexion   Gait Assistance 3  Moderate assist  (to steady pt and maneuver walker)   Additional items Assist x 1;Verbal cues   Assistive Device Rolling walker   Distance 2' limited by urinary incontinence   Stair Management Assistance Not tested   Balance   Static Sitting Good   Static Standing Poor +  (retropulsive)   Ambulatory Poor   Endurance Deficit   Endurance Deficit Yes   Endurance Deficit Description limited amb distance, needs rests between mobility, limited sitting tolerance time   Activity Tolerance   Activity Tolerance Patient limited by fatigue;Patient limited by pain   Medical Staff Made Aware spoke to Mary from OT   Nurse Made Aware spoke to RN re: Pt's pain med request   Assessment:   Pt is a 89 y.o. female seen for PT evaluation s/p admit to [facility] on [facility]2023 w/ Ambulatory dysfunction, multiple falls.  Order placed for PT.      Prior to admission: Pt lives at [facility].  Daughter reports that patient ambulated with a rolling walker with no physical assistance up until recently.  In the recent past she had a TLSO back Brace as well as an LSO brace due to compression fractures, but had not been wearing it recently based on \"doctor's recommendations\".  Upon evaluation: Pt needed between min and mod assist for transfers, and mod assist for ambulation for very short distances w/walker, trial ending " "due to patient's urinary incontinence..      Pt's clinical presentation is currently unstable/unpredictable given the functional mobility deficits above, especially (but not limited to) weakness, gait deviations, and pain, and combined with medical complications including hypertension  and fear/retreat.  Pt IS NOT at his/her mobility baseline.  She is at risk for falls based on hx of falls, impulsivity, impaired balance, impaired judgement, decreased safety awareness, and decreased cognition.       During this admission, pt would benefit from continued skilled inpatient PT in the acute care setting in order to address deficits as defined above to maximize function and mobility.      Recommendations:    From a PT standpoint, recommend next several sessions focus on continued mobility training with use of rolling walker especially with focusing on walker management around obstacles and staying inside of walker.  Also patient/daughter and this writer discussed potentially using one of her old back braces as needed for comfort.  Reached out to [facility] to request the same.  Dr Pamela Barfield stated that wearing the brace PRN \"would be fine\" and she would add nursing communication to chart.  Daughter stated that she would bring in the brace for use.      Prognosis Fair   Problem List Decreased strength;Decreased range of motion;Decreased endurance;Impaired balance;Decreased mobility;Decreased coordination;Decreased cognition;Impaired judgement;Decreased safety awareness;Pain  (gait deviations)   Barriers to Discharge Decreased caregiver support;Inaccessible home environment   Goals   Patient Goals to get back to bed and take a nap   STG Expiration Date 12/21/23   Short Term Goal #1 Pt will: Perform rolling  and supine<>sit bed mobility tasks with no more than min A to prepare for transfers and reposition in bed while minimizing bending and twisting. Perform transfers with no more than min A to improve ease of transfers " and promote proper hand placement and approach. Perform ambulation with rolling walker for up to 50 feet with supervisionSupervision to increase Indep in prior living environment and promote proper use of assistive device.  Increase standing tolerance time to 5 minutes with BUE support to increase upright activity tolerance without loss of balance.   PT Treatment Day 1   Plan   Treatment/Interventions Functional transfer training;LE strengthening/ROM;Therapeutic exercise;Endurance training;Cognitive reorientation;Patient/family training;Equipment eval/education;Bed mobility;Gait training;Spoke to nursing;Spoke to case management;Family   PT Frequency 3-5x/wk   Discharge Recommendation   Rehab Resource Intensity Level, PT II (Moderate Resource Intensity)   Equipment Recommended Walker   Additional Comments Nursing Recommendations:    Mobility Plan as of 12/11/23: Pt is one Assist with RW to/from recliner and BSC.   Additional Comments 2 Co-morbidities affecting pt's physical performance at time of assessment include but are not limited to: AAA, arthritis, aspiration pneumonia, breast cancer, depression, hypertension, macular degeneration, vertigo, left mastectomy. Personal factors affecting pt at time of IE include: advanced age, past experience, behavioral pattern, inability to ambulate household distances, inability to navigate community distances, limited insight into impairments, and recent fall(s).   AM-PAC Basic Mobility Inpatient   Turning in Flat Bed Without Bedrails 2   Lying on Back to Sitting on Edge of Flat Bed Without Bedrails 3   Moving Bed to Chair 2   Standing Up From Chair Using Arms 2   Walk in Room 2   Climb 3-5 Stairs With Railing 1   Basic Mobility Inpatient Raw Score 12   Basic Mobility Standardized Score 32.23   Highest Level Of Mobility   -HLM Goal 4: Move to chair/commode   JH-HLM Achieved 6: Walk 10 steps or more   Additional Treatment Session   Start Time 1446   End Time 1501   Treatment  Assessment He was able to ambulate further distances during treatment portion of session using a walker.  However, she still requires physical assistance both for steadying support and especially for walker management with turns, staying inside of walker.  She is impulsive with returning back to bed and was sitting at edge of bed.  Skilled PT recommended to progress patient towards treatment goals.   Equipment Use Rolling walker   Additional Treatment Day 1   Exercises   Neuro re-ed Transfers sit to stand fluctuate between min and mod assist with verbal instruction for hand placement, weight shift, completion of approach.  Patient impulsive at times with transfers not waiting for cueing from this writer.  Ambulation with rolling walker for 12 feet with mod assist both for walker management, steadying support.  Patient has a tendency to have increased walker positioning, and needs instructions plus facilitation to stay to the back Of the walker.  Sit to supine max assist of 2 as patient almost perpendicular to bed, and needed dependent of 2 to reposition towards the head of the bed.   End of Consult   Patient Position at End of Consult Supine;All needs within reach;Bed/Chair alarm activated   (Please find full objective findings from PT assessment regarding body systems outlined above).     The patient's AM-PAC Basic Mobility Inpatient Short Form Raw Score is 12. A Raw score of less than or equal to 16 suggests the patient may benefit from discharge to post-acute rehabilitation services, which DOES coincide with CURRENT above PT recommendations.     However please refer to therapist recommendation for discharge planning given other factors that may influence destination.     Adapted from Joe SEARS, Timur MATA, Harpal MATA, Clayton MATA. [facility] of [facility] “6-Clicks” Basic Mobility and Daily Activity Scores With Discharge Destination. Physical Therapy, 2021;101:1-9. DOI: [email or web address]    Portions of the record may  "have been created with voice recognition software.  Occasional wrong word or \"sound a like\" substitutions may have occurred due to the inherent limitations of voice recognition software.  Read the chart carefully and recognize, using context, where substitutions have occurred    "

## 2023-12-11 NOTE — OCCUPATIONAL THERAPY NOTE
Occupational Therapy Evaluation     Patient Name: Kyra Christianson  Today's Date: 12/11/2023  Problem List  Principal Problem:    Ambulatory dysfunction  Active Problems:    Essential hypertension    Persistent atrial fibrillation (HCC)    Acquired hypothyroidism    Vitamin D deficiency    Vitamin B12 deficiency    T12 compression fracture (HCC)    Moderate protein-calorie malnutrition (HCC)    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 behavioral disturbance, psychotic disturbance, mood disturbance, or anxiety (HCC)    Fall at nursing home    Acute midline low back pain without sciatica    SOB (shortness of breath)    Past Medical History  Past Medical History:   Diagnosis Date    AAA (abdominal aortic aneurysm) (HCC)     Acute medial meniscus tear     Arthritis 1980    Aspiration pneumonia (HCC)     LAST ASSESSED: 7/30/14    Breast CA (HCC)     left    Cancer (HCC) 2017    Chest pain     RESOLVED: 1/31/17    CTS (carpal tunnel syndrome)     Depression     Dermatitis     LAST ASSESSED: 7/25/13    Disease of thyroid gland     Fainting     LAST ASSESSED: 3/15/13    GERD (gastroesophageal reflux disease)     H. pylori infection 03/17/2009    Hypertension     Incomplete defecation     LAST ASSESSED: 7/25/13    Macular degeneration     Osteoporosis     Pneumonia     Vertigo 2012     Past Surgical History  Past Surgical History:   Procedure Laterality Date    APPENDECTOMY      CHOLECYSTECTOMY      COLONOSCOPY      MASTECTOMY Left 09/2017    SIMPLE    WY INTRAOP SENTINEL LYMPH NODE ID W/DYE INJECTION Left 9/5/2017    Procedure: INTRAOPERATIVE LYMPHATIC MAPPING; BLUE DYE ONLY; SENITNEL LYMPH NODE BIOPSY;  Surgeon: Marcelo Reddy MD;  Location: [facility];  Service: Surgical Oncology    WY MASTECTOMY SIMPLE COMPLETE Left 9/5/2017    Procedure: BREAST MASTECTOMY;  Surgeon: Marcelo Reddy MD;  [facility];  Service: Surgical Oncology    SENTINEL LYMPH NODE BIOPSY      US GUIDED BREAST BIOPSY LEFT COMPLETE Left 8/14/2017          12/11/23 1341   OT Last Visit   OT Visit Date 12/11/23   Note Type   Note type Evaluation  (+ Tx session (0911-4569))   Pain Assessment   Pain Assessment Tool 0-10   Pain Score No Pain   Restrictions/Precautions   Weight Bearing Precautions Per Order No   Other Precautions Chair Alarm;Bed Alarm;Fall Risk;Pain;Cognitive   Home Living   Type of Home SNF  ([facility])   Home Layout One level;Performs ADLs on one level;Able to live on main level with bedroom/bathroom;Ramped entrance;Access   Bathroom Shower/Tub Walk-in shower   Bathroom Toilet Raised   Bathroom Equipment Grab bars in shower;Shower chair;Hand-held shower;Toilet raiser;Grab bars around toilet   Bathroom Accessibility Accessible   Home Equipment Walker;Cane;Wheelchair-manual   Prior Function   Level of [place] Needs assistance with ADLs;Needs assistance with IADLS;Needs assistance with functional mobility   Lives With Facility staff   Receives Help From Personal care attendant   IADLs Family/Friend/Other provides transportation;Family/Friend/Other provides medication management;Family/Friend/Other provides meals   Falls in the last 6 months 5 to 10  (8-9 falls)   Vocational Retired   Lifestyle   Autonomy pta pt was at [facility], (A) c ADLs and IADLs, (-) , 8-10 falls   Reciprocal Relationships daughter and brother both involved in care   Service to Others retired   General   Additional Pertinent History Essential hypertension, A-fib, hx of T12 compression fracture, Alzheimers dementia w/o behavioral disturbance, mood and psychotic disturbances, macular degeneration, hx of left breast cancer   Family/Caregiver Present Yes   Additional General Comments pt was pleasant and agreed to work with therapy   ADL   Where Assessed Chair   Eating Assistance 4  Minimal Assistance   Eating Deficit Bringing food to mouth assist   Grooming Assistance 4  Minimal Assistance   UB Bathing Assistance 4  Minimal Assistance   LB  Bathing Assistance 2  Maximal Assistance   UB Dressing Assistance 4  Minimal Assistance   LB Dressing Assistance 2  Maximal Assistance   LB Dressing Deficit Don/doff R sock;Don/doff L sock;Increased time to complete   Toileting Assistance  2  Maximal Assistance   Toileting Deficit Perineal hygiene;Grab bar use;Bedside commode;Increased time to complete   Bed Mobility   Supine to Sit 4  Minimal assistance   Additional items Assist x 1;Bedrails;Increased time required;LE management;Verbal cues   Additional Comments Pt OOB in recliner chair at end of OT IE session   Transfers   Sit to Stand 3  Moderate assistance   Additional items Assist x 1;Increased time required;Verbal cues;Armrests   Stand to Sit 3  Moderate assistance   Additional items Assist x 1;Increased time required;Verbal cues;Armrests   Stand pivot 2  Maximal assistance   Additional items Assist x 2;Increased time required;Verbal cues   Toilet transfer 2  Maximal assistance   Additional items Assist x 2;Increased time required;Commode;Verbal cues   Functional Mobility   Functional Mobility 2  Maximal assistance   Additional Comments ax2, short side steps to commode   Additional items Rolling walker   Balance   Static Sitting Good   Dynamic Sitting Fair   Static Standing Poor +   Dynamic Standing Poor -   Ambulatory Poor -   Activity Tolerance   Activity Tolerance Patient limited by fatigue;Patient limited by pain   Medical Staff Made Aware PT Karan canela   Nurse Made Aware RN aware   RUE Assessment   RUE Assessment WFL   LUE Assessment   LUE Assessment WFL   Vision-Basic Assessment   Current Vision Does not wear glasses   Cognition   Overall Cognitive Status Impaired   Arousal/Participation Alert;Cooperative   Attention Attends with cues to redirect   Orientation Level Oriented to person;Oriented to place;Disoriented to situation;Disoriented to time   Memory Decreased recall of precautions;Decreased recall of recent events;Decreased short term memory    Following Commands Follows one step commands with increased time or repetition   Assessment   Limitation Decreased ADL status;Decreased cognition;Decreased Safe judgement during ADL;Decreased endurance;Decreased self-care trans;Decreased high-level ADLs   Prognosis Good   Assessment Pt is a 89 y.o. female seen for OT evaluation s/p admission to [facility] on 12/9/2023 due to ambulatory dysfunction. Diagnosed with back pain. Personal and env factors supporting pt at time of IE include supportive family, accessible home environment, and (A) with all ADLs. Personal and env factors inhibiting engagement in occupations include advanced age and difficulty completing ADLs. Performance deficits that affect the pt's occupational performance can be seen above. Due to pt's current functional limitations and medical complications pt is functioning below baseline. Pt would benefit from continued skilled OT treatment in order to maximize safety, independence and overall performance with ADLs, functional mobility, functional transfers, and cognition in order to achieve highest level of function.   Goals   Patient Goals to go home   LTG Time Frame 10-14   Long Term Goal see below   Plan   Treatment Interventions ADL retraining;Functional transfer training;Endurance training;Cognitive reorientation;Patient/family training;Equipment evaluation/education;Compensatory technique education;Continued evaluation;Energy conservation;Activityengagement   Goal Expiration Date 12/25/23   OT Treatment Day 0   OT Frequency 3-5x/wk   Discharge Recommendation   Rehab Resource Intensity Level, OT II (Moderate Resource Intensity)  (return to facility)   Additional Comments  The patient's raw score on the AM-PAC Daily Activity Inpatient Short Form is 15. A raw score of less than 19 suggests the patient may benefit from discharge to post-acute rehabilitation services. Please refer to the recommendation of the Occupational Therapist for safe discharge  planning.   AM-PAC Daily Activity Inpatient   Lower Body Dressing 2   Bathing 2   Toileting 2   Upper Body Dressing 3   Grooming 3   Eating 3   Daily Activity Raw Score 15   Daily Activity Standardized Score (Calc for Raw Score >=11) 34.69   AM-PAC Applied Cognition Inpatient   Following a Speech/Presentation 2   Understanding Ordinary Conversation 3   Taking Medications 2   Remembering Where Things Are Placed or Put Away 2   Remembering List of 4-5 Errands 2   Taking Care of Complicated Tasks 1   Applied Cognition Raw Score 12   Applied Cognition Standardized Score 28.82   Additional Treatment Session   Start Time 1345   End Time 1355   Treatment Assessment Pt was seen for additional OT tx session on this date focusing on ADLS and fnxl transfer training. Pt completed sit to stand from BSC with max A x2 and RW. Pt maintained standing for ~2 minutes, unable to complete fnxl mobility d/t fatigue at this time. Recliner chair was placed behind pt and pt completed Stand to sit transfer with mod A x2. Pt engaged in LB dressing and was max A. At the end of the session, all alarms activated and all needs met, no complaints. Will continue to follow pt on OT caseload while admitted.   Additional Treatment Day 1   End of Consult   Education Provided Yes;Family or social support of family present for education by provider   Patient Position at End of Consult Bedside chair;Bed/Chair alarm activated;All needs within reach   Nurse Communication Nurse aware of consult   End of Consult Comments Pt was seated OOB in recliner at end of session, all needs met     GOALS    Pt will improve activity tolerance to G for min 30 min txment sessions for increase engagement in functional tasks    Pt will complete bed mobility at a Mod I level w/ G balance/safety demonstrated to decrease caregiver assistance required     Pt will complete UB dressing/self care w/ mod I using adaptive device and DME as needed     Pt will complete toileting w/ mod  A  w/ G hygiene/thoroughness using DME as needed    Pt will improve functional transfers to Min A on/off all surfaces using DME as needed w/ G balance/safety     Pt will improve functional mobility during ADL/IADL/leisure tasks to Min A using DME as needed w/ G balance/safety    Pt will demonstrate G carryover of pt/caregiver education and training as appropriate w/o cues w/ good tolerance to increase safety during functional tasks    Pt will demonstrate 100% carryover of energy conservation techniques t/o functional I/ADL/leisure tasks w/o cues s/p skilled education to increase endurance during functional task    Pt will increase seated tolerance to 5 minutes with fair+ dynamic seated balance to increase safety during participation in ADLs     The patient's raw score on the -PAC Daily Activity Inpatient Short Form is 15. A raw score of less than 19 suggests the patient may benefit from discharge to post-acute rehabilitation services. Please refer to the recommendation of the Occupational Therapist for safe discharge planning.    Precious White, OTR/L

## 2023-12-11 NOTE — ASSESSMENT & PLAN NOTE
Patient had some SOB and coughing with clear mucous production today; since resolved; patient thinks it may have been related to her being in bed with less ambulation the past couple of days  B/l basilar expiratory wheezing upon admission, still present on exam as of 12/11  O2 saturation 94%, on continuous o2 monitoring   CT chest in ED showed minimal interstitial edema with trace b/l pleural effusions, secretions/debris in mid R trachea  Incentive spirometer ordered  COVID/Flu/RSV negative  WBC upon admission wnl and repeat, will continue to trend   Could be related to infection vs reactive from decreased ambulation for past several days  Continue to monitor respiratory status and oxygenation saturation

## 2023-12-11 NOTE — ASSESSMENT & PLAN NOTE
Malnutrition Findings:   - Albumin 3.5 12/9, 3.5 12/10  - Possible dehydration on admission and poor oral intake since 12/6    BMI Findings:     Body mass index is 27.49 kg/m².     Ordered magic cups and regular diet   Encourage adequate hydration and oral intake

## 2023-12-11 NOTE — PLAN OF CARE
Problem: OCCUPATIONAL THERAPY ADULT  Goal: Performs self-care activities at highest level of function for planned discharge setting.  See evaluation for individualized goals.  Description: Treatment Interventions: ADL retraining, Functional transfer training, Endurance training, Cognitive reorientation, Patient/family training, Equipment evaluation/education, Compensatory technique education, Continued evaluation, Energy conservation, Activityengagement          See flowsheet documentation for full assessment, interventions and recommendations.   Outcome: Progressing  Note: Limitation: Decreased ADL status, Decreased cognition, Decreased Safe judgement during ADL, Decreased endurance, Decreased self-care trans, Decreased high-level ADLs  Prognosis: Good  Assessment: Pt is a 89 y.o. female seen for OT evaluation s/p admission to [facility] on 12/9/2023 due to ambulatory dysfunction. Diagnosed with back pain. Personal and env factors supporting pt at time of IE include supportive family, accessible home environment, and (A) with all ADLs. Personal and env factors inhibiting engagement in occupations include advanced age and difficulty completing ADLs. Performance deficits that affect the pt's occupational performance can be seen above. Due to pt's current functional limitations and medical complications pt is functioning below baseline. Pt would benefit from continued skilled OT treatment in order to maximize safety, independence and overall performance with ADLs, functional mobility, functional transfers, and cognition in order to achieve highest level of function.     Rehab Resource Intensity Level, OT: II (Moderate Resource Intensity) (return to facility)

## 2023-12-11 NOTE — ASSESSMENT & PLAN NOTE
Malnutrition Findings:   - Albumin 3.5 12/9, 3.5 12/10  - Possible dehydration on admission and poor oral intake since 12/6    BMI Findings:     Body mass index is 27.49 kg/m².     Ordered magic cups and regular diet while in hospital, Appetite improved throughout hospitalization.   Encourage adequate hydration and oral intake

## 2023-12-11 NOTE — ASSESSMENT & PLAN NOTE
BP on admission: 178/92, SBP came down between 130-150s, then back to 176/94 night of 12/9  Likely related to missed medication today, as well as reaction to low back pain from fall   Continue to monitor BP per protocol   Amlodipine 2.5 mg oral twice daily previously, trialed holding amlodipine due to concern for low bp related to fall but high BP overnight, recontinued amlodipine but changed to 2.5 mg once per day only  Orthostatic VS negative

## 2023-12-11 NOTE — DISCHARGE SUMMARY
[facility]  Progress Note  Name: Kyra Christianson I  MRN: [id number]  Unit/Bed#: W -01 I Date of Admission: 12/9/2023   Date of Service: 12/12/2023 I Hospital Day: 2    Assessment/Plan   * Ambulatory dysfunction  Assessment & Plan  Low back pain after fall on 12/4 where patient was using her walker then trying to open a drawer when she fell backwards. No head strike, no LOC. Xray of pelvis and back obtained at nursing facility per daughter and showed no acute abnormalities but showed prior T12 compression fracture from fall in 3/2023 (unable to see imaging). Tylenol 975 mg q8hrs at nursing facility and without relief. Patient able to ambulate after fall but pain progressively worse and less appetite and ambulation since 12/6.   12/9 CT CAP: Diffuse osseous demineralization. 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 Chronic appearing moderate compression of L3 (previously mild in 3/2023).   PT/OT evaluation and recommended level 2 post discharge, can return to facility from where patient came  Tylenol 975 mg q8 hours   Orthostatic VS negative, received maintenance fluid due to possible dehydration, since discontinued fluids  Bengay ordered prn for pain relief   Follow up with PCP within one week of discharge    Acute midline low back pain without sciatica  Assessment & Plan  See ambulatory dysfunction    SOB (shortness of breath)  Assessment & Plan  Patient had some SOB and coughing with clear mucous production on admission; since resolved; patient thinks it may have been related to her being in bed with less ambulation the past couple of days prior to admission  B/l basilar expiratory wheezing upon admission, absent prior to discharge  O2 saturation 94%  CT chest in ED showed minimal interstitial edema with trace b/l pleural effusions, secretions/debris in mid R trachea  Incentive spirometer ordered  COVID/Flu/RSV negative  WBC upon admission wnl  and repeat wnl upon discharge  Could be reactive from decreased ambulation for past several days  Follow up with PCP within one week of discharge    Fall at nursing home  Assessment & Plan  9 falls in past year with 5 falls being in the last 6 months; according to daughter first one was due to syncope and others due to imbalance when patient was trying to reach for things in higher cabinets  Will discontinue amiodarone and monitor patient's BP   See acute midline low back pain without sciatica    Essential hypertension  Assessment & Plan  BP on admission: 178/92, SBP came down between 130-150s, then back to 176/94 night of 12/9  Likely related to missed medication today, as well as reaction to low back pain from fall   Continue to monitor BP per protocol   Amlodipine 2.5 mg oral twice daily previously, trialed holding amlodipine due to concern for low bp related to fall but high BP overnight, recontinued amlodipine but changed to 2.5 mg once per day only  Orthostatic VS negative    Persistent atrial fibrillation (HCC)  Assessment & Plan  HR 60-90 since admission  Continue metoprolol 25 mg oral daily     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 behavioral disturbance, psychotic disturbance, mood disturbance, or anxiety (HCC)  Assessment & Plan  Baseline dementia and slight confusion waxing and waning in nature per nursing   Follow up with PCP outpatient    Moderate protein-calorie malnutrition (HCC)  Assessment & Plan  Malnutrition Findings:   - Albumin 3.5 12/9, 3.5 12/10  - Possible dehydration on admission and poor oral intake since 12/6    BMI Findings:     Body mass index is 27.49 kg/m².     Ordered magic cups and regular diet while in hospital, Appetite improved throughout hospitalization.   Encourage adequate hydration and oral intake     T12 compression fracture (HCC)  Assessment & Plan  Previous T12 compression fracture due to fall in 3/2024   Per daughter, xray of back and pelvis performed at nursing  facility this past week, which showed no new acute fractures but revealed prior T12 compression fracture  See acute low back pain for further details    Acquired hypothyroidism  Assessment & Plan  Continue levothyroxine 150 mcg orally once per day ONLY M-F    Vitamin D deficiency  Assessment & Plan  Continue vitamin D once daily     Vitamin B12 deficiency  Assessment & Plan  Continue vitamin B12 1000 mcg tablet twice per week ONLY M-F     Hypokalemia  Assessment & Plan  - 12/12 K 3.4, repleted with 20 meq kdur  - can follow up with PCP outpatient    Hypomagnesemia  Assessment & Plan  - Mg low on 12/11, repleted  - Mg slightly low 12/12, repletion discontinued as patient asymptomatic and preparing for discharge   - Can follow with PCP outpatient         Medical Problems       Resolved Problems  Date Reviewed: 12/12/2023   None       Discharging Physician / Practitioner: Pamela Barfield DO  PCP: Jackie Berger MD  Admission Date:   Admission Orders (From admission, onward)       Ordered        12/10/23 1307  Inpatient Admission  Once            12/09/23 1745  Place in Observation  Once                          Discharge Date: 12/12/23    Consultations During Hospital Stay:  PT/OT, speech, case management     Procedures Performed:   None    Significant Findings / Test Results:   CT chest abdomen pelvis w contrast   Final Result by Ab Zapien MD (12/09 1646)      1.  Minimal interstitial edema with trace bilateral pleural effusions.   2.  No acute findings in the abdomen or pelvis.               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   Mg and K repeat per PCP, made aware    Complications:   None    Reason for Admission: ambulatory dysfunction    Hospital Course:   Kyra Christianson is a 89 y.o. female patient who originally presented to the hospital on 12/9/2023 due to fall at nursing home. Patient was using her walker while trying to open up a drawer when she fell backward onto the ground. Trauma workup was negative in ED. SOB noted on admission as patient had decreased ambulation and appetite for several days after fall and had worsening back pain. CT chesy abd pelvis in ED showed minimal interstital edema with trace bilateral pleural effusions. COVID, RSV, and Flu negative. Incentive spirometer ordered. Expiratory wheezing noted on initial exam upon admission. For back pain, patient received scheduled Tylenol as well as bengay prn. PT/OT ordered and recommended continued rehab at patient's presenting facility. Speech evaluated due to possible aspiration risk as trace debris noted in mid trachea on CT chest imaging. Speech reported no recommendations bedside mechanical soft diet with thin liquids. Electrolytes repleted whole in hospital. Wheezing improved and patient had no SOB since first day of admission. Wheezing likely due to less ambulation several days prior to presentation. Back pain improved but patient still sore and with pain. Tylenol as recommended upon discharge. Patient to follow up with PCP within one week of discharge. Patient stable and medically cleared for discharge. Patient discharged to [place] [facility].     Please see above list of diagnoses and related plan for additional information.     Condition at Discharge: good    Discharge Day Visit / Exam:   Subjective:  feels much better, still having some back pain and soreness, has a good appetite this morning and was able to tolerate her breakfast, no acute events or issues overnight per nursing   Vitals: Blood Pressure: 149/76 (12/12/23 0911)  Pulse: 74 (12/12/23 0911)  Temperature: 98 °F (36.7 °C) (12/12/23 0911)  Temp Source:  "Oral (12/11/23 2127)  Respirations: 17 (12/12/23 0911)  Height: 5' 1\" (154.9 cm) (per records) (12/11/23 1154)  Weight - Scale: 66 kg (145 lb 8.1 oz) (12/09/23 1357)  SpO2: 95 % (12/12/23 0911)  Exam:   Physical Exam  Vitals reviewed.   Constitutional:       General: She is not in acute distress.     Appearance: Normal appearance. She is not ill-appearing, toxic-appearing or diaphoretic.   HENT:      Head: Normocephalic and atraumatic.      Right Ear: External ear normal.      Left Ear: External ear normal.      Nose: Nose normal.      Mouth/Throat:      Mouth: Mucous membranes are moist.      Pharynx: Oropharynx is clear.   Eyes:      Extraocular Movements: Extraocular movements intact.      Conjunctiva/sclera: Conjunctivae normal.      Pupils: Pupils are equal, round, and reactive to light.   Cardiovascular:      Rate and Rhythm: Normal rate and regular rhythm.      Pulses: Normal pulses.      Heart sounds: Normal heart sounds.   Pulmonary:      Effort: Pulmonary effort is normal.      Breath sounds: Normal breath sounds.      Comments: No wheezing noted on exam, improved since prior  Abdominal:      General: Abdomen is flat.      Palpations: Abdomen is soft.   Musculoskeletal:         General: Normal range of motion.      Cervical back: Normal range of motion and neck supple.      Right lower leg: No edema.      Left lower leg: No edema.   Skin:     General: Skin is warm.      Capillary Refill: Capillary refill takes less than 2 seconds.   Neurological:      General: No focal deficit present.      Mental Status: She is alert and oriented to person, place, and time. Mental status is at baseline.   Psychiatric:         Mood and Affect: Mood normal.         Behavior: Behavior normal.          Discussion with Family: Updated  (daughter) at bedside.    Discharge instructions/Information to patient and family:   See after visit summary for information provided to patient and family.      Provisions for " Follow-Up Care:  See after visit summary for information related to follow-up care and any pertinent home health orders.      Mobility at time of Discharge:   Basic Mobility Inpatient Raw Score: 12  JH-HLM Goal: 4: Move to chair/commode  JH-HLM Achieved: 2: Bed activities/Dependent transfer  HLM Goal NOT achieved. Continue to encourage mobility in post discharge setting.     Disposition:   Other Skilled Nursing Facility at [facility]    Planned Readmission: No     Discharge Statement:  I spent 45 minutes discharging the patient. This time was spent on the day of discharge. I had direct contact with the patient on the day of discharge. Greater than 50% of the total time was spent examining patient, answering all patient questions, arranging and discussing plan of care with patient as well as directly providing post-discharge instructions.  Additional time then spent on discharge activities.    Discharge Medications:  See after visit summary for reconciled discharge medications provided to patient and/or family.      **Please Note: This note may have been constructed using a voice recognition system**

## 2023-12-11 NOTE — ASSESSMENT & PLAN NOTE
Low back pain after fall on 12/4 where patient was using her walker then trying to open a drawer when she fell backwards. No head strike, no LOC. Xray of pelvis and back obtained at nursing facility per daughter and showed no acute abnormalities but showed prior T12 compression fracture from fall in 3/2023 (unable to see imaging). Tylenol 975 mg q8hrs at nursing facility and without relief. Patient able to ambulate after fall but pain progressively worse and less appetite and ambulation since 12/6.   12/9 CT CAP: Diffuse osseous demineralization. 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 Chronic appearing moderate compression of L3 (previously mild in 3/2023).   PT/OT evaluation and recommended level 2 post discharge, can return to facility from where patient came  Tylenol 975 mg q8 hours   Orthostatic VS negative, received maintenance fluid due to possible dehydration, since discontinued fluids  Bengay ordered prn for pain relief   Follow up with PCP within one week of discharge

## 2023-12-11 NOTE — ASSESSMENT & PLAN NOTE
Low back pain after fall on 12/4 where patient was using her walker then trying to open a drawer when she fell backwards. No head strike, no LOC. Xray of pelvis and back obtained at nursing facility per daughter and showed no acute abnormalities but showed prior T12 compression fracture from fall in 3/2023 (unable to see imaging). Tylenol 975 mg q8hrs at nursing facility and without relief. Patient able to ambulate after fall but pain progressively worse and less appetite and ambulation since 12/6.   12/9 CT CAP: Diffuse osseous demineralization. 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 Chronic appearing moderate compression of L3 (previously mild in 3/2023).   PT/OT evaluation- pending   Tylenol 975 mg q8 hours   Orthostatic VS negative, received maintenance fluid due to possible dehydration, since discontinued fluids  Bengay ordered prn for pain relief

## 2023-12-11 NOTE — ASSESSMENT & PLAN NOTE
Patient had some SOB and coughing with clear mucous production on admission; since resolved; patient thinks it may have been related to her being in bed with less ambulation the past couple of days prior to admission  B/l basilar expiratory wheezing upon admission, absent prior to discharge  O2 saturation 94%  CT chest in ED showed minimal interstitial edema with trace b/l pleural effusions, secretions/debris in mid R trachea  Incentive spirometer ordered  COVID/Flu/RSV negative  WBC upon admission wnl and repeat wnl upon discharge  Could be reactive from decreased ambulation for past several days  Follow up with PCP within one week of discharge

## 2023-12-11 NOTE — PLAN OF CARE
"  Problem: PHYSICAL THERAPY ADULT  Goal: Performs mobility at highest level of function for planned discharge setting.  See evaluation for individualized goals.  Description: Treatment/Interventions: Functional transfer training, LE strengthening/ROM, Therapeutic exercise, Endurance training, Cognitive reorientation, Patient/family training, Equipment eval/education, Bed mobility, Gait training, Spoke to nursing, Spoke to case management, Family  Equipment Recommended: Walker       See flowsheet documentation for full assessment, interventions and recommendations.  Note: Prognosis: Fair  Problem List: Decreased strength, Decreased range of motion, Decreased endurance, Impaired balance, Decreased mobility, Decreased coordination, Decreased cognition, Impaired judgement, Decreased safety awareness, Pain (gait deviations)  Assessment: Pt is a 89 y.o. female seen for PT evaluation s/p admit to [facility] on [facility] w/ Ambulatory dysfunction, multiple falls.  Order placed for PT.      Prior to admission: Pt lives at [facility].  Daughter reports that patient ambulated with a rolling walker with no physical assistance up until recently.  In the recent past she had a TLSO back Brace as well as an LSO brace due to compression fractures, but had not been wearing it recently based on \"doctor's recommendations\".  Upon evaluation: Pt needed between min and mod assist for transfers, and mod assist for ambulation for very short distances w/walker, trial ending due to patient's urinary incontinence..      Pt's clinical presentation is currently unstable/unpredictable given the functional mobility deficits above, especially (but not limited to) weakness, gait deviations, and pain, and combined with medical complications including hypertension  and fear/retreat.  Pt IS NOT at his/her mobility baseline.  She is at risk for falls based on hx of falls, impulsivity, impaired balance, impaired judgement, " "decreased safety awareness, and decreased cognition.       During this admission, pt would benefit from continued skilled inpatient PT in the acute care setting in order to address deficits as defined above to maximize function and mobility.      Recommendations:     From a PT standpoint, recommend next several sessions focus on continued mobility training with use of rolling walker especially with focusing on walker management around obstacles and staying inside of walker.   Also patient/daughter and this writer discussed potentially using one of her old back braces as needed for comfort.  Reached out to [facility] to request the same.  Dr Pamela Barfield stated that wearing the brace PRN \"would be fine\" and she would add nursing communication to chart.  Daughter stated that she would bring in the brace for use.  Barriers to Discharge: Decreased caregiver support, Inaccessible home environment     Rehab Resource Intensity Level, PT: II (Moderate Resource Intensity)    See flowsheet documentation for full assessment.        "

## 2023-12-11 NOTE — SPEECH THERAPY NOTE
Speech-Language Pathology Bedside Swallow Evaluation      Patient Name: Kyra Christianson    Today's Date: 12/11/2023     Problem List  Principal Problem:    Ambulatory dysfunction  Active Problems:    Essential hypertension    Persistent atrial fibrillation (HCC)    Acquired hypothyroidism    Vitamin D deficiency    Vitamin B12 deficiency    T12 compression fracture (HCC)    Moderate protein-calorie malnutrition (HCC)    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 behavioral disturbance, psychotic disturbance, mood disturbance, or anxiety (HCC)    Fall at nursing home    Acute midline low back pain without sciatica    SOB (shortness of breath)      Past Medical History  Past Medical History:   Diagnosis Date    AAA (abdominal aortic aneurysm) (HCC)     Acute medial meniscus tear     Arthritis 1980    Aspiration pneumonia (HCC)     LAST ASSESSED: 7/30/14    Breast CA (HCC)     left    Cancer (HCC) 2017    Chest pain     RESOLVED: 1/31/17    CTS (carpal tunnel syndrome)     Depression     Dermatitis     LAST ASSESSED: 7/25/13    Disease of thyroid gland     Fainting     LAST ASSESSED: 3/15/13    GERD (gastroesophageal reflux disease)     H. pylori infection 03/17/2009    Hypertension     Incomplete defecation     LAST ASSESSED: 7/25/13    Macular degeneration     Osteoporosis     Pneumonia     Vertigo 2012       Past Surgical History  Past Surgical History:   Procedure Laterality Date    APPENDECTOMY      CHOLECYSTECTOMY      COLONOSCOPY      MASTECTOMY Left 09/2017    SIMPLE    CA INTRAOP SENTINEL LYMPH NODE ID W/DYE INJECTION Left 9/5/2017    Procedure: INTRAOPERATIVE LYMPHATIC MAPPING; BLUE DYE ONLY; SENITNEL LYMPH NODE BIOPSY;  Surgeon: Marcelo Reddy MD;  Location: [facility];  Service: Surgical Oncology    CA MASTECTOMY SIMPLE COMPLETE Left 9/5/2017    Procedure: BREAST MASTECTOMY;  Surgeon: Marcelo Reddy MD;  [facility];  Service: Surgical Oncology    SENTINEL LYMPH NODE BIOPSY      US GUIDED BREAST BIOPSY  LEFT COMPLETE Left 8/14/2017       Summary   Pt presented with functional appearing oral and pharyngeal stage swallowing skills with materials administered today.    Recommended Diet: regular diet and thin liquids   Recommended Form of Meds: as desired  Aspiration precautions and swallowing strategies: upright posture, slow rate of eating and small frequent feedings if indicated (ensure that supplement available at this time-->prefers chocolate magic cup)  Other Recommendations: Continue frequent oral care        Current Medical Status  Kyra Christianson is a 89 y.o. female with a PMH of HTN, hypothyroidism, persistent afib, breast cancer, osteoporosis, AAA, previous pneumonia and mild dementia/waxing and waning of confusion per daughter. who presented 12/9  due to low back pain from a fall on 12/4.   DX: ambulatory dysfunction and recurrent falls, SOB, dementia, mod protein calorie malnutrition (possible dehydration on admission and poor oral intake since 12/6), T12 compression fracture, Vid D deficiency.   SLP Swallowing Evaluation ordered at this time.     Current Precautions:  Fall  Aspiration  Contact  AIrborn  C diff   Seizure  Delirium    Allergies:  No known food allergies    Past medical history:  Please see H&P for details    Special Studies:  12/9 CT of chest/abd/pelvis:    1.  Minimal interstitial edema with trace bilateral pleural effusions.  2.  No acute findings in the abdomen or pelvis.    Social/Education/Vocational Hx:  Pt currently at [facility] ([place] [facility]).     Swallow Information   Current Symptoms/Concerns: reduced oral intake  Current Diet: regular textured food and thin liquids   Baseline Diet: regular textured food and thin liquids       Baseline Assessment   Behavior/Cognition: alert  Speech/Language Status: able to follow commands with prompts needed at times, expressed some simple needs c no s/s dysarthria  Patient Positioning: upright in chair  Pain  Status/Interventions/Response to Interventions:  No report of or nonverbal indications of pain.       Swallow Mechanism Exam  Facial: symmetrical  Labial: WFL  Lingual: WFL  Velum: symmetrical  Mandible: adequate ROM  Dentition: adequate  Vocal quality:clear/adequate   Respiratory Status: on RA       Consistencies Assessed and Performance   Consistencies Administered: thin liquids, soft solids/melissa crackers, and ice cream    Oral Stage:   Mastication was adequate with the materials administered today.  Bolus formation and transfer were functional with no significant oral residue noted.  No overt s/s reduced oral control.    Pharyngeal Stage:   Swallow Mechanics:  Swallowing initiation appeared prompt.  Laryngeal rise was palpated and judged to be within functional limits.  No coughing, throat clearing, change in vocal quality or respiratory status noted today.     Esophageal Concerns: early satiety today but generally has good appetite /good amt of intake at a meal per dtr    Summary and Recommendations (see above)    Results Reviewed with: patient, RN, and family /dtr    Treatment Recommended: None at this time

## 2023-12-12 VITALS
BODY MASS INDEX: 27.47 KG/M2 | WEIGHT: 145.5 LBS | HEART RATE: 74 BPM | DIASTOLIC BLOOD PRESSURE: 76 MMHG | OXYGEN SATURATION: 95 % | RESPIRATION RATE: 17 BRPM | SYSTOLIC BLOOD PRESSURE: 149 MMHG | TEMPERATURE: 98 F | HEIGHT: 61 IN

## 2023-12-12 LAB
ANION GAP SERPL CALCULATED.3IONS-SCNC: 9 MMOL/L
BASOPHILS # BLD AUTO: 0.03 THOUSANDS/ÂΜL (ref 0–0.1)
BASOPHILS NFR BLD AUTO: 1 % (ref 0–1)
BUN SERPL-MCNC: 18 MG/DL (ref 5–25)
CALCIUM SERPL-MCNC: 8.4 MG/DL (ref 8.4–10.2)
CHLORIDE SERPL-SCNC: 105 MMOL/L (ref 96–108)
CO2 SERPL-SCNC: 23 MMOL/L (ref 21–32)
CREAT SERPL-MCNC: 0.65 MG/DL (ref 0.6–1.3)
EOSINOPHIL # BLD AUTO: 0.25 THOUSAND/ÂΜL (ref 0–0.61)
EOSINOPHIL NFR BLD AUTO: 5 % (ref 0–6)
ERYTHROCYTE [DISTWIDTH] IN BLOOD BY AUTOMATED COUNT: 14.5 % (ref 11.6–15.1)
GFR SERPL CREATININE-BSD FRML MDRD: 74 ML/MIN/1.73SQ M
GLUCOSE SERPL-MCNC: 97 MG/DL (ref 65–140)
HCT VFR BLD AUTO: 36.6 % (ref 34.8–46.1)
HGB BLD-MCNC: 12 G/DL (ref 11.5–15.4)
IMM GRANULOCYTES # BLD AUTO: 0.01 THOUSAND/UL (ref 0–0.2)
IMM GRANULOCYTES NFR BLD AUTO: 0 % (ref 0–2)
LYMPHOCYTES # BLD AUTO: 2.01 THOUSANDS/ÂΜL (ref 0.6–4.47)
LYMPHOCYTES NFR BLD AUTO: 40 % (ref 14–44)
MAGNESIUM SERPL-MCNC: 1.8 MG/DL (ref 1.9–2.7)
MCH RBC QN AUTO: 30.4 PG (ref 26.8–34.3)
MCHC RBC AUTO-ENTMCNC: 32.8 G/DL (ref 31.4–37.4)
MCV RBC AUTO: 93 FL (ref 82–98)
MONOCYTES # BLD AUTO: 0.66 THOUSAND/ÂΜL (ref 0.17–1.22)
MONOCYTES NFR BLD AUTO: 13 % (ref 4–12)
NEUTROPHILS # BLD AUTO: 2.02 THOUSANDS/ÂΜL (ref 1.85–7.62)
NEUTS SEG NFR BLD AUTO: 41 % (ref 43–75)
NRBC BLD AUTO-RTO: 0 /100 WBCS
PLATELET # BLD AUTO: 234 THOUSANDS/UL (ref 149–390)
PMV BLD AUTO: 9.7 FL (ref 8.9–12.7)
POTASSIUM SERPL-SCNC: 3.4 MMOL/L (ref 3.5–5.3)
RBC # BLD AUTO: 3.95 MILLION/UL (ref 3.81–5.12)
SODIUM SERPL-SCNC: 137 MMOL/L (ref 135–147)
WBC # BLD AUTO: 4.98 THOUSAND/UL (ref 4.31–10.16)

## 2023-12-12 PROCEDURE — 99239 HOSP IP/OBS DSCHRG MGMT >30: CPT | Performed by: INTERNAL MEDICINE

## 2023-12-12 PROCEDURE — 80048 BASIC METABOLIC PNL TOTAL CA: CPT

## 2023-12-12 PROCEDURE — 85025 COMPLETE CBC W/AUTO DIFF WBC: CPT

## 2023-12-12 PROCEDURE — 83735 ASSAY OF MAGNESIUM: CPT

## 2023-12-12 RX ORDER — MAGNESIUM SULFATE HEPTAHYDRATE 40 MG/ML
2 INJECTION, SOLUTION INTRAVENOUS ONCE
Status: DISCONTINUED | OUTPATIENT
Start: 2023-12-12 | End: 2023-12-12

## 2023-12-12 RX ORDER — POTASSIUM CHLORIDE 20 MEQ/1
20 TABLET, EXTENDED RELEASE ORAL ONCE
Status: COMPLETED | OUTPATIENT
Start: 2023-12-12 | End: 2023-12-12

## 2023-12-12 RX ADMIN — AMLODIPINE BESYLATE 2.5 MG: 2.5 TABLET ORAL at 09:38

## 2023-12-12 RX ADMIN — LEVOTHYROXINE SODIUM 150 MCG: 150 TABLET ORAL at 06:27

## 2023-12-12 RX ADMIN — ENOXAPARIN SODIUM 40 MG: 40 INJECTION SUBCUTANEOUS at 09:39

## 2023-12-12 RX ADMIN — ACETAMINOPHEN 975 MG: 325 TABLET, FILM COATED ORAL at 14:31

## 2023-12-12 RX ADMIN — GLYCERIN 1 DROP: .002; .002; .01 SOLUTION/ DROPS OPHTHALMIC at 06:27

## 2023-12-12 RX ADMIN — Medication 1000 UNITS: at 09:38

## 2023-12-12 RX ADMIN — POTASSIUM CHLORIDE 20 MEQ: 1500 TABLET, EXTENDED RELEASE ORAL at 14:31

## 2023-12-12 RX ADMIN — METOPROLOL SUCCINATE 25 MG: 25 TABLET, EXTENDED RELEASE ORAL at 09:38

## 2023-12-12 RX ADMIN — ACETAMINOPHEN 975 MG: 325 TABLET, FILM COATED ORAL at 06:27

## 2023-12-12 RX ADMIN — DOCUSATE SODIUM 100 MG: 100 CAPSULE, LIQUID FILLED ORAL at 09:39

## 2023-12-12 NOTE — ASSESSMENT & PLAN NOTE
- Mg low on 12/11, repleted  - Mg slightly low 12/12, repletion discontinued as patient asymptomatic and preparing for discharge   - Can follow with PCP outpatient

## 2023-12-12 NOTE — PLAN OF CARE
Problem: PAIN - ADULT  Goal: Verbalizes/displays adequate comfort level or baseline comfort level  Description: Interventions:  - Encourage patient to monitor pain and request assistance  - Assess pain using appropriate pain scale  - Administer analgesics based on type and severity of pain and evaluate response  - Implement non-pharmacological measures as appropriate and evaluate response  - Consider cultural and social influences on pain and pain management  - Notify physician/advanced practitioner if interventions unsuccessful or patient reports new pain  Outcome: Progressing     Problem: INFECTION - ADULT  Goal: Absence or prevention of progression during hospitalization  Description: INTERVENTIONS:  - Assess and monitor for signs and symptoms of infection  - Monitor lab/diagnostic results  - Monitor all insertion sites, i.e. indwelling lines, tubes, and drains  - Monitor endotracheal if appropriate and nasal secretions for changes in amount and color  - [place] appropriate cooling/warming therapies per order  - Administer medications as ordered  - Instruct and encourage patient and family to use good hand hygiene technique  - Identify and instruct in appropriate isolation precautions for identified infection/condition  Outcome: Progressing  Goal: Absence of fever/infection during neutropenic period  Description: INTERVENTIONS:  - Monitor WBC    Outcome: Progressing     Problem: SAFETY ADULT  Goal: Patient will remain free of falls  Description: INTERVENTIONS:  - Educate patient/family on patient safety including physical limitations  - Instruct patient to call for assistance with activity   - Consult OT/PT to assist with strengthening/mobility   - Keep Call bell within reach  - Keep bed low and locked with side rails adjusted as appropriate  - Keep care items and personal belongings within reach  - Initiate and maintain comfort rounds  - Make Fall Risk Sign visible to staff  - Offer Toileting every  Hours,  in advance of need  - Initiate/Maintain alarm  - Obtain necessary fall risk management equipment:   - Apply yellow socks and bracelet for high fall risk patients  - Consider moving patient to room near nurses station  Outcome: Progressing  Goal: Maintain or return to baseline ADL function  Description: INTERVENTIONS:  -  Assess patient's ability to carry out ADLs; assess patient's baseline for ADL function and identify physical deficits which impact ability to perform ADLs (bathing, care of mouth/teeth, toileting, grooming, dressing, etc.)  - Assess/evaluate cause of self-care deficits   - Assess range of motion  - Assess patient's mobility; develop plan if impaired  - Assess patient's need for assistive devices and provide as appropriate  - Encourage maximum independence but intervene and supervise when necessary  - Involve family in performance of ADLs  - Assess for home care needs following discharge   - Consider OT consult to assist with ADL evaluation and planning for discharge  - Provide patient education as appropriate  Outcome: Progressing  Goal: Maintains/Returns to pre admission functional level  Description: INTERVENTIONS:  - Perform AM-PAC 6 Click Basic Mobility/ Daily Activity assessment daily.  - Set and communicate daily mobility goal to care team and patient/family/caregiver.   - Collaborate with rehabilitation services on mobility goals if consulted  - Perform Range of Motion  times a day.  - Reposition patient every  hours.  - Dangle patient  times a day  - Stand patient  times a day  - Ambulate patient  times a day  - Out of bed to chair  times a day   - Out of bed for meals  times a day  - Out of bed for toileting  - Record patient progress and toleration of activity level   Outcome: Progressing     Problem: DISCHARGE PLANNING  Goal: Discharge to home or other facility with appropriate resources  Description: INTERVENTIONS:  - Identify barriers to discharge w/patient and caregiver  - Arrange for  needed discharge resources and transportation as appropriate  - Identify discharge learning needs (meds, wound care, etc.)  - Arrange for interpretive services to assist at discharge as needed  - Refer to Case Management Department for coordinating discharge planning if the patient needs post-hospital services based on physician/advanced practitioner order or complex needs related to functional status, cognitive ability, or social support system  Outcome: Progressing     Problem: Knowledge Deficit  Goal: Patient/family/caregiver demonstrates understanding of disease process, treatment plan, medications, and discharge instructions  Description: Complete learning assessment and assess knowledge base.  Interventions:  - Provide teaching at level of understanding  - Provide teaching via preferred learning methods  Outcome: Progressing     Problem: Nutrition/Hydration-ADULT  Goal: Nutrient/Hydration intake appropriate for improving, restoring or maintaining nutritional needs  Description: Monitor and assess patient's nutrition/hydration status for malnutrition. Collaborate with interdisciplinary team and initiate plan and interventions as ordered.  Monitor patient's weight and dietary intake as ordered or per policy. Utilize nutrition screening tool and intervene as necessary. Determine patient's food preferences and provide high-protein, high-caloric foods as appropriate.     INTERVENTIONS:  - Monitor oral intake, urinary output, labs, and treatment plans  - Assess nutrition and hydration status and recommend course of action  - Evaluate amount of meals eaten  - Assist patient with eating if necessary   - Allow adequate time for meals  - Recommend/ encourage appropriate diets, oral nutritional supplements, and vitamin/mineral supplements  - Order, calculate, and assess calorie counts as needed  - Recommend, monitor, and adjust tube feedings and TPN/PPN based on assessed needs  - Assess need for intravenous fluids  -  Provide specific nutrition/hydration education as appropriate  - Include patient/family/caregiver in decisions related to nutrition  Outcome: Progressing

## 2023-12-12 NOTE — PLAN OF CARE
Problem: PAIN - ADULT  Goal: Verbalizes/displays adequate comfort level or baseline comfort level  Description: Interventions:  - Encourage patient to monitor pain and request assistance  - Assess pain using appropriate pain scale  - Administer analgesics based on type and severity of pain and evaluate response  - Implement non-pharmacological measures as appropriate and evaluate response  - Consider cultural and social influences on pain and pain management  - Notify physician/advanced practitioner if interventions unsuccessful or patient reports new pain  Outcome: Progressing     Problem: INFECTION - ADULT  Goal: Absence or prevention of progression during hospitalization  Description: INTERVENTIONS:  - Assess and monitor for signs and symptoms of infection  - Monitor lab/diagnostic results  - Monitor all insertion sites, i.e. indwelling lines, tubes, and drains  - Monitor endotracheal if appropriate and nasal secretions for changes in amount and color  - [place] appropriate cooling/warming therapies per order  - Administer medications as ordered  - Instruct and encourage patient and family to use good hand hygiene technique  - Identify and instruct in appropriate isolation precautions for identified infection/condition  Outcome: Progressing  Goal: Absence of fever/infection during neutropenic period  Description: INTERVENTIONS:  - Monitor WBC    Outcome: Progressing     Problem: SAFETY ADULT  Goal: Patient will remain free of falls  Description: INTERVENTIONS:  - Educate patient/family on patient safety including physical limitations  - Instruct patient to call for assistance with activity   - Consult OT/PT to assist with strengthening/mobility   - Keep Call bell within reach  - Keep bed low and locked with side rails adjusted as appropriate  - Keep care items and personal belongings within reach  - Initiate and maintain comfort rounds  - Make Fall Risk Sign visible to staff  - Offer Toileting every  Hours,  in advance of need  - Initiate/Maintain alarm  - Obtain necessary fall risk management equipment:   - Apply yellow socks and bracelet for high fall risk patients  - Consider moving patient to room near nurses station  Outcome: Progressing  Goal: Maintain or return to baseline ADL function  Description: INTERVENTIONS:  -  Assess patient's ability to carry out ADLs; assess patient's baseline for ADL function and identify physical deficits which impact ability to perform ADLs (bathing, care of mouth/teeth, toileting, grooming, dressing, etc.)  - Assess/evaluate cause of self-care deficits   - Assess range of motion  - Assess patient's mobility; develop plan if impaired  - Assess patient's need for assistive devices and provide as appropriate  - Encourage maximum independence but intervene and supervise when necessary  - Involve family in performance of ADLs  - Assess for home care needs following discharge   - Consider OT consult to assist with ADL evaluation and planning for discharge  - Provide patient education as appropriate  Outcome: Progressing  Goal: Maintains/Returns to pre admission functional level  Description: INTERVENTIONS:  - Perform AM-PAC 6 Click Basic Mobility/ Daily Activity assessment daily.  - Set and communicate daily mobility goal to care team and patient/family/caregiver.   - Collaborate with rehabilitation services on mobility goals if consulted  - Perform Range of Motion  times a day.  - Reposition patient every  hours.  - Dangle patient  times a day  - Stand patient  times a day  - Ambulate patient  times a day  - Out of bed to chair  times a day   - Out of bed for meals  times a day  - Out of bed for toileting  - Record patient progress and toleration of activity level   Outcome: Progressing     Problem: DISCHARGE PLANNING  Goal: Discharge to home or other facility with appropriate resources  Description: INTERVENTIONS:  - Identify barriers to discharge w/patient and caregiver  - Arrange for  needed discharge resources and transportation as appropriate  - Identify discharge learning needs (meds, wound care, etc.)  - Arrange for interpretive services to assist at discharge as needed  - Refer to Case Management Department for coordinating discharge planning if the patient needs post-hospital services based on physician/advanced practitioner order or complex needs related to functional status, cognitive ability, or social support system  Outcome: Progressing     Problem: Knowledge Deficit  Goal: Patient/family/caregiver demonstrates understanding of disease process, treatment plan, medications, and discharge instructions  Description: Complete learning assessment and assess knowledge base.  Interventions:  - Provide teaching at level of understanding  - Provide teaching via preferred learning methods  Outcome: Progressing     Problem: Nutrition/Hydration-ADULT  Goal: Nutrient/Hydration intake appropriate for improving, restoring or maintaining nutritional needs  Description: Monitor and assess patient's nutrition/hydration status for malnutrition. Collaborate with interdisciplinary team and initiate plan and interventions as ordered.  Monitor patient's weight and dietary intake as ordered or per policy. Utilize nutrition screening tool and intervene as necessary. Determine patient's food preferences and provide high-protein, high-caloric foods as appropriate.     INTERVENTIONS:  - Monitor oral intake, urinary output, labs, and treatment plans  - Assess nutrition and hydration status and recommend course of action  - Evaluate amount of meals eaten  - Assist patient with eating if necessary   - Allow adequate time for meals  - Recommend/ encourage appropriate diets, oral nutritional supplements, and vitamin/mineral supplements  - Order, calculate, and assess calorie counts as needed  - Recommend, monitor, and adjust tube feedings and TPN/PPN based on assessed needs  - Assess need for intravenous fluids  -  Provide specific nutrition/hydration education as appropriate  - Include patient/family/caregiver in decisions related to nutrition  Outcome: Progressing     Problem: Prexisting or High Potential for Compromised Skin Integrity  Goal: Skin integrity is maintained or improved  Description: INTERVENTIONS:  - Identify patients at risk for skin breakdown  - Assess and monitor skin integrity  - Assess and monitor nutrition and hydration status  - Monitor labs   - Assess for incontinence   - Turn and reposition patient  - Assist with mobility/ambulation  - Relieve pressure over bony prominences  - Avoid friction and shearing  - Provide appropriate hygiene as needed including keeping skin clean and dry  - Evaluate need for skin moisturizer/barrier cream  - Collaborate with interdisciplinary team   - Patient/family teaching  - Consider wound care consult   Outcome: Progressing

## 2023-12-12 NOTE — DISCHARGE INSTR - AVS FIRST PAGE
Dear Krya Christianson,     It was our pleasure to care for you here at [facility].  It is our hope that we were always able to exceed the expected standards for your care during your stay.  You were hospitalized due to ambulatory dysfunction after fall.  You were cared for on the 4th floor by Pamela Barfield DO under the service of Maritza Phoenix MD with the [facility] who covers for your primary care physician (PCP), Jackie Berger MD, while you were hospitalized.  If you have any questions or concerns related to this hospitalization, you may contact us at .  For follow up as well as any medication refills, we recommend that you follow up with your primary care physician.  A registered nurse will reach out to you by phone within a few days after your discharge to answer any additional questions that you may have after going home.  However, at this time we provide for you here, the most important instructions / recommendations at discharge:     Notable Medication Adjustments -   Instead of taking your amlodipine 2.5 mg twice daily, start taking amlodipine 2.5 mg once daily   Testing Required after Discharge -   Repeat Mg and K per PCP   ** Please contact your PCP to request testing orders for any of the testing recommended here **  Important follow up information -   Follow up with your PCP within one week of discharge  Other Instructions -   Please follow with PT/OT for continued rehab   Please review this entire after visit summary as additional general instructions including medication list, appointments, activity, diet, any pertinent wound care, and other additional recommendations from your care team that may be provided for you.      Sincerely,     Pamela Barfield DO

## 2023-12-12 NOTE — CASE MANAGEMENT
Case Management Assessment & Discharge Planning Note    Patient name Kyra Isaac W /W -01 MRN [id number]  : 1925 Date 2023       Current Admission Date: 2023  Current Admission Diagnosis:Ambulatory dysfunction   Patient Active Problem List    Diagnosis Date Noted    Acute midline low back pain without sciatica 2023    SOB (shortness of breath) 2023    Fall at nursing home 2023    Candidal intertrigo 2023    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 behavioral disturbance, psychotic disturbance, mood disturbance, or anxiety (HCC) 2023    Chronic pain syndrome 2023    Moderate protein-calorie malnutrition (HCC) 2023    Unwitnessed fall 2023    T12 compression fracture (HCC) 2023    Primary osteoarthritis of left knee 2022    Seasonal allergic rhinitis due to pollen 2022    Syncope 2021    Interstitial cystitis 2021    Anxiety 2021    Atrial flutter (HCC) 2021    Vitamin B12 deficiency 09/10/2019    Localized edema 09/10/2019    Stage 2 chronic kidney disease 09/10/2019    Hiatal hernia 2019    Chronic diastolic heart failure (HCC) 2019    Ambulatory dysfunction 2019    Encounter for follow-up examination after completed treatment for malignant neoplasm 2019    Hemorrhoids 2018    History of left breast cancer 2018    S/P left mastectomy 2017    Persistent atrial fibrillation (HCC) 2017    Osteoporosis     Essential hypertension     GERD (gastroesophageal reflux disease)     Constipation 2016    Macular degeneration 2015    Aneurysm of ascending aorta (HCC) 2014    First degree AV block 03/15/2013    Benign paroxysmal vertigo 2013    Vitamin D deficiency 10/01/2012    Dyslipidemia 2012    Acquired hypothyroidism 2012    Vertigo 2012    H. pylori infection 2009    Advance directive in  chart 08/25/2005      LOS (days): 2  Geometric Mean LOS (GMLOS) (days): 3.10  Days to GMLOS:1     OBJECTIVE:    Risk of Unplanned Readmission Score: 10.13         Current admission status: Inpatient       Preferred Pharmacy:   [id number] [place] - [address]  [address]  [place] [address]  Phone: [phone] Fax: [phone]    [id number] - [place] - [address]  [address]  [place] [address]  Phone: [phone] Fax: [phone]    [facility] [place] - [address]  [address] [place] [address]  Phone: [phone] Fax: [phone]    Primary Care Provider: Jackie Berger MD    Primary Insurance: MEDICARE  Secondary Insurance: [facility]    ASSESSMENT:  Active Health Care Proxies      Readmission Root Cause  30 Day Readmission: No    Patient Information  Admitted from:: Facility  Mental Status: Alert, Confused  During Assessment patient was accompanied by: Not accompanied during assessment, Daughter  Assessment information provided by:: Patient  Primary Caregiver: Other (Comment)  Caregiver's Name:: [facility]  Caregiver's Relationship to Patient:: Other (Specify)  Support Systems: Family members, Daughter, Son  County of Residence: [place]  [facility] do you live in?: [place]  Home entry access options. Select all that apply.: No steps to enter home  Type of Current Residence: Facility  Upon entering residence, is there a bedroom on the main floor (no further steps)?: Yes  Upon entering residence, is there a bathroom on the main floor (no further steps)?: Yes    Activities of Daily Living Prior to Admission  Functional Status: Assistance  Completes ADLs independently?: No  Level of ADL dependence: Assistance  Ambulates independently?: Yes  Does patient use assisted devices?: Yes  Assisted Devices (DME) used: Walker  Does patient currently own DME?: Yes  What DME does the patient currently own?: Walker  Does the  patient have a history of Short-Term Rehab?: Yes    Patient Information Continued  Income Source: SSI/SSD  Does patient have prescription coverage?: Yes    Means of Transportation  Means of Transport to Appts:: Family transport    Housing Stability: Low Risk  (12/12/2023)    Housing Stability Vital Sign     Unable to Pay for Housing in the Last Year: No     Number of Places Lived in the Last Year: 1     Unstable Housing in the Last Year: No   Food Insecurity: No Food Insecurity (12/12/2023)    Hunger Vital Sign     Worried About Running Out of Food in the Last Year: Never true     Ran Out of Food in the Last Year: Never true   Transportation Needs: No Transportation Needs (12/12/2023)    PRAPARE - Transportation     Lack of Transportation (Medical): No     Lack of Transportation (Non-Medical): No   Utilities: Not At Risk (12/12/2023)    [facility] Utilities     Threatened with loss of utilities: No     DISCHARGE DETAILS:    Discharge planning discussed with:: patient andn daughter Lillian at bedside  Freedom of Choice: Yes  Comments - Freedom of Choice: RUST  CM contacted family/caregiver?: Yes  Were Treatment Team discharge recommendations reviewed with patient/caregiver?: Yes  Did patient/caregiver verbalize understanding of patient care needs?: N/A- going to facility    Contacts  Patient Contacts: Lillian  Relationship to Patient:: Family  Contact Method: In Person  Reason/Outcome: Discharge Planning, Referral, Emergency Contact, Continuity of Care    Requested Home Health Care         Is the patient interested in C at discharge?: No    DME Referral Provided  Referral made for DME?: No    Other Referral/Resources/Interventions Provided:  Interventions: SNF, Facility Return  Referral Comments: [facility]. CM met with patient and daughter at bedside to complete assessment/ discuss dcp. Patient is LTC resident at [facility]. Patient requires assistance with ADLs, ambulates  independently- uses a walker. Daughter confirming plan is for patient to return to facility. Referral for [facility] placed via [facility]- facility reserved. Per SLIM patient medically cleared today. Daughter agreeable to d/c back today. 3:00PM ingrid watson confirmed- all appropriate parties aware, med necessity placed in binder on W4. No further CM needs anticipated at this time.    Would you like to participate in our [facility] service program?  : No - Declined    Treatment Team Recommendation: SNF, Facility Return  Discharge Destination Plan:: SNF, Facility Return  Transport at Discharge : Wilson watson  Transported by (Company and Unit #): [employer]      IMM Given (Date):: 12/12/23    Accepting Facility Name, City & State : [facility], [facility]  Receiving Facility/Agency Phone Number: [phone]  Facility/Agency Fax Number: [phone]

## 2023-12-13 ENCOUNTER — NURSING HOME VISIT (OUTPATIENT)
Dept: GERIATRICS | Facility: OTHER | Age: 88
End: 2023-12-13
Payer: MEDICARE

## 2023-12-13 ENCOUNTER — PATIENT OUTREACH (OUTPATIENT)
Dept: CASE MANAGEMENT | Facility: OTHER | Age: 88
End: 2023-12-13

## 2023-12-13 DIAGNOSIS — F02.A0 MILD LATE ONSET ALZHEIMER'S DEMENTIA WITHOUT BEHAVIORAL DISTURBANCE, PSYCHOTIC DISTURBANCE, MOOD DISTURBANCE, OR ANXIETY (HCC): ICD-10-CM

## 2023-12-13 DIAGNOSIS — E03.9 ACQUIRED HYPOTHYROIDISM: ICD-10-CM

## 2023-12-13 DIAGNOSIS — I10 ESSENTIAL HYPERTENSION: ICD-10-CM

## 2023-12-13 DIAGNOSIS — I48.19 PERSISTENT ATRIAL FIBRILLATION (HCC): ICD-10-CM

## 2023-12-13 DIAGNOSIS — I50.32 CHRONIC DIASTOLIC HEART FAILURE (HCC): Chronic | ICD-10-CM
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
## 2023-12-13 DIAGNOSIS — R26.2 AMBULATORY DYSFUNCTION: Primary | ICD-10-CM

## 2023-12-13 DIAGNOSIS — G30.1 MILD LATE ONSET ALZHEIMER'S DEMENTIA WITHOUT BEHAVIORAL DISTURBANCE, PSYCHOTIC DISTURBANCE, MOOD DISTURBANCE, OR ANXIETY (HCC): ICD-10-CM

## 2023-12-13 DIAGNOSIS — I48.92 ATRIAL FLUTTER, UNSPECIFIED TYPE (HCC): ICD-10-CM

## 2023-12-13 PROCEDURE — 99306 1ST NF CARE HIGH MDM 50: CPT | Performed by: FAMILY MEDICINE
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
## 2023-12-13 NOTE — PROGRESS NOTES
Outpatient Care Management CARLOS/SNF Pathway. Discharged 12/11/23 to [facility] post [facility]. Email sent to facility to inform them the patient is on the CARLOS Pathway and I will be following them during their skilled stay. This Admin Coordinator will continue to monitor via chart review.

## 2023-12-14 VITALS
TEMPERATURE: 97.3 F | BODY MASS INDEX: 26.45 KG/M2 | WEIGHT: 140 LBS | SYSTOLIC BLOOD PRESSURE: 169 MMHG | DIASTOLIC BLOOD PRESSURE: 97 MMHG | HEART RATE: 83 BPM | RESPIRATION RATE: 18 BRPM | OXYGEN SATURATION: 97 %

## 2023-12-14 NOTE — ASSESSMENT & PLAN NOTE
Continue current regimen with levothyroxine 150 mcg daily Monday through Friday  TSH WNL 3.6 in May 2023

## 2023-12-14 NOTE — ASSESSMENT & PLAN NOTE
Amlodipine was decreased in the hospital  Continue amlodipine 2.5 mg daily for now, and monitor BP daily. Resume 2.5 mg twice daily if BP still high.

## 2023-12-14 NOTE — ASSESSMENT & PLAN NOTE
>>ASSESSMENT AND PLAN FOR [facility]TER ([address]) WRITTEN ON 12/14/2023  1:18 AM BY LANI JOHNSON MD    Continue Toprol daily  No AC due to recurrent falls, high risk of falls, age  Monitor vitals

## 2023-12-14 NOTE — ASSESSMENT & PLAN NOTE
Wt Readings from Last 3 Encounters:   12/13/23 63.5 kg (140 lb)   12/09/23 66 kg (145 lb 8.1 oz)   12/08/23 63.5 kg (140 lb)   Weight stable  Euvolemic on exam 149.9

## 2023-12-14 NOTE — ASSESSMENT & PLAN NOTE
Delirum/Dementia Protocol:   Provide redirection, reorientation, distraction techniques  Fall Precautions  Assist with ADLs/IADLs  Avoid deliriogenic medications such as tramadol, benzodiazepines, anticholinergics, benadryl  Encourage Hydration/ Nutrition  Implement sleep hygiene, limit night time interuptions   Encourage participation in group activities when appropriate

## 2023-12-18 ENCOUNTER — PATIENT OUTREACH (OUTPATIENT)
Dept: CASE MANAGEMENT | Facility: OTHER | Age: 88
End: 2023-12-18

## 2023-12-18 NOTE — PROGRESS NOTES
Chart review completed.  Email sent to  SNF Coordinator to obtain an update on patient.   This Admin Coordinator will continue to monitor via chart review throughout episode.

## 2023-12-25 ENCOUNTER — TELEPHONE (OUTPATIENT)
Dept: OTHER | Facility: OTHER | Age: 88
End: 2023-12-25

## 2023-12-25 NOTE — TELEPHONE ENCOUNTER
Tereza called from [facility] stating that pt's left eye was irritated and is crusted. Requested call back from the on call provider.

## 2023-12-26 ENCOUNTER — PATIENT OUTREACH (OUTPATIENT)
Dept: CASE MANAGEMENT | Facility: OTHER | Age: 88
End: 2023-12-26

## 2023-12-26 NOTE — PROGRESS NOTES
Chart review complete the patient is currently admitted to [facility] for STR no LCD at this time. This Admin Coordinator will continue to monitor via chart review.

## 2024-01-02 ENCOUNTER — PATIENT OUTREACH (OUTPATIENT)
Dept: CASE MANAGEMENT | Facility: OTHER | Age: 89
End: 2024-01-02

## 2024-01-02 NOTE — PROGRESS NOTES
Chart review complete Update obtained from [facility] The patient is currently admitted to the SNF and is receiving skilled therapies No LCD at this time. This Admin Coordinator will continue to monitor via chart review.

## 2024-01-05 ENCOUNTER — TELEPHONE (OUTPATIENT)
Dept: OTHER | Facility: OTHER | Age: 89
End: 2024-01-05

## 2024-01-05 NOTE — TELEPHONE ENCOUNTER
[phone]/Lisa from [facility]. Pt has low grade fever, and cough up sputum w/ blood. Pt complaining of stomach pain.    Estrella De La Torre was paged via TC    Please allow the on-call provider 20-30 mins to respond. If you have not heard from them within that time, please feel free to call back.

## 2024-01-06 ENCOUNTER — TELEPHONE (OUTPATIENT)
Dept: OTHER | Facility: OTHER | Age: 89
End: 2024-01-06

## 2024-01-07 ENCOUNTER — TELEPHONE (OUTPATIENT)
Dept: OTHER | Facility: OTHER | Age: 89
End: 2024-01-07

## 2024-01-08 ENCOUNTER — PATIENT OUTREACH (OUTPATIENT)
Dept: CASE MANAGEMENT | Facility: OTHER | Age: 89
End: 2024-01-08

## 2024-01-11 ENCOUNTER — PATIENT OUTREACH (OUTPATIENT)
Dept: CASE MANAGEMENT | Facility: OTHER | Age: 89
End: 2024-01-11

## 2024-01-11 NOTE — PROGRESS NOTES
Chart review complete It has been 30 days since SNF/STR Surveillance episode was opened I will no longer be following the patient per protocol. I have removed myself from the care team, updated the Care Coordination note, and closed the episode.  Email sent to facility to inform them I will no longer be following the patient.

## 2024-01-14 ENCOUNTER — NURSE TRIAGE (OUTPATIENT)
Dept: OTHER | Facility: OTHER | Age: 89
End: 2024-01-14

## 2024-01-14 ENCOUNTER — HOSPITAL ENCOUNTER (INPATIENT)
Facility: HOSPITAL | Age: 89
LOS: 3 days | Discharge: NURSING, REHAB OR LONG-TERM CARE | DRG: 206 | End: 2024-01-17
Attending: EMERGENCY MEDICINE | Admitting: INTERNAL MEDICINE
Payer: MEDICARE

## 2024-01-14 ENCOUNTER — APPOINTMENT (EMERGENCY)
Dept: RADIOLOGY | Facility: HOSPITAL | Age: 89
DRG: 206 | End: 2024-01-14
Payer: MEDICARE

## 2024-01-14 ENCOUNTER — TELEPHONE (OUTPATIENT)
Dept: OTHER | Facility: OTHER | Age: 89
End: 2024-01-14

## 2024-01-14 ENCOUNTER — APPOINTMENT (INPATIENT)
Dept: RADIOLOGY | Facility: HOSPITAL | Age: 89
DRG: 206 | End: 2024-01-14
Payer: MEDICARE

## 2024-01-14 DIAGNOSIS — R09.02 HYPOXIA: ICD-10-CM

## 2024-01-14 DIAGNOSIS — M25.532 PAIN AND SWELLING OF WRIST, LEFT: ICD-10-CM

## 2024-01-14 DIAGNOSIS — J18.9 PNEUMONIA: Primary | ICD-10-CM

## 2024-01-14 DIAGNOSIS — B37.0 ORAL THRUSH: ICD-10-CM

## 2024-01-14 DIAGNOSIS — R19.7 DIARRHEA: ICD-10-CM

## 2024-01-14 DIAGNOSIS — F41.9 ANXIETY: ICD-10-CM

## 2024-01-14 DIAGNOSIS — M25.432 PAIN AND SWELLING OF WRIST, LEFT: ICD-10-CM

## 2024-01-14 DIAGNOSIS — I10 ESSENTIAL HYPERTENSION: ICD-10-CM

## 2024-01-14 LAB
ALBUMIN SERPL BCP-MCNC: 3.4 G/DL (ref 3.5–5)
ALP SERPL-CCNC: 60 U/L (ref 34–104)
ALT SERPL W P-5'-P-CCNC: 7 U/L (ref 7–52)
ANION GAP SERPL CALCULATED.3IONS-SCNC: 9 MMOL/L
AST SERPL W P-5'-P-CCNC: 15 U/L (ref 13–39)
ATRIAL RATE: 73 BPM
BASOPHILS # BLD AUTO: 0.02 THOUSANDS/ÂΜL (ref 0–0.1)
BASOPHILS NFR BLD AUTO: 0 % (ref 0–1)
BILIRUB SERPL-MCNC: 0.76 MG/DL (ref 0.2–1)
BUN SERPL-MCNC: 15 MG/DL (ref 5–25)
CALCIUM ALBUM COR SERPL-MCNC: 9.1 MG/DL (ref 8.3–10.1)
CALCIUM SERPL-MCNC: 8.6 MG/DL (ref 8.4–10.2)
CHLORIDE SERPL-SCNC: 103 MMOL/L (ref 96–108)
CO2 SERPL-SCNC: 26 MMOL/L (ref 21–32)
CREAT SERPL-MCNC: 0.76 MG/DL (ref 0.6–1.3)
EOSINOPHIL # BLD AUTO: 0.01 THOUSAND/ÂΜL (ref 0–0.61)
EOSINOPHIL NFR BLD AUTO: 0 % (ref 0–6)
ERYTHROCYTE [DISTWIDTH] IN BLOOD BY AUTOMATED COUNT: 13 % (ref 11.6–15.1)
FLUAV RNA RESP QL NAA+PROBE: NEGATIVE
FLUBV RNA RESP QL NAA+PROBE: NEGATIVE
GFR SERPL CREATININE-BSD FRML MDRD: 65 ML/MIN/1.73SQ M
GLUCOSE SERPL-MCNC: 140 MG/DL (ref 65–140)
HCT VFR BLD AUTO: 35.4 % (ref 34.8–46.1)
HGB BLD-MCNC: 11.6 G/DL (ref 11.5–15.4)
IMM GRANULOCYTES # BLD AUTO: 0.05 THOUSAND/UL (ref 0–0.2)
IMM GRANULOCYTES NFR BLD AUTO: 1 % (ref 0–2)
LACTATE SERPL-SCNC: 1.3 MMOL/L (ref 0.5–2)
LACTATE SERPL-SCNC: 2.3 MMOL/L (ref 0.5–2)
LIPASE SERPL-CCNC: <6 U/L (ref 11–82)
LYMPHOCYTES # BLD AUTO: 2.2 THOUSANDS/ÂΜL (ref 0.6–4.47)
LYMPHOCYTES NFR BLD AUTO: 20 % (ref 14–44)
MCH RBC QN AUTO: 29.7 PG (ref 26.8–34.3)
MCHC RBC AUTO-ENTMCNC: 32.8 G/DL (ref 31.4–37.4)
MCV RBC AUTO: 91 FL (ref 82–98)
MONOCYTES # BLD AUTO: 1.34 THOUSAND/ÂΜL (ref 0.17–1.22)
MONOCYTES NFR BLD AUTO: 12 % (ref 4–12)
NEUTROPHILS # BLD AUTO: 7.19 THOUSANDS/ÂΜL (ref 1.85–7.62)
NEUTS SEG NFR BLD AUTO: 67 % (ref 43–75)
NRBC BLD AUTO-RTO: 0 /100 WBCS
P AXIS: 57 DEGREES
PLATELET # BLD AUTO: 281 THOUSANDS/UL (ref 149–390)
PMV BLD AUTO: 10.2 FL (ref 8.9–12.7)
POTASSIUM SERPL-SCNC: 3.4 MMOL/L (ref 3.5–5.3)
PR INTERVAL: 256 MS
PROCALCITONIN SERPL-MCNC: 0.15 NG/ML
PROT SERPL-MCNC: 6.8 G/DL (ref 6.4–8.4)
QRS AXIS: -56 DEGREES
QRSD INTERVAL: 106 MS
QT INTERVAL: 426 MS
QTC INTERVAL: 469 MS
RBC # BLD AUTO: 3.91 MILLION/UL (ref 3.81–5.12)
RSV RNA RESP QL NAA+PROBE: NEGATIVE
SARS-COV-2 RNA RESP QL NAA+PROBE: NEGATIVE
SODIUM SERPL-SCNC: 138 MMOL/L (ref 135–147)
T WAVE AXIS: 61 DEGREES
TSH SERPL DL<=0.05 MIU/L-ACNC: 0.87 UIU/ML (ref 0.45–4.5)
VENTRICULAR RATE: 73 BPM
WBC # BLD AUTO: 10.81 THOUSAND/UL (ref 4.31–10.16)

## 2024-01-14 PROCEDURE — 96365 THER/PROPH/DIAG IV INF INIT: CPT

## 2024-01-14 PROCEDURE — 99223 1ST HOSP IP/OBS HIGH 75: CPT | Performed by: INTERNAL MEDICINE

## 2024-01-14 PROCEDURE — 99285 EMERGENCY DEPT VISIT HI MDM: CPT | Performed by: EMERGENCY MEDICINE

## 2024-01-14 PROCEDURE — 99285 EMERGENCY DEPT VISIT HI MDM: CPT

## 2024-01-14 PROCEDURE — 85025 COMPLETE CBC W/AUTO DIFF WBC: CPT

## 2024-01-14 PROCEDURE — 83605 ASSAY OF LACTIC ACID: CPT

## 2024-01-14 PROCEDURE — 80053 COMPREHEN METABOLIC PANEL: CPT

## 2024-01-14 PROCEDURE — 84443 ASSAY THYROID STIM HORMONE: CPT

## 2024-01-14 PROCEDURE — 93005 ELECTROCARDIOGRAM TRACING: CPT

## 2024-01-14 PROCEDURE — 87040 BLOOD CULTURE FOR BACTERIA: CPT

## 2024-01-14 PROCEDURE — 36415 COLL VENOUS BLD VENIPUNCTURE: CPT

## 2024-01-14 PROCEDURE — 83605 ASSAY OF LACTIC ACID: CPT | Performed by: INTERNAL MEDICINE

## 2024-01-14 PROCEDURE — 83690 ASSAY OF LIPASE: CPT

## 2024-01-14 PROCEDURE — 0241U HB NFCT DS VIR RESP RNA 4 TRGT: CPT | Performed by: EMERGENCY MEDICINE

## 2024-01-14 PROCEDURE — 71045 X-RAY EXAM CHEST 1 VIEW: CPT

## 2024-01-14 PROCEDURE — 84145 PROCALCITONIN (PCT): CPT

## 2024-01-14 PROCEDURE — 73110 X-RAY EXAM OF WRIST: CPT

## 2024-01-14 RX ORDER — ESCITALOPRAM OXALATE 10 MG/1
5 TABLET ORAL
Status: DISCONTINUED | OUTPATIENT
Start: 2024-01-14 | End: 2024-01-17 | Stop reason: HOSPADM

## 2024-01-14 RX ORDER — MELATONIN
1000 DAILY
Status: DISCONTINUED | OUTPATIENT
Start: 2024-01-15 | End: 2024-01-17 | Stop reason: HOSPADM

## 2024-01-14 RX ORDER — ACETAMINOPHEN 325 MG/1
650 TABLET ORAL EVERY 6 HOURS PRN
Status: DISCONTINUED | OUTPATIENT
Start: 2024-01-14 | End: 2024-01-15

## 2024-01-14 RX ORDER — METOPROLOL SUCCINATE 25 MG/1
25 TABLET, EXTENDED RELEASE ORAL DAILY
Status: DISCONTINUED | OUTPATIENT
Start: 2024-01-15 | End: 2024-01-17 | Stop reason: HOSPADM

## 2024-01-14 RX ORDER — ENOXAPARIN SODIUM 100 MG/ML
40 INJECTION SUBCUTANEOUS DAILY
Status: DISCONTINUED | OUTPATIENT
Start: 2024-01-15 | End: 2024-01-17 | Stop reason: HOSPADM

## 2024-01-14 RX ORDER — LEVOTHYROXINE SODIUM 0.15 MG/1
150 TABLET ORAL
Status: DISCONTINUED | OUTPATIENT
Start: 2024-01-15 | End: 2024-01-17 | Stop reason: HOSPADM

## 2024-01-14 RX ORDER — ONDANSETRON 2 MG/ML
4 INJECTION INTRAMUSCULAR; INTRAVENOUS EVERY 6 HOURS PRN
Status: DISCONTINUED | OUTPATIENT
Start: 2024-01-14 | End: 2024-01-17 | Stop reason: HOSPADM

## 2024-01-14 RX ORDER — SODIUM CHLORIDE 9 MG/ML
75 INJECTION, SOLUTION INTRAVENOUS CONTINUOUS
Status: DISPENSED | OUTPATIENT
Start: 2024-01-14 | End: 2024-01-15

## 2024-01-14 RX ORDER — AMLODIPINE BESYLATE 2.5 MG/1
2.5 TABLET ORAL DAILY
Status: DISCONTINUED | OUTPATIENT
Start: 2024-01-15 | End: 2024-01-17 | Stop reason: HOSPADM

## 2024-01-14 RX ORDER — ASPIRIN 81 MG/1
81 TABLET, CHEWABLE ORAL DAILY
Status: DISCONTINUED | OUTPATIENT
Start: 2024-01-15 | End: 2024-01-17 | Stop reason: HOSPADM

## 2024-01-14 RX ADMIN — SODIUM CHLORIDE, SODIUM LACTATE, POTASSIUM CHLORIDE, AND CALCIUM CHLORIDE 1000 ML: .6; .31; .03; .02 INJECTION, SOLUTION INTRAVENOUS at 16:15

## 2024-01-14 RX ADMIN — ESCITALOPRAM OXALATE 5 MG: 5 TABLET, FILM COATED ORAL at 23:58

## 2024-01-14 RX ADMIN — SODIUM CHLORIDE 75 ML/HR: 0.9 INJECTION, SOLUTION INTRAVENOUS at 19:25

## 2024-01-14 NOTE — ED ATTENDING ATTESTATION
1/14/2024  I, Oscar Rizo MD, saw and evaluated the patient. I have discussed the patient with the resident/non-physician practitioner and agree with the resident's/non-physician practitioner's findings, Plan of Care, and MDM as documented in the resident's/non-physician practitioner's note, except where noted. All available labs and Radiology studies were reviewed.  I was present for key portions of any procedure(s) performed by the resident/non-physician practitioner and I was immediately available to provide assistance.       At this point I agree with the current assessment done in the Emergency Department.  I have conducted an independent evaluation of this patient a history and physical is as follows: General weakness, near syncope, hypoxia on initial evaluation.  Recent diagnosis of pneumonia.  Now also with diarrhea.  Laboratory studies with mild leukocytosis, negative procalcitonin.  Patient afebrile here.  No SIRS criteria.  Possible early left lower lobe infiltrate however due to diarrhea, will hold off on antibiotics at this point given no SIRS criteria, no evidence of severe sepsis despite mild lactic acid elevation.  Gentle IV fluids given.  Patient hemodynamically improved after supplemental oxygenation IV fluids.  Will admit to hospital service for further management.  No abdominal tenderness on examination to suggest need for CT scan in the emergency department.  Stool study ordered, not resulted at time of admission.    Results Reviewed       Procedure Component Value Units Date/Time    Procalcitonin [[id number]]  (Normal) Collected: 01/14/24 1614    Lab Status: Final result Specimen: Blood from Arm, Right Updated: 01/14/24 1701     Procalcitonin 0.15 ng/ml     Lactic acid, plasma (w/reflex if result > 2.0) [[id number]]  (Abnormal) Collected: 01/14/24 1614    Lab Status: Final result Specimen: Blood from Arm, Right Updated: 01/14/24 1657     LACTIC ACID 2.3 mmol/L     Narrative:      Result  may be elevated if tourniquet was used during collection.    Lactic acid 2 Hours [[id number]]     Lab Status: No result Specimen: Blood     Comprehensive metabolic panel [[id number]]  (Abnormal) Collected: 01/14/24 1614    Lab Status: Final result Specimen: Blood from Arm, Right Updated: 01/14/24 1654     Sodium 138 mmol/L      Potassium 3.4 mmol/L      Chloride 103 mmol/L      CO2 26 mmol/L      ANION GAP 9 mmol/L      BUN 15 mg/dL      Creatinine 0.76 mg/dL      Glucose 140 mg/dL      Calcium 8.6 mg/dL      Corrected Calcium 9.1 mg/dL      AST 15 U/L      ALT 7 U/L      Alkaline Phosphatase 60 U/L      Total Protein 6.8 g/dL      Albumin 3.4 g/dL      Total Bilirubin 0.76 mg/dL      eGFR 65 ml/min/1.73sq m     Narrative:      National Kid[employer] Foundation guidelines for Chronic Kidney Disease (CKD):     Stage 1 with normal or high GFR (GFR > 90 mL/min/1.73 square meters)    Stage 2 Mild CKD (GFR = 60-89 mL/min/1.73 square meters)    Stage 3A Moderate CKD (GFR = 45-59 mL/min/1.73 square meters)    Stage 3B Moderate CKD (GFR = 30-44 mL/min/1.73 square meters)    Stage 4 Severe CKD (GFR = 15-29 mL/min/1.73 square meters)    Stage 5 End Stage CKD (GFR <15 mL/min/1.73 square meters)  Note: GFR calculation is accurate only with a steady state creatinine    Lipase [[id number]]  (Abnormal) Collected: 01/14/24 1614    Lab Status: Final result Specimen: Blood from Arm, Right Updated: 01/14/24 1654     Lipase <6 u/L     CBC and differential [[id number]]  (Abnormal) Collected: 01/14/24 1614    Lab Status: Final result Specimen: Blood from Arm, Right Updated: 01/14/24 1634     WBC 10.81 Thousand/uL      RBC 3.91 Million/uL      Hemoglobin 11.6 g/dL      Hematocrit 35.4 %      MCV 91 fL      MCH 29.7 pg      MCHC 32.8 g/dL      RDW 13.0 %      MPV 10.2 fL      Platelets 281 Thousands/uL      nRBC 0 /100 WBCs      Neutrophils Relative 67 %      Immat GRANS % 1 %      Lymphocytes Relative 20 %      Monocytes Relative 12 %       Eosinophils Relative 0 %      Basophils Relative 0 %      Neutrophils Absolute 7.19 Thousands/µL      Immature Grans Absolute 0.05 Thousand/uL      Lymphocytes Absolute 2.20 Thousands/µL      Monocytes Absolute 1.34 Thousand/µL      Eosinophils Absolute 0.01 Thousand/µL      Basophils Absolute 0.02 Thousands/µL     Blood culture [[id number]] Collected: 01/14/24 1614    Lab Status: In process Specimen: Blood from Line, Venous Updated: 01/14/24 1628    FLU/RSV/COVID - if FLU/RSV clinically relevant [[id number]]  (Normal) Collected: 01/14/24 1543    Lab Status: Final result Specimen: Nares from Nose Updated: 01/14/24 1625     SARS-CoV-2 Negative     INFLUENZA A PCR Negative     INFLUENZA B PCR Negative     RSV PCR Negative    Narrative:      FOR PEDIATRIC PATIENTS - copy/paste COVID Guidelines URL to browser: [email or web address]    SARS-CoV-2 assay is a Nucleic Acid Amplification assay intended for the  qualitative detection of nucleic acid from SARS-CoV-2 in nasopharyngeal  swabs. Results are for the presumptive identification of SARS-CoV-2 RNA.    Positive results are indicative of infection with SARS-CoV-2, the virus  causing COVID-19, but do not rule out bacterial infection or co-infection  with other viruses. Laboratories within the [place] and its  territories are required to report all positive results to the appropriate  public health authorities. Negative results do not preclude SARS-CoV-2  infection and should not be used as the sole basis for treatment or other  patient management decisions. Negative results must be combined with  clinical observations, patient history, and epidemiological information.  This test has not been FDA cleared or approved.    This test has been authorized by FDA under an Emergency Use Authorization  (EUA). This test is only authorized for the duration of time the  declaration that circumstances exist justifying the  authorization of the  emergency use of an in vitro diagnostic tests for detection of SARS-CoV-2  virus and/or diagnosis of COVID-19 infection under section 564(b)(1) of  the Act, 21 U.S.C. 360bbb-3(b)(1), unless the authorization is terminated  or revoked sooner. The test has been validated but independent review by FDA  and CLIA is pending.    Test performed using [employer] GeneXpert: This RT-PCR assay targets N2,  a region unique to SARS-CoV-2. A conserved region in the E-gene was chosen  for pan-Sarbecovirus detection which includes SARS-CoV-2.    According to CMS-2020-01-R, this platform meets the definition of high-throughput technology.    Clostridium difficile toxin by PCR with EIA [[id number]]     Lab Status: No result Specimen: Stool from Per Rectum     Stool Enteric Bacterial Panel by PCR [[id number]]     Lab Status: No result Specimen: Stool from Rectum           XR chest 1 view portable   ED Interpretation by Syed Garcia MD (01/14 1659)   Possible left lower lobe pneumonia.      XR wrist 3+ views LEFT    (Results Pending)         ED Course         Critical Care Time  Procedures

## 2024-01-14 NOTE — ASSESSMENT & PLAN NOTE
Presented with 1 episode of loose stool at nursing facility today.  Patient was a poor historian and history given by her daughter who received a call from facility.    She had become lightheaded and hypoxic and brought to the ED.  Patient's appetite has been low and oral intake has been poor.    Last week completed 10 days course of Augmentin for pneumonia.  Labs significant for lactic acid elevated at 2.3.  WBC slightly elevated at 10.8.  Lipase Pro-Nando normal  Received 1L LR in ED. Patient is not meeting SIRS criteria.    Plan:  Continue IV normal saline at 75 cc/h for 10 hours.  Holding off of antibiotics at this time due to C. difficile rule out.  Follow-up C. difficile  Follow-up enteric panel  Monitor bowel movements.  Monitor CBC

## 2024-01-14 NOTE — ED NOTES
Pt oxygen 91% on RA. Placed on 2L nasal cannula. EMS reported a fever, afebrile here. Resident at bedside.      Hannah Cantu RN  01/14/24 [id number]

## 2024-01-14 NOTE — TELEPHONE ENCOUNTER
[phone] unit 2 / [facility] is reporting patient had loose diarrhea and vomiting, periods of weakness. Vitals are stable.

## 2024-01-14 NOTE — TELEPHONE ENCOUNTER
"\"I am calling with updates.\"  Reason for Disposition   Difficulty breathing    Answer Assessment - Initial Assessment Questions  1. DESCRIPTION: \"Describe how you are feeling.\"      Per nurse from [facility], patient had dizzy spell and weakness while getting up from toilet with caregivers present. Per nurse, patient currently resting in bed. Pt requiring 2L NC to maintain oxygen saturation of 96% at this time. Pt refusing to eat lunch. Pt weak and tired.     2. SEVERITY: \"How bad is it?\"  \"Can you stand and walk?\"    - MILD - Feels weak or tired, but does not interfere with work, school or normal activities    - MODERATE - Able to stand and walk; weakness interferes with work, school, or normal activities    - SEVERE - Unable to stand or walk      Severe    3. ONSET:  \"When did the weakness begin?\"        4. CAUSE: \"What do you think is causing the weakness?\"      Unknown    5. MEDICINES: \"Have you recently started a new medicine or had a change in the amount of a medicine?\"      No    6. OTHER SYMPTOMS: \"Do you have any other symptoms?\" (e.g., chest pain, fever, cough, SOB, vomiting, diarrhea, bleeding, other areas of pain)      Vomiting, diarrhea, SOB, fatigue, loss of appetite    Protocols used: Weakness (Generalized) and Fatigue-ADULT-AH    "

## 2024-01-14 NOTE — ASSESSMENT & PLAN NOTE
Noted by daughter at bedside about new swelling of left wrist not present yesterday.  Patient and facility did not report any fall.  Patient has history of recurrent falls. 9 falls in past year with 5 falls being in the last 6 months; according to daughter first one was due to syncope and others due to imbalance when patient was trying to reach for things in higher cabinets.  Left wrist is painful on extension and flexion.  Warm, tender, swollen.    Plan:  Pending xray left wrist.  For pain management, Tylenol 650 mg as needed every 6 hours.  IV Zofran every 6 hours as needed for nausea.

## 2024-01-14 NOTE — ED PROVIDER NOTES
History  Chief Complaint   Patient presents with    Syncope     Pt arrives EMS from [place], staff reported low oxygen and near syncopal event. Pt c/o dizziness.      89-year-old female presenting to the ED with a complaint of general weakness, hypoxia, diarrhea.  Patient is from a nursing home facility and was being treated since the beginning of January for possible pneumonia with Augmentin.  Patient has also been noted to have new onset diarrhea over the past 3 to 4 days.  Patient's daughter is with her and states that she does the patient's laundry and notes the diarrhea in the laundry.  Patient's daughter does not note any extremely foul smell.  Today the patient's daughter received a phone call from the nursing home stating that the patient had a episode of decreased oxygen sats.  Apparently the patient's oxygen saturation was 86% and required oxygen therapy.  Patient was put on nasal cannula oxygen 2 L/min which brought her up to 96%.  Normal was called and patient was transferred by ambulance to the ED.  EMS reported that they had a fever temperature of 100 in the ambulance however patient is afebrile here in the ER.  Patient is alert and at her baseline.  However her ability to give and ROS is poor.        Prior to Admission Medications   Prescriptions Last Dose Informant Patient Reported? Taking?   Carboxymethylcellul-Glycerin 0.5-0.9 % SOLN  Child Yes No   Sig: Apply to eye Drops to b/l eyes   Cholecalciferol (VITAMIN D-3) 1000 UNITS CAPS  Child Yes No   Sig: Take 2,000 Units by mouth daily     acetaminophen (TYLENOL) 325 mg tablet   Yes No   Sig: Take 975 mg by mouth every 8 (eight) hours   amLODIPine (NORVASC) 2.5 mg tablet   No No   Sig: Take 1 tablet (2.5 mg total) by mouth daily Do not start before December 12, 2023.   aspirin 81 mg chewable tablet   No No   Sig: Chew 1 tablet (81 mg total) daily Do not start before June 5, 2023.   escitalopram (LEXAPRO) 5 mg tablet  Child No No   Sig: Take 1  tablet (5 mg total) by mouth daily   levothyroxine (Synthroid) 150 mcg tablet   No No   Sig: Take 1 tab PO 5 days a week   metoprolol succinate (TOPROL-XL) 25 mg 24 hr tablet   No No   Sig: Take 1 tablet (25 mg total) by mouth daily   vitamin B-12 (VITAMIN B-12) 1,000 mcg tablet   No No   Sig: Take 2 days a week- Mon and Fri      Facility-Administered Medications: None       Past Medical History:   Diagnosis Date    AAA (abdominal aortic aneurysm) (HCC)     Acute medial meniscus tear     Arthritis 1980    Aspiration pneumonia (HCC)     LAST ASSESSED: 7/30/14    Breast CA (HCC)     left    Cancer (HCC) 2017    Chest pain     RESOLVED: 1/31/17    CTS (carpal tunnel syndrome)     Depression     Dermatitis     LAST ASSESSED: 7/25/13    Disease of thyroid gland     Fainting     LAST ASSESSED: 3/15/13    GERD (gastroesophageal reflux disease)     H. pylori infection 03/17/2009    Hypertension     Incomplete defecation     LAST ASSESSED: 7/25/13    Macular degeneration     Osteoporosis     Pneumonia     Vertigo 2012       Past Surgical History:   Procedure Laterality Date    APPENDECTOMY      CHOLECYSTECTOMY      COLONOSCOPY      MASTECTOMY Left 09/2017    SIMPLE    TX INTRAOP SENTINEL LYMPH NODE ID W/DYE INJECTION Left 9/5/2017    Procedure: INTRAOPERATIVE LYMPHATIC MAPPING; BLUE DYE ONLY; SENITNEL LYMPH NODE BIOPSY;  Surgeon: Marcelo Reddy MD;  Location: [facility];  Service: Surgical Oncology    TX MASTECTOMY SIMPLE COMPLETE Left 9/5/2017    Procedure: BREAST MASTECTOMY;  Surgeon: Marcelo Reddy MD;  Location: [facility];  Service: Surgical Oncology    SENTINEL LYMPH NODE BIOPSY      US GUIDED BREAST BIOPSY LEFT COMPLETE Left 8/14/2017       Family History   Problem Relation Age of Onset    Angina Mother         PECTORIS    Alcohol abuse Neg Hx     Depression Neg Hx     Drug abuse Neg Hx     Substance Abuse Neg Hx      I have reviewed and agree with the history as documented.    E-Cigarette/Vaping    E-Cigarette Use Never User       E-Cigarette/Vaping Substances    Nicotine No     THC No     CBD No     Flavoring No     Other No     Unknown No      Social History     Tobacco Use    Smoking status: Never    Smokeless tobacco: Never   Vaping Use    Vaping status: Never Used   Substance Use Topics    Alcohol use: Not Currently    Drug use: No        Review of Systems    Physical Exam  ED Triage Vitals [01/14/24 1534]   Temperature Pulse Respirations Blood Pressure SpO2   98.5 °F (36.9 °C) 75 18 116/60 93 %      Temp Source Heart Rate Source Patient Position - Orthostatic VS BP Location FiO2 (%)   Oral Monitor Sitting Right arm --      Pain Score       --             Orthostatic Vital Signs  Vitals:    01/14/24 1534 01/14/24 1730   BP: 116/60 128/62   Pulse: 75 75   Patient Position - Orthostatic VS: Sitting Sitting       Physical Exam  Constitutional:       Appearance: She is ill-appearing.   HENT:      Head: Normocephalic and atraumatic.      Right Ear: External ear normal.      Left Ear: External ear normal.      Nose: Nose normal.      Mouth/Throat:      Pharynx: Oropharynx is clear.   Eyes:      Extraocular Movements: Extraocular movements intact.   Cardiovascular:      Rate and Rhythm: Normal rate.      Pulses: Normal pulses.      Heart sounds: Normal heart sounds.   Pulmonary:      Effort: Pulmonary effort is normal.      Breath sounds: Normal breath sounds.   Abdominal:      General: Bowel sounds are normal.      Palpations: Abdomen is soft.   Musculoskeletal:         General: Normal range of motion.      Cervical back: Normal range of motion.   Skin:     General: Skin is warm.      Capillary Refill: Capillary refill takes less than 2 seconds.   Neurological:      General: No focal deficit present.      Mental Status: She is alert. Mental status is at baseline.   Psychiatric:         Mood and Affect: Mood normal.         Behavior: Behavior normal.         ED Medications  Medications   lactated ringers bolus 1,000 mL (1,000 mL  Intravenous New Bag 1/14/24 1615)       Diagnostic Studies  Results Reviewed       Procedure Component Value Units Date/Time    Procalcitonin [[id number]]  (Normal) Collected: 01/14/24 1614    Lab Status: Final result Specimen: Blood from Arm, Right Updated: 01/14/24 1701     Procalcitonin 0.15 ng/ml     Lactic acid, plasma (w/reflex if result > 2.0) [[id number]]  (Abnormal) Collected: 01/14/24 1614    Lab Status: Final result Specimen: Blood from Arm, Right Updated: 01/14/24 1657     LACTIC ACID 2.3 mmol/L     Narrative:      Result may be elevated if tourniquet was used during collection.    Lactic acid 2 Hours [[id number]]     Lab Status: No result Specimen: Blood     Comprehensive metabolic panel [[id number]]  (Abnormal) Collected: 01/14/24 1614    Lab Status: Final result Specimen: Blood from Arm, Right Updated: 01/14/24 1654     Sodium 138 mmol/L      Potassium 3.4 mmol/L      Chloride 103 mmol/L      CO2 26 mmol/L      ANION GAP 9 mmol/L      BUN 15 mg/dL      Creatinine 0.76 mg/dL      Glucose 140 mg/dL      Calcium 8.6 mg/dL      Corrected Calcium 9.1 mg/dL      AST 15 U/L      ALT 7 U/L      Alkaline Phosphatase 60 U/L      Total Protein 6.8 g/dL      Albumin 3.4 g/dL      Total Bilirubin 0.76 mg/dL      eGFR 65 ml/min/1.73sq m     Narrative:      National Kidney Disease Foundation guidelines for Chronic Kidney Disease (CKD):     Stage 1 with normal or high GFR (GFR > 90 mL/min/1.73 square meters)    Stage 2 Mild CKD (GFR = 60-89 mL/min/1.73 square meters)    Stage 3A Moderate CKD (GFR = 45-59 mL/min/1.73 square meters)    Stage 3B Moderate CKD (GFR = 30-44 mL/min/1.73 square meters)    Stage 4 Severe CKD (GFR = 15-29 mL/min/1.73 square meters)    Stage 5 End Stage CKD (GFR <15 mL/min/1.73 square meters)  Note: GFR calculation is accurate only with a steady state creatinine    Lipase [[id number]]  (Abnormal) Collected: 01/14/24 1614    Lab Status: Final result Specimen: Blood from Arm, Right Updated: 01/14/24  1654     Lipase <6 u/L     CBC and differential [[id number]]  (Abnormal) Collected: 01/14/24 1614    Lab Status: Final result Specimen: Blood from Arm, Right Updated: 01/14/24 1634     WBC 10.81 Thousand/uL      RBC 3.91 Million/uL      Hemoglobin 11.6 g/dL      Hematocrit 35.4 %      MCV 91 fL      MCH 29.7 pg      MCHC 32.8 g/dL      RDW 13.0 %      MPV 10.2 fL      Platelets 281 Thousands/uL      nRBC 0 /100 WBCs      Neutrophils Relative 67 %      Immat GRANS % 1 %      Lymphocytes Relative 20 %      Monocytes Relative 12 %      Eosinophils Relative 0 %      Basophils Relative 0 %      Neutrophils Absolute 7.19 Thousands/µL      Immature Grans Absolute 0.05 Thousand/uL      Lymphocytes Absolute 2.20 Thousands/µL      Monocytes Absolute 1.34 Thousand/µL      Eosinophils Absolute 0.01 Thousand/µL      Basophils Absolute 0.02 Thousands/µL     Blood culture [[id number]] Collected: 01/14/24 1614    Lab Status: In process Specimen: Blood from Line, Venous Updated: 01/14/24 1628    FLU/RSV/COVID - if FLU/RSV clinically relevant [[id number]]  (Normal) Collected: 01/14/24 1543    Lab Status: Final result Specimen: Nares from Nose Updated: 01/14/24 1625     SARS-CoV-2 Negative     INFLUENZA A PCR Negative     INFLUENZA B PCR Negative     RSV PCR Negative    Narrative:      FOR PEDIATRIC PATIENTS - copy/paste COVID Guidelines URL to browser: [email or web address]    SARS-CoV-2 assay is a Nucleic Acid Amplification assay intended for the  qualitative detection of nucleic acid from SARS-CoV-2 in nasopharyngeal  swabs. Results are for the presumptive identification of SARS-CoV-2 RNA.    Positive results are indicative of infection with SARS-CoV-2, the virus  causing COVID-19, but do not rule out bacterial infection or co-infection  with other viruses. Laboratories within the [place] and its  territories are required to report all positive results to the  appropriate  public health authorities. Negative results do not preclude SARS-CoV-2  infection and should not be used as the sole basis for treatment or other  patient management decisions. Negative results must be combined with  clinical observations, patient history, and epidemiological information.  This test has not been FDA cleared or approved.    This test has been authorized by FDA under an Emergency Use Authorization  (EUA). This test is only authorized for the duration of time the  declaration that circumstances exist justifying the authorization of the  emergency use of an in vitro diagnostic tests for detection of SARS-CoV-2  virus and/or diagnosis of COVID-19 infection under section 564(b)(1) of  the Act, 21 U.S.C. 360bbb-3(b)(1), unless the authorization is terminated  or revoked sooner. The test has been validated but independent review by FDA  and CLIA is pending.    Test performed using [employer]: This RT-PCR assay targets N2,  a region unique to SARS-CoV-2. A conserved region in the E-gene was chosen  for pan-Sarbecovirus detection which includes SARS-CoV-2.    According to CMS-2020-01-R, this platform meets the definition of high-throughput technology.    Clostridium difficile toxin by PCR with EIA [[id number]]     Lab Status: No result Specimen: Stool from Per Rectum     Stool Enteric Bacterial Panel by PCR [[id number]]     Lab Status: No result Specimen: Stool from Rectum                    XR wrist 3+ views LEFT   ED Interpretation by Syed Garcia MD (01/14 1759)   No acute fracture noted.      XR chest 1 view portable   ED Interpretation by Syed Garcia MD (01/14 1659)   Possible left lower lobe pneumonia.            Procedures  ECG 12 Lead Documentation Only    Date/Time: 1/14/2024 4:38 PM    Performed by: Syed Garcia MD  Authorized by: Syed Garcia MD    Indications / Diagnosis:  Generalized weakness and abdominal pain  ECG reviewed by me, the ED Provider: yes     Patient location:  ED  Previous ECG:     Previous ECG:  Compared to current    Similarity:  Changes noted  Interpretation:     Interpretation: abnormal    Rate:     ECG rate:  73    ECG rate assessment: normal    Rhythm:     Rhythm: sinus rhythm    Ectopy:     Ectopy: none    QRS:     QRS axis:  Left    QRS intervals:  Wide  Conduction:     Conduction: abnormal      Abnormal conduction: 1st degree    ST segments:     ST segments:  Abnormal    Elevation:  V3    Depression:  V4 and V5  T waves:     T waves: non-specific          ED Course  ED Course as of 01/14/24 1800   Sun Jan 14, 2024   1640 Flu RSV COVID-negative.   1640 Elevated white cell count nonspecific at this time.   1640 WBC(!): 10.81   1658 LACTIC ACID(!!): 2.3  No SIRS criteria at this time.  Fluids are running.   1659 Possible left lower lobe pneumonia with elevated lactic and slightly elevated white blood cell count.  However need to be cautious given patient's recent diarrhea following antibiotic use   1724 Spoke with the son Kvng.  Patient ultimately admitted to  IM.   1731 Patient's daughter alerts appears that it appears the left hand is swelling.  Will get an x-ray.   1731 Patient's daughter does state that she was washing the patient's hands and scrubbing the hands because she thought there was feces on them prior to the swelling starting                                       Medical Decision Making  89-year-old female presenting to the ED with family with a complaint of hypoxia as well as generalized weakness and diarrhea.  At this time considering patient has a viral illness including flu, RSV, COVID also considering possible pneumonia from viral or bacterial source.  Given patient's age and abdominal pain may consider atypical ACS versus MI.  Also considering gastroenteritis versus possible C. difficile infection given diarrhea and recent antibiotic use.  Will complete CBC, CMP, lipase, troponin, EKG, flu RSV COVID swab, Pro-Nando, lactate,  C. difficile and enteric bacterial pathogen PCR.  Please follow ED course for continued timeline.  Patient's lactate returned elevated at 2.3 however Pro-Nando was negative.  Patient's lactate was treated with IV fluids.  Will reevaluate.  Patient's flu RSV COVID swab returns negative.  And patient's troponins were within normal limits.  At this time after evaluating the x-ray it is difficult to tell if the patient is suffering from a new pneumonia or if the x-ray findings are old.  Either way it is still decided that given the patient's hypoxia, admission would be appropriate.  During preparing admission, nurse came to provider to state that the patient's daughter is complaining that the patient's left hand is swollen.  Prior to return to room and assessed the patient's left hand which did appear to be swollen however the patient's daughter does mention that she was aggressively washing the patient's hands just prior to the swelling starting.  X-rays were ordered of the left hand.  Ultimately, Terrence was notified of the patient and the patient was accepted to inpatient admission.  X-rays of the wrist returned with no acute fractures noted.  C. difficile results are still pending.    Amount and/or Complexity of Data Reviewed  Labs: ordered. Decision-making details documented in ED Course.  Radiology: ordered and independent interpretation performed.    Risk  Decision regarding hospitalization.          Disposition  Final diagnoses:   Pneumonia   Hypoxia     Time reflects when diagnosis was documented in both MDM as applicable and the Disposition within this note       Time User Action Codes Description Comment    1/14/2024  5:18 PM Syed Adame [J18.9] Pneumonia     1/14/2024  5:19 PM Syed Adame [R09.02] Hypoxia           ED Disposition       ED Disposition   Admit    Condition   Stable    Date/Time   Sun Jan 14, 2024  5:18 PM    Comment   Case was discussed with TERRENCE and the patient's admission  status was agreed to be Admission Status: inpatient status to the service of Dr. Phoenix .               Follow-up Information    None         Patient's Medications   Discharge Prescriptions    No medications on file     No discharge procedures on file.    PDMP Review       None             ED Provider  Attending physically available and evaluated Kyra Christianson. I managed the patient along with the ED Attending.    Electronically Signed by           Syed Garcia MD  01/14/24 [id number]

## 2024-01-14 NOTE — TELEPHONE ENCOUNTER
Marcelo from [facility] requesting order for PRN oxygen and wanting to provide updates for pt to on call provider. On call provider paged and updated with pt current status. On call provider recommended patient to be evaluated in the ED for further evaluation to rule out cardiac problem. Marcelo verbalized understanding and will make unit manager aware of on call provider's recommendations.

## 2024-01-15 LAB
ANION GAP SERPL CALCULATED.3IONS-SCNC: 8 MMOL/L
BUN SERPL-MCNC: 17 MG/DL (ref 5–25)
CALCIUM SERPL-MCNC: 8.1 MG/DL (ref 8.4–10.2)
CHLORIDE SERPL-SCNC: 104 MMOL/L (ref 96–108)
CO2 SERPL-SCNC: 27 MMOL/L (ref 21–32)
CREAT SERPL-MCNC: 0.69 MG/DL (ref 0.6–1.3)
ERYTHROCYTE [DISTWIDTH] IN BLOOD BY AUTOMATED COUNT: 13.2 % (ref 11.6–15.1)
GFR SERPL CREATININE-BSD FRML MDRD: 72 ML/MIN/1.73SQ M
GLUCOSE SERPL-MCNC: 107 MG/DL (ref 65–140)
HCT VFR BLD AUTO: 32.1 % (ref 34.8–46.1)
HGB BLD-MCNC: 10.3 G/DL (ref 11.5–15.4)
MAGNESIUM SERPL-MCNC: 1.5 MG/DL (ref 1.9–2.7)
MCH RBC QN AUTO: 30 PG (ref 26.8–34.3)
MCHC RBC AUTO-ENTMCNC: 32.1 G/DL (ref 31.4–37.4)
MCV RBC AUTO: 94 FL (ref 82–98)
PLATELET # BLD AUTO: 230 THOUSANDS/UL (ref 149–390)
PMV BLD AUTO: 10.8 FL (ref 8.9–12.7)
POTASSIUM SERPL-SCNC: 3.2 MMOL/L (ref 3.5–5.3)
RBC # BLD AUTO: 3.43 MILLION/UL (ref 3.81–5.12)
SODIUM SERPL-SCNC: 139 MMOL/L (ref 135–147)
URATE SERPL-MCNC: 6.6 MG/DL (ref 2–7.5)
WBC # BLD AUTO: 10.31 THOUSAND/UL (ref 4.31–10.16)

## 2024-01-15 PROCEDURE — 84550 ASSAY OF BLOOD/URIC ACID: CPT

## 2024-01-15 PROCEDURE — 99232 SBSQ HOSP IP/OBS MODERATE 35: CPT | Performed by: INTERNAL MEDICINE

## 2024-01-15 PROCEDURE — 83735 ASSAY OF MAGNESIUM: CPT

## 2024-01-15 PROCEDURE — 80048 BASIC METABOLIC PNL TOTAL CA: CPT

## 2024-01-15 PROCEDURE — 85027 COMPLETE CBC AUTOMATED: CPT

## 2024-01-15 PROCEDURE — 87081 CULTURE SCREEN ONLY: CPT | Performed by: INTERNAL MEDICINE

## 2024-01-15 PROCEDURE — 99222 1ST HOSP IP/OBS MODERATE 55: CPT | Performed by: PHYSICIAN ASSISTANT

## 2024-01-15 RX ORDER — COLCHICINE 0.6 MG/1
1.2 TABLET ORAL ONCE
Status: COMPLETED | OUTPATIENT
Start: 2024-01-15 | End: 2024-01-15

## 2024-01-15 RX ORDER — POTASSIUM CHLORIDE 20 MEQ/1
40 TABLET, EXTENDED RELEASE ORAL ONCE
Status: COMPLETED | OUTPATIENT
Start: 2024-01-15 | End: 2024-01-15

## 2024-01-15 RX ORDER — ACETAMINOPHEN 325 MG/1
975 TABLET ORAL EVERY 8 HOURS SCHEDULED
Status: DISCONTINUED | OUTPATIENT
Start: 2024-01-15 | End: 2024-01-17 | Stop reason: HOSPADM

## 2024-01-15 RX ORDER — ONDANSETRON 4 MG/1
4 TABLET, FILM COATED ORAL EVERY 8 HOURS PRN
COMMUNITY

## 2024-01-15 RX ORDER — MAGNESIUM SULFATE HEPTAHYDRATE 40 MG/ML
4 INJECTION, SOLUTION INTRAVENOUS ONCE
Status: COMPLETED | OUTPATIENT
Start: 2024-01-15 | End: 2024-01-15

## 2024-01-15 RX ORDER — COLCHICINE 0.6 MG/1
0.6 TABLET ORAL 2 TIMES DAILY
Status: DISCONTINUED | OUTPATIENT
Start: 2024-01-16 | End: 2024-01-17 | Stop reason: HOSPADM

## 2024-01-15 RX ORDER — COLCHICINE 0.6 MG/1
0.6 TABLET ORAL ONCE
Status: COMPLETED | OUTPATIENT
Start: 2024-01-15 | End: 2024-01-15

## 2024-01-15 RX ADMIN — METOPROLOL SUCCINATE 25 MG: 25 TABLET, EXTENDED RELEASE ORAL at 08:27

## 2024-01-15 RX ADMIN — ACETAMINOPHEN 975 MG: 325 TABLET, FILM COATED ORAL at 09:34

## 2024-01-15 RX ADMIN — ASPIRIN 81 MG CHEWABLE TABLET 81 MG: 81 TABLET CHEWABLE at 08:27

## 2024-01-15 RX ADMIN — Medication 1000 UNITS: at 08:27

## 2024-01-15 RX ADMIN — COLCHICINE 0.6 MG: 0.6 TABLET ORAL at 09:35

## 2024-01-15 RX ADMIN — ESCITALOPRAM OXALATE 5 MG: 5 TABLET, FILM COATED ORAL at 21:59

## 2024-01-15 RX ADMIN — ACETAMINOPHEN 975 MG: 325 TABLET, FILM COATED ORAL at 21:59

## 2024-01-15 RX ADMIN — POTASSIUM CHLORIDE 40 MEQ: 1500 TABLET, EXTENDED RELEASE ORAL at 07:12

## 2024-01-15 RX ADMIN — ENOXAPARIN SODIUM 40 MG: 40 INJECTION SUBCUTANEOUS at 08:27

## 2024-01-15 RX ADMIN — LEVOTHYROXINE SODIUM 150 MCG: 150 TABLET ORAL at 05:44

## 2024-01-15 RX ADMIN — MAGNESIUM SULFATE IN WATER 4 G: 4 INJECTION, SOLUTION INTRAVENOUS at 07:34

## 2024-01-15 RX ADMIN — COLCHICINE 1.2 MG: 0.6 TABLET ORAL at 09:35

## 2024-01-15 RX ADMIN — AMLODIPINE BESYLATE 2.5 MG: 2.5 TABLET ORAL at 08:27

## 2024-01-15 NOTE — ASSESSMENT & PLAN NOTE
Noted by daughter at bedside about new swelling of left wrist not present yesterday.  Patient and facility did not report any fall.  Patient has history of recurrent falls. 9 falls in past year with 5 falls being in the last 6 months; according to daughter first one was due to syncope and others due to imbalance when patient was trying to reach for things in higher cabinets.  Left wrist is painful on extension and flexion.  Warm, tender, swollen.  Left wrist XR without acute osseous abnormality  Per hand surgery this is likely osteoarthritis exacerbation    Plan:  Started on Colchicine today for possible gout  Follow up on uric acid levels  Hand surgery on board - recommendations appreciated  Tylenol 975mg q8h scheduled for pain  Cock up wrist brace for comfort  Weight bearing as tolerated LUE  IV Zofran every 6 hours as needed for nausea.

## 2024-01-15 NOTE — ASSESSMENT & PLAN NOTE
Chronic and at baseline.  Continue amlodipine 2.5 mg daily.  Continue metoprolol to tartrate 25 mg daily.  Continue aspirin 81 mg daily.  Continue monitoring BP

## 2024-01-15 NOTE — ASSESSMENT & PLAN NOTE
Chronic and at baseline.  Continue amlodipine 2.5 mg daily.  Continue metoprolol to tartrate 25 mg daily.  Continue aspirin 81 mg daily.

## 2024-01-15 NOTE — ASSESSMENT & PLAN NOTE
Patient alert, awake, oriented to herself and place. In a pleasant mood.  Poor historian and had no presenting complains. History reported by daughter at bedside.  Continue escitalopram 5 mg daily at bedtime.

## 2024-01-15 NOTE — ASSESSMENT & PLAN NOTE
Patient presented from nursing facility due to hypoxia down to 80s.  Does not history of any history of smoking or COPD.  Per my read CXR shows mild hilar congestion.  Pending official read  Holding off of antibiotic in the setting of C. difficile rule out.  Placed on 2 L of oxygen and satting 96%.  Monitor pulse oximetry.  Weaned off of nasal cannula oxygen - hypoxia resolved at this time

## 2024-01-15 NOTE — ASSESSMENT & PLAN NOTE
Presented with 1 episode of loose stool at nursing facility today.  Patient was a poor historian and history given by her daughter who received a call from facility.    She had become lightheaded and hypoxic and brought to the ED.  Patient's appetite has been low and oral intake has been poor.    Last week completed 10 days course of Augmentin for pneumonia.  Labs significant for lactic acid elevated at 2.3.  WBC slightly elevated at 10.8.  Lipase Pro-Nando normal  Received 1L LR in ED. Patient is not meeting SIRS criteria.  Fluids discontinued  Patient has not had further bowel movements while in the hospital.     Plan:  Holding off of antibiotics at this time due to C. difficile rule out.  Follow-up C. difficile  Follow-up enteric panel  Monitor bowel movements.  Monitor CBC
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
## 2024-01-15 NOTE — PROGRESS NOTES
Patient:  LILIA ALBRIGHT    MRN:  [id number]    Aidin Request ID:  [id number]    Level of care reserved:  Skilled Nursing Facility    Partner Reserved:  [facility], LIZ [place] [id number] [phone]    Clinical needs requested:    Geography searched:  10 miles around [phone]    Start of Service:    Request sent:  3:02pm EST on 1/15/2024 by Maurice Farr    Partner reserved:  3:56pm EST on 1/15/2024 by Maurice Farr    Choice list shared:  3:56pm EST on 1/15/2024 by Maurice Farr

## 2024-01-15 NOTE — CASE MANAGEMENT
Case Management Assessment & Discharge Planning Note    Patient name Kyra ALARCON /S -01 MRN [id number]  : 1925 Date 1/15/2024       Current Admission Date: 2024  Current Admission Diagnosis:Diarrhea   Patient Active Problem List    Diagnosis Date Noted    Pain and swelling of wrist, left 2024    Hypoxia 2024    Acute midline low back pain without sciatica 2023    SOB (shortness of breath) 2023    Fall at nursing home 2023    Candidal intertrigo 2023    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 behavioral disturbance, psychotic disturbance, mood disturbance, or anxiety (HCC) 2023    Chronic pain syndrome 2023    Moderate protein-calorie malnutrition (HCC) 2023    Unwitnessed fall 2023    T12 compression fracture (HCC) 2023    Diarrhea 02/10/2023    Primary osteoarthritis of left knee 2022    Seasonal allergic rhinitis due to pollen 2022    Hypokalemia 2021    Pre-syncope 2021    Hypomagnesemia 2021    Interstitial cystitis 2021    Anxiety 2021    Vitamin B12 deficiency 09/10/2019    Localized edema 09/10/2019    Stage 2 chronic kidney disease 09/10/2019    Hiatal hernia 2019    Chronic diastolic heart failure (HCC) 2019    Ambulatory dysfunction 2019    Encounter for follow-up examination after completed treatment for malignant neoplasm 2019    Hemorrhoids 2018    History of left breast cancer 2018    S/P left mastectomy 2017    Persistent atrial fibrillation (HCC) 2017    Osteoporosis     Essential hypertension     GERD (gastroesophageal reflux disease)     Constipation 2016    Macular degeneration 2015    Aneurysm of ascending aorta (HCC) 2014    First degree AV block 03/15/2013    Benign paroxysmal vertigo 2013    Vitamin D deficiency 10/01/2012    Dyslipidemia 2012    Acquired  hypothyroidism 06/09/2012    Vertigo 06/09/2012    H. pylori infection 03/17/2009    Advance directive in chart 08/25/2005      LOS (days): 1  Geometric Mean LOS (GMLOS) (days):   Days to GMLOS:     OBJECTIVE:    Risk of Unplanned Readmission Score: 20.47         Current admission status: Inpatient       Preferred Pharmacy:   [facility]/pharm[facility] #[id number] - [place] - [address]  [address] [place] [address]  Phone: [phone] Fax: [phone]    [facility]/pharmacy #[id number] - [place], [place] - [address]  [address]  [place] [address]  Phone: [phone] Fax: [phone]    [facility] - [address]  [address]  [place] [address]  Phone: [phone] Fax: [phone]    Primary Care Provider: Jackie Berger MD    Primary Insurance: MEDICARE  Secondary Insurance: [employer]    ASSESSMENT:  Active Health Care Proxies       Lillian Palacios [facility] Care Representative - Daughter   Primary Phone: [phone] (Mobile)                           Readmission Root Cause  30 Day Readmission: No    Patient Information  Admitted from:: Facility ([facility])  Mental Status: Confused (Dementia at baseline)  During Assessment patient was accompanied by: Not accompanied during assessment  Assessment information provided by:: Daughter  Primary Caregiver: Other (Comment)  Caregiver's Name:: [facility]  Caregiver's Relationship to Patient:: Other (Specify)  Caregiver's Telephone Number:: Lillian Palacios (Daughter)  Support Systems: Family members  County of Residence: [place]  What city do you live in?: [place]  Home entry access options. Select all that apply.: No steps to enter home  Type of Current Residence: Facility  Upon entering residence, is there a bedroom on the main floor (no further steps)?: Yes  Upon entering residence, is there a bathroom on the main floor (no further steps)?: Yes  Living Arrangements: Other (Comment)  Is  patient a ?: No    Activities of Daily Living Prior to Admission  Functional Status: Assistance  Completes ADLs independently?: No  Level of ADL dependence: Assistance  Ambulates independently?: No  Level of ambulatory dependence: Assistance  Does patient use assisted devices?: Yes  Assisted Devices (DME) used: Walker  Does patient currently own DME?: Yes  What DME does the patient currently own?: Walker  Does patient have a history of Outpatient Therapy (PT/OT)?: No  Does the patient have a history of Short-Term Rehab?: Yes  Does patient have a history of HHC?: Yes  Does patient currently have HHC?: No         Patient Information Continued  Income Source: SSI/SSD  Does patient have prescription coverage?: Yes  Does patient receive dialysis treatments?: No  Does patient have a history of substance abuse?: No  Does patient have a history of Mental Health Diagnosis?: No         Means of Transportation  [facility]      Housing Stability: Low Risk  (1/15/2024)    Housing Stability Vital Sign     Unable to Pay for Housing in the Last Year: No     Number of Places Lived in the Last Year: 1     Unstable Housing in the Last Year: No   Food Insecurity: No Food Insecurity (1/15/2024)    Hunger Vital Sign     Worried About Running Out of Food in the Last Year: Never true     Ran Out of Food in the Last Year: Never true   Transportation Needs: No Transportation Needs (1/15/2024)    PRAPARE - Transportation     Lack of Transportation (Medical): No     Lack of Transportation (Non-Medical): No   Utilities: Not At Risk (1/15/2024)    [facility] Utilities     Threatened with loss of utilities: No       DISCHARGE DETAILS:    Discharge planning discussed with:: Lillian Palacios (Daughter)  Freedom of Choice: Yes  Comments - Freedom of Choice: Plan when medically stable is for return to her LTC placement at [facility]  CM contacted family/caregiver?: Yes  Were Treatment Team discharge  recommendations reviewed with patient/caregiver?: Yes  Did patient/caregiver verbalize understanding of patient care needs?: N/A- going to facility  Were patient/caregiver advised of the risks associated with not following Treatment Team discharge recommendations?: Yes    Contacts  Patient Contacts: Lillian Palaicos (Daughter)  Relationship to Patient:: Family  Contact Method: Phone  Phone Number: [phone]  Reason/Outcome: Discharge Planning, Referral, Emergency Contact, Continuity of Care    Requested Home Health Care         Is the patient interested in C at discharge?: No    DME Referral Provided  Referral made for DME?: No    Other Referral/Resources/Interventions Provided:  Interventions: Facility Return, SNF         Treatment Team Recommendation: SNF, Facility Return  Discharge Destination Plan:: Facility Return, SNF  Transport at Discharge : [facility] Ambulance                                         CM reached out to patient's daughter.  CM name and role reviewed.  CM assessment completed and charted above.    Patient is LTC resident at [facility].  When she is medically stable plan is for her to return to the SNF.    CM made a referral in [place] and will reserve when confirmed with facility.    CM reviewed discharge planning process including the following: identifying caregivers at home, preference for d/c planning needs, availability of treatment team to discuss questions or concerns patient and/or family may have regarding diagnosis, plan of care, old or new medications and discharge planning.  CM will continue to follow for care coordination and update assessment as necessary.

## 2024-01-15 NOTE — ASSESSMENT & PLAN NOTE
History of atrial fibrillation.  Heart rate is within normal.  Telemetry monitoring showing sinus rhythm.  Continue metoprolol tartrate.

## 2024-01-15 NOTE — ASSESSMENT & PLAN NOTE
Patient presented due to diarrhea and lightheadedness without any complain of dizziness.    She is a poor historian.  Presenting from nursing facility.  Blood pressure 122/59 on presentation.  Pulse 72.  Satting 95% on room air.    Plan:  Orthostatics VS pending  Monitor blood pressure  PT OT eval pending.

## 2024-01-15 NOTE — ASSESSMENT & PLAN NOTE
TSH checked 7 months ago was 3.62.   Continue levothyroxine 150 mcg for 5 days from Monday to Friday.  Recheck TSH.

## 2024-01-15 NOTE — ASSESSMENT & PLAN NOTE
Last TSH checked 7 months was 3.62.   Continue levothyroxine 150 mcg for 5 days from Monday to Friday.  Recheck TSH.

## 2024-01-15 NOTE — ASSESSMENT & PLAN NOTE
Patient presented from nursing facility due to hypoxia down to 80s.  Does not history of any history of smoking or COPD.  Per my read CXR shows mild hilar congestion.  Pending official read  Holding off of antibiotic in the setting of C. difficile rule out.  Placed on 2 L of oxygen and satting 96%.  Monitor pulse oximetry.  Wean off of nasal cannula oxygen.

## 2024-01-15 NOTE — ASSESSMENT & PLAN NOTE
Patient presented due to diarrhea and lightheadedness without any complain of dizziness.    She is a poor historian.  Presenting from nursing facility.  Blood pressure 122/59 on presentation.  Pulse 72.  Satting 95% on room air.    Plan:  Orthostatics.  Monitor blood pressure  PT OT

## 2024-01-15 NOTE — PROGRESS NOTES
[facility]  Progress Note  Name: Kyra Christianson I  MRN: [id number]  Unit/Bed#: S -01 I Date of Admission: 1/14/2024   Date of Service: 1/15/2024 I Hospital Day: 1    Assessment/Plan   * Diarrhea  Assessment & Plan  Presented with 1 episode of loose stool at nursing facility today.  Patient was a poor historian and history given by her daughter who received a call from facility.    She had become lightheaded and hypoxic and brought to the ED.  Patient's appetite has been low and oral intake has been poor.    Last week completed 10 days course of Augmentin for pneumonia.  Labs significant for lactic acid elevated at 2.3.  WBC slightly elevated at 10.8.  Lipase Pro-Nando normal  Received 1L LR in ED. Patient is not meeting SIRS criteria.  Fluids discontinued  Patient has not had further bowel movements while in the hospital.     Plan:  Holding off of antibiotics at this time due to C. difficile rule out.  Follow-up C. difficile  Follow-up enteric panel  Monitor bowel movements.  Monitor CBC    Pain and swelling of wrist, left  Assessment & Plan  Noted by daughter at bedside about new swelling of left wrist not present yesterday.  Patient and facility did not report any fall.  Patient has history of recurrent falls. 9 falls in past year with 5 falls being in the last 6 months; according to daughter first one was due to syncope and others due to imbalance when patient was trying to reach for things in higher cabinets.  Left wrist is painful on extension and flexion.  Warm, tender, swollen.  Left wrist XR without acute osseous abnormality  Per hand surgery this is likely osteoarthritis exacerbation    Plan:  Started on Colchicine today for possible gout  Follow up on uric acid levels  Hand surgery on board - recommendations appreciated  Tylenol 975mg q8h scheduled for pain  Cock up wrist brace for comfort  Weight bearing as tolerated LUE  IV Zofran every 6 hours as needed for  nausea.      Hypokalemia  Assessment & Plan  Potassium on presentation 3.4  Follow-up a.m. BMP.    Replete as needed.    Pre-syncope  Assessment & Plan  Patient presented due to diarrhea and lightheadedness without any complain of dizziness.    She is a poor historian.  Presenting from nursing facility.  Blood pressure 122/59 on presentation.  Pulse 72.  Satting 95% on room air.    Plan:  Orthostatics VS pending  Monitor blood pressure  PT OT eval pending.     Hypoxia  Assessment & Plan  Patient presented from nursing facility due to hypoxia down to 80s.  Does not history of any history of smoking or COPD.  Per my read CXR shows mild hilar congestion.  Pending official read  Holding off of antibiotic in the setting of C. difficile rule out.  Placed on 2 L of oxygen and satting 96%.  Monitor pulse oximetry.  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 In a pleasant mood.  Poor historian and had no presenting complains. History reported by daughter at bedside.  Continue escitalopram 5 mg daily at bedtime.    Acquired hypothyroidism  Assessment & Plan  TSH checked 7 months ago was 3.62.   Continue levothyroxine 150 mcg for 5 days from Monday to Friday.  Recheck TSH.    Persistent atrial fibrillation (HCC)  Assessment & Plan  History of atrial fibrillation.  Heart rate is within normal.  Telemetry monitoring showing sinus rhythm.  Continue metoprolol tartrate.    Essential hypertension  Assessment & Plan  Chronic and at baseline.  Continue amlodipine 2.5 mg daily.  Continue metoprolol to tartrate 25 mg daily.  Continue aspirin 81 mg daily.  Continue monitoring BP               VTE Pharmacologic Prophylaxis: VTE Score: 7 High Risk (Score >/= 5) - Pharmacological DVT Prophylaxis Ordered: enoxaparin  (Lovenox). Sequential Compression Devices Ordered.    Mobility:   JH-HLM Achieved: 2: Bed activities/Dependent transfer  HLM Goal NOT achieved. Continue with multidisciplinary rounding and encourage appropriate mobility to improve upon HLM goals.    Patient Centered Rounds: I performed bedside rounds with nursing staff today.  Discussions with Specialists or Other Care Team Provider: Hand surgery    Education and Discussions with Family / Patient: Updated  (daughter) at bedside.    Current Length of Stay: 1 day(s)  Current Patient Status: Inpatient   Discharge Plan: Anticipate discharge in 24-48 hrs to discharge location to be determined pending rehab evaluations.    Code Status: Level 3 - DNAR and DNI    Subjective:   Patient was seen at bedside today.  She is not requiring O2.  Patient is pleasantly confused.  Still has pain in the left wrist on small movement.    Objective:     Vitals:   Temp (24hrs), Av.4 °F (36.9 °C), Min:98.2 °F (36.8 °C), Max:98.5 °F (36.9 °C)    Temp:  [98.2 °F (36.8 °C)-98.5 °F (36.9 °C)] 98.4 °F (36.9 °C)  HR:  [54-77] 54  Resp:  [17-18] 17  BP: (110-138)/(53-82) 133/82  SpO2:  [93 %-97 %] 97 %  Body mass index is 28.03 kg/m².     Input and Output Summary (last 24 hours):     Intake/Output Summary (Last 24 hours) at 1/15/2024 1525  Last data filed at 1/15/2024 0546  Gross per 24 hour   Intake 400 ml   Output --   Net 400 ml       Physical Exam:   Physical Exam  Vitals and nursing note reviewed.   Constitutional:       General: She is not in acute distress.     Appearance: Normal appearance. She is not ill-appearing, toxic-appearing or diaphoretic.   HENT:      Head: Normocephalic and atraumatic.      Nose: Nose normal.      Mouth/Throat:      Mouth: Mucous membranes are moist.      Pharynx: Oropharynx is clear.   Eyes:      General: No scleral icterus.        Right eye: No discharge.         Left eye: No discharge.      Extraocular Movements: Extraocular movements intact.       Conjunctiva/sclera: Conjunctivae normal.   Cardiovascular:      Rate and Rhythm: Normal rate. Rhythm irregular.      Pulses: Normal pulses.      Heart sounds: Normal heart sounds. No murmur heard.  Pulmonary:      Effort: Pulmonary effort is normal. No respiratory distress.      Breath sounds: Normal breath sounds. No wheezing, rhonchi or rales.   Abdominal:      General: Abdomen is flat. Bowel sounds are normal. There is no distension.      Palpations: Abdomen is soft.      Tenderness: There is no abdominal tenderness. There is no guarding or rebound.   Musculoskeletal:         General: Swelling (L wrist) and tenderness (L wrist) present.      Cervical back: Normal range of motion and neck supple.      Right lower leg: No edema.      Left lower leg: No edema.   Skin:     General: Skin is warm and dry.      Coloration: Skin is not jaundiced or pale.   Neurological:      Mental Status: She is alert and oriented to person, place, and time. Mental status is at baseline.   Psychiatric:         Mood and Affect: Mood normal.         Behavior: Behavior normal.         Thought Content: Thought content normal.         Judgment: Judgment normal.          Additional Data:     Labs:  Results from last 7 days   Lab Units 01/15/24  0428 01/14/24  1614   WBC Thousand/uL 10.31* 10.81*   HEMOGLOBIN g/dL 10.3* 11.6   HEMATOCRIT % 32.1* 35.4   PLATELETS Thousands/uL 230 281   NEUTROS PCT %  --  67   LYMPHS PCT %  --  20   MONOS PCT %  --  12   EOS PCT %  --  0     Results from last 7 days   Lab Units 01/15/24  0428 01/14/24  1614   SODIUM mmol/L 139 138   POTASSIUM mmol/L 3.2* 3.4*   CHLORIDE mmol/L 104 103   CO2 mmol/L 27 26   BUN mg/dL 17 15   CREATININE mg/dL 0.69 0.76   ANION GAP mmol/L 8 9   CALCIUM mg/dL 8.1* 8.6   ALBUMIN g/dL  --  3.4*   TOTAL BILIRUBIN mg/dL  --  0.76   ALK PHOS U/L  --  60   ALT U/L  --  7   AST U/L  --  15   GLUCOSE RANDOM mg/dL 107 140                 Results from last 7 days   Lab Units  01/14/24 1922 01/14/24  1614   LACTIC ACID mmol/L 1.3 2.3*   PROCALCITONIN ng/ml  --  0.15       Lines/Drains:  Invasive Devices       Peripheral Intravenous Line  Duration             Peripheral IV 01/14/24 Dorsal (posterior);Right Hand <1 day                          Imaging: Reviewed radiology reports from this admission including: chest xray and XR left wrist and Personally reviewed the following imaging: chest xray and XR left wrist    Recent Cultures (last 7 days):   Results from last 7 days   Lab Units 01/14/24  1614   BLOOD CULTURE  Received in Microbiology Lab. Culture in Progress.       Last 24 Hours Medication List:   Current Facility-Administered Medications   Medication Dose Route Frequency Provider Last Rate    acetaminophen  975 mg Oral Q8H LEVAR Susan Faye MD      amLODIPine  2.5 mg Oral Daily Keon Cao MD      aspirin  81 mg Oral Daily Keon Cao MD      cholecalciferol  1,000 Units Oral Daily Keno Cao MD      [START ON 1/16/2024] colchicine  0.6 mg Oral BID Susan Faye MD      [START ON 1/22/2024] vitamin B-12  1,000 mcg Oral Once per day on Monday Thursday Keon Cao MD      enoxaparin  40 mg Subcutaneous Daily Keon Cao MD      escitalopram  5 mg Oral HS Keon Cao MD      levothyroxine  150 mcg Oral Once per day on Monday Tuesday Wednesday Thursday Friday Keon Cao MD      metoprolol succinate  25 mg Oral Daily Keon Cao MD      ondansetron  4 mg Intravenous Q6H PRN Keon Cao MD          Today, Patient Was Seen By: Susan Faye MD    **Please Note: This note may have been constructed using a voice recognition system.**

## 2024-01-16 PROBLEM — B37.0 ORAL THRUSH: Status: ACTIVE | Noted: 2024-01-16

## 2024-01-16 PROBLEM — R09.02 HYPOXIA: Status: RESOLVED | Noted: 2024-01-14 | Resolved: 2024-01-16

## 2024-01-16 LAB
ANION GAP SERPL CALCULATED.3IONS-SCNC: 6 MMOL/L
BUN SERPL-MCNC: 24 MG/DL (ref 5–25)
C COLI+JEJUNI TUF STL QL NAA+PROBE: NORMAL
C DIFF TOX GENS STL QL NAA+PROBE: NEGATIVE
CALCIUM SERPL-MCNC: 8.2 MG/DL (ref 8.4–10.2)
CHLORIDE SERPL-SCNC: 104 MMOL/L (ref 96–108)
CO2 SERPL-SCNC: 27 MMOL/L (ref 21–32)
CREAT SERPL-MCNC: 0.86 MG/DL (ref 0.6–1.3)
EC STX1+STX2 GENES STL QL NAA+PROBE: NORMAL
ERYTHROCYTE [DISTWIDTH] IN BLOOD BY AUTOMATED COUNT: 13.2 % (ref 11.6–15.1)
GFR SERPL CREATININE-BSD FRML MDRD: 56 ML/MIN/1.73SQ M
GLUCOSE SERPL-MCNC: 97 MG/DL (ref 65–140)
HCT VFR BLD AUTO: 34.4 % (ref 34.8–46.1)
HGB BLD-MCNC: 10.9 G/DL (ref 11.5–15.4)
MCH RBC QN AUTO: 29.7 PG (ref 26.8–34.3)
MCHC RBC AUTO-ENTMCNC: 31.7 G/DL (ref 31.4–37.4)
MCV RBC AUTO: 94 FL (ref 82–98)
MRSA NOSE QL CULT: NORMAL
PLATELET # BLD AUTO: 232 THOUSANDS/UL (ref 149–390)
PMV BLD AUTO: 10.7 FL (ref 8.9–12.7)
POTASSIUM SERPL-SCNC: 4 MMOL/L (ref 3.5–5.3)
RBC # BLD AUTO: 3.67 MILLION/UL (ref 3.81–5.12)
SALMONELLA SP SPAO STL QL NAA+PROBE: NORMAL
SARS-COV-2 RNA RESP QL NAA+PROBE: NEGATIVE
SHIGELLA SP+EIEC IPAH STL QL NAA+PROBE: NORMAL
SODIUM SERPL-SCNC: 137 MMOL/L (ref 135–147)
WBC # BLD AUTO: 9.54 THOUSAND/UL (ref 4.31–10.16)

## 2024-01-16 PROCEDURE — 80048 BASIC METABOLIC PNL TOTAL CA: CPT

## 2024-01-16 PROCEDURE — 87493 C DIFF AMPLIFIED PROBE: CPT

## 2024-01-16 PROCEDURE — 87505 NFCT AGENT DETECTION GI: CPT

## 2024-01-16 PROCEDURE — 85027 COMPLETE CBC AUTOMATED: CPT

## 2024-01-16 PROCEDURE — 97535 SELF CARE MNGMENT TRAINING: CPT

## 2024-01-16 PROCEDURE — 97760 ORTHOTIC MGMT&TRAING 1ST ENC: CPT

## 2024-01-16 PROCEDURE — 87635 SARS-COV-2 COVID-19 AMP PRB: CPT

## 2024-01-16 PROCEDURE — 97167 OT EVAL HIGH COMPLEX 60 MIN: CPT

## 2024-01-16 PROCEDURE — 99232 SBSQ HOSP IP/OBS MODERATE 35: CPT | Performed by: INTERNAL MEDICINE

## 2024-01-16 RX ORDER — KETOROLAC TROMETHAMINE 30 MG/ML
15 INJECTION, SOLUTION INTRAMUSCULAR; INTRAVENOUS ONCE
Status: COMPLETED | OUTPATIENT
Start: 2024-01-16 | End: 2024-01-16

## 2024-01-16 RX ORDER — SODIUM CHLORIDE, SODIUM GLUCONATE, SODIUM ACETATE, POTASSIUM CHLORIDE, MAGNESIUM CHLORIDE, SODIUM PHOSPHATE, DIBASIC, AND POTASSIUM PHOSPHATE .53; .5; .37; .037; .03; .012; .00082 G/100ML; G/100ML; G/100ML; G/100ML; G/100ML; G/100ML; G/100ML
75 INJECTION, SOLUTION INTRAVENOUS CONTINUOUS
Status: DISCONTINUED | OUTPATIENT
Start: 2024-01-16 | End: 2024-01-17 | Stop reason: HOSPADM

## 2024-01-16 RX ADMIN — DICLOFENAC SODIUM TOPICAL GEL, 1% 2 G: 10 GEL TOPICAL at 22:18

## 2024-01-16 RX ADMIN — ASPIRIN 81 MG CHEWABLE TABLET 81 MG: 81 TABLET CHEWABLE at 08:28

## 2024-01-16 RX ADMIN — ESCITALOPRAM OXALATE 5 MG: 5 TABLET, FILM COATED ORAL at 22:17

## 2024-01-16 RX ADMIN — DICLOFENAC SODIUM TOPICAL GEL, 1% 2 G: 10 GEL TOPICAL at 14:57

## 2024-01-16 RX ADMIN — NYSTATIN 500000 UNITS: 100000 SUSPENSION ORAL at 18:24

## 2024-01-16 RX ADMIN — ACETAMINOPHEN 975 MG: 325 TABLET, FILM COATED ORAL at 22:17

## 2024-01-16 RX ADMIN — KETOROLAC TROMETHAMINE 15 MG: 30 INJECTION, SOLUTION INTRAMUSCULAR; INTRAVENOUS at 10:38

## 2024-01-16 RX ADMIN — LEVOTHYROXINE SODIUM 150 MCG: 150 TABLET ORAL at 06:39

## 2024-01-16 RX ADMIN — DICLOFENAC SODIUM TOPICAL GEL, 1% 2 G: 10 GEL TOPICAL at 18:25

## 2024-01-16 RX ADMIN — COLCHICINE 0.6 MG: 0.6 TABLET ORAL at 08:28

## 2024-01-16 RX ADMIN — ACETAMINOPHEN 975 MG: 325 TABLET, FILM COATED ORAL at 06:39

## 2024-01-16 RX ADMIN — METOPROLOL SUCCINATE 25 MG: 25 TABLET, EXTENDED RELEASE ORAL at 08:28

## 2024-01-16 RX ADMIN — Medication 1000 UNITS: at 08:28

## 2024-01-16 RX ADMIN — COLCHICINE 0.6 MG: 0.6 TABLET ORAL at 18:24

## 2024-01-16 RX ADMIN — AMLODIPINE BESYLATE 2.5 MG: 2.5 TABLET ORAL at 08:28

## 2024-01-16 RX ADMIN — SODIUM CHLORIDE, SODIUM GLUCONATE, SODIUM ACETATE, POTASSIUM CHLORIDE, MAGNESIUM CHLORIDE, SODIUM PHOSPHATE, DIBASIC, AND POTASSIUM PHOSPHATE 75 ML/HR: .53; .5; .37; .037; .03; .012; .00082 INJECTION, SOLUTION INTRAVENOUS at 10:22

## 2024-01-16 RX ADMIN — ENOXAPARIN SODIUM 40 MG: 40 INJECTION SUBCUTANEOUS at 08:27

## 2024-01-16 RX ADMIN — ACETAMINOPHEN 975 MG: 325 TABLET, FILM COATED ORAL at 14:56

## 2024-01-16 RX ADMIN — NYSTATIN 500000 UNITS: 100000 SUSPENSION ORAL at 22:17

## 2024-01-16 RX ADMIN — NYSTATIN 500000 UNITS: 100000 SUSPENSION ORAL at 14:59

## 2024-01-16 NOTE — PLAN OF CARE
Problem: Potential for Falls  Goal: Patient will remain free of falls  Description: INTERVENTIONS:  - Educate patient/family on patient safety including physical limitations  - Instruct patient to call for assistance with activity   - Consult OT/PT to assist with strengthening/mobility   - Keep Call bell within reach  - Keep bed low and locked with side rails adjusted as appropriate  - Keep care items and personal belongings within reach  - Initiate and maintain comfort rounds  - Make Fall Risk Sign visible to staff  - Offer Toileting every 2 Hours, in advance of need  - Initiate/Maintain bed alarm  - Obtain necessary fall risk management equipment  - Apply yellow socks and bracelet for high fall risk patients  - Consider moving patient to room near nurses station  Outcome: Progressing     Problem: Prexisting or High Potential for Compromised Skin Integrity  Goal: Skin integrity is maintained or improved  Description: INTERVENTIONS:  - Identify patients at risk for skin breakdown  - Assess and monitor skin integrity  - Assess and monitor nutrition and hydration status  - Monitor labs   - Assess for incontinence   - Turn and reposition patient  - Assist with mobility/ambulation  - Relieve pressure over bony prominences  - Avoid friction and shearing  - Provide appropriate hygiene as needed including keeping skin clean and dry  - Evaluate need for skin moisturizer/barrier cream  - Collaborate with interdisciplinary team   - Patient/family teaching  - Consider wound care consult   Outcome: Progressing     Problem: Nutrition/Hydration-ADULT  Goal: Nutrient/Hydration intake appropriate for improving, restoring or maintaining nutritional needs  Description: Monitor and assess patient's nutrition/hydration status for malnutrition. Collaborate with interdisciplinary team and initiate plan and interventions as ordered.  Monitor patient's weight and dietary intake as ordered or per policy. Utilize nutrition screening tool  and intervene as necessary. Determine patient's food preferences and provide high-protein, high-caloric foods as appropriate.     INTERVENTIONS:  - Monitor oral intake, urinary output, labs, and treatment plans  - Assess nutrition and hydration status and recommend course of action  - Evaluate amount of meals eaten  - Assist patient with eating if necessary   - Allow adequate time for meals  - Recommend/ encourage appropriate diets, oral nutritional supplements, and vitamin/mineral supplements  - Order, calculate, and assess calorie counts as needed  - Assess need for intravenous fluids  - Provide specific nutrition/hydration education as appropriate  - Include patient/family/caregiver in decisions related to nutrition  Outcome: Progressing

## 2024-01-16 NOTE — PLAN OF CARE
Problem: OCCUPATIONAL THERAPY ADULT  Goal: Performs self-care activities at highest level of function for planned discharge setting.  See evaluation for individualized goals.  Description: Treatment Interventions: ADL retraining, Functional transfer training, UE strengthening/ROM, Endurance training, Cognitive reorientation, Patient/family training, Equipment evaluation/education, Compensatory technique education, Energy conservation, Activityengagement          See flowsheet documentation for full assessment, interventions and recommendations.   Note: Limitation: Decreased ADL status, Decreased UE ROM, Decreased Safe judgement during ADL, Decreased UE strength, Decreased cognition, Decreased endurance, Decreased self-care trans, Decreased high-level ADLs  Prognosis: Good  Assessment: Patient is a 89 y.o. female seen for OT orthotic management, evaluation and treatment  at [facility] following admission on 1/14/2024  s/p Diarrhea. Please see above for comprehensive list of comorbidities and significant PMHx impacting functional performance.  At baseline, pt is a LTC resident at [facility], (A) for ADLs/IADLs and functional mobility w/ use of AD. (-) driving. (+) extensive fall hx. . Upon initial evaluation, pt appears to be performing below baseline functional status. Pt requires MODA for UB ADLs, MAXA for LB ADLs, MODA for bed mobility, MELVA for transfers and MODA for functional mobility short distance  with HHA. The AM-PAC & Barthel Index outcome tools were used to assist in determining pt safety w/self care /mobility and appropriate d/c recommendations, see above for score. Occupational performance is affected by the following deficits: endurance ,  decreased muscular strength , decreased balance , decreased functional reach , impaired memory , impaired safety awareness , impaired learning ability d/t current cognitive status , and impaired global mental status. Personal/Environmental factors  impacting D/C include: (+) Hx of falls , Assistance needed for ADL/IADLs, Assistance needed for ADLs and functional mobility, High fall risk , decreased insight toward deficits , decreased recall of precautions , and baseline cognitive deficits. Supporting factors include: able to maintain FFSU, support system available, facility staff available for continued assistance, and attitude towards recovery Patient would benefit from OT services within the acute care setting to maximize level of functional independence in the following areas safety procedures , fall prevention , energy conservation , self-care transfers, bed mobility , functional mobility, and ADLs.  From OT standpoint, recommendation at time of D/C would be level II (moderate resource intensity).     Rehab Resource Intensity Level, OT: II (Moderate Resource Intensity)     Nanette Moreno MS OTR/L   [place] Licensure# [id number]

## 2024-01-16 NOTE — ASSESSMENT & PLAN NOTE
Noted by daughter at bedside about new swelling of left wrist not present yesterday.  Patient and facility did not report any fall.  Patient has history of recurrent falls. 9 falls in past year with 5 falls being in the last 6 months; according to daughter first one was due to syncope and others due to imbalance when patient was trying to reach for things in higher cabinets.  Left wrist is painful on extension and flexion. Warm, tender, swollen.  Left wrist XR without acute osseous abnormality  Per hand surgery this is likely osteoarthritis exacerbation  Uric acid 6.6.    Plan:  Started on Colchicine today for possible gout.  Hand surgery on board - recommendations appreciated  Pain control:  Tylenol 975mg q8h scheduled  Voltaren gel  1 dose of Toradol injection.  Cock up wrist brace for comfort  Weight bearing as tolerated LUE  IV Zofran every 6 hours as needed for nausea.  Will need reevaluation if there is increasing swelling or redness.

## 2024-01-16 NOTE — ASSESSMENT & PLAN NOTE
Presented with 1 episode of loose stool at nursing facility today.  Patient was a poor historian and history given by her daughter who received a call from facility.    She had become lightheaded and hypoxic and brought to the ED.  Patient's appetite has been low and oral intake has been poor.    Last week completed 10 days course of Augmentin for pneumonia.  Labs significant for lactic acid elevated at 2.3.  WBC slightly elevated at 10.8.  Lipase Pro-Nando normal  Received 1L LR in ED. Patient is not meeting SIRS criteria.  Fluids discontinued  Patient has not had further bowel movements while in the hospital.   Patient has had not had diarrhea yesterday but 2 episodes of loose stools today.  1/16 stool enteric panel and C. difficile negative.  WBC downtrended to 9.54 today    Plan:   Vitally stable, afebrile will continue to hold off of antibiotics.  Monitor bowel movements.  Monitor CBC

## 2024-01-16 NOTE — ASSESSMENT & PLAN NOTE
Patient presented from nursing facility due to hypoxia down to 80s.  Does not history of any history of smoking or COPD.  Per my read CXR shows mild hilar congestion.  Pending official read  Holding off of antibiotic in the setting of C. difficile rule out.  Placed on 2 L of oxygen and satting 96% on admission.  Monitor pulse oximetry.  Weaned off of nasal cannula oxygen - hypoxia resolved at this time

## 2024-01-16 NOTE — PROGRESS NOTES
[facility]  Progress Note  Name: Kyra Christianson I  MRN: [id number]  Unit/Bed#: S -01 I Date of Admission: 1/14/2024   Date of Service: 1/16/2024 I Hospital Day: 2    Assessment/Plan   * Diarrhea  Assessment & Plan  Presented with 1 episode of loose stool at nursing facility today.  Patient was a poor historian and history given by her daughter who received a call from facility.    She had become lightheaded and hypoxic and brought to the ED.  Patient's appetite has been low and oral intake has been poor.    Last week completed 10 days course of Augmentin for pneumonia.  Labs significant for lactic acid elevated at 2.3.  WBC slightly elevated at 10.8.  Lipase Pro-Nando normal  Received 1L LR in ED. Patient is not meeting SIRS criteria.  Fluids discontinued  Patient has not had further bowel movements while in the hospital.   Patient has had not had diarrhea yesterday but 2 episodes of loose stools today.  1/16 stool enteric panel and C. difficile negative.  WBC downtrended to 9.54 today    Plan:   Vitally stable, afebrile will continue to hold off of antibiotics.  Monitor bowel movements.  Monitor CBC    Pain and swelling of wrist, left  Assessment & Plan  Noted by daughter at bedside about new swelling of left wrist not present yesterday.  Patient and facility did not report any fall.  Patient has history of recurrent falls. 9 falls in past year with 5 falls being in the last 6 months; according to daughter first one was due to syncope and others due to imbalance when patient was trying to reach for things in higher cabinets.  Left wrist is painful on extension and flexion. Warm, tender, swollen.  Left wrist XR without acute osseous abnormality  Per hand surgery this is likely osteoarthritis exacerbation  Uric acid 6.6.    Plan:  Started on Colchicine today for possible gout.  Hand surgery on board - recommendations appreciated  Pain control:  Tylenol 975mg q8h scheduled  Voltaren  gel  1 dose of Toradol injection.  Cock up wrist brace for comfort  Weight bearing as tolerated LUE  IV Zofran every 6 hours as needed for nausea.  Will need reevaluation if there is increasing swelling or redness.    Hypoxia-resolved as of 1/16/2024  Assessment & Plan  Patient presented from nursing facility due to hypoxia down to 80s.  Does not history of any history of smoking or COPD.  Per my read CXR shows mild hilar congestion.  Pending official read  Holding off of antibiotic in the setting of C. difficile rule out.  Placed on 2 L of oxygen and satting 96% on admission.  Monitor pulse oximetry.  Weaned off of nasal cannula oxygen - hypoxia resolved at this time    Pre-syncope  Assessment & Plan  Patient presented due to diarrhea and lightheadedness without any complain of dizziness.    She is a poor historian.  Presenting from nursing facility.  Blood pressure 122/59 on presentation.  Pulse 72.  Satting 95% on room air.    Plan:  Orthostatics VS pending  Monitor blood pressure  PT requirements level II.  Patient will return to [place] long-term [facility].    Hypokalemia  Assessment & Plan  Potassium on presentation 3.4  Follow-up a.m. BMP.    Replete as needed.    Oral thrush  Assessment & Plan  Oral thrush noted today.  Nystatin swish and swallow daily for 7 days.    Acquired hypothyroidism  Assessment & Plan  TSH checked 7 months ago was 3.62.   Continue levothyroxine 150 mcg for 5 days from Monday to Friday.  Recheck TSH.    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 behavioral disturbance, psychotic disturbance, mood disturbance, or anxiety (HCC)  Assessment & Plan  Patient alert, awake, oriented to herself and place. In a pleasant mood.  Poor historian and had no presenting complains. History reported by daughter at bedside.  Continue escitalopram 5 mg daily at bedtime.    Persistent atrial fibrillation (HCC)  Assessment & Plan  History of atrial fibrillation.  Heart rate is within  normal.  Telemetry monitoring showing sinus rhythm.  Continue metoprolol tartrate.    Essential hypertension  Assessment & Plan  Chronic and at baseline.  Continue amlodipine 2.5 mg daily.  Continue metoprolol to tartrate 25 mg daily.  Continue aspirin 81 mg daily.  Continue monitoring BP             VTE Pharmacologic Prophylaxis: VTE Score: 7 High Risk (Score >/= 5) - Pharmacological DVT Prophylaxis Ordered: enoxaparin (Lovenox). Sequential Compression Devices Ordered.    Mobility:   Basic Mobility Inpatient Raw Score: 12  -HLM Goal: 4: Move to chair/commode  JH-HLM Achieved: 2: Bed activities/Dependent transfer  HLM Goal NOT achieved. Continue with multidisciplinary rounding and encourage appropriate mobility to improve upon HLM goals.    Patient Centered Rounds: I performed bedside rounds with nursing staff today.  Discussions with Specialists or Other Care Team Provider: Dr. Pickard    Education and Discussions with Family / Patient: Updated  (daughter) via phone.    Current Length of Stay: 2 day(s)  Current Patient Status: Inpatient   Discharge Plan: Anticipate discharge later today or tomorrow to prior assisted or independent living facility. [facility] term     Code Status: Level 3 - DNAR and DNI    Subjective:   Patient was seen at bedside today.  She is alert and awake, oriented only to herself.  Her appetite has been very low.  She is taking only few bites from her breakfast.  Per nurse she has had 2 loose bowel movements today.  Patient has not gotten out of bed and walk around.  Discussed with her daughter, daughter stated that she was very lethargic yesterday.  Patient breathing on room air.    Objective:     Vitals:   Temp (24hrs), Av.1 °F (36.7 °C), Min:98 °F (36.7 °C), Max:98.2 °F (36.8 °C)    Temp:  [98 °F (36.7 °C)-98.2 °F (36.8 °C)] 98.2 °F (36.8 °C)  HR:  [67-69] 67  Resp:  [16-18] 18  BP: (134-156)/(65-78) 144/65  SpO2:  [93 %-96 %] 93 %  Body mass index is 28.03  kg/m².     Input and Output Summary (last 24 hours):   No intake or output data in the 24 hours ending 01/16/24 1457    Physical Exam:   Physical Exam     Additional Data:     Labs:  Results from last 7 days   Lab Units 01/16/24  0510 01/15/24  0428 01/14/24  1614   WBC Thousand/uL 9.54   < > 10.81*   HEMOGLOBIN g/dL 10.9*   < > 11.6   HEMATOCRIT % 34.4*   < > 35.4   PLATELETS Thousands/uL 232   < > 281   NEUTROS PCT %  --   --  67   LYMPHS PCT %  --   --  20   MONOS PCT %  --   --  12   EOS PCT %  --   --  0    < > = values in this interval not displayed.     Results from last 7 days   Lab Units 01/16/24  0510 01/15/24  0428 01/14/24  1614   SODIUM mmol/L 137   < > 138   POTASSIUM mmol/L 4.0   < > 3.4*   CHLORIDE mmol/L 104   < > 103   CO2 mmol/L 27   < > 26   BUN mg/dL 24   < > 15   CREATININE mg/dL 0.86   < > 0.76   ANION GAP mmol/L 6   < > 9   CALCIUM mg/dL 8.2*   < > 8.6   ALBUMIN g/dL  --   --  3.4*   TOTAL BILIRUBIN mg/dL  --   --  0.76   ALK PHOS U/L  --   --  60   ALT U/L  --   --  7   AST U/L  --   --  15   GLUCOSE RANDOM mg/dL 97   < > 140    < > = values in this interval not displayed.                 Results from last 7 days   Lab Units 01/14/24  1922 01/14/24  1614   LACTIC ACID mmol/L 1.3 2.3*   PROCALCITONIN ng/ml  --  0.15       Lines/Drains:  Invasive Devices       Peripheral Intravenous Line  Duration             Peripheral IV 01/14/24 Dorsal (posterior);Right Hand 1 day                          Imaging: No pertinent imaging reviewed.    Recent Cultures (last 7 days):   Results from last 7 days   Lab Units 01/16/24  0054 01/14/24  1614   BLOOD CULTURE   --  No Growth at 24 hrs.   C DIFF TOXIN B BY PCR  Negative  --        Last 24 Hours Medication List:   Current Facility-Administered Medications   Medication Dose Route Frequency Provider Last Rate    acetaminophen  975 mg Oral Q8H [facility] Susan Faye MD      amLODIPine  2.5 mg Oral Daily Reem Nash, MD      aspirin  81 mg Oral Daily Keon  MD Nash      cholecalciferol  1,000 Units Oral Daily Keon Cao MD      colchicine  0.6 mg Oral BID Susan Faye MD      [START ON 1/22/2024] vitamin B-12  1,000 mcg Oral Once per day on Monday Thursday Keon Cao MD      Diclofenac Sodium  2 g Topical 4x Daily Susan Faye MD      enoxaparin  40 mg Subcutaneous Daily Keon Cao MD      escitalopram  5 mg Oral HS Keon Cao MD      levothyroxine  150 mcg Oral Once per day on Monday Tuesday Wednesday Thursday Friday Keon Cao MD      metoprolol succinate  25 mg Oral Daily Keon Cao MD      multi-electrolyte  75 mL/hr Intravenous Continuous Susan Faye MD 75 mL/hr (01/16/24 1022)    nystatin  500,000 Units Swish & Swallow 4x Daily Susan Faye MD      ondansetron  4 mg Intravenous Q6H PRN Keon Cao MD          Today, Patient Was Seen By: Keon Cao MD    **Please Note: This note may have been constructed using a voice recognition system.**

## 2024-01-16 NOTE — ASSESSMENT & PLAN NOTE
Patient presented due to diarrhea and lightheadedness without any complain of dizziness.    She is a poor historian.  Presenting from nursing facility.  Blood pressure 122/59 on presentation.  Pulse 72.  Satting 95% on room air.    Plan:  Orthostatics VS pending  Monitor blood pressure  PT requirements level II.  Patient will return to [facility].

## 2024-01-16 NOTE — OCCUPATIONAL THERAPY NOTE
Occupational Therapy Evaluation/Treatment/Orthotic Management     Patient Name: Kyra Christianson  Today's Date: 1/16/2024  Problem List  Principal Problem:    Diarrhea  Active Problems:    Essential hypertension    Persistent atrial fibrillation (HCC)    Acquired hypothyroidism    Pre-syncope    Hypokalemia    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 behavioral disturbance, psychotic disturbance, mood disturbance, or anxiety (HCC)    Pain and swelling of wrist, left    Hypoxia    Past Medical History  Past Medical History:   Diagnosis Date    AAA (abdominal aortic aneurysm) (HCC)     Acute medial meniscus tear     Arthritis 1980    Aspiration pneumonia (HCC)     LAST ASSESSED: 7/30/14    Breast CA (HCC)     left    Cancer (HCC) 2017    Chest pain     RESOLVED: 1/31/17    CTS (carpal tunnel syndrome)     Depression     Dermatitis     LAST ASSESSED: 7/25/13    Disease of thyroid gland     Fainting     LAST ASSESSED: 3/15/13    GERD (gastroesophageal reflux disease)     H. pylori infection 03/17/2009    Hypertension     Incomplete defecation     LAST ASSESSED: 7/25/13    Macular degeneration     Osteoporosis     Pneumonia     Vertigo 2012     Past Surgical History  Past Surgical History:   Procedure Laterality Date    APPENDECTOMY      CHOLECYSTECTOMY      COLONOSCOPY      MASTECTOMY Left 09/2017    SIMPLE    MO INTRAOP SENTINEL LYMPH NODE ID W/DYE INJECTION Left 9/5/2017    Procedure: INTRAOPERATIVE LYMPHATIC MAPPING; BLUE DYE ONLY; SENITNEL LYMPH NODE BIOPSY;  Surgeon: Marcelo Reddy MD;  [facility];  Service: Surgical Oncology    MO MASTECTOMY SIMPLE COMPLETE Left 9/5/2017    Procedure: BREAST MASTECTOMY;  Surgeon: Marcelo Reddy MD;  [facility];  Service: Surgical Oncology    SENTINEL LYMPH NODE BIOPSY      US GUIDED BREAST BIOPSY LEFT COMPLETE Left 8/14/2017 01/16/24 1047   OT Last Visit   OT Visit Date 01/16/24   Note Type   Note type Evaluation;Orthotic  Management/Training  (+treatment session)   Pain Assessment   Pain Assessment Tool 0-10   Pain Score No Pain   Patient's Stated Pain Goal No pain   Hospital Pain Intervention(s) Repositioned;Ambulation/increased activity;Emotional support   Multiple Pain Sites No   Restrictions/Precautions   Weight Bearing Precautions Per Order Yes   LUE Weight Bearing Per Order WBAT  (per ortho)   Other Precautions Contact/isolation;Cognitive;Chair Alarm;Bed Alarm;Multiple lines;Fall Risk  (strict contact and hand hygiene; r/o c.diff)   Type of Brace   Brace Applied Other (Comment)  (L universal wrist splint)   Additional Brace Ordered No   Patient Position When Brace Applied Seated  (in recliner chair)   Bracing Recommendations   (Pt requires total assistance for donning/doffing brace at this time, as well as max verbal instruction.  Pt verbalized poor/fair understanding  of donning/doffing brace, when to wear brace. RN aware w/ education on frequent checks for skin breakdown)   Home Living   Type of Home SNF  ([facility] resident)   Home Layout One level;Performs ADLs on one level;Able to live on main level with bedroom/bathroom;Access   Bathroom Shower/Tub Walk-in shower   Bathroom Toilet Raised   Bathroom Equipment Grab bars in shower;Shower chair;Hand-held shower;Grab bars around toilet   Bathroom Accessibility Accessible   Home Equipment Wheelchair-manual;Walker;Cane   Additional Comments Pt reports ambulating with walker.   Prior Function   Level of [place] Needs assistance with ADLs;Needs assistance with functional mobility;Needs assistance with IADLS   Lives With Facility staff   Receives Help From Personal care attendant   IADLs Family/Friend/Other provides transportation;Family/Friend/Other provides meals;Family/Friend/Other provides medication management   Falls in the last 6 months 5 to 10  (Per EMR, pt had approx 8-9 falls in 12/23. Pt unable to comment or quantify falls.)   Vocational Retired  "  Comments Pt is a poor historian with underlying dementia. PLOF and home setup obtained from H&P and previous occupational therapy note.   Lifestyle   Autonomy At baseline, pt is a LTC resident at [facility], (A) for ADLs/IADLs and functional mobility w/ use of AD. (-) driving. (+) extensive fall hx.   Reciprocal Relationships Supportive facility staff and family   Service to Others Retired   General   Additional Pertinent History Pt admitted to [facility] with diarrhea, hypoxia and near syncopal episode at nursing home.   Family/Caregiver Present No   Additional General Comments PMHx: HTN, dCHF, hypothyroidism, persistent afib, breast cancer, osteoporosis, AAA, previous history of recurrent falls, mild late onset Alzheimer's dementia   Subjective   Subjective \"There was someone in here before to visit.. I have no idea who but she was SO pretty\"   ADL   Eating Assistance 4  Minimal Assistance   Grooming Assistance 4  Minimal Assistance   UB Bathing Assistance 3  Moderate Assistance   LB Bathing Assistance 2  Maximal Assistance   UB Dressing Assistance 3  Moderate Assistance   LB Dressing Assistance 2  Maximal Assistance   Toileting Assistance  2  Maximal Assistance   Additional Comments Unable to formally assess bathing, dressing, grooming or toileting at time of eval. The above levels of assistance are anticipated based on functional performance deficits with use of clinical judgement.   Bed Mobility   Supine to Sit 3  Moderate assistance   Additional items Assist x 1;HOB elevated;Increased time required;Verbal cues;LE management  (trunk managment)   Additional Comments Max assist to scoot fwd towards the EOB for optimal seated position and balance   Transfers   Sit to Stand 4  Minimal assistance   Additional items Assist x 1;Increased time required;Verbal cues  (HHA)   Stand to Sit 4  Minimal assistance   Additional items Assist x 1;Increased time required;Verbal cues;Armrests  (VC for surface proximity; HHA)   Additional " Comments Verbal cues for optimal hand placement and body mechanics for safe transfers.   Functional Mobility   Functional Mobility 3  Moderate assistance   Additional Comments for a few steps from the EOB to the chair with HHA. VC for body positioning and awareness.   Additional items Hand hold assistance   Balance   Static Sitting Fair -   Dynamic Sitting Poor +   Static Standing Poor   Activity Tolerance   Activity Tolerance Patient limited by fatigue  (cognition)   Medical Staff Made Aware Spoke with care coordination   Nurse Made Aware Spoke with RN Giana pre/post   RUE Assessment   RUE Assessment WFL  (decreased shoulder ROM, grossly WFL)   LUE Assessment   LUE Assessment WFL  (decreased shoulder ROM, grossly WFL)   Hand Function   Gross Motor Coordination Functional   Fine Motor Coordination Functional   Hand Function Comments Pt is R handed   Cognition   Overall Cognitive Status Impaired   Arousal/Participation Alert;Responsive;Cooperative   Attention Attends with cues to redirect   Orientation Level Oriented to person;Disoriented to place;Disoriented to time;Disoriented to situation  (able to recall full name, unable to recall year of birth)   Memory Decreased recall of precautions;Decreased recall of recent events;Decreased recall of biographical information;Decreased long term memory;Decreased short term memory   Following Commands Follows one step commands with increased time or repetition   Comments Pt ID via wristband, name and . Pt displays limited insight into current limitations and deficits. Is very forgetful, benefits from step-by-step cueing to attend to and particiapte in functional tasks.   Assessment   Limitation Decreased ADL status;Decreased UE ROM;Decreased Safe judgement during ADL;Decreased UE strength;Decreased cognition;Decreased endurance;Decreased self-care trans;Decreased high-level ADLs   Prognosis Good   Assessment Patient is a 89 y.o. female seen for OT orthotic management,  evaluation and treatment  at [facility] following admission on 1/14/2024  s/p Diarrhea. Please see above for comprehensive list of comorbidities and significant PMHx impacting functional performance.  At baseline, pt is a LTC resident at [facility], (A) for ADLs/IADLs and functional mobility w/ use of AD. (-) driving. (+) extensive fall hx. . Upon initial evaluation, pt appears to be performing below baseline functional status. Pt requires MODA for UB ADLs, MAXA for LB ADLs, MODA for bed mobility, MELVA for transfers and MODA for functional mobility short distance  with HHA. The AM-PAC & Barthel Index outcome tools were used to assist in determining pt safety w/self care /mobility and appropriate d/c recommendations, see above for score. Occupational performance is affected by the following deficits: endurance ,  decreased muscular strength , decreased balance , decreased functional reach , impaired memory , impaired safety awareness , impaired learning ability d/t current cognitive status , and impaired global mental status. Personal/Environmental factors impacting D/C include: (+) Hx of falls , Assistance needed for ADL/IADLs, Assistance needed for ADLs and functional mobility, High fall risk , decreased insight toward deficits , decreased recall of precautions , and baseline cognitive deficits. Supporting factors include: able to maintain FFSU, support system available, facility staff available for continued assistance, and attitude towards recovery Patient would benefit from OT services within the acute care setting to maximize level of functional independence in the following areas safety procedures , fall prevention , energy conservation , self-care transfers, bed mobility , functional mobility, and ADLs.  From OT standpoint, recommendation at time of D/C would be level II (moderate resource intensity).   Goals   Patient Goals no rehab goals stated on this date d/t impaired cognition; decrease burden  of care. Will continue to assess.   LTG Time Frame 10-14   Long Term Goal See below   Plan   Treatment Interventions ADL retraining;Functional transfer training;UE strengthening/ROM;Endurance training;Cognitive reorientation;Patient/family training;Equipment evaluation/education;Compensatory technique education;Energy conservation;Activityengagement   Goal Expiration Date 01/26/24   OT Treatment Day 1   OT Frequency 3-5x/wk   Discharge Recommendation   Rehab Resource Intensity Level, OT II (Moderate Resource Intensity)   Additional Comments  The patient's raw score on the AM-PAC Daily Activity Inpatient Short Form is 14. A raw score of less than 19 suggests the patient may benefit from discharge to post-acute rehabilitation services. Please refer to the recommendation of the Occupational Therapist for safe discharge planning.   AM-PAC Daily Activity Inpatient   Lower Body Dressing 2   Bathing 2   Toileting 2   Upper Body Dressing 2   Grooming 3   Eating 3   Daily Activity Raw Score 14   Daily Activity Standardized Score (Calc for Raw Score >=11) 33.39   AM-PAC Applied Cognition Inpatient   Following a Speech/Presentation 1   Understanding Ordinary Conversation 2   Taking Medications 1   Remembering Where Things Are Placed or Put Away 1   Remembering List of 4-5 Errands 1   Taking Care of Complicated Tasks 1   Applied Cognition Raw Score 7   Applied Cognition Standardized Score 15.17   Barthel Index   Feeding 5   Bathing 0   Grooming Score 0   Dressing Score 5   Bladder Score 0   Bowels Score 0   Toilet Use Score 5   Transfers (Bed/Chair) Score 10   Mobility (Level Surface) Score 0   Stairs Score 0   Barthel Index Score 25   Additional Treatment Session   Start Time 1112   End Time 1122   Treatment Assessment Pt participated in tx session following IE for ADL training and to further challenge activity tolerance. Prior to bed mobility, pt noted to be soiled in BM. Pt participated in rolling R/L w/ minimal assist, use  of bed rails to optimize independence. Total assist for hygiene and posterior pericare. Once seated in the chair, pt participated in functional grooming tasks with minimal assist for setup and sequencing. Extensive prompting and cueing provided during tasks for sustained focus, orientation and sequencing. Pt completed face washing and oral care with increased amount of time d/t cognitive deficits. At the end of the session, pt remained in the chair with alarm donned and all needs in reach. Pt will continue to benefit from skilled OT services to address functional performance deficits and maximize independence in mobility and ADL tasks.   End of Consult   Education Provided Yes   Nurse Communication Nurse aware of consult     Goals established on initial evaluation in order to achieve pt's goal of n/a (see above).      Pt will complete UB ADLs Supervision  for increased ADL independence within 10 days.     Pt will complete LB ADLs Mod A   for increased ADL independence within 10 days.     Pt will complete toileting Mod A   with use of DME for increased ADL independence within 10 days.     Pt will demonstrate proper body mechanics to complete self-care transfers and functional mobility with Min A and use of LRAD for increased safety and functional independence within 10 days.     Pt will perform grooming tasks sitting in the chair vs at the sink with Independent  in order to increase overall independence and return to PLOF.     Pt will demonstrate standing tolerance of 1 min with Min A and use of LRAD for increased activity tolerance during ADL/IADL tasks within 10 days.     Pt will complete bed mobility Min A for increased independence in repositioning, pressure offloading, and managing comfort.     Pt will demonstrate proper body mechanics and fall prevention strategies during 100% of tx sessions for increased safety awareness during ADL/IADLs    Pt will improve functional activity tolerance to 25 mins of sustained  functional tasks to increase participation in basic self-care tasks and minimize assistance level.     Pt will receive education on use of compensatory memory strategies for increased safety and independent with IADL tasks.     Pt will attend to treatment task or activity for 10 minutes without need for redirection to improve activity engagement within 10 days.     Pt will participate in ongoing cognitive assessments to assist with safe D/C planning and supervision/assistance recommendations.     Pt will verbalize and demonstrate understanding of B UE HEP program increase strength and endurance during self care transfers within 10 days.     Nanette Moreno MS OTR/L   [place] Licensure# [id number]

## 2024-01-17 VITALS
HEART RATE: 71 BPM | WEIGHT: 148.37 LBS | DIASTOLIC BLOOD PRESSURE: 97 MMHG | TEMPERATURE: 98.2 F | SYSTOLIC BLOOD PRESSURE: 187 MMHG | RESPIRATION RATE: 16 BRPM | BODY MASS INDEX: 28.03 KG/M2 | OXYGEN SATURATION: 94 %

## 2024-01-17 LAB
ANION GAP SERPL CALCULATED.3IONS-SCNC: 8 MMOL/L
BUN SERPL-MCNC: 28 MG/DL (ref 5–25)
CALCIUM SERPL-MCNC: 7.7 MG/DL (ref 8.4–10.2)
CHLORIDE SERPL-SCNC: 105 MMOL/L (ref 96–108)
CO2 SERPL-SCNC: 23 MMOL/L (ref 21–32)
CREAT SERPL-MCNC: 0.85 MG/DL (ref 0.6–1.3)
ERYTHROCYTE [DISTWIDTH] IN BLOOD BY AUTOMATED COUNT: 13.1 % (ref 11.6–15.1)
GFR SERPL CREATININE-BSD FRML MDRD: 57 ML/MIN/1.73SQ M
GLUCOSE SERPL-MCNC: 96 MG/DL (ref 65–140)
HCT VFR BLD AUTO: 32.8 % (ref 34.8–46.1)
HGB BLD-MCNC: 10.3 G/DL (ref 11.5–15.4)
MCH RBC QN AUTO: 29.6 PG (ref 26.8–34.3)
MCHC RBC AUTO-ENTMCNC: 31.4 G/DL (ref 31.4–37.4)
MCV RBC AUTO: 94 FL (ref 82–98)
PLATELET # BLD AUTO: 241 THOUSANDS/UL (ref 149–390)
PMV BLD AUTO: 10.5 FL (ref 8.9–12.7)
POTASSIUM SERPL-SCNC: 3.7 MMOL/L (ref 3.5–5.3)
RBC # BLD AUTO: 3.48 MILLION/UL (ref 3.81–5.12)
SODIUM SERPL-SCNC: 136 MMOL/L (ref 135–147)
WBC # BLD AUTO: 7.67 THOUSAND/UL (ref 4.31–10.16)

## 2024-01-17 PROCEDURE — 99239 HOSP IP/OBS DSCHRG MGMT >30: CPT | Performed by: INTERNAL MEDICINE

## 2024-01-17 PROCEDURE — 85027 COMPLETE CBC AUTOMATED: CPT

## 2024-01-17 PROCEDURE — 80048 BASIC METABOLIC PNL TOTAL CA: CPT

## 2024-01-17 RX ORDER — IBUPROFEN 400 MG/1
400 TABLET ORAL EVERY 6 HOURS PRN
Start: 2024-01-17

## 2024-01-17 RX ORDER — COLCHICINE 0.6 MG/1
0.6 TABLET ORAL 2 TIMES DAILY
Start: 2024-01-17 | End: 2024-01-22

## 2024-01-17 RX ORDER — ESCITALOPRAM OXALATE 5 MG/1
5 TABLET ORAL
Qty: 90 TABLET | Refills: 0 | Status: SHIPPED | OUTPATIENT
Start: 2024-01-17

## 2024-01-17 RX ORDER — LOPERAMIDE HYDROCHLORIDE 2 MG/1
2 CAPSULE ORAL 4 TIMES DAILY PRN
Qty: 30 CAPSULE
Start: 2024-01-17

## 2024-01-17 RX ORDER — LOPERAMIDE HYDROCHLORIDE 2 MG/1
2 CAPSULE ORAL 4 TIMES DAILY PRN
Status: DISCONTINUED | OUTPATIENT
Start: 2024-01-17 | End: 2024-01-17 | Stop reason: HOSPADM

## 2024-01-17 RX ADMIN — LOPERAMIDE HYDROCHLORIDE 2 MG: 2 CAPSULE ORAL at 05:26

## 2024-01-17 RX ADMIN — ASPIRIN 81 MG CHEWABLE TABLET 81 MG: 81 TABLET CHEWABLE at 09:07

## 2024-01-17 RX ADMIN — METOPROLOL SUCCINATE 25 MG: 25 TABLET, EXTENDED RELEASE ORAL at 09:07

## 2024-01-17 RX ADMIN — NYSTATIN 500000 UNITS: 100000 SUSPENSION ORAL at 09:07

## 2024-01-17 RX ADMIN — LEVOTHYROXINE SODIUM 150 MCG: 150 TABLET ORAL at 05:26

## 2024-01-17 RX ADMIN — AMLODIPINE BESYLATE 2.5 MG: 2.5 TABLET ORAL at 09:07

## 2024-01-17 RX ADMIN — Medication 1000 UNITS: at 09:07

## 2024-01-17 RX ADMIN — COLCHICINE 0.6 MG: 0.6 TABLET ORAL at 09:07

## 2024-01-17 RX ADMIN — ENOXAPARIN SODIUM 40 MG: 40 INJECTION SUBCUTANEOUS at 09:07

## 2024-01-17 RX ADMIN — ACETAMINOPHEN 975 MG: 325 TABLET, FILM COATED ORAL at 05:26

## 2024-01-17 RX ADMIN — SODIUM CHLORIDE, SODIUM GLUCONATE, SODIUM ACETATE, POTASSIUM CHLORIDE, MAGNESIUM CHLORIDE, SODIUM PHOSPHATE, DIBASIC, AND POTASSIUM PHOSPHATE 75 ML/HR: .53; .5; .37; .037; .03; .012; .00082 INJECTION, SOLUTION INTRAVENOUS at 00:08

## 2024-01-17 NOTE — ASSESSMENT & PLAN NOTE
Patient presented due to diarrhea and lightheadedness without any complain of dizziness.    She is a poor historian.  Presenting from nursing facility.  Blood pressure 122/59 on presentation.  Pulse 72.  Satting 95% on room air.    Plan:  Orthostatics VS pending  Monitor blood pressure  PT requirements level II.  Patient will return to [facility].

## 2024-01-17 NOTE — ASSESSMENT & PLAN NOTE
Noted by daughter at bedside about new swelling of left wrist not present yesterday.  Patient and facility did not report any fall.  Patient has history of recurrent falls. 9 falls in past year with 5 falls being in the last 6 months; according to daughter first one was due to syncope and others due to imbalance when patient was trying to reach for things in higher cabinets.  Left wrist is painful on extension and flexion. Warm, tender, swollen.  Left wrist XR without acute osseous abnormality  Per hand surgery this is likely osteoarthritis exacerbation  Uric acid 6.6.    Plan:  Started on Colchicine today for possible gout.  Continue Colchicine 0.6 mg BID for 5 days.  Patient's L wrist pain has improved.  Hand surgery was consulted.  Pain control:  Tylenol 975mg q8h scheduled  Voltaren gel  Ibuprofen as needed  Cock up wrist brace for comfort  Weight bearing as tolerated LUE  Will need reevaluation if there is increasing swelling or redness.  Discharge today to long-term facility at [place].

## 2024-01-17 NOTE — PLAN OF CARE
Problem: Potential for Falls  Goal: Patient will remain free of falls  Description: INTERVENTIONS:  - Educate patient/family on patient safety including physical limitations  - Instruct patient to call for assistance with activity   - Consult OT/PT to assist with strengthening/mobility   - Keep Call bell within reach  - Keep bed low and locked with side rails adjusted as appropriate  - Keep care items and personal belongings within reach  - Initiate and maintain comfort rounds  - Make Fall Risk Sign visible to staff  - Apply yellow socks and bracelet for high fall risk patients  - Consider moving patient to room near nurses station  Outcome: Progressing     Problem: Prexisting or High Potential for Compromised Skin Integrity  Goal: Skin integrity is maintained or improved  Description: INTERVENTIONS:  - Identify patients at risk for skin breakdown  - Assess and monitor skin integrity  - Assess and monitor nutrition and hydration status  - Monitor labs   - Assess for incontinence   - Turn and reposition patient  - Assist with mobility/ambulation  - Relieve pressure over bony prominences  - Avoid friction and shearing  - Provide appropriate hygiene as needed including keeping skin clean and dry  - Evaluate need for skin moisturizer/barrier cream  - Collaborate with interdisciplinary team   - Patient/family teaching  - Consider wound care consult   Outcome: Progressing     Problem: Nutrition/Hydration-ADULT  Goal: Nutrient/Hydration intake appropriate for improving, restoring or maintaining nutritional needs  Description: Monitor and assess patient's nutrition/hydration status for malnutrition. Collaborate with interdisciplinary team and initiate plan and interventions as ordered.  Monitor patient's weight and dietary intake as ordered or per policy. Utilize nutrition screening tool and intervene as necessary. Determine patient's food preferences and provide high-protein, high-caloric foods as appropriate.      INTERVENTIONS:  - Monitor oral intake, urinary output, labs, and treatment plans  - Assess nutrition and hydration status and recommend course of action  - Evaluate amount of meals eaten  - Assist patient with eating if necessary   - Allow adequate time for meals  - Recommend/ encourage appropriate diets, oral nutritional supplements, and vitamin/mineral supplements  - Order, calculate, and assess calorie counts as needed  - Recommend, monitor, and adjust tube feedings and TPN/PPN based on assessed needs  - Assess need for intravenous fluids  - Provide specific nutrition/hydration education as appropriate  - Include patient/family/caregiver in decisions related to nutrition  Outcome: Progressing     Problem: PAIN - ADULT  Goal: Verbalizes/displays adequate comfort level or baseline comfort level  Description: Interventions:  - Encourage patient to monitor pain and request assistance  - Assess pain using appropriate pain scale  - Administer analgesics based on type and severity of pain and evaluate response  - Implement non-pharmacological measures as appropriate and evaluate response  - Consider cultural and social influences on pain and pain management  - Notify physician/advanced practitioner if interventions unsuccessful or patient reports new pain  Outcome: Progressing     Problem: INFECTION - ADULT  Goal: Absence or prevention of progression during hospitalization  Description: INTERVENTIONS:  - Assess and monitor for signs and symptoms of infection  - Monitor lab/diagnostic results  - Monitor all insertion sites, i.e. indwelling lines, tubes, and drains  - Monitor endotracheal if appropriate and nasal secretions for changes in amount and color  - [place] appropriate cooling/warming therapies per order  - Administer medications as ordered  - Instruct and encourage patient and family to use good hand hygiene technique  - Identify and instruct in appropriate isolation precautions for identified  infection/condition  Outcome: Progressing     Problem: SAFETY ADULT  Goal: Patient will remain free of falls  Description: INTERVENTIONS:  - Educate patient/family on patient safety including physical limitations  - Instruct patient to call for assistance with activity   - Consult OT/PT to assist with strengthening/mobility   - Keep Call bell within reach  - Keep bed low and locked with side rails adjusted as appropriate  - Keep care items and personal belongings within reach  - Initiate and maintain comfort rounds  - Make Fall Risk Sign visible to staff  - Apply yellow socks and bracelet for high fall risk patients  - Consider moving patient to room near nurses station  Outcome: Progressing     Problem: DISCHARGE PLANNING  Goal: Discharge to home or other facility with appropriate resources  Description: INTERVENTIONS:  - Identify barriers to discharge w/patient and caregiver  - Arrange for needed discharge resources and transportation as appropriate  - Identify discharge learning needs (meds, wound care, etc.)  - Arrange for interpretive services to assist at discharge as needed  - Refer to Case Management Department for coordinating discharge planning if the patient needs post-hospital services based on physician/advanced practitioner order or complex needs related to functional status, cognitive ability, or social support system  Outcome: Progressing     Problem: Knowledge Deficit  Goal: Patient/family/caregiver demonstrates understanding of disease process, treatment plan, medications, and discharge instructions  Description: Complete learning assessment and assess knowledge base.  Interventions:  - Provide teaching at level of understanding  - Provide teaching via preferred learning methods  Outcome: Progressing

## 2024-01-17 NOTE — ASSESSMENT & PLAN NOTE
TSH checked 7 months ago was 3.62.   Continue levothyroxine 150 mcg for 5 days from Monday to Friday.  Recheck TSH is normal at 0.87.

## 2024-01-17 NOTE — ASSESSMENT & PLAN NOTE
Presented with 1 episode of loose stool at nursing facility today.  Patient was a poor historian and history given by her daughter who received a call from facility.    She had become lightheaded and hypoxic and brought to the ED.  Patient's appetite has been low and oral intake has been poor.    Last week completed 10 days course of Augmentin for pneumonia.  Labs significant for lactic acid elevated at 2.3.  WBC slightly elevated at 10.8.  Lipase Pro-Nando normal  Received 1L LR in ED. Patient is not meeting SIRS criteria.  Fluids discontinued  Patient has not had further bowel movements while in the hospital.   Patient has had not had diarrhea yesterday but 2 episodes of loose stools today.  1/16 stool enteric panel and C. difficile negative.  WBC downtrended to 9.54 today    Plan:   Vitally stable, afebrile will continue to hold off of antibiotics.  Imodium as needed.  Per daughter she has had intermittent diarrhea for 20 years.

## 2024-01-17 NOTE — DISCHARGE SUMMARY
[facility]  Discharge- Kyra Christianson 7/17/1925, 89 y.o. female MRN: [id number]  Unit/Bed#: S -[facility] Encounter: [id number]  Primary Care Provider: Jackie Berger MD   Date and time admitted to hospital: 1/14/2024  3:32 PM    * Diarrhea  Assessment & Plan  Presented with 1 episode of loose stool at nursing facility today.  Patient was a poor historian and history given by her daughter who received a call from facility.    She had become lightheaded and hypoxic and brought to the ED.  Patient's appetite has been low and oral intake has been poor.    Last week completed 10 days course of Augmentin for pneumonia.  Labs significant for lactic acid elevated at 2.3.  WBC slightly elevated at 10.8.  Lipase Pro-Nando normal  Received 1L LR in ED. Patient is not meeting SIRS criteria.  Fluids discontinued  Patient has not had further bowel movements while in the hospital.   Patient has had not had diarrhea yesterday but 2 episodes of loose stools today.  1/16 stool enteric panel and C. difficile negative.  WBC downtrended to 9.54 today    Plan:   Vitally stable, afebrile will continue to hold off of antibiotics.  Imodium as needed.  Per daughter she has had intermittent diarrhea for 20 years.    Pain and swelling of wrist, left  Assessment & Plan  Noted by daughter at bedside about new swelling of left wrist not present yesterday.  Patient and facility did not report any fall.  Patient has history of recurrent falls. 9 falls in past year with 5 falls being in the last 6 months; according to daughter first one was due to syncope and others due to imbalance when patient was trying to reach for things in higher cabinets.  Left wrist is painful on extension and flexion. Warm, tender, swollen.  Left wrist XR without acute osseous abnormality  Per hand surgery this is likely osteoarthritis exacerbation  Uric acid 6.6.    Plan:  Started on Colchicine today for possible gout.  Continue Colchicine 0.6  mg BID for 5 days.  Patient's L wrist pain has improved.  Hand surgery was consulted.  Pain control:  Tylenol 975mg q8h scheduled  Voltaren gel  Ibuprofen as needed  Cock up wrist brace for comfort  Weight bearing as tolerated LUE  Will need reevaluation if there is increasing swelling or redness.  Discharge today to long-term facility at [facility].    Hypoxia-resolved as of 1/16/2024  Assessment & Plan  Patient presented from nursing facility due to hypoxia down to 80s.  Does not history of any history of smoking or COPD.  Per my read CXR shows mild hilar congestion.  Pending official read  Holding off of antibiotic in the setting of C. difficile rule out.  Placed on 2 L of oxygen and satting 96% on admission.  Monitor pulse oximetry.  Weaned off of nasal cannula oxygen - hypoxia resolved at this time    Pre-syncope  Assessment & Plan  Patient presented due to diarrhea and lightheadedness without any complain of dizziness.    She is a poor historian.  Presenting from nursing facility.  Blood pressure 122/59 on presentation.  Pulse 72.  Satting 95% on room air.    Plan:  Orthostatics VS pending  Monitor blood pressure  PT requirements level II.  Patient will return to [facility].    Hypokalemia  Assessment & Plan  Potassium on presentation 3.4  Follow-up a.m. BMP.    Replete as needed.    Oral thrush  Assessment & Plan  Oral thrush noted today.  Nystatin swish and swallow daily for 7 days.    Acquired hypothyroidism  Assessment & Plan  TSH checked 7 months ago was 3.62.   Continue levothyroxine 150 mcg for 5 days from Monday to Friday.  Recheck TSH is normal at 0.87.    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 behavioral disturbance, psychotic disturbance, mood disturbance, or anxiety (HCC)  Assessment & Plan  Patient alert, awake, oriented to herself and place. In a pleasant mood.  Poor historian and had no presenting complains. History reported by daughter at bedside.  Continue  escitalopram 5 mg daily at bedtime.    Persistent atrial fibrillation (HCC)  Assessment & Plan  History of atrial fibrillation.  Heart rate is within normal.  Telemetry monitoring showing sinus rhythm.  Continue metoprolol tartrate.    Essential hypertension  Assessment & Plan  Chronic and at baseline.  Continue amlodipine 2.5 mg daily.  Continue metoprolol to tartrate 25 mg daily.  Continue aspirin 81 mg daily.  Continue monitoring BP      Medical Problems       Resolved Problems  Date Reviewed: 12/13/2023            Resolved    Hypoxia 1/16/2024     Resolved by  Keon Cao MD        Discharging Resident: Keon Cao MD  Discharging Attending: Alina Pickard MD  PCP: Jackie Berger MD  Admission Date:   Admission Orders (From admission, onward)       Ordered        01/14/24 1719  INPATIENT ADMISSION  Once                          Discharge Date: 01/17/24    Consultations During Hospital Stay:  Orthopedics    Procedures Performed:   None    Significant Findings / Test Results:   Xray left wrist     No acute osseous abnormality.    Xray chest     No acute cardiopulmonary disease.    Incidental Findings:   None    Test Results Pending at Discharge (will require follow up):  None     Outpatient Tests Requested:  None    Complications:  None    Reason for Admission: Diarrhea    Hospital Course:   Kyra Christianson is a 89 y.o. female patient who originally presented to the hospital on 1/14/2024 due to diarrhea followed by lightheadedness and hypoxia.  She was placed on 2 L of oxygen in the ED. Due to recent antibiotic use, c diff was sent. Patient was noted to have left wrist swelling.  No fractures or osseous abnormality noted on x-ray of the wrist.  Orthopedics was consulted and they recommended left wrist brace. Her oxygen was weaned off on day 2. She was started on colchicine due to suspicion of gout but uric acid level is normal.  Patient will continue colchicine, NSAIDs and Voltaren gel upon  discharge.  Feeding panel was negative and patient continues to have 2-4 loose bowel movements daily. Per daughter she has had diarrhea on and off since 20 years. She can take imodium.     Please see above list of diagnoses and related plan for additional information.     Condition at Discharge: stable    Discharge Day Visit / Exam:   Subjective: Patient was seen at bedside today.  Per nurse she had 3-4 loose bowel movements today.  She denies any abdominal pain. She is oriented to herself only.  She has no acute complaints today.  Left wrist swelling has reduced and pain has improved.  Vitals: Blood Pressure: (!) 187/97 (01/17/24 0912)  Pulse: 71 (01/17/24 0912)  Temperature: 98.2 °F (36.8 °C) (01/17/24 0912)  Temp Source: Oral (01/15/24 0732)  Respirations: 16 (01/17/24 0912)  Weight - Scale: 67.3 kg (148 lb 5.9 oz) (01/14/24 1534)  SpO2: 94 % (01/17/24 0912)  Exam:   Physical Exam  Vitals and nursing note reviewed.   Constitutional:       General: She is not in acute distress.     Appearance: She is well-developed.   HENT:      Head: Normocephalic and atraumatic.   Eyes:      Conjunctiva/sclera: Conjunctivae normal.   Cardiovascular:      Rate and Rhythm: Normal rate and regular rhythm.      Heart sounds: No murmur heard.  Pulmonary:      Effort: Pulmonary effort is normal. No respiratory distress.      Breath sounds: Normal breath sounds.   Abdominal:      Palpations: Abdomen is soft.      Tenderness: There is no abdominal tenderness.   Musculoskeletal:         General: Swelling present.      Cervical back: Neck supple.      Comments: Left wrist swelling.  She has a left cock up wrist brace on.   Skin:     General: Skin is warm and dry.      Capillary Refill: Capillary refill takes less than 2 seconds.   Neurological:      Mental Status: She is alert.      Comments: Oriented to only her self at baseline   Psychiatric:         Mood and Affect: Mood normal.          Discussion with Family:  Will update patient's  daughter over phone or at bedside.     Discharge instructions/Information to patient and family:   See after visit summary for information provided to patient and family.      Provisions for Follow-Up Care:  See after visit summary for information related to follow-up care and any pertinent home health orders.      Mobility at time of Discharge:   Basic Mobility Inpatient Raw Score: 12  JH-HLM Goal: 4: Move to chair/commode  JH-HLM Achieved: 4: Move to chair/commode  HLM Goal achieved. Continue to encourage appropriate mobility.     Disposition:   Assisted Living Facility at [facility]     Planned Readmission: None    Discharge Medications:  See after visit summary for reconciled discharge medications provided to patient and/or family.      **Please Note: This note may have been constructed using a voice recognition system**

## 2024-01-17 NOTE — CASE MANAGEMENT
Case Management Discharge Planning Note    Patient name Kyra ALARCON /S -01 MRN [id number]  : 1925 Date 2024       Current Admission Date: 2024  Current Admission Diagnosis:Diarrhea   Patient Active Problem List    Diagnosis Date Noted    Oral thrush 2024    Pain and swelling of wrist, left 2024    Acute midline low back pain without sciatica 2023    SOB (shortness of breath) 2023    Fall at nursing home 2023    Candidal intertrigo 2023    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 behavioral disturbance, psychotic disturbance, mood disturbance, or anxiety (HCC) 2023    Chronic pain syndrome 2023    Moderate protein-calorie malnutrition (HCC) 2023    Unwitnessed fall 2023    T12 compression fracture (HCC) 2023    Diarrhea 02/10/2023    Primary osteoarthritis of left knee 2022    Seasonal allergic rhinitis due to pollen 2022    Hypokalemia 2021    Pre-syncope 2021    Hypomagnesemia 2021    Interstitial cystitis 2021    Anxiety 2021    Vitamin B12 deficiency 09/10/2019    Localized edema 09/10/2019    Stage 2 chronic kidney disease 09/10/2019    Hiatal hernia 2019    Chronic diastolic heart failure (HCC) 2019    Ambulatory dysfunction 2019    Encounter for follow-up examination after completed treatment for malignant neoplasm 2019    Hemorrhoids 2018    History of left breast cancer 2018    S/P left mastectomy 2017    Persistent atrial fibrillation (HCC) 2017    Osteoporosis     Essential hypertension     GERD (gastroesophageal reflux disease)     Constipation 2016    Macular degeneration 2015    Aneurysm of ascending aorta (HCC) 2014    First degree AV block 03/15/2013    Benign paroxysmal vertigo 2013    Vitamin D deficiency 10/01/2012    Dyslipidemia 2012    Acquired hypothyroidism  06/09/2012    Vertigo 06/09/2012    H. pylori infection 03/17/2009    Advance directive in chart 08/25/2005      LOS (days): 3  Geometric Mean LOS (GMLOS) (days): 2.5  Days to GMLOS:-0.4     OBJECTIVE:  Risk of Unplanned Readmission Score: 18.33         Current admission status: Inpatient   Preferred Pharmacy:   [id number] [place] - [address]  [address] [place] [address]  Phone: [phone] Fax: [phone]    [id number] - [place] - [address]  [address]  [place] [address]  Phone: [phone] Fax: [phone]    [facility] [place] - [address]  [address]  [place] [address]  Phone: [phone] Fax: [phone]    Primary Care Provider: Jackie Berger MD    Primary Insurance: MEDICARE  Secondary Insurance: [facility]    DISCHARGE DETAILS:    Discharge planning discussed with:: status post IR right upper lobe lung biopsy performed by Dr. Miller on 1/15/2024  Freedom of Choice: Yes  Comments - Freedom of Choice: Patient clear for return to LTC at [facility]  CM contacted family/caregiver?: Yes  Were Treatment Team discharge recommendations reviewed with patient/caregiver?: Yes  Did patient/caregiver verbalize understanding of patient care needs?: N/A- going to facility  Were patient/caregiver advised of the risks associated with not following Treatment Team discharge recommendations?: Yes    Contacts  Patient Contacts: Lillian Palacios (Daughter)  Relationship to Patient:: Family  Contact Method: Phone  Phone Number: [phone]  Reason/Outcome: Discharge Planning, Emergency Contact, Continuity of Care    Requested Home Health Care         Is the patient interested in HHC at discharge?: No    DME Referral Provided  Referral made for DME?: No    Other Referral/Resources/Interventions Provided:  Interventions: Facility Return, SNF, Transportation         Treatment Team Recommendation: Facility  Return, SNF  Discharge Destination Plan:: SNF, Facility Return  Transport at Discharge : BLS Ambulance  Dispatcher Contacted: Yes  Number/Name of Dispatcher: Requested via Roundtrip  Transported by (Company and Unit #): [facility]  ETA of Transport (Date): 01/17/24  ETA of Transport (Time): 1500              IMM Given (Date):: 01/17/24  IMM Given to:: Family  Family notified:: Reviewed with daughter over the phone       Accepting Facility Name, City & State : [facility]  Receiving Facility/Agency Phone Number: [phone]  Facility/Agency Fax Number: [phone]     CM notified that patient is medically stable for DC back to her SNF today.    BLS transport [facility] and [facility] by [facility] for 3:00 PM.    Patient's daughter was updated on the DCP.    SLIM, primary nurse, facility and family all aware of the DCP and transport time.    No further CM DC needs were discussed or identified at this time.

## 2024-01-17 NOTE — DISCHARGE INSTR - AVS FIRST PAGE
Dear Kyra Christianson,     It was our pleasure to care for you here at [facility].  It is our hope that we were always able to exceed the expected standards for your care during your stay.  You were hospitalized due to diarrhea.  You were cared for on the fourth floor by Keon Cao MD under the service of Alina Pickard MD with the [facility] who covers for your primary care physician (PCP), Jackie Berger MD, while you were hospitalized.  If you have any questions or concerns related to this hospitalization, you may contact us at .  For follow up as well as any medication refills, we recommend that you follow up with your primary care physician.  A registered nurse will reach out to you by phone within a few days after your discharge to answer any additional questions that you may have after going home.  However, at this time we provide for you here, the most important instructions / recommendations at discharge:     Notable Medication Adjustments -   Start taking colchicine 0.6 mg twice daily for 5 days.  Start using Voltaren gel 4 times daily on the left wrist for wrist pain.  Continue taking Tylenol 975 mg every 8 hours for left wrist pain. You can stop taking tylenol once the pain is resolved.  You can take loperamide 2 mg up to 4 times daily as needed for diarrhea.  You can take ibuprofen 400 mg every 6 hours as needed for mild pain.  Continue to wear the left wrist brace for 1-2 weeks.  Testing Required after Discharge -   None  ** Please contact your PCP to request testing orders for any of the testing recommended here **  Important follow up information -   Please follow-up with your primary care physician in 1 week.  Other Instructions -   If there were to be any increase in left wrist swelling, redness in the area, please call tell your PCP for reevaluation.   Please review this entire after visit summary as additional  general instructions including medication list, appointments, activity, diet, any pertinent wound care, and other additional recommendations from your care team that may be provided for you.      Sincerely,     Keon Cao MD\

## 2024-01-17 NOTE — PLAN OF CARE
Problem: Potential for Falls  Goal: Patient will remain free of falls  Description: INTERVENTIONS:  - Educate patient/family on patient safety including physical limitations  - Instruct patient to call for assistance with activity   - Consult OT/PT to assist with strengthening/mobility   - Keep Call bell within reach  - Keep bed low and locked with side rails adjusted as appropriate  - Keep care items and personal belongings within reach  - Initiate and maintain comfort rounds  - Make Fall Risk Sign visible to staff  - Offer Toileting every 2 Hours, in advance of need  - Initiate/Maintain bed alarm  - Obtain necessary fall risk management equipment  - Apply yellow socks and bracelet for high fall risk patients  - Consider moving patient to room near nurses station  Outcome: Progressing     Problem: Prexisting or High Potential for Compromised Skin Integrity  Goal: Skin integrity is maintained or improved  Description: INTERVENTIONS:  - Identify patients at risk for skin breakdown  - Assess and monitor skin integrity  - Assess and monitor nutrition and hydration status  - Monitor labs   - Assess for incontinence   - Turn and reposition patient  - Assist with mobility/ambulation  - Relieve pressure over bony prominences  - Avoid friction and shearing  - Provide appropriate hygiene as needed including keeping skin clean and dry  - Evaluate need for skin moisturizer/barrier cream  - Collaborate with interdisciplinary team   - Patient/family teaching  - Consider wound care consult   Outcome: Progressing     Problem: Nutrition/Hydration-ADULT  Goal: Nutrient/Hydration intake appropriate for improving, restoring or maintaining nutritional needs  Description: Monitor and assess patient's nutrition/hydration status for malnutrition. Collaborate with interdisciplinary team and initiate plan and interventions as ordered.  Monitor patient's weight and dietary intake as ordered or per policy. Utilize nutrition screening tool  and intervene as necessary. Determine patient's food preferences and provide high-protein, high-caloric foods as appropriate.     INTERVENTIONS:  - Monitor oral intake, urinary output, labs, and treatment plans  - Assess nutrition and hydration status and recommend course of action  - Evaluate amount of meals eaten  - Assist patient with eating if necessary   - Allow adequate time for meals  - Recommend/ encourage appropriate diets, oral nutritional supplements, and vitamin/mineral supplements  - Order, calculate, and assess calorie counts as needed  - Recommend, monitor, and adjust tube feedings and TPN/PPN based on assessed needs  - Assess need for intravenous fluids  - Provide specific nutrition/hydration education as appropriate  - Include patient/family/caregiver in decisions related to nutrition  Outcome: Progressing     Problem: PAIN - ADULT  Goal: Verbalizes/displays adequate comfort level or baseline comfort level  Description: Interventions:  - Encourage patient to monitor pain and request assistance  - Assess pain using appropriate pain scale  - Administer analgesics based on type and severity of pain and evaluate response  - Implement non-pharmacological measures as appropriate and evaluate response  - Consider cultural and social influences on pain and pain management  - Notify physician/advanced practitioner if interventions unsuccessful or patient reports new pain  Outcome: Progressing     Problem: INFECTION - ADULT  Goal: Absence or prevention of progression during hospitalization  Description: INTERVENTIONS:  - Assess and monitor for signs and symptoms of infection  - Monitor lab/diagnostic results  - Monitor all insertion sites, i.e. indwelling lines, tubes, and drains  - Monitor endotracheal if appropriate and nasal secretions for changes in amount and color  - [place] appropriate cooling/warming therapies per order  - Administer medications as ordered  - Instruct and encourage patient and  family to use good hand hygiene technique  - Identify and instruct in appropriate isolation precautions for identified infection/condition  Outcome: Progressing     Problem: SAFETY ADULT  Goal: Patient will remain free of falls  Description: INTERVENTIONS:  - Educate patient/family on patient safety including physical limitations  - Instruct patient to call for assistance with activity   - Consult OT/PT to assist with strengthening/mobility   - Keep Call bell within reach  - Keep bed low and locked with side rails adjusted as appropriate  - Keep care items and personal belongings within reach  - Initiate and maintain comfort rounds  - Make Fall Risk Sign visible to staff  - Offer Toileting every 2 Hours, in advance of need  - Initiate/Maintain bed alarm  - Obtain necessary fall risk management equipment  - Apply yellow socks and bracelet for high fall risk patients  - Consider moving patient to room near nurses station  1Outcome: Progressing     Problem: DISCHARGE PLANNING  Goal: Discharge to home or other facility with appropriate resources  Description: INTERVENTIONS:  - Identify barriers to discharge w/patient and caregiver  - Arrange for needed discharge resources and transportation as appropriate  - Identify discharge learning needs (meds, wound care, etc.)  - Arrange for interpretive services to assist at discharge as needed  - Refer to Case Management Department for coordinating discharge planning if the patient needs post-hospital services based on physician/advanced practitioner order or complex needs related to functional status, cognitive ability, or social support system  Outcome: Progressing     Problem: Knowledge Deficit  Goal: Patient/family/caregiver demonstrates understanding of disease process, treatment plan, medications, and discharge instructions  Description: Complete learning assessment and assess knowledge base.  Interventions:  - Provide teaching at level of understanding  - Provide teaching  via preferred learning methods  Outcome: Progressing

## 2024-01-19 LAB — BACTERIA BLD CULT: NORMAL

## 2024-01-21 ENCOUNTER — NURSING HOME VISIT (OUTPATIENT)
Dept: GERIATRICS | Facility: OTHER | Age: 89
End: 2024-01-21
Payer: MEDICARE

## 2024-01-21 VITALS
RESPIRATION RATE: 18 BRPM | WEIGHT: 145 LBS | HEART RATE: 76 BPM | BODY MASS INDEX: 27.4 KG/M2 | SYSTOLIC BLOOD PRESSURE: 146 MMHG | TEMPERATURE: 97.6 F | DIASTOLIC BLOOD PRESSURE: 74 MMHG | OXYGEN SATURATION: 94 %

## 2024-01-21 DIAGNOSIS — G30.1 MILD LATE ONSET ALZHEIMER'S DEMENTIA WITHOUT BEHAVIORAL DISTURBANCE, PSYCHOTIC DISTURBANCE, MOOD DISTURBANCE, OR ANXIETY (HCC): ICD-10-CM

## 2024-01-21 DIAGNOSIS — R19.7 DIARRHEA, UNSPECIFIED TYPE: Primary | ICD-10-CM

## 2024-01-21 DIAGNOSIS — I48.19 PERSISTENT ATRIAL FIBRILLATION (HCC): ICD-10-CM

## 2024-01-21 DIAGNOSIS — R26.2 AMBULATORY DYSFUNCTION: ICD-10-CM

## 2024-01-21 DIAGNOSIS — F02.A0 MILD LATE ONSET ALZHEIMER'S DEMENTIA WITHOUT BEHAVIORAL DISTURBANCE, PSYCHOTIC DISTURBANCE, MOOD DISTURBANCE, OR ANXIETY (HCC): ICD-10-CM

## 2024-01-21 DIAGNOSIS — M25.432 PAIN AND SWELLING OF WRIST, LEFT: ICD-10-CM

## 2024-01-21 DIAGNOSIS — I10 ESSENTIAL HYPERTENSION: ICD-10-CM

## 2024-01-21 DIAGNOSIS — M25.532 PAIN AND SWELLING OF WRIST, LEFT: ICD-10-CM

## 2024-01-21 DIAGNOSIS — I50.32 CHRONIC DIASTOLIC HEART FAILURE (HCC): Chronic | ICD-10-CM

## 2024-01-21 PROCEDURE — 99310 SBSQ NF CARE HIGH MDM 45: CPT

## 2024-01-21 NOTE — ASSESSMENT & PLAN NOTE
Multifactorial  Age related, multiple comorbidities, recurrent falls  5 falls within past 6 months  Continue fall precautions  Continue PT/OT as needed  Encourage hydration/nutrition  Continue support 24/7 from LTC

## 2024-01-21 NOTE — ASSESSMENT & PLAN NOTE
At hospital admission, 1/14/24, daughter noted new swelling of L wrist  No falls reported  Hx of recurrent falls  Upon exam in hospital, wrist red, swollen, pain with palpation  Xrays did not show fracture or any abnormalities  Possible worsening arthritis  Uric acid level 6.6 (1/15/24) wnl  Colchicine prescribed for 5 days to be completed 1/23/24  No L wrist swellling, redness or pain noted today upon exam
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
## 2024-01-21 NOTE — ASSESSMENT & PLAN NOTE
Taken [facility], d/t diarrhea  Loss of appetite  Pt's daughter reports 3-4 loose stools  Enteric panel and C diff panel negative (1/15/23)  Given IV hydration, 1L LR  Had completed course of Augmentin last week for pneumonia  Prescribed Immodium in hospital as needed  No diarrhea noted upon exam today, will continue to monitor  Pt eating 3 meals a day

## 2024-01-21 NOTE — ASSESSMENT & PLAN NOTE
Not observed upon exam   Heart rate and rhythm regular  Continue Metoprolol 25 mg once daily  Continue ASA 81 mg once daily

## 2024-01-21 NOTE — ASSESSMENT & PLAN NOTE
Current 146/74  Continue Amlodipine 2.5 mg once daily  Monitor BP, will add 2.5 mg twice daily if high bp persists

## 2024-01-21 NOTE — ASSESSMENT & PLAN NOTE
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

## 2024-01-21 NOTE — ASSESSMENT & PLAN NOTE
Wt Readings from Last 3 Encounters:   01/21/24 65.8 kg (145 lb)   01/14/24 67.3 kg (148 lb 5.9 oz)   12/13/23 63.5 kg (140 lb)     Weights stable at this time  No edema, lung sounds clear noted on exam  Currently not on diuretic medication  Continue to monitor weights weekly

## 2024-03-28 ENCOUNTER — NURSING HOME VISIT (OUTPATIENT)
Dept: GERIATRICS | Facility: OTHER | Age: 89
End: 2024-03-28
Payer: MEDICARE

## 2024-03-28 DIAGNOSIS — I10 ESSENTIAL HYPERTENSION: ICD-10-CM

## 2024-03-28 DIAGNOSIS — I50.32 CHRONIC DIASTOLIC HEART FAILURE (HCC): Chronic | ICD-10-CM

## 2024-03-28 DIAGNOSIS — E03.9 ACQUIRED HYPOTHYROIDISM: ICD-10-CM

## 2024-03-28 DIAGNOSIS — F02.A0 MILD LATE ONSET ALZHEIMER'S DEMENTIA WITHOUT BEHAVIORAL DISTURBANCE, PSYCHOTIC DISTURBANCE, MOOD DISTURBANCE, OR ANXIETY (HCC): ICD-10-CM

## 2024-03-28 DIAGNOSIS — G30.1 MILD LATE ONSET ALZHEIMER'S DEMENTIA WITHOUT BEHAVIORAL DISTURBANCE, PSYCHOTIC DISTURBANCE, MOOD DISTURBANCE, OR ANXIETY (HCC): ICD-10-CM

## 2024-03-28 DIAGNOSIS — R26.2 AMBULATORY DYSFUNCTION: Primary | ICD-10-CM

## 2024-03-28 DIAGNOSIS — F41.9 ANXIETY: ICD-10-CM

## 2024-03-28 PROCEDURE — 99309 SBSQ NF CARE MODERATE MDM 30: CPT | Performed by: FAMILY MEDICINE

## 2024-03-28 NOTE — PROGRESS NOTES
[facility]  [address] [address]  [phone]  [facility]  POS 32      NAME: Kyra Christianson  AGE: 89 y.o. SEX: female [id number]    DATE OF ENCOUNTER: 3/29/2024    Assessment and Plan     1. Ambulatory dysfunction  - uses walker  - Fall precautions in place  - denies recent falls    2. Acquired hypothyroidism  - cont Levothyroxine 150 mcg po qm Mon-Fri  - monitor TSH 0.8 (1/14/24)  - TSH ordered    3. Anxiety  - stable  - cont Escitalopram 5 mg po qd    4. Chronic diastolic heart failure (HCC)  - controlled  - monitor weights  - cont Metoprolol succinate 25 mg po qd    5. Essential hypertension/A. Fib  - controlled  - cont ASA 81 mg po qd  - cont Amlodipine 2.5 mg po qd  - cont Metoprolol succinate 25 mg po qd    6. 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 behavioral disturbance, psychotic disturbance, mood disturbance, or anxiety (HCC)  - AAOx 3  - need mod assistance with ADLs      - Counseling Documentation: patient was counseled regarding: risk factor reductions    Chief Complaint     Routine Long term follow-up visit.    History of Present Illness     HPI  Kyra Christianson, a 89 y/o female is a long term resident at [facility], seen and examined at bedside, stable. She is able to give good history, able to answer questions well. She is all dressed and waiting for her daughter. Her apettite and sleep is good. She denies any pain. She uses walker, denies any falls.   Staff have no concerns at this time. She has progressive weight loss, Dietician is following.    The following portions of the patient's history were reviewed and updated as appropriate: allergies, current medications, past family history, past medical history, past social history, past surgical history and problem list.    Review of Systems     Review of Systems   Constitutional:  Negative for activity change, fatigue and fever.   HENT:  Positive for hearing loss. Negative for dental problem and trouble swallowing.    Eyes:   Negative for photophobia and visual disturbance.   Respiratory:  Negative for cough and shortness of breath.    Cardiovascular:  Negative for chest pain, palpitations and leg swelling.   Gastrointestinal:  Negative for abdominal pain, constipation, diarrhea, nausea and vomiting.   Genitourinary:  Negative for difficulty urinating and dysuria.   Musculoskeletal:  Positive for gait problem. Negative for arthralgias.   Neurological:  Negative for dizziness, weakness and headaches.   All other systems reviewed and are negative.  As in HPI.    Active Problem List     Patient Active Problem List   Diagnosis   • Osteoporosis   • Essential hypertension   • GERD (gastroesophageal reflux disease)   • Persistent atrial fibrillation (HCC)   • S/P left mastectomy   • History of left breast cancer   • Aneurysm of ascending aorta (HCC)   • Constipation   • Dyslipidemia   • First degree AV block   • Acquired hypothyroidism   • Macular degeneration   • Vertigo   • Vitamin D deficiency   • Benign paroxysmal vertigo   • H. pylori infection   • Hemorrhoids   • Advance directive in chart   • Encounter for follow-up examination after completed treatment for malignant neoplasm   • Ambulatory dysfunction   • Chronic diastolic heart failure (HCC)   • Hiatal hernia   • Vitamin B12 deficiency   • Localized edema   • Stage 2 chronic kidney disease   • Interstitial cystitis   • Anxiety   • Pre-syncope   • Hypomagnesemia   • Hypokalemia   • Primary osteoarthritis of left knee   • Seasonal allergic rhinitis due to pollen   • Diarrhea   • Unwitnessed fall   • T12 compression fracture ([facility])   • Moderate protein-calorie malnutrition (HCC)   • Chronic pain syndrome   • 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 behavioral disturbance, psychotic disturbance, mood disturbance, or anxiety ([facility])   • Candidal intertrigo   • Fall at nursing home   • Acute midline low back pain without sciatica   • SOB (shortness of breath)   • Pain and swelling of wrist,  left   • Oral thrush       Objective     Vitals: T: 97.5, P: 69, R: 16, BP: 145/82, 98% on RA, Wt: 130 lbs    Physical Exam  Vitals and nursing note reviewed.   Constitutional:       General: She is not in acute distress.     Appearance: Normal appearance. She is well-developed. She is not diaphoretic.   HENT:      Head: Normocephalic and atraumatic.      Nose: Nose normal.      Mouth/Throat:      Mouth: Mucous membranes are moist.      Pharynx: Oropharynx is clear. No oropharyngeal exudate.   Eyes:      General: No scleral icterus.        Right eye: No discharge.         Left eye: No discharge.      Extraocular Movements: Extraocular movements intact.      Conjunctiva/sclera: Conjunctivae normal.   Cardiovascular:      Rate and Rhythm: Normal rate and regular rhythm.      Heart sounds: Normal heart sounds. No murmur heard.  Pulmonary:      Effort: Pulmonary effort is normal. No respiratory distress.      Breath sounds: Normal breath sounds. No wheezing.   Chest:      Chest wall: No tenderness.   Abdominal:      General: Bowel sounds are normal.      Palpations: Abdomen is soft.      Tenderness: There is no abdominal tenderness. There is no guarding or rebound.   Musculoskeletal:         General: No tenderness or deformity. Normal range of motion.      Cervical back: Normal range of motion and neck supple.   Skin:     General: Skin is warm and dry.   Neurological:      Mental Status: She is alert.      Cranial Nerves: No cranial nerve deficit.      Comments: Oriented to person and place, says it is Easter time  Verbal, clear speech  Able to follow commands     Psychiatric:         Mood and Affect: Mood normal.         Behavior: Behavior normal.         Pertinent Laboratory/Diagnostic Studies:  Refer to facility chart.    Current Medications   Medications reviewed and updated in facility chart.

## 2024-03-31 ENCOUNTER — TELEPHONE (OUTPATIENT)
Dept: OTHER | Facility: OTHER | Age: 89
End: 2024-03-31

## 2024-03-31 NOTE — TELEPHONE ENCOUNTER
"Nanette GOLDEN from  [facility] stated, \"  She has fallen twice today at 10:45 and 10 mins ago.  She is alert to self she is not complaining of pain. Can the doctor  order a UAC or whatever the doctor prefers.\"    Paged on call VIA TT  "

## 2024-05-02 ENCOUNTER — HOSPITAL ENCOUNTER (EMERGENCY)
Facility: HOSPITAL | Age: 89
Discharge: HOME | End: 2024-05-02
Attending: EMERGENCY MEDICINE
Payer: MEDICARE

## 2024-05-02 ENCOUNTER — APPOINTMENT (EMERGENCY)
Dept: RADIOLOGY | Facility: HOSPITAL | Age: 89
End: 2024-05-02
Payer: MEDICARE

## 2024-05-02 ENCOUNTER — TELEPHONE (OUTPATIENT)
Dept: OTHER | Facility: OTHER | Age: 89
End: 2024-05-02

## 2024-05-02 ENCOUNTER — NURSING HOME VISIT (OUTPATIENT)
Dept: GERIATRICS | Facility: OTHER | Age: 89
End: 2024-05-02
Payer: MEDICARE

## 2024-05-02 VITALS
SYSTOLIC BLOOD PRESSURE: 100 MMHG | DIASTOLIC BLOOD PRESSURE: 60 MMHG | OXYGEN SATURATION: 91 % | RESPIRATION RATE: 14 BRPM

## 2024-05-02 VITALS
RESPIRATION RATE: 16 BRPM | OXYGEN SATURATION: 95 % | WEIGHT: 135.36 LBS | BODY MASS INDEX: 25.58 KG/M2 | DIASTOLIC BLOOD PRESSURE: 68 MMHG | SYSTOLIC BLOOD PRESSURE: 145 MMHG | TEMPERATURE: 97.6 F | HEART RATE: 64 BPM

## 2024-05-02 DIAGNOSIS — R40.4 UNRESPONSIVE EPISODE: Primary | ICD-10-CM

## 2024-05-02 DIAGNOSIS — I50.32 CHRONIC DIASTOLIC HEART FAILURE (HCC): Chronic | ICD-10-CM

## 2024-05-02 DIAGNOSIS — R55 SYNCOPE: Primary | ICD-10-CM

## 2024-05-02 DIAGNOSIS — I48.19 PERSISTENT ATRIAL FIBRILLATION (HCC): ICD-10-CM

## 2024-05-02 DIAGNOSIS — N39.0 UTI (URINARY TRACT INFECTION): ICD-10-CM

## 2024-05-02 LAB
ALBUMIN SERPL BCP-MCNC: 3.1 G/DL (ref 3.5–5)
ALP SERPL-CCNC: 55 U/L (ref 34–104)
ALT SERPL W P-5'-P-CCNC: 7 U/L (ref 7–52)
ANION GAP SERPL CALCULATED.3IONS-SCNC: 7 MMOL/L (ref 4–13)
APTT PPP: 31 SECONDS (ref 23–37)
AST SERPL W P-5'-P-CCNC: 11 U/L (ref 13–39)
BACTERIA UR QL AUTO: ABNORMAL /HPF
BASOPHILS # BLD AUTO: 0.02 THOUSANDS/ÂΜL (ref 0–0.1)
BASOPHILS NFR BLD AUTO: 0 % (ref 0–1)
BILIRUB SERPL-MCNC: 0.35 MG/DL (ref 0.2–1)
BILIRUB UR QL STRIP: NEGATIVE
BUN SERPL-MCNC: 20 MG/DL (ref 5–25)
CALCIUM ALBUM COR SERPL-MCNC: 7.9 MG/DL (ref 8.3–10.1)
CALCIUM SERPL-MCNC: 7.2 MG/DL (ref 8.4–10.2)
CARDIAC TROPONIN I PNL SERPL HS: 4 NG/L
CHLORIDE SERPL-SCNC: 112 MMOL/L (ref 96–108)
CLARITY UR: CLEAR
CO2 SERPL-SCNC: 20 MMOL/L (ref 21–32)
COLOR UR: ABNORMAL
CREAT SERPL-MCNC: 0.52 MG/DL (ref 0.6–1.3)
EOSINOPHIL # BLD AUTO: 0.12 THOUSAND/ÂΜL (ref 0–0.61)
EOSINOPHIL NFR BLD AUTO: 1 % (ref 0–6)
ERYTHROCYTE [DISTWIDTH] IN BLOOD BY AUTOMATED COUNT: 14.1 % (ref 11.6–15.1)
GFR SERPL CREATININE-BSD FRML MDRD: 79 ML/MIN/1.73SQ M
GLUCOSE SERPL-MCNC: 82 MG/DL (ref 65–140)
GLUCOSE UR STRIP-MCNC: NEGATIVE MG/DL
HCT VFR BLD AUTO: 40.4 % (ref 34.8–46.1)
HGB BLD-MCNC: 13.1 G/DL (ref 11.5–15.4)
HGB UR QL STRIP.AUTO: ABNORMAL
IMM GRANULOCYTES # BLD AUTO: 0.04 THOUSAND/UL (ref 0–0.2)
IMM GRANULOCYTES NFR BLD AUTO: 0 % (ref 0–2)
INR PPP: 1.05 (ref 0.84–1.19)
KETONES UR STRIP-MCNC: NEGATIVE MG/DL
LEUKOCYTE ESTERASE UR QL STRIP: ABNORMAL
LYMPHOCYTES # BLD AUTO: 2.35 THOUSANDS/ÂΜL (ref 0.6–4.47)
LYMPHOCYTES NFR BLD AUTO: 26 % (ref 14–44)
MAGNESIUM SERPL-MCNC: 1.3 MG/DL (ref 1.9–2.7)
MCH RBC QN AUTO: 30.8 PG (ref 26.8–34.3)
MCHC RBC AUTO-ENTMCNC: 32.4 G/DL (ref 31.4–37.4)
MCV RBC AUTO: 95 FL (ref 82–98)
MONOCYTES # BLD AUTO: 0.93 THOUSAND/ÂΜL (ref 0.17–1.22)
MONOCYTES NFR BLD AUTO: 10 % (ref 4–12)
NEUTROPHILS # BLD AUTO: 5.57 THOUSANDS/ÂΜL (ref 1.85–7.62)
NEUTS SEG NFR BLD AUTO: 63 % (ref 43–75)
NITRITE UR QL STRIP: NEGATIVE
NON-SQ EPI CELLS URNS QL MICRO: ABNORMAL /HPF
NRBC BLD AUTO-RTO: 0 /100 WBCS
PH UR STRIP.AUTO: 5.5 [PH]
PLATELET # BLD AUTO: 228 THOUSANDS/UL (ref 149–390)
PMV BLD AUTO: 10.4 FL (ref 8.9–12.7)
POTASSIUM SERPL-SCNC: 3.3 MMOL/L (ref 3.5–5.3)
PROT SERPL-MCNC: 5.6 G/DL (ref 6.4–8.4)
PROT UR STRIP-MCNC: NEGATIVE MG/DL
PROTHROMBIN TIME: 14.4 SECONDS (ref 11.6–14.5)
RBC # BLD AUTO: 4.26 MILLION/UL (ref 3.81–5.12)
RBC #/AREA URNS AUTO: ABNORMAL /HPF
SODIUM SERPL-SCNC: 139 MMOL/L (ref 135–147)
SP GR UR STRIP.AUTO: 1.01 (ref 1–1.03)
TSH SERPL DL<=0.05 MIU/L-ACNC: 2.08 UIU/ML (ref 0.45–4.5)
UROBILINOGEN UR STRIP-ACNC: <2 MG/DL
WBC # BLD AUTO: 9.03 THOUSAND/UL (ref 4.31–10.16)
WBC #/AREA URNS AUTO: ABNORMAL /HPF

## 2024-05-02 PROCEDURE — 93005 ELECTROCARDIOGRAM TRACING: CPT

## 2024-05-02 PROCEDURE — 71045 X-RAY EXAM CHEST 1 VIEW: CPT

## 2024-05-02 PROCEDURE — 87077 CULTURE AEROBIC IDENTIFY: CPT | Performed by: PHYSICIAN ASSISTANT

## 2024-05-02 PROCEDURE — 84484 ASSAY OF TROPONIN QUANT: CPT | Performed by: PHYSICIAN ASSISTANT

## 2024-05-02 PROCEDURE — 85730 THROMBOPLASTIN TIME PARTIAL: CPT | Performed by: PHYSICIAN ASSISTANT

## 2024-05-02 PROCEDURE — 81001 URINALYSIS AUTO W/SCOPE: CPT | Performed by: PHYSICIAN ASSISTANT

## 2024-05-02 PROCEDURE — 96361 HYDRATE IV INFUSION ADD-ON: CPT

## 2024-05-02 PROCEDURE — 99285 EMERGENCY DEPT VISIT HI MDM: CPT | Performed by: PHYSICIAN ASSISTANT

## 2024-05-02 PROCEDURE — 99284 EMERGENCY DEPT VISIT MOD MDM: CPT

## 2024-05-02 PROCEDURE — 36415 COLL VENOUS BLD VENIPUNCTURE: CPT | Performed by: PHYSICIAN ASSISTANT

## 2024-05-02 PROCEDURE — 96365 THER/PROPH/DIAG IV INF INIT: CPT

## 2024-05-02 PROCEDURE — 85025 COMPLETE CBC W/AUTO DIFF WBC: CPT | Performed by: PHYSICIAN ASSISTANT

## 2024-05-02 PROCEDURE — 83735 ASSAY OF MAGNESIUM: CPT | Performed by: PHYSICIAN ASSISTANT

## 2024-05-02 PROCEDURE — 84443 ASSAY THYROID STIM HORMONE: CPT | Performed by: PHYSICIAN ASSISTANT

## 2024-05-02 PROCEDURE — 80053 COMPREHEN METABOLIC PANEL: CPT | Performed by: PHYSICIAN ASSISTANT

## 2024-05-02 PROCEDURE — 87186 SC STD MICRODIL/AGAR DIL: CPT | Performed by: PHYSICIAN ASSISTANT

## 2024-05-02 PROCEDURE — 85610 PROTHROMBIN TIME: CPT | Performed by: PHYSICIAN ASSISTANT

## 2024-05-02 PROCEDURE — 99308 SBSQ NF CARE LOW MDM 20: CPT

## 2024-05-02 PROCEDURE — 87086 URINE CULTURE/COLONY COUNT: CPT | Performed by: PHYSICIAN ASSISTANT

## 2024-05-02 RX ORDER — CEPHALEXIN 500 MG/1
500 CAPSULE ORAL EVERY 6 HOURS SCHEDULED
Qty: 10 CAPSULE | Refills: 0 | Status: SHIPPED | OUTPATIENT
Start: 2024-05-02 | End: 2024-05-05

## 2024-05-02 RX ADMIN — CEFTRIAXONE SODIUM 1000 MG: 10 INJECTION, POWDER, FOR SOLUTION INTRAVENOUS at 15:03

## 2024-05-02 RX ADMIN — SODIUM CHLORIDE 500 ML: 0.9 INJECTION, SOLUTION INTRAVENOUS at 14:04

## 2024-05-02 NOTE — ED PROVIDER NOTES
"History  Chief Complaint   Patient presents with    Syncope     Arrives from [place] via EMS. Had a syncopal episode while in wheelchair. Does not recall event. Staff reporting patient \"does not appear to be at baseline,\" per EMS. GCS 14 with hx of dementia. Had x1 episode of loose stool after syncope.     This 89-year-old female presents emergency room from [facility] via EMS.  She had her hair done this afternoon.  She was sitting in the wheelchair in the elevator to go back to her room when she had a syncopal episode.  She was unresponsive for a few minutes.  She was incontinent of bowel per the EMS.  Patient states that she felt a little lightheaded prior to the onset.  She states she feels fine now.  At first she was slightly confused but is back to her normal baseline.  I spoke to her daughter who states that she has a POLST form on file at the nursing home.  She states that they called her to tell her that she had this episode of unresponsiveness and that she had a syncopal episode and they were going to send her to the emergency room.  Patient denies any history of chest pain or shortness of breath.  She denies any abdominal pain.  She denies any black tarry stools or bright red blood per rectum.  She denies any coughing, nasal congestion, postnasal drip.  She states that she feels may be slightly fatigued.  They do not describe any seizure activity.  She did not bite her tongue.  She is unsure if she was incontinent of urine with the stool.  I personally spoke to the patient's son who is a retired ER physician.  He would like her to just worked up for urinary tract infection and just electrolytes.  He does not feel that she needs a CAT scan at this time as they are not going to be aggressive with any treatment for her even if she would have a small bleed or an infarct.  Patient has a history of atrial fibrillation.  She is compliant with her metoprolol.  She used to take it twice a day but now she is " down to once daily.  She is also on Norvasc for her blood pressure.    Past medical history is positive for AAA, atrial fibrillation, arthritis, aspiration pneumonia, left breast cancer with mastectomy, chest pain, carpal tunnel syndrome, depression, dermatitis, hypothyroidism, syncopal episodes, GERD, H. pylori infection, hypertension, incomplete defecation, macular degeneration, osteoporosis, vertigo.      History provided by:  Patient  Syncope  Episode history:  Single  Most recent episode:  Today  Duration:  2 minutes  Timing:  Intermittent  Progression:  Resolved  Chronicity:  New  Context: normal activity and sitting down    Context: not blood draw, not bowel movement, not dehydration, not exertion, not inactivity, not medication change, not sight of blood, not standing up and not urination    Witnessed: yes    Relieved by:  Bed rest  Worsened by:  Nothing  Ineffective treatments:  None tried  Associated symptoms: malaise/fatigue    Associated symptoms: no anxiety, no chest pain, no confusion, no diaphoresis, no difficulty breathing, no dizziness, no fever, no focal sensory loss, no focal weakness, no headaches, no nausea, no palpitations, no recent fall, no recent injury, no recent surgery, no rectal bleeding, no seizures, no shortness of breath, no visual change, no vomiting and no weakness    Risk factors: no congenital heart disease, no coronary artery disease, no seizures and no vascular disease        Prior to Admission Medications   Prescriptions Last Dose Informant Patient Reported? Taking?   Carboxymethylcellul-Glycerin 0.5-0.9 % SOLN  Child Yes No   Sig: Apply to eye Drops to b/l eyes   Cholecalciferol (VITAMIN D-3) 1000 UNITS CAPS  Child Yes No   Sig: Take 2,000 Units by mouth daily     acetaminophen (TYLENOL) 325 mg tablet   Yes No   Sig: Take 975 mg by mouth every 8 (eight) hours   amLODIPine (NORVASC) 2.5 mg tablet   No No   Sig: Take 1 tablet (2.5 mg total) by mouth daily Do not start before  December 12, 2023.   aspirin 81 mg chewable tablet   No No   Sig: Chew 1 tablet (81 mg total) daily Do not start before June 5, 2023.   escitalopram (LEXAPRO) 5 mg tablet   No No   Sig: Take 1 tablet (5 mg total) by mouth daily at bedtime   levothyroxine (Synthroid) 150 mcg tablet   No No   Sig: Take 1 tab PO 5 days a week   loperamide (IMODIUM) 2 mg capsule   No No   Sig: Take 1 capsule (2 mg total) by mouth 4 (four) times a day as needed for diarrhea   metoprolol succinate (TOPROL-XL) 25 mg 24 hr tablet   No No   Sig: Take 1 tablet (25 mg total) by mouth daily   vitamin B-12 (VITAMIN B-12) 1,000 mcg tablet   No No   Sig: Take 2 days a week- Mon and Fri      Facility-Administered Medications: None       Past Medical History:   Diagnosis Date    AAA (abdominal aortic aneurysm) (HCC)     Acute medial meniscus tear     Arthritis 1980    Aspiration pneumonia (HCC)     LAST ASSESSED: 7/30/14    Breast CA (HCC)     left    Cancer (HCC) 2017    Chest pain     RESOLVED: 1/31/17    CTS (carpal tunnel syndrome)     Depression     Dermatitis     LAST ASSESSED: 7/25/13    Disease of thyroid gland     Fainting     LAST ASSESSED: 3/15/13    GERD (gastroesophageal reflux disease)     H. pylori infection 03/17/2009    Hypertension     Incomplete defecation     LAST ASSESSED: 7/25/13    Macular degeneration     Osteoporosis     Pneumonia     Vertigo 2012       Past Surgical History:   Procedure Laterality Date    APPENDECTOMY      CHOLECYSTECTOMY      COLONOSCOPY      MASTECTOMY Left 09/2017    SIMPLE    RI INTRAOP SENTINEL LYMPH NODE ID W/DYE INJECTION Left 9/5/2017    Procedure: INTRAOPERATIVE LYMPHATIC MAPPING; BLUE DYE ONLY; SENITNEL LYMPH NODE BIOPSY;  Surgeon: Marcelo Reddy MD;  Location: [facility];  Service: Surgical Oncology    RI MASTECTOMY SIMPLE COMPLETE Left 9/5/2017    Procedure: BREAST MASTECTOMY;  Surgeon: Marcelo Reddy MD;  Location: [facility];  Service: Surgical Oncology    SENTINEL LYMPH NODE BIOPSY      US GUIDED  BREAST BIOPSY LEFT COMPLETE Left 8/14/2017       Family History   Problem Relation Age of Onset    Angina Mother         PECTORIS    Alcohol abuse Neg Hx     Depression Neg Hx     Drug abuse Neg Hx     Substance Abuse Neg Hx      I have reviewed and agree with the history as documented.    E-Cigarette/Vaping    E-Cigarette Use Never User      E-Cigarette/Vaping Substances    Nicotine No     THC No     CBD No     Flavoring No     Other No     Unknown No      Social History     Tobacco Use    Smoking status: Never    Smokeless tobacco: Never   Vaping Use    Vaping status: Never Used   Substance Use Topics    Alcohol use: Not Currently    Drug use: No       Review of Systems   Constitutional:  Positive for activity change, fatigue and malaise/fatigue. Negative for appetite change, chills, diaphoresis and fever.   HENT:  Negative for congestion, ear discharge, ear pain, postnasal drip, rhinorrhea, sore throat and trouble swallowing.    Eyes:  Negative for pain, discharge, redness and itching.   Respiratory:  Negative for cough, chest tightness and shortness of breath.    Cardiovascular:  Positive for syncope. Negative for chest pain and palpitations.   Gastrointestinal:  Negative for abdominal pain, nausea and vomiting.   Genitourinary:  Negative for dysuria, flank pain, frequency, hematuria and urgency.   Skin:  Negative for color change, pallor and rash.   Neurological:  Negative for dizziness, focal weakness, seizures, weakness and headaches.   Psychiatric/Behavioral:  Negative for confusion.    All other systems reviewed and are negative.      Physical Exam  Physical Exam  Vitals and nursing note reviewed.   Constitutional:       General: She is not in acute distress.     Appearance: Normal appearance. She is normal weight. She is not ill-appearing, toxic-appearing or diaphoretic.   HENT:      Head: Normocephalic and atraumatic.      Right Ear: External ear normal.      Left Ear: External ear normal.      Nose: Nose  normal. No congestion or rhinorrhea.      Mouth/Throat:      Mouth: Mucous membranes are dry.      Pharynx: No oropharyngeal exudate or posterior oropharyngeal erythema.   Eyes:      Conjunctiva/sclera: Conjunctivae normal.   Neck:      Vascular: No carotid bruit.   Cardiovascular:      Rate and Rhythm: Normal rate. Rhythm irregular.      Heart sounds: Normal heart sounds.   Pulmonary:      Effort: Pulmonary effort is normal.      Breath sounds: Normal breath sounds.   Abdominal:      General: There is no distension.      Palpations: Abdomen is soft.      Tenderness: There is no abdominal tenderness. There is no guarding or rebound.   Musculoskeletal:      Cervical back: Neck supple. No rigidity or tenderness.      Right lower leg: No edema.      Left lower leg: No edema.   Lymphadenopathy:      Cervical: No cervical adenopathy.   Skin:     General: Skin is warm.      Capillary Refill: Capillary refill takes less than 2 seconds.   Neurological:      Mental Status: She is alert and oriented to person, place, and time.   Psychiatric:         Mood and Affect: Mood normal.         Behavior: Behavior normal.         Thought Content: Thought content normal.         Judgment: Judgment normal.         Vital Signs  ED Triage Vitals [05/02/24 1224]   Temperature Pulse Respirations Blood Pressure SpO2   97.6 °F (36.4 °C) 64 16 145/68 95 %      Temp Source Heart Rate Source Patient Position - Orthostatic VS BP Location FiO2 (%)   Oral Monitor Sitting Left arm --      Pain Score       No Pain           Vitals:    05/02/24 1224   BP: 145/68   Pulse: 64   Patient Position - Orthostatic VS: Sitting         Visual Acuity      ED Medications  Medications   sodium chloride 0.9 % bolus 500 mL (0 mL Intravenous Stopped 5/2/24 1503)   ceftriaxone (ROCEPHIN) 1 g/50 mL in dextrose IVPB (0 mg Intravenous Stopped 5/2/24 1533)       Diagnostic Studies  Results Reviewed       Procedure Component Value Units Date/Time    TSH, 3rd generation  with Free T4 reflex [[id number]]  (Normal) Collected: 05/02/24 1502    Lab Status: Final result Specimen: Blood from Arm, Right Updated: 05/02/24 1607     TSH 3RD GENERATON 2.084 uIU/mL     HS Troponin 0hr (reflex protocol) [[id number]]  (Normal) Collected: 05/02/24 1502    Lab Status: Final result Specimen: Blood from Arm, Right Updated: 05/02/24 1543     hs TnI 0hr 4 ng/L     Comprehensive metabolic panel [[id number]]  (Abnormal) Collected: 05/02/24 1502    Lab Status: Final result Specimen: Blood from Arm, Right Updated: 05/02/24 1529     Sodium 139 mmol/L      Potassium 3.3 mmol/L      Chloride 112 mmol/L      CO2 20 mmol/L      ANION GAP 7 mmol/L      BUN 20 mg/dL      Creatinine 0.52 mg/dL      Glucose 82 mg/dL      Calcium 7.2 mg/dL      Corrected Calcium 7.9 mg/dL      AST 11 U/L      ALT 7 U/L      Alkaline Phosphatase 55 U/L      Total Protein 5.6 g/dL      Albumin 3.1 g/dL      Total Bilirubin 0.35 mg/dL      eGFR 79 ml/min/1.73sq m     Narrative:      National Kidney Disease Foundation guidelines for Chronic Kidney Disease (CKD):     Stage 1 with normal or high GFR (GFR > 90 mL/min/1.73 square meters)    Stage 2 Mild CKD (GFR = 60-89 mL/min/1.73 square meters)    Stage 3A Moderate CKD (GFR = 45-59 mL/min/1.73 square meters)    Stage 3B Moderate CKD (GFR = 30-44 mL/min/1.73 square meters)    Stage 4 Severe CKD (GFR = 15-29 mL/min/1.73 square meters)    Stage 5 End Stage CKD (GFR <15 mL/min/1.73 square meters)  Note: GFR calculation is accurate only with a steady state creatinine    Magnesium [[id number]]  (Abnormal) Collected: 05/02/24 1502    Lab Status: Final result Specimen: Blood from Arm, Right Updated: 05/02/24 1529     Magnesium 1.3 mg/dL     Protime-INR [[id number]]  (Normal) Collected: 05/02/24 1401    Lab Status: Final result Specimen: Blood from Arm, Right Updated: 05/02/24 1436     Protime 14.4 seconds      INR 1.05    APTT [[id number]]  (Normal) Collected: 05/02/24 1401    Lab Status: Final result  Specimen: Blood from Arm, Right Updated: 05/02/24 1436     PTT 31 seconds     Urine Microscopic [[id number]]  (Abnormal) Collected: 05/02/24 1402    Lab Status: Final result Specimen: Urine, Straight Cath Updated: 05/02/24 1428     RBC, UA 2-4 /hpf      WBC, UA 10-20 /hpf      Epithelial Cells Occasional /hpf      Bacteria, UA Occasional /hpf     Urine culture [[id number]] Collected: 05/02/24 1402    Lab Status: In process Specimen: Urine, Straight Cath Updated: 05/02/24 1428    UA w Reflex to Microscopic w Reflex to Culture [[id number]]  (Abnormal) Collected: 05/02/24 1402    Lab Status: Final result Specimen: Urine, Straight Cath Updated: 05/02/24 1418     Color, UA Light Yellow     Clarity, UA Clear     Specific Gravity, UA 1.013     pH, UA 5.5     Leukocytes, UA Moderate     Nitrite, UA Negative     Protein, UA Negative mg/dl      Glucose, UA Negative mg/dl      Ketones, UA Negative mg/dl      Urobilinogen, UA <2.0 mg/dl      Bilirubin, UA Negative     Occult Blood, UA Small    CBC and differential [[id number]] Collected: 05/02/24 1401    Lab Status: Final result Specimen: Blood from Arm, Right Updated: 05/02/24 1417     WBC 9.03 Thousand/uL      RBC 4.26 Million/uL      Hemoglobin 13.1 g/dL      Hematocrit 40.4 %      MCV 95 fL      MCH 30.8 pg      MCHC 32.4 g/dL      RDW 14.1 %      MPV 10.4 fL      Platelets 228 Thousands/uL      nRBC 0 /100 WBCs      Segmented % 63 %      Immature Grans % 0 %      Lymphocytes % 26 %      Monocytes % 10 %      Eosinophils Relative 1 %      Basophils Relative 0 %      Absolute Neutrophils 5.57 Thousands/µL      Absolute Immature Grans 0.04 Thousand/uL      Absolute Lymphocytes 2.35 Thousands/µL      Absolute Monocytes 0.93 Thousand/µL      Eosinophils Absolute 0.12 Thousand/µL      Basophils Absolute 0.02 Thousands/µL                    XR chest 1 view portable   ED Interpretation by Julie Lynn Gutzweiler, PA-C (05/02 1422)   No acute cardiopulmonary disease.  Borderline  cardiomegaly                 Procedures  ECG 12 Lead Documentation Only    Date/Time: 5/2/2024 2:53 PM    Performed by: Julie Lynn Gutzweiler, PA-C  Authorized by: Julie Lynn Gutzweiler, PA-C    Indications / Diagnosis:  Syncope  ECG reviewed by me, the ED Provider: yes    Patient location:  ED  Previous ECG:     Previous ECG:  Compared to current    Comparison ECG info:  1/14/24    Similarity:  No change    Comparison to cardiac monitor: Yes    Interpretation:     Interpretation: abnormal    Rate:     ECG rate:  62    ECG rate assessment: normal    Rhythm:     Rhythm: atrial fibrillation    Ectopy:     Ectopy: PVCs    QRS:     QRS axis:  Left    QRS intervals:  Wide  Conduction:     Conduction: abnormal      Abnormal conduction: incomplete LBBB    ST segments:     ST segments:  Normal  T waves:     T waves: normal    Comments:      Poor R wave progression concerning for septal infarct cited on or before March 20, 2023.    Independently interpreted by me           ED Course  ED Course as of 05/02/24 2019   Th May 02, 2024   1422 I spoke to the patient's family her daughter and son who is a retired ER doctor.  They do not want any aggressive treatment.  They do not want her hospitalized.  I am going to medicate her with Rocephin for her positive leukocytes.  I am going to send her home with Keflex 500 mg twice daily.                               SBIRT 22yo+      Flowsheet Row Most Recent Value   Initial Alcohol Screen: US AUDIT-C     1. How often do you have a drink containing alcohol? 0 Filed at: 05/02/2024 1223   2. How many drinks containing alcohol do you have on a typical day you are drinking?  0 Filed at: 05/02/2024 1223   3a. Male UNDER 65: How often do you have five or more drinks on one occasion? 0 Filed at: 05/02/2024 1223   3b. FEMALE Any Age, or MALE 65+: How often do you have 4 or more drinks on one occassion? 0 Filed at: 05/02/2024 1223   Audit-C Score 0 Filed at: 05/02/2024 1223   KIRIT: How many  times in the past year have you...    Used an illegal drug or used a prescription medication for non-medical reasons? Never Filed at: 05/02/2024 1223                      Medical Decision Making  This 89-year-old female presents emergency room after having a syncopal episode while sitting in the wheelchair at the nursing home.  She was sent over for evaluation per the family's request.  Her daughter was at bedside with her.  Patient has no present complaints.  She does not remember passing out.  She did have some incontinence of stool per EMS.  To the patient's daughter as well as her son who is a retired ER doctor.  They were agreeable to checking some labs and a urinalysis to rule out a urinary tract infection.  They were okay with a chest x-ray and an EKG would prefer that we did not go any further with imaging.  Patient was seen and examined.  Laboratory studies were taken and were reviewed.  Patient had a urinary tract infection.  She was treated with a dose of Rocephin as well as Keflex twice a day for the next 5 days.  I do not want her admitted for her syncope or generalized weakness.  He just want their mother placed on a comfort care.  They want to keep her from being sent to the emergency room for evaluation.  They are going to treat her as needed as an outpatient.  They understand her laboratory values today.  Family is going to speak to the nursing home.    Amount and/or Complexity of Data Reviewed  Labs: ordered.     Details:   Independently interpreted by me  Radiology: ordered and independent interpretation performed.     Details: No acute cardiopulmonary disease    Risk  Prescription drug management.             Disposition  Final diagnoses:   Syncope   UTI (urinary tract infection)     Time reflects when diagnosis was documented in both MDM as applicable and the Disposition within this note       Time User Action Codes Description Comment    5/2/2024  2:24 PM Gutzweiler, Julie Add [R55] Syncope      5/2/2024  2:24 PM Gutzweiler, Julie Add [N39.0] UTI (urinary tract infection)           ED Disposition       ED Disposition   Discharge    Condition   Stable    Date/Time   Thu May 2, 2024 1424    Comment   Kyra Christianson discharge to home/self care.                   Follow-up Information       Follow up With Specialties Details Why Contact Info    Jackie Berger MD Internal Medicine Schedule an appointment as soon as possible for a visit  As needed [address] [address]  [phone]              Discharge Medication List as of 5/2/2024  2:26 PM        START taking these medications    Details   cephalexin (KEFLEX) 500 mg capsule Take 1 capsule (500 mg total) by mouth every 6 (six) hours for 10 doses, Starting Thu 5/2/2024, Until Sun 5/5/2024, Normal           CONTINUE these medications which have NOT CHANGED    Details   acetaminophen (TYLENOL) 325 mg tablet Take 975 mg by mouth every 8 (eight) hours, Historical Med      amLODIPine (NORVASC) 2.5 mg tablet Take 1 tablet (2.5 mg total) by mouth daily Do not start before December 12, 2023., Starting Tue 12/12/2023, No Print      aspirin 81 mg chewable tablet Chew 1 tablet (81 mg total) daily Do not start before June 5, 2023., Starting Mon 6/5/2023, No Print      Carboxymethylcellul-Glycerin 0.5-0.9 % SOLN Apply to eye Drops to b/l eyes, Historical Med      Cholecalciferol (VITAMIN D-3) 1000 UNITS CAPS Take 2,000 Units by mouth daily  , Starting Thu 8/20/2009, Historical Med      escitalopram (LEXAPRO) 5 mg tablet Take 1 tablet (5 mg total) by mouth daily at bedtime, Starting Wed 1/17/2024, Normal      levothyroxine (Synthroid) 150 mcg tablet Take 1 tab PO 5 days a week, No Print      loperamide (IMODIUM) 2 mg capsule Take 1 capsule (2 mg total) by mouth 4 (four) times a day as needed for diarrhea, Starting Wed 1/17/2024, No Print      metoprolol succinate (TOPROL-XL) 25 mg 24 hr tablet Take 1 tablet (25 mg total) by mouth daily,  Starting Sun 6/4/2023, No Print      vitamin B-12 (VITAMIN B-12) 1,000 mcg tablet Take 2 days a week- Mon and Fri, No Print             No discharge procedures on file.    PDMP Review       None            ED Provider  Electronically Signed by             Julie Lynn Gutzweiler, PA-C  05/02/24 2019

## 2024-05-02 NOTE — ASSESSMENT & PLAN NOTE
HR controlled, 60  Denies cp/palpitations  Continue metoprolol ER 25 mg daily  Not on AC d/t age and fall risk  Continue ASA 81 mg

## 2024-05-02 NOTE — ASSESSMENT & PLAN NOTE
Patient found unresponsive in her wheelchair  Patient did not respond to stimuli for several minutes  She was placed on oxygen, sat improved to 98%  Patient diaphoretic, BS wnl  Patients family notified, would like patient sent out for eval.
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
## 2024-05-02 NOTE — ASSESSMENT & PLAN NOTE
Wt Readings from Last 3 Encounters:   01/21/24 65.8 kg (145 lb)   01/14/24 67.3 kg (148 lb 5.9 oz)   12/13/23 63.5 kg (140 lb)     Wt Readings from Last 3 Encounters:   01/21/24 65.8 kg (145 lb)   01/14/24 67.3 kg (148 lb 5.9 oz)   12/13/23 63.5 kg (140 lb)   Continue metoprolol 25 mg daily  Weights stable   Currently not on diuretic medication  Continue to monitor weights

## 2024-05-05 LAB
ATRIAL RATE: 62 BPM
BACTERIA UR CULT: ABNORMAL
BACTERIA UR CULT: ABNORMAL
P AXIS: 61 DEGREES
PR INTERVAL: 262 MS
QRS AXIS: -53 DEGREES
QRSD INTERVAL: 112 MS
QT INTERVAL: 466 MS
QTC INTERVAL: 472 MS
T WAVE AXIS: 77 DEGREES
VENTRICULAR RATE: 62 BPM

## 2024-05-05 PROCEDURE — 93010 ELECTROCARDIOGRAM REPORT: CPT | Performed by: INTERNAL MEDICINE

## 2024-05-14 ENCOUNTER — NURSING HOME VISIT (OUTPATIENT)
Dept: GERIATRICS | Facility: OTHER | Age: 89
End: 2024-05-14
Payer: MEDICARE

## 2024-05-14 ENCOUNTER — TELEPHONE (OUTPATIENT)
Dept: OTHER | Facility: OTHER | Age: 89
End: 2024-05-14

## 2024-05-14 DIAGNOSIS — M25.531 PAIN AND SWELLING OF RIGHT WRIST: ICD-10-CM
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
## 2024-05-14 DIAGNOSIS — M25.431 PAIN AND SWELLING OF RIGHT WRIST: ICD-10-CM

## 2024-05-14 PROCEDURE — 99307 SBSQ NF CARE SF MDM 10: CPT

## 2024-05-15 VITALS
RESPIRATION RATE: 18 BRPM | HEART RATE: 66 BPM | DIASTOLIC BLOOD PRESSURE: 83 MMHG | SYSTOLIC BLOOD PRESSURE: 157 MMHG | TEMPERATURE: 98.1 F | OXYGEN SATURATION: 96 %

## 2024-05-15 PROBLEM — M25.531 PAIN AND SWELLING OF RIGHT WRIST: Status: ACTIVE | Noted: 2024-01-14

## 2024-05-15 PROBLEM — M25.431 PAIN AND SWELLING OF RIGHT WRIST: Status: ACTIVE | Noted: 2024-01-14

## 2024-05-15 NOTE — TELEPHONE ENCOUNTER
Fall this morning, complaining of Pain Right Hand, there is also Swelling and Bruising. Needs Xray Order.

## 2024-05-15 NOTE — ASSESSMENT & PLAN NOTE
Right writ/hand injured following mechanical fall  Bruising and mild swelling noted  Xray negative for fracture  Tylenol and ice for pain control  Apply ace wrap for support
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
## 2024-05-15 NOTE — ASSESSMENT & PLAN NOTE
Patient evaluated following fall in her room  Patient injured her right hand/wrist  Bruising noted on hand/wrist  No obvious signs of fracture/dislocation  Xray negative for fracture, showed moderate bony demineralization  Apply ace wrap for support  Tylenol and ice for pain control  Fall precautions  Reminded patient to use call bell for assistance

## 2024-05-17 ENCOUNTER — HOSPICE ADMISSION (OUTPATIENT)
Dept: HOME HOSPICE | Facility: HOSPICE | Age: 89
End: 2024-05-17
Payer: MEDICARE

## 2024-05-17 ENCOUNTER — HOME CARE VISIT (OUTPATIENT)
Dept: HOME HEALTH SERVICES | Facility: HOME HEALTHCARE | Age: 89
End: 2024-05-17
Payer: MEDICARE

## 2024-05-17 ENCOUNTER — TELEPHONE (OUTPATIENT)
Dept: OTHER | Facility: OTHER | Age: 89
End: 2024-05-17

## 2024-05-17 ENCOUNTER — DOCUMENTATION (OUTPATIENT)
Dept: PALLIATIVE MEDICINE | Facility: CLINIC | Age: 89
End: 2024-05-17

## 2024-05-17 VITALS
DIASTOLIC BLOOD PRESSURE: 82 MMHG | SYSTOLIC BLOOD PRESSURE: 142 MMHG | TEMPERATURE: 97.6 F | HEART RATE: 80 BPM | RESPIRATION RATE: 20 BRPM

## 2024-05-17 DIAGNOSIS — R10.9 ABDOMINAL PAIN: Primary | ICD-10-CM

## 2024-05-17 DIAGNOSIS — R06.00 DYSPNEA: ICD-10-CM

## 2024-05-17 DIAGNOSIS — R52 PAIN: ICD-10-CM

## 2024-05-17 DIAGNOSIS — Z51.5 HOSPICE CARE PATIENT: ICD-10-CM

## 2024-05-17 PROCEDURE — G0299 HHS/HOSPICE OF RN EA 15 MIN: HCPCS

## 2024-05-17 RX ORDER — MORPHINE SULFATE 100 MG/5ML
2.5 SOLUTION, CONCENTRATE ORAL EVERY 4 HOURS PRN
Qty: 30 ML | Refills: 0 | Status: SHIPPED | OUTPATIENT
Start: 2024-05-17

## 2024-05-18 NOTE — TELEPHONE ENCOUNTER
[facility] called to confirm hospice recommendations order.    On call provider paged.

## 2024-05-20 ENCOUNTER — HOME CARE VISIT (OUTPATIENT)
Dept: HOME HOSPICE | Facility: HOSPICE | Age: 89
End: 2024-05-20
Payer: MEDICARE

## 2024-05-20 ENCOUNTER — HOME CARE VISIT (OUTPATIENT)
Dept: HOME HEALTH SERVICES | Facility: HOME HEALTHCARE | Age: 89
End: 2024-05-20
Payer: MEDICARE

## 2024-05-20 VITALS — DIASTOLIC BLOOD PRESSURE: 80 MMHG | SYSTOLIC BLOOD PRESSURE: 138 MMHG | HEART RATE: 80 BPM

## 2024-05-20 PROCEDURE — G0155 HHCP-SVS OF CSW,EA 15 MIN: HCPCS

## 2024-05-20 PROCEDURE — G0156 HHCP-SVS OF AIDE,EA 15 MIN: HCPCS

## 2024-05-20 PROCEDURE — G0299 HHS/HOSPICE OF RN EA 15 MIN: HCPCS

## 2024-05-21 ENCOUNTER — HOME CARE VISIT (OUTPATIENT)
Dept: HOME HEALTH SERVICES | Facility: HOME HEALTHCARE | Age: 89
End: 2024-05-21
Payer: MEDICARE

## 2024-05-21 PROCEDURE — G0156 HHCP-SVS OF AIDE,EA 15 MIN: HCPCS

## 2024-05-24 ENCOUNTER — HOME CARE VISIT (OUTPATIENT)
Dept: HOME HOSPICE | Facility: HOSPICE | Age: 89
End: 2024-05-24
Payer: MEDICARE

## 2024-05-24 ENCOUNTER — HOME CARE VISIT (OUTPATIENT)
Dept: HOME HEALTH SERVICES | Facility: HOME HEALTHCARE | Age: 89
End: 2024-05-24
Payer: MEDICARE

## 2024-05-24 PROCEDURE — G0156 HHCP-SVS OF AIDE,EA 15 MIN: HCPCS

## 2024-05-28 ENCOUNTER — HOME CARE VISIT (OUTPATIENT)
Dept: HOME HEALTH SERVICES | Facility: HOME HEALTHCARE | Age: 89
End: 2024-05-28
Payer: MEDICARE

## 2024-05-28 VITALS — SYSTOLIC BLOOD PRESSURE: 128 MMHG | DIASTOLIC BLOOD PRESSURE: 90 MMHG | HEART RATE: 76 BPM

## 2024-05-28 PROCEDURE — G0156 HHCP-SVS OF AIDE,EA 15 MIN: HCPCS

## 2024-05-28 PROCEDURE — G0299 HHS/HOSPICE OF RN EA 15 MIN: HCPCS

## 2024-05-29 ENCOUNTER — NURSING HOME VISIT (OUTPATIENT)
Dept: GERIATRICS | Facility: OTHER | Age: 89
End: 2024-05-29
Payer: MEDICARE

## 2024-05-29 VITALS
TEMPERATURE: 97.2 F | SYSTOLIC BLOOD PRESSURE: 146 MMHG | DIASTOLIC BLOOD PRESSURE: 96 MMHG | HEART RATE: 66 BPM | OXYGEN SATURATION: 94 % | RESPIRATION RATE: 16 BRPM

## 2024-05-29 DIAGNOSIS — F02.A0 MILD LATE ONSET ALZHEIMER'S DEMENTIA WITHOUT BEHAVIORAL DISTURBANCE, PSYCHOTIC DISTURBANCE, MOOD DISTURBANCE, OR ANXIETY (HCC): ICD-10-CM

## 2024-05-29 DIAGNOSIS — I50.32 CHRONIC DIASTOLIC HEART FAILURE (HCC): Primary | Chronic | ICD-10-CM

## 2024-05-29 DIAGNOSIS — G30.1 MILD LATE ONSET ALZHEIMER'S DEMENTIA WITHOUT BEHAVIORAL DISTURBANCE, PSYCHOTIC DISTURBANCE, MOOD DISTURBANCE, OR ANXIETY (HCC): ICD-10-CM

## 2024-05-29 DIAGNOSIS — I10 ESSENTIAL HYPERTENSION: ICD-10-CM

## 2024-05-29 DIAGNOSIS — E03.9 ACQUIRED HYPOTHYROIDISM: ICD-10-CM

## 2024-05-29 PROCEDURE — 99309 SBSQ NF CARE MODERATE MDM 30: CPT
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
## 2024-05-30 ENCOUNTER — HOME CARE VISIT (OUTPATIENT)
Dept: HOME HEALTH SERVICES | Facility: HOME HEALTHCARE | Age: 89
End: 2024-05-30
Payer: MEDICARE

## 2024-05-30 PROCEDURE — G0156 HHCP-SVS OF AIDE,EA 15 MIN: HCPCS

## 2024-05-30 NOTE — ASSESSMENT & PLAN NOTE
Wt Readings from Last 3 Encounters:   05/02/24 61.4 kg (135 lb 5.8 oz)   01/21/24 65.8 kg (145 lb)   01/14/24 67.3 kg (148 lb 5.9 oz)   Continue metoprolol 25 mg daily  Weights stable   Currently not on diuretic medication  Continue to monitor weights

## 2024-05-31 ENCOUNTER — HOME CARE VISIT (OUTPATIENT)
Dept: HOME HEALTH SERVICES | Facility: HOME HEALTHCARE | Age: 89
End: 2024-05-31
Payer: MEDICARE

## 2024-05-31 PROCEDURE — G0299 HHS/HOSPICE OF RN EA 15 MIN: HCPCS

## 2024-06-03 ENCOUNTER — HOME CARE VISIT (OUTPATIENT)
Dept: HOME HEALTH SERVICES | Facility: HOME HEALTHCARE | Age: 89
End: 2024-06-03
Payer: MEDICARE

## 2024-06-03 VITALS — SYSTOLIC BLOOD PRESSURE: 125 MMHG | HEART RATE: 80 BPM | DIASTOLIC BLOOD PRESSURE: 82 MMHG

## 2024-06-03 PROCEDURE — G0156 HHCP-SVS OF AIDE,EA 15 MIN: HCPCS

## 2024-06-03 PROCEDURE — G0299 HHS/HOSPICE OF RN EA 15 MIN: HCPCS

## 2024-06-05 ENCOUNTER — HOME CARE VISIT (OUTPATIENT)
Dept: HOME HEALTH SERVICES | Facility: HOME HEALTHCARE | Age: 89
End: 2024-06-05
Payer: MEDICARE

## 2024-06-05 PROCEDURE — G0156 HHCP-SVS OF AIDE,EA 15 MIN: HCPCS

## 2024-06-07 ENCOUNTER — HOME CARE VISIT (OUTPATIENT)
Dept: HOME HEALTH SERVICES | Facility: HOME HEALTHCARE | Age: 89
End: 2024-06-07
Payer: MEDICARE

## 2024-06-07 PROCEDURE — G0156 HHCP-SVS OF AIDE,EA 15 MIN: HCPCS

## 2024-06-10 ENCOUNTER — HOME CARE VISIT (OUTPATIENT)
Dept: HOME HEALTH SERVICES | Facility: HOME HEALTHCARE | Age: 89
End: 2024-06-10
Payer: MEDICARE

## 2024-06-10 PROCEDURE — G0156 HHCP-SVS OF AIDE,EA 15 MIN: HCPCS

## 2024-06-11 ENCOUNTER — HOME CARE VISIT (OUTPATIENT)
Dept: HOME HOSPICE | Facility: HOSPICE | Age: 89
End: 2024-06-11
Payer: MEDICARE

## 2024-06-11 ENCOUNTER — HOME CARE VISIT (OUTPATIENT)
Dept: HOME HEALTH SERVICES | Facility: HOME HEALTHCARE | Age: 89
End: 2024-06-11
Payer: MEDICARE

## 2024-06-11 VITALS — SYSTOLIC BLOOD PRESSURE: 130 MMHG | DIASTOLIC BLOOD PRESSURE: 90 MMHG | HEART RATE: 84 BPM

## 2024-06-11 PROCEDURE — G0299 HHS/HOSPICE OF RN EA 15 MIN: HCPCS

## 2024-06-11 PROCEDURE — G0155 HHCP-SVS OF CSW,EA 15 MIN: HCPCS

## 2024-06-12 ENCOUNTER — HOME CARE VISIT (OUTPATIENT)
Dept: HOME HEALTH SERVICES | Facility: HOME HEALTHCARE | Age: 89
End: 2024-06-12
Payer: MEDICARE

## 2024-06-12 PROCEDURE — G0156 HHCP-SVS OF AIDE,EA 15 MIN: HCPCS

## 2024-06-14 ENCOUNTER — HOME CARE VISIT (OUTPATIENT)
Dept: HOME HEALTH SERVICES | Facility: HOME HEALTHCARE | Age: 89
End: 2024-06-14
Payer: MEDICARE

## 2024-06-14 PROCEDURE — G0299 HHS/HOSPICE OF RN EA 15 MIN: HCPCS

## 2024-06-14 PROCEDURE — G0156 HHCP-SVS OF AIDE,EA 15 MIN: HCPCS

## 2024-06-17 ENCOUNTER — HOME CARE VISIT (OUTPATIENT)
Dept: HOME HEALTH SERVICES | Facility: HOME HEALTHCARE | Age: 89
End: 2024-06-17
Payer: MEDICARE

## 2024-06-17 PROCEDURE — G0156 HHCP-SVS OF AIDE,EA 15 MIN: HCPCS

## 2024-06-19 ENCOUNTER — HOME CARE VISIT (OUTPATIENT)
Dept: HOME HEALTH SERVICES | Facility: HOME HEALTHCARE | Age: 89
End: 2024-06-19
Payer: MEDICARE

## 2024-06-19 PROCEDURE — G0156 HHCP-SVS OF AIDE,EA 15 MIN: HCPCS

## 2024-06-20 ENCOUNTER — HOME CARE VISIT (OUTPATIENT)
Dept: HOME HEALTH SERVICES | Facility: HOME HEALTHCARE | Age: 89
End: 2024-06-20
Payer: MEDICARE

## 2024-06-20 VITALS — HEART RATE: 68 BPM | SYSTOLIC BLOOD PRESSURE: 110 MMHG | DIASTOLIC BLOOD PRESSURE: 80 MMHG

## 2024-06-20 PROCEDURE — G0156 HHCP-SVS OF AIDE,EA 15 MIN: HCPCS

## 2024-06-20 PROCEDURE — G0299 HHS/HOSPICE OF RN EA 15 MIN: HCPCS

## 2024-06-24 ENCOUNTER — HOME CARE VISIT (OUTPATIENT)
Dept: HOME HEALTH SERVICES | Facility: HOME HEALTHCARE | Age: 89
End: 2024-06-24
Payer: MEDICARE

## 2024-06-24 PROBLEM — I13.0 HYPERTENSIVE HEART AND RENAL DISEASE WITH CONGESTIVE HEART FAILURE (HCC): Status: ACTIVE | Noted: 2024-06-24

## 2024-06-24 PROCEDURE — G0156 HHCP-SVS OF AIDE,EA 15 MIN: HCPCS

## 2024-06-26 ENCOUNTER — HOME CARE VISIT (OUTPATIENT)
Dept: HOME HOSPICE | Facility: HOSPICE | Age: 89
End: 2024-06-26
Payer: MEDICARE

## 2024-06-26 ENCOUNTER — HOME CARE VISIT (OUTPATIENT)
Dept: HOME HEALTH SERVICES | Facility: HOME HEALTHCARE | Age: 89
End: 2024-06-26
Payer: MEDICARE

## 2024-06-26 VITALS — DIASTOLIC BLOOD PRESSURE: 85 MMHG | HEART RATE: 86 BPM | SYSTOLIC BLOOD PRESSURE: 135 MMHG

## 2024-06-26 PROCEDURE — G0299 HHS/HOSPICE OF RN EA 15 MIN: HCPCS

## 2024-06-26 PROCEDURE — G0156 HHCP-SVS OF AIDE,EA 15 MIN: HCPCS

## 2024-06-27 ENCOUNTER — HOME CARE VISIT (OUTPATIENT)
Dept: HOME HEALTH SERVICES | Facility: HOME HEALTHCARE | Age: 89
End: 2024-06-27
Payer: MEDICARE

## 2024-06-27 PROCEDURE — G0156 HHCP-SVS OF AIDE,EA 15 MIN: HCPCS

## 2024-06-30 ENCOUNTER — HOME CARE VISIT (OUTPATIENT)
Dept: HOME HEALTH SERVICES | Facility: HOME HEALTHCARE | Age: 89
End: 2024-06-30
Payer: MEDICARE

## 2024-06-30 VITALS
SYSTOLIC BLOOD PRESSURE: 128 MMHG | HEART RATE: 66 BPM | TEMPERATURE: 98.4 F | RESPIRATION RATE: 18 BRPM | DIASTOLIC BLOOD PRESSURE: 80 MMHG

## 2024-06-30 PROCEDURE — G0299 HHS/HOSPICE OF RN EA 15 MIN: HCPCS

## 2024-07-01 ENCOUNTER — HOME CARE VISIT (OUTPATIENT)
Dept: HOME HEALTH SERVICES | Facility: HOME HEALTHCARE | Age: 89
End: 2024-07-01
Payer: MEDICARE

## 2024-07-01 PROCEDURE — G0156 HHCP-SVS OF AIDE,EA 15 MIN: HCPCS

## 2024-07-02 ENCOUNTER — HOME CARE VISIT (OUTPATIENT)
Dept: HOME HOSPICE | Facility: HOSPICE | Age: 89
End: 2024-07-02
Payer: MEDICARE

## 2024-07-02 DIAGNOSIS — H10.9 CONJUNCTIVITIS, BACTERIAL: Primary | ICD-10-CM

## 2024-07-02 RX ORDER — POLYMYXIN B SULFATE AND TRIMETHOPRIM 1; 10000 MG/ML; [USP'U]/ML
1 SOLUTION OPHTHALMIC EVERY 6 HOURS
Qty: 5 ML | Refills: 0 | Status: SHIPPED | OUTPATIENT
Start: 2024-07-02 | End: 2024-07-09

## 2024-07-03 ENCOUNTER — HOME CARE VISIT (OUTPATIENT)
Dept: HOME HEALTH SERVICES | Facility: HOME HEALTHCARE | Age: 89
End: 2024-07-03
Payer: MEDICARE

## 2024-07-03 VITALS
HEART RATE: 64 BPM | DIASTOLIC BLOOD PRESSURE: 78 MMHG | RESPIRATION RATE: 16 BRPM | SYSTOLIC BLOOD PRESSURE: 122 MMHG | TEMPERATURE: 98.1 F

## 2024-07-03 PROCEDURE — G0299 HHS/HOSPICE OF RN EA 15 MIN: HCPCS

## 2024-07-03 PROCEDURE — G0156 HHCP-SVS OF AIDE,EA 15 MIN: HCPCS

## 2024-07-05 ENCOUNTER — HOME CARE VISIT (OUTPATIENT)
Dept: HOME HEALTH SERVICES | Facility: HOME HEALTHCARE | Age: 89
End: 2024-07-05
Payer: MEDICARE

## 2024-07-05 PROCEDURE — G0156 HHCP-SVS OF AIDE,EA 15 MIN: HCPCS

## 2024-07-08 ENCOUNTER — HOME CARE VISIT (OUTPATIENT)
Dept: HOME HEALTH SERVICES | Facility: HOME HEALTHCARE | Age: 89
End: 2024-07-08
Payer: MEDICARE

## 2024-07-08 PROCEDURE — G0156 HHCP-SVS OF AIDE,EA 15 MIN: HCPCS

## 2024-07-09 ENCOUNTER — HOME CARE VISIT (OUTPATIENT)
Dept: HOME HEALTH SERVICES | Facility: HOME HEALTHCARE | Age: 89
End: 2024-07-09
Payer: MEDICARE

## 2024-07-09 VITALS
DIASTOLIC BLOOD PRESSURE: 64 MMHG | SYSTOLIC BLOOD PRESSURE: 120 MMHG | HEART RATE: 67 BPM | RESPIRATION RATE: 16 BRPM | OXYGEN SATURATION: 97 %

## 2024-07-09 PROCEDURE — G0299 HHS/HOSPICE OF RN EA 15 MIN: HCPCS

## 2024-07-10 ENCOUNTER — HOME CARE VISIT (OUTPATIENT)
Dept: HOME HEALTH SERVICES | Facility: HOME HEALTHCARE | Age: 89
End: 2024-07-10
Payer: MEDICARE

## 2024-07-10 PROCEDURE — G0156 HHCP-SVS OF AIDE,EA 15 MIN: HCPCS

## 2024-07-12 ENCOUNTER — HOME CARE VISIT (OUTPATIENT)
Dept: HOME HEALTH SERVICES | Facility: HOME HEALTHCARE | Age: 89
End: 2024-07-12
Payer: MEDICARE

## 2024-07-12 PROCEDURE — G0156 HHCP-SVS OF AIDE,EA 15 MIN: HCPCS

## 2024-07-14 ENCOUNTER — HOME CARE VISIT (OUTPATIENT)
Dept: HOME HEALTH SERVICES | Facility: HOME HEALTHCARE | Age: 89
End: 2024-07-14
Payer: MEDICARE

## 2024-07-14 VITALS
RESPIRATION RATE: 16 BRPM | DIASTOLIC BLOOD PRESSURE: 78 MMHG | HEART RATE: 74 BPM | TEMPERATURE: 98.7 F | SYSTOLIC BLOOD PRESSURE: 130 MMHG

## 2024-07-14 PROCEDURE — G0299 HHS/HOSPICE OF RN EA 15 MIN: HCPCS

## 2024-07-14 PROCEDURE — G0156 HHCP-SVS OF AIDE,EA 15 MIN: HCPCS

## 2024-07-18 ENCOUNTER — HOME CARE VISIT (OUTPATIENT)
Dept: HOME HEALTH SERVICES | Facility: HOME HEALTHCARE | Age: 89
End: 2024-07-18
Payer: MEDICARE

## 2024-07-18 PROCEDURE — G0156 HHCP-SVS OF AIDE,EA 15 MIN: HCPCS

## 2024-07-21 ENCOUNTER — HOME CARE VISIT (OUTPATIENT)
Dept: HOME HEALTH SERVICES | Facility: HOME HEALTHCARE | Age: 89
End: 2024-07-21
Payer: MEDICARE

## 2024-07-21 PROCEDURE — G0156 HHCP-SVS OF AIDE,EA 15 MIN: HCPCS

## 2024-07-22 ENCOUNTER — HOME CARE VISIT (OUTPATIENT)
Dept: HOME HEALTH SERVICES | Facility: HOME HEALTHCARE | Age: 89
End: 2024-07-22
Payer: MEDICARE

## 2024-07-22 VITALS
HEART RATE: 72 BPM | TEMPERATURE: 97.8 F | SYSTOLIC BLOOD PRESSURE: 140 MMHG | DIASTOLIC BLOOD PRESSURE: 78 MMHG | RESPIRATION RATE: 16 BRPM

## 2024-07-22 PROCEDURE — G0299 HHS/HOSPICE OF RN EA 15 MIN: HCPCS

## 2024-07-23 ENCOUNTER — HOME CARE VISIT (OUTPATIENT)
Dept: HOME HEALTH SERVICES | Facility: HOME HEALTHCARE | Age: 89
End: 2024-07-23
Payer: MEDICARE

## 2024-07-23 ENCOUNTER — HOME CARE VISIT (OUTPATIENT)
Dept: HOME HOSPICE | Facility: HOSPICE | Age: 89
End: 2024-07-23
Payer: MEDICARE

## 2024-07-23 PROCEDURE — G0156 HHCP-SVS OF AIDE,EA 15 MIN: HCPCS

## 2024-07-24 ENCOUNTER — HOME CARE VISIT (OUTPATIENT)
Dept: HOME HOSPICE | Facility: HOSPICE | Age: 89
End: 2024-07-24
Payer: MEDICARE

## 2024-07-25 ENCOUNTER — HOME CARE VISIT (OUTPATIENT)
Dept: HOME HEALTH SERVICES | Facility: HOME HEALTHCARE | Age: 89
End: 2024-07-25
Payer: MEDICARE

## 2024-07-25 PROCEDURE — G0156 HHCP-SVS OF AIDE,EA 15 MIN: HCPCS

## 2024-07-29 ENCOUNTER — HOME CARE VISIT (OUTPATIENT)
Dept: HOME HEALTH SERVICES | Facility: HOME HEALTHCARE | Age: 89
End: 2024-07-29
Payer: MEDICARE

## 2024-07-29 VITALS
RESPIRATION RATE: 16 BRPM | DIASTOLIC BLOOD PRESSURE: 78 MMHG | SYSTOLIC BLOOD PRESSURE: 118 MMHG | HEART RATE: 72 BPM | TEMPERATURE: 97.1 F

## 2024-07-29 PROCEDURE — G0156 HHCP-SVS OF AIDE,EA 15 MIN: HCPCS

## 2024-07-29 PROCEDURE — G0299 HHS/HOSPICE OF RN EA 15 MIN: HCPCS

## 2024-07-31 ENCOUNTER — HOME CARE VISIT (OUTPATIENT)
Dept: HOME HEALTH SERVICES | Facility: HOME HEALTHCARE | Age: 89
End: 2024-07-31
Payer: MEDICARE

## 2024-07-31 ENCOUNTER — HOME CARE VISIT (OUTPATIENT)
Dept: HOME HOSPICE | Facility: HOSPICE | Age: 89
End: 2024-07-31
Payer: MEDICARE

## 2024-07-31 PROCEDURE — G0155 HHCP-SVS OF CSW,EA 15 MIN: HCPCS

## 2024-07-31 PROCEDURE — G0156 HHCP-SVS OF AIDE,EA 15 MIN: HCPCS

## 2024-08-02 ENCOUNTER — HOME CARE VISIT (OUTPATIENT)
Dept: HOME HOSPICE | Facility: HOSPICE | Age: 89
End: 2024-08-02
Payer: MEDICARE

## 2024-08-02 ENCOUNTER — TELEPHONE (OUTPATIENT)
Dept: HOME HEALTH SERVICES | Facility: HOME HEALTHCARE | Age: 89
End: 2024-08-02

## 2024-08-05 ENCOUNTER — HOME CARE VISIT (OUTPATIENT)
Dept: HOME HEALTH SERVICES | Facility: HOME HEALTHCARE | Age: 89
End: 2024-08-05
Payer: MEDICARE

## 2024-08-05 PROCEDURE — G0156 HHCP-SVS OF AIDE,EA 15 MIN: HCPCS

## 2024-08-05 NOTE — ASSESSMENT & PLAN NOTE
Continue supplement  Patient with chronic afib unable to be on AC for this and seemingly minimally responsive to amiodarone. Cardiology following, no obvious underlying cause aside from hemorrhage, which is difficult to control.  - Continue digoxin per cardiology  - Digoxin 125 EOD  - F/U Trough  - Holding eliquis Patient with chronic afib unable to be on AC for this and seemingly minimally responsive to amiodarone. Cardiology following, no obvious underlying cause aside from hemorrhage, which is difficult to control. Most recent digoxin trough low at 0.7.  - Continue digoxin per cardiology  - Digoxin 125 EOD  - Holding eliquis

## 2024-08-08 ENCOUNTER — HOME CARE VISIT (OUTPATIENT)
Dept: HOME HOSPICE | Facility: HOSPICE | Age: 89
End: 2024-08-08
Payer: MEDICARE

## 2024-08-08 ENCOUNTER — HOME CARE VISIT (OUTPATIENT)
Dept: HOME HEALTH SERVICES | Facility: HOME HEALTHCARE | Age: 89
End: 2024-08-08
Payer: MEDICARE

## 2024-08-08 VITALS — HEART RATE: 84 BPM | SYSTOLIC BLOOD PRESSURE: 130 MMHG | DIASTOLIC BLOOD PRESSURE: 78 MMHG

## 2024-08-08 PROCEDURE — G0299 HHS/HOSPICE OF RN EA 15 MIN: HCPCS

## 2024-08-12 ENCOUNTER — HOME CARE VISIT (OUTPATIENT)
Dept: HOME HEALTH SERVICES | Facility: HOME HEALTHCARE | Age: 89
End: 2024-08-12
Payer: MEDICARE

## 2024-08-12 PROCEDURE — G0156 HHCP-SVS OF AIDE,EA 15 MIN: HCPCS

## 2024-08-14 ENCOUNTER — HOME CARE VISIT (OUTPATIENT)
Dept: HOME HEALTH SERVICES | Facility: HOME HEALTHCARE | Age: 89
End: 2024-08-14
Payer: MEDICARE

## 2024-08-14 PROCEDURE — G0156 HHCP-SVS OF AIDE,EA 15 MIN: HCPCS

## 2024-08-15 ENCOUNTER — HOME CARE VISIT (OUTPATIENT)
Dept: HOME HEALTH SERVICES | Facility: HOME HEALTHCARE | Age: 89
End: 2024-08-15
Payer: MEDICARE

## 2024-08-15 VITALS — SYSTOLIC BLOOD PRESSURE: 138 MMHG | HEART RATE: 68 BPM | DIASTOLIC BLOOD PRESSURE: 80 MMHG | TEMPERATURE: 97.8 F

## 2024-08-15 PROCEDURE — G0299 HHS/HOSPICE OF RN EA 15 MIN: HCPCS

## 2024-08-15 PROCEDURE — G0156 HHCP-SVS OF AIDE,EA 15 MIN: HCPCS

## 2024-08-21 ENCOUNTER — HOME CARE VISIT (OUTPATIENT)
Dept: HOME HOSPICE | Facility: HOSPICE | Age: 89
End: 2024-08-21
Payer: MEDICARE

## 2024-08-21 ENCOUNTER — HOME CARE VISIT (OUTPATIENT)
Dept: HOME HEALTH SERVICES | Facility: HOME HEALTHCARE | Age: 89
End: 2024-08-21
Payer: MEDICARE

## 2024-08-21 PROCEDURE — G0156 HHCP-SVS OF AIDE,EA 15 MIN: HCPCS

## 2024-08-22 ENCOUNTER — HOME CARE VISIT (OUTPATIENT)
Dept: HOME HOSPICE | Facility: HOSPICE | Age: 89
End: 2024-08-22
Payer: MEDICARE

## 2024-08-22 ENCOUNTER — HOME CARE VISIT (OUTPATIENT)
Dept: HOME HEALTH SERVICES | Facility: HOME HEALTHCARE | Age: 89
End: 2024-08-22
Payer: MEDICARE

## 2024-08-22 VITALS — DIASTOLIC BLOOD PRESSURE: 80 MMHG | SYSTOLIC BLOOD PRESSURE: 140 MMHG | HEART RATE: 60 BPM

## 2024-08-22 PROCEDURE — G0299 HHS/HOSPICE OF RN EA 15 MIN: HCPCS

## 2024-08-22 PROCEDURE — G0155 HHCP-SVS OF CSW,EA 15 MIN: HCPCS

## 2024-08-26 ENCOUNTER — HOME CARE VISIT (OUTPATIENT)
Dept: HOME HEALTH SERVICES | Facility: HOME HEALTHCARE | Age: 89
End: 2024-08-26
Payer: MEDICARE

## 2024-08-26 PROCEDURE — G0156 HHCP-SVS OF AIDE,EA 15 MIN: HCPCS

## 2024-08-28 ENCOUNTER — HOME CARE VISIT (OUTPATIENT)
Dept: HOME HEALTH SERVICES | Facility: HOME HEALTHCARE | Age: 89
End: 2024-08-28
Payer: MEDICARE

## 2024-08-28 ENCOUNTER — HOME CARE VISIT (OUTPATIENT)
Dept: HOME HOSPICE | Facility: HOSPICE | Age: 89
End: 2024-08-28
Payer: MEDICARE

## 2024-08-28 PROCEDURE — G0156 HHCP-SVS OF AIDE,EA 15 MIN: HCPCS

## 2024-08-30 ENCOUNTER — HOME CARE VISIT (OUTPATIENT)
Dept: HOME HEALTH SERVICES | Facility: HOME HEALTHCARE | Age: 89
End: 2024-08-30
Payer: MEDICARE

## 2024-08-30 VITALS
HEART RATE: 74 BPM | SYSTOLIC BLOOD PRESSURE: 120 MMHG | TEMPERATURE: 98.2 F | RESPIRATION RATE: 16 BRPM | DIASTOLIC BLOOD PRESSURE: 62 MMHG

## 2024-08-30 PROCEDURE — G0156 HHCP-SVS OF AIDE,EA 15 MIN: HCPCS

## 2024-08-30 PROCEDURE — G0299 HHS/HOSPICE OF RN EA 15 MIN: HCPCS

## 2024-09-03 ENCOUNTER — HOME CARE VISIT (OUTPATIENT)
Dept: HOME HEALTH SERVICES | Facility: HOME HEALTHCARE | Age: 89
End: 2024-09-03
Payer: MEDICARE

## 2024-09-03 VITALS — SYSTOLIC BLOOD PRESSURE: 140 MMHG | HEART RATE: 80 BPM | DIASTOLIC BLOOD PRESSURE: 78 MMHG

## 2024-09-03 PROCEDURE — G0299 HHS/HOSPICE OF RN EA 15 MIN: HCPCS

## 2024-09-04 ENCOUNTER — HOME CARE VISIT (OUTPATIENT)
Dept: HOME HEALTH SERVICES | Facility: HOME HEALTHCARE | Age: 89
End: 2024-09-04
Payer: MEDICARE

## 2024-09-04 PROCEDURE — G0156 HHCP-SVS OF AIDE,EA 15 MIN: HCPCS

## 2024-09-06 ENCOUNTER — HOME CARE VISIT (OUTPATIENT)
Dept: HOME HEALTH SERVICES | Facility: HOME HEALTHCARE | Age: 89
End: 2024-09-06
Payer: MEDICARE

## 2024-09-06 PROCEDURE — G0156 HHCP-SVS OF AIDE,EA 15 MIN: HCPCS

## 2024-09-09 ENCOUNTER — HOME CARE VISIT (OUTPATIENT)
Dept: HOME HEALTH SERVICES | Facility: HOME HEALTHCARE | Age: 89
End: 2024-09-09
Payer: MEDICARE

## 2024-09-09 VITALS — DIASTOLIC BLOOD PRESSURE: 100 MMHG | HEART RATE: 76 BPM | SYSTOLIC BLOOD PRESSURE: 150 MMHG

## 2024-09-09 PROCEDURE — G0299 HHS/HOSPICE OF RN EA 15 MIN: HCPCS

## 2024-09-09 PROCEDURE — G0156 HHCP-SVS OF AIDE,EA 15 MIN: HCPCS

## 2024-09-11 ENCOUNTER — HOME CARE VISIT (OUTPATIENT)
Dept: HOME HEALTH SERVICES | Facility: HOME HEALTHCARE | Age: 89
End: 2024-09-11
Payer: MEDICARE

## 2024-09-11 PROCEDURE — G0156 HHCP-SVS OF AIDE,EA 15 MIN: HCPCS

## 2024-09-13 ENCOUNTER — NURSING HOME VISIT (OUTPATIENT)
Dept: GERIATRICS | Facility: OTHER | Age: 89
End: 2024-09-13
Payer: MEDICARE

## 2024-09-13 DIAGNOSIS — I48.19 PERSISTENT ATRIAL FIBRILLATION (HCC): ICD-10-CM

## 2024-09-13 DIAGNOSIS — F41.9 ANXIETY: ICD-10-CM

## 2024-09-13 DIAGNOSIS — F02.A0 MILD LATE ONSET ALZHEIMER'S DEMENTIA WITHOUT BEHAVIORAL DISTURBANCE, PSYCHOTIC DISTURBANCE, MOOD DISTURBANCE, OR ANXIETY (HCC): Primary | ICD-10-CM

## 2024-09-13 DIAGNOSIS — E03.9 ACQUIRED HYPOTHYROIDISM: ICD-10-CM

## 2024-09-13 DIAGNOSIS — E44.0 MODERATE PROTEIN-CALORIE MALNUTRITION (HCC): ICD-10-CM

## 2024-09-13 DIAGNOSIS — G30.1 MILD LATE ONSET ALZHEIMER'S DEMENTIA WITHOUT BEHAVIORAL DISTURBANCE, PSYCHOTIC DISTURBANCE, MOOD DISTURBANCE, OR ANXIETY (HCC): Primary | ICD-10-CM

## 2024-09-13 DIAGNOSIS — R26.2 AMBULATORY DYSFUNCTION: ICD-10-CM

## 2024-09-13 PROCEDURE — 99309 SBSQ NF CARE MODERATE MDM 30: CPT | Performed by: FAMILY MEDICINE

## 2024-09-13 NOTE — PROGRESS NOTES
[facility]  [address] [address]  [phone]  [facility]  POS 32      NAME: Kyra Christianson  AGE: 89 y.o. SEX: female [id number]    DATE OF ENCOUNTER: 9/13/2024    Assessment and Plan     1. Mild - moderate late onset Alzheimer's dementia without behavioral disturbance, psychotic disturbance, mood disturbance, or anxiety (HCC)  - redirection, reorientation  - assistance with ADLs  - cont comfort measures    2. Moderate protein-calorie malnutrition (HCC)  - encourage po intake  - no dietary restrictions    3. Persistent atrial fibrillation (HCC)  - rate controlled  - not on AC    4. Ambulatory dysfunction  - Fall precautions in place  - uses walker    5. Acquired hypothyroidism  - cont Levothyroxine 150 mcg po qam  - TSH: 5.0 (5/21/24)    6. Anxiety  - cont Escitalopram 5 mg po qd      - Counseling Documentation: patient was counseled regarding: prognosis    Chief Complaint     Routine Long term follow-up visit.    History of Present Illness     HPI  Kyra Christianson, a 89 y/o female is a long term resident at [facility], seen an dexamined, stable. She is on hospice care for debility and chronic conditions. She found sitting in her bed, pleasant and able to answer questions. She have no c/o at this time. She is eating and sleeping well.   Staff have no concerns at this time.    The following portions of the patient's history were reviewed and updated as appropriate: allergies, current medications, past family history, past medical history, past social history, past surgical history and problem list.    Review of Systems     Review of Systems   Constitutional:  Negative for activity change, fatigue and fever.   HENT:  Positive for hearing loss. Negative for dental problem and trouble swallowing.    Eyes:  Negative for photophobia and visual disturbance.   Respiratory:  Negative for cough and shortness of breath.    Cardiovascular:  Negative for chest pain, palpitations and leg swelling.    Gastrointestinal:  Negative for abdominal pain, constipation, diarrhea, nausea and vomiting.   Genitourinary:  Negative for difficulty urinating and dysuria.   Musculoskeletal:  Positive for gait problem. Negative for arthralgias.   Neurological:  Negative for dizziness, weakness and headaches.   As in HPI.    Active Problem List     Patient Active Problem List   Diagnosis    Osteoporosis    Essential hypertension    GERD (gastroesophageal reflux disease)    Persistent atrial fibrillation (HCC)    S/P left mastectomy    History of left breast cancer    Aneurysm of ascending aorta (HCC)    Constipation    Dyslipidemia    First degree AV block    Acquired hypothyroidism    Macular degeneration    Vertigo    Vitamin D deficiency    Benign paroxysmal vertigo    H. pylori infection    Hemorrhoids    Advance directive in chart    Encounter for follow-up examination after completed treatment for malignant neoplasm    Ambulatory dysfunction    Chronic diastolic heart failure (HCC)    Hiatal hernia    Vitamin B12 deficiency    Localized edema    Stage 2 chronic kidney disease    Interstitial cystitis    Anxiety    Pre-syncope    Hypomagnesemia    Hypokalemia    Primary osteoarthritis of left knee    Seasonal allergic rhinitis due to pollen    Diarrhea    Fall    T12 compression fracture (HCC)    Moderate protein-calorie malnutrition (HCC)    Chronic pain syndrome    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 behavioral disturbance, psychotic disturbance, mood disturbance, or anxiety (HCC)    Candidal intertrigo    Fall at nursing home    Acute midline low back pain without sciatica    SOB (shortness of breath)    Pain and swelling of right wrist    Oral thrush    Unresponsive episode    Hypertensive heart and renal disease with congestive heart failure (HCC)       Objective     Vitals:no recent vitals available    Physical Exam  Vitals and nursing note reviewed.   Constitutional:       General: She is not in acute  distress.     Appearance: Normal appearance. She is well-developed. She is not diaphoretic.   HENT:      Head: Normocephalic and atraumatic.      Nose: Nose normal.      Mouth/Throat:      Mouth: Mucous membranes are moist.      Pharynx: Oropharynx is clear. No oropharyngeal exudate.   Eyes:      General: No scleral icterus.        Right eye: No discharge.         Left eye: No discharge.      Extraocular Movements: Extraocular movements intact.      Conjunctiva/sclera: Conjunctivae normal.   Cardiovascular:      Rate and Rhythm: Normal rate and regular rhythm.      Heart sounds: Normal heart sounds. No murmur heard.  Pulmonary:      Effort: Pulmonary effort is normal. No respiratory distress.      Breath sounds: Normal breath sounds. No wheezing.   Chest:      Chest wall: No tenderness.   Abdominal:      General: Bowel sounds are normal.      Palpations: Abdomen is soft.      Tenderness: There is no abdominal tenderness. There is no guarding or rebound.   Musculoskeletal:         General: No tenderness or deformity. Normal range of motion.      Cervical back: Normal range of motion and neck supple.      Right lower leg: No edema.      Left lower leg: No edema.   Skin:     General: Skin is warm and dry.   Neurological:      Mental Status: She is alert.      Cranial Nerves: No cranial nerve deficit.      Comments: Oriented to person and place  Verbal  Able to follow commands   Psychiatric:         Mood and Affect: Mood normal.         Behavior: Behavior normal.         Pertinent Laboratory/Diagnostic Studies:  Refer to facility chart.    Current Medications   Medications reviewed and updated in facility chart.

## 2024-09-15 ENCOUNTER — HOME CARE VISIT (OUTPATIENT)
Dept: HOME HEALTH SERVICES | Facility: HOME HEALTHCARE | Age: 89
End: 2024-09-15
Payer: MEDICARE

## 2024-09-15 PROCEDURE — G0299 HHS/HOSPICE OF RN EA 15 MIN: HCPCS

## 2024-09-16 VITALS — HEART RATE: 68 BPM | RESPIRATION RATE: 18 BRPM | DIASTOLIC BLOOD PRESSURE: 90 MMHG | SYSTOLIC BLOOD PRESSURE: 140 MMHG

## 2024-09-17 ENCOUNTER — HOME CARE VISIT (OUTPATIENT)
Dept: HOME HEALTH SERVICES | Facility: HOME HEALTHCARE | Age: 89
End: 2024-09-17
Payer: MEDICARE

## 2024-09-17 ENCOUNTER — HOME CARE VISIT (OUTPATIENT)
Dept: HOME HOSPICE | Facility: HOSPICE | Age: 89
End: 2024-09-17
Payer: MEDICARE

## 2024-09-17 PROCEDURE — G0156 HHCP-SVS OF AIDE,EA 15 MIN: HCPCS

## 2024-09-18 ENCOUNTER — HOME CARE VISIT (OUTPATIENT)
Dept: HOME HEALTH SERVICES | Facility: HOME HEALTHCARE | Age: 89
End: 2024-09-18
Payer: MEDICARE

## 2024-09-18 PROCEDURE — G0156 HHCP-SVS OF AIDE,EA 15 MIN: HCPCS

## 2024-09-19 ENCOUNTER — HOME CARE VISIT (OUTPATIENT)
Dept: HOME HEALTH SERVICES | Facility: HOME HEALTHCARE | Age: 89
End: 2024-09-19
Payer: MEDICARE

## 2024-09-19 PROCEDURE — G0156 HHCP-SVS OF AIDE,EA 15 MIN: HCPCS

## 2024-09-23 ENCOUNTER — HOME CARE VISIT (OUTPATIENT)
Dept: HOME HEALTH SERVICES | Facility: HOME HEALTHCARE | Age: 89
End: 2024-09-23
Payer: MEDICARE

## 2024-09-23 ENCOUNTER — HOME CARE VISIT (OUTPATIENT)
Dept: HOME HOSPICE | Facility: HOSPICE | Age: 89
End: 2024-09-23
Payer: MEDICARE

## 2024-09-23 VITALS
SYSTOLIC BLOOD PRESSURE: 138 MMHG | TEMPERATURE: 97.7 F | RESPIRATION RATE: 16 BRPM | DIASTOLIC BLOOD PRESSURE: 92 MMHG | HEART RATE: 80 BPM

## 2024-09-23 PROCEDURE — G0299 HHS/HOSPICE OF RN EA 15 MIN: HCPCS

## 2024-09-24 ENCOUNTER — HOME CARE VISIT (OUTPATIENT)
Dept: HOME HEALTH SERVICES | Facility: HOME HEALTHCARE | Age: 89
End: 2024-09-24
Payer: MEDICARE

## 2024-09-24 PROCEDURE — G0156 HHCP-SVS OF AIDE,EA 15 MIN: HCPCS

## 2024-09-25 ENCOUNTER — HOME CARE VISIT (OUTPATIENT)
Dept: HOME HEALTH SERVICES | Facility: HOME HEALTHCARE | Age: 89
End: 2024-09-25
Payer: MEDICARE

## 2024-09-25 PROCEDURE — G0156 HHCP-SVS OF AIDE,EA 15 MIN: HCPCS

## 2024-09-26 ENCOUNTER — HOME CARE VISIT (OUTPATIENT)
Dept: HOME HOSPICE | Facility: HOSPICE | Age: 89
End: 2024-09-26
Payer: MEDICARE

## 2024-09-26 PROCEDURE — G0155 HHCP-SVS OF CSW,EA 15 MIN: HCPCS

## 2024-09-27 ENCOUNTER — HOME CARE VISIT (OUTPATIENT)
Dept: HOME HEALTH SERVICES | Facility: HOME HEALTHCARE | Age: 89
End: 2024-09-27
Payer: MEDICARE

## 2024-09-27 PROCEDURE — G0156 HHCP-SVS OF AIDE,EA 15 MIN: HCPCS

## 2024-09-30 ENCOUNTER — HOME CARE VISIT (OUTPATIENT)
Dept: HOME HEALTH SERVICES | Facility: HOME HEALTHCARE | Age: 89
End: 2024-09-30
Payer: MEDICARE

## 2024-09-30 VITALS — DIASTOLIC BLOOD PRESSURE: 90 MMHG | SYSTOLIC BLOOD PRESSURE: 158 MMHG | HEART RATE: 76 BPM

## 2024-09-30 PROCEDURE — G0299 HHS/HOSPICE OF RN EA 15 MIN: HCPCS

## 2024-10-02 ENCOUNTER — HOME CARE VISIT (OUTPATIENT)
Dept: HOME HEALTH SERVICES | Facility: HOME HEALTHCARE | Age: 89
End: 2024-10-02
Payer: MEDICARE

## 2024-10-02 PROCEDURE — G0156 HHCP-SVS OF AIDE,EA 15 MIN: HCPCS

## 2024-10-04 ENCOUNTER — HOME CARE VISIT (OUTPATIENT)
Dept: HOME HEALTH SERVICES | Facility: HOME HEALTHCARE | Age: 89
End: 2024-10-04
Payer: MEDICARE

## 2024-10-04 PROCEDURE — G0156 HHCP-SVS OF AIDE,EA 15 MIN: HCPCS

## 2024-10-07 ENCOUNTER — HOME CARE VISIT (OUTPATIENT)
Dept: HOME HEALTH SERVICES | Facility: HOME HEALTHCARE | Age: 89
End: 2024-10-07
Payer: MEDICARE

## 2024-10-07 PROCEDURE — G0156 HHCP-SVS OF AIDE,EA 15 MIN: HCPCS

## 2024-10-09 ENCOUNTER — TELEPHONE (OUTPATIENT)
Age: 89
End: 2024-10-09

## 2024-10-09 NOTE — TELEPHONE ENCOUNTER
Pt daughter called and they would like a script sent in for bras and prothesis to [facility] in [place]. Daughter stated even though the pt is in a nursing home she still gets ready everyday and she needs new bras and inserts.    Please call daughter back once script was sent in

## 2024-10-10 DIAGNOSIS — Z90.12 S/P LEFT MASTECTOMY: Primary | ICD-10-CM

## 2024-10-10 DIAGNOSIS — Z85.3 HISTORY OF LEFT BREAST CANCER: ICD-10-CM

## 2024-10-11 ENCOUNTER — TELEPHONE (OUTPATIENT)
Age: 89
End: 2024-10-11

## 2024-10-11 ENCOUNTER — HOME CARE VISIT (OUTPATIENT)
Dept: HOME HEALTH SERVICES | Facility: HOME HEALTHCARE | Age: 89
End: 2024-10-11
Payer: MEDICARE

## 2024-10-11 VITALS
DIASTOLIC BLOOD PRESSURE: 80 MMHG | TEMPERATURE: 98 F | RESPIRATION RATE: 14 BRPM | SYSTOLIC BLOOD PRESSURE: 142 MMHG | HEART RATE: 84 BPM

## 2024-10-11 PROCEDURE — G0299 HHS/HOSPICE OF RN EA 15 MIN: HCPCS

## 2024-10-11 NOTE — TELEPHONE ENCOUNTER
Strala from [facility] called they received the order but its 5pgs. She stated per medicare they need 1 script for each thing.  1 script for prothesis quantity #1 with ICD 10 code  1 script for mastectomy bras quantity #6 with ICD 10 code    Please fax them to [phone]    Callback is [phone]

## 2024-10-12 ENCOUNTER — HOME CARE VISIT (OUTPATIENT)
Dept: HOME HEALTH SERVICES | Facility: HOME HEALTHCARE | Age: 89
End: 2024-10-12
Payer: MEDICARE

## 2024-10-12 PROCEDURE — G0156 HHCP-SVS OF AIDE,EA 15 MIN: HCPCS

## 2024-10-14 ENCOUNTER — HOME CARE VISIT (OUTPATIENT)
Dept: HOME HEALTH SERVICES | Facility: HOME HEALTHCARE | Age: 89
End: 2024-10-14
Payer: MEDICARE

## 2024-10-14 ENCOUNTER — HOME CARE VISIT (OUTPATIENT)
Dept: HOME HOSPICE | Facility: HOSPICE | Age: 89
End: 2024-10-14
Payer: MEDICARE

## 2024-10-14 PROCEDURE — G0156 HHCP-SVS OF AIDE,EA 15 MIN: HCPCS

## 2024-10-15 ENCOUNTER — HOME CARE VISIT (OUTPATIENT)
Dept: HOME HEALTH SERVICES | Facility: HOME HEALTHCARE | Age: 89
End: 2024-10-15
Payer: MEDICARE

## 2024-10-15 VITALS — SYSTOLIC BLOOD PRESSURE: 138 MMHG | DIASTOLIC BLOOD PRESSURE: 88 MMHG | HEART RATE: 70 BPM | RESPIRATION RATE: 14 BRPM

## 2024-10-15 DIAGNOSIS — Z51.5 HOSPICE CARE PATIENT: Primary | ICD-10-CM

## 2024-10-15 DIAGNOSIS — I13.0 HYPERTENSIVE HEART AND RENAL DISEASE WITH CONGESTIVE HEART FAILURE (HCC): ICD-10-CM

## 2024-10-15 PROCEDURE — G0299 HHS/HOSPICE OF RN EA 15 MIN: HCPCS

## 2024-10-15 RX ORDER — MORPHINE SULFATE 100 MG/5ML
2.5 SOLUTION, CONCENTRATE ORAL EVERY 4 HOURS PRN
Qty: 30 ML | Refills: 0 | Status: SHIPPED | OUTPATIENT
Start: 2024-10-15

## 2024-10-15 NOTE — TELEPHONE ENCOUNTER
10/15/2024 2:11 PM  [facility] facility patient requests refill of CII medication.  Filled electronically via Epic as per [place] State Law.    Requested Prescriptions     Signed Prescriptions Disp Refills    Morphine Sulfate, Concentrate, 20 mg/mL concentrated solution 30 mL 0     Sig: Take 0.13 mL (2.6 mg total) by mouth every 4 (four) hours as needed (pain / SOB) Max Daily Amount: 15.6 mg     Authorizing Provider: GABRIELA PRIETO CRNP  [facility]  Hospice Answering Service: [phone]  You can find me on [employer]!

## 2024-10-16 ENCOUNTER — HOME CARE VISIT (OUTPATIENT)
Dept: HOME HEALTH SERVICES | Facility: HOME HEALTHCARE | Age: 89
End: 2024-10-16
Payer: MEDICARE

## 2024-10-16 ENCOUNTER — HOME CARE VISIT (OUTPATIENT)
Dept: HOME HOSPICE | Facility: HOSPICE | Age: 89
End: 2024-10-16
Payer: MEDICARE

## 2024-10-16 PROCEDURE — G0156 HHCP-SVS OF AIDE,EA 15 MIN: HCPCS

## 2024-10-16 PROCEDURE — G0155 HHCP-SVS OF CSW,EA 15 MIN: HCPCS

## 2024-10-17 ENCOUNTER — HOME CARE VISIT (OUTPATIENT)
Dept: HOME HOSPICE | Facility: HOSPICE | Age: 89
End: 2024-10-17
Payer: MEDICARE

## 2024-10-18 ENCOUNTER — TELEPHONE (OUTPATIENT)
Age: 89
End: 2024-10-18

## 2024-10-18 DIAGNOSIS — Z85.3 HISTORY OF LEFT BREAST CANCER: Primary | ICD-10-CM

## 2024-10-18 DIAGNOSIS — Z90.12 S/P LEFT MASTECTOMY: ICD-10-CM

## 2024-10-18 NOTE — TELEPHONE ENCOUNTER
[facility] for breast prosthesis qt 1 with dx. Faxed to [phone]

## 2024-10-18 NOTE — TELEPHONE ENCOUNTER
Patient's prothesis prescription and most recent office note faxed to [facility] as requested. Fax confirmation received.

## 2024-10-21 ENCOUNTER — HOME CARE VISIT (OUTPATIENT)
Dept: HOME HOSPICE | Facility: HOSPICE | Age: 89
End: 2024-10-21
Payer: MEDICARE

## 2024-10-21 ENCOUNTER — HOME CARE VISIT (OUTPATIENT)
Dept: HOME HEALTH SERVICES | Facility: HOME HEALTHCARE | Age: 89
End: 2024-10-21
Payer: MEDICARE

## 2024-10-21 PROCEDURE — G0156 HHCP-SVS OF AIDE,EA 15 MIN: HCPCS

## 2024-10-21 NOTE — TELEPHONE ENCOUNTER
Received call from Lisbeth at [facility], requesting order for 3 mastectomy bras for patient. Orders can be faxed to [phone].    Additionally, she will be sending another CMN to be signed by Edouard.

## 2024-10-22 ENCOUNTER — HOME CARE VISIT (OUTPATIENT)
Dept: HOME HOSPICE | Facility: HOSPICE | Age: 89
End: 2024-10-22
Payer: MEDICARE

## 2024-10-22 ENCOUNTER — HOME CARE VISIT (OUTPATIENT)
Dept: HOME HEALTH SERVICES | Facility: HOME HEALTHCARE | Age: 89
End: 2024-10-22
Payer: MEDICARE

## 2024-10-22 VITALS — HEART RATE: 80 BPM | RESPIRATION RATE: 16 BRPM | DIASTOLIC BLOOD PRESSURE: 80 MMHG | SYSTOLIC BLOOD PRESSURE: 132 MMHG

## 2024-10-22 DIAGNOSIS — R09.81 CONGESTION OF NASAL SINUS: Primary | ICD-10-CM

## 2024-10-22 DIAGNOSIS — Z51.5 HOSPICE CARE PATIENT: ICD-10-CM

## 2024-10-22 PROCEDURE — G0299 HHS/HOSPICE OF RN EA 15 MIN: HCPCS

## 2024-10-23 ENCOUNTER — HOME CARE VISIT (OUTPATIENT)
Dept: HOME HEALTH SERVICES | Facility: HOME HEALTHCARE | Age: 89
End: 2024-10-23
Payer: MEDICARE

## 2024-10-23 PROCEDURE — G0156 HHCP-SVS OF AIDE,EA 15 MIN: HCPCS

## 2024-10-24 ENCOUNTER — HOME CARE VISIT (OUTPATIENT)
Dept: HOME HOSPICE | Facility: HOSPICE | Age: 89
End: 2024-10-24
Payer: MEDICARE

## 2024-10-24 PROCEDURE — G0155 HHCP-SVS OF CSW,EA 15 MIN: HCPCS

## 2024-10-25 ENCOUNTER — HOME CARE VISIT (OUTPATIENT)
Dept: HOME HEALTH SERVICES | Facility: HOME HEALTHCARE | Age: 89
End: 2024-10-25
Payer: MEDICARE

## 2024-10-25 PROCEDURE — G0156 HHCP-SVS OF AIDE,EA 15 MIN: HCPCS

## 2024-10-28 ENCOUNTER — HOME CARE VISIT (OUTPATIENT)
Dept: HOME HOSPICE | Facility: HOSPICE | Age: 89
End: 2024-10-28
Payer: MEDICARE

## 2024-10-28 ENCOUNTER — HOME CARE VISIT (OUTPATIENT)
Dept: HOME HEALTH SERVICES | Facility: HOME HEALTHCARE | Age: 89
End: 2024-10-28
Payer: MEDICARE

## 2024-10-28 ENCOUNTER — TELEPHONE (OUTPATIENT)
Age: 89
End: 2024-10-28

## 2024-10-28 PROCEDURE — G0156 HHCP-SVS OF AIDE,EA 15 MIN: HCPCS

## 2024-10-28 NOTE — TELEPHONE ENCOUNTER
[facility] pharmacy calling in requesting a script for mastectomy bras, quantity of 3 with ICD-10 code faxed to [phone].    Thank you.

## 2024-10-29 ENCOUNTER — HOME CARE VISIT (OUTPATIENT)
Dept: HOME HEALTH SERVICES | Facility: HOME HEALTHCARE | Age: 89
End: 2024-10-29
Payer: MEDICARE

## 2024-10-29 VITALS — RESPIRATION RATE: 18 BRPM | HEART RATE: 80 BPM | DIASTOLIC BLOOD PRESSURE: 78 MMHG | SYSTOLIC BLOOD PRESSURE: 112 MMHG

## 2024-10-29 PROCEDURE — G0156 HHCP-SVS OF AIDE,EA 15 MIN: HCPCS

## 2024-10-29 PROCEDURE — G0299 HHS/HOSPICE OF RN EA 15 MIN: HCPCS

## 2024-10-30 ENCOUNTER — TELEPHONE (OUTPATIENT)
Age: 89
End: 2024-10-30

## 2024-10-30 ENCOUNTER — HOME CARE VISIT (OUTPATIENT)
Dept: HOME HEALTH SERVICES | Facility: HOME HEALTHCARE | Age: 89
End: 2024-10-30
Payer: MEDICARE

## 2024-10-30 DIAGNOSIS — Z85.3 HISTORY OF LEFT BREAST CANCER: Primary | ICD-10-CM

## 2024-10-30 DIAGNOSIS — Z90.12 S/P LEFT MASTECTOMY: ICD-10-CM

## 2024-10-30 PROCEDURE — G0156 HHCP-SVS OF AIDE,EA 15 MIN: HCPCS

## 2024-10-30 NOTE — TELEPHONE ENCOUNTER
Lisbeth abad from [facility] pharmacy requesting a script for mastectomy bras, quantity of 3 with ICD-10 code Z90.12 faxed to [phone].

## 2024-10-31 ENCOUNTER — HOME CARE VISIT (OUTPATIENT)
Dept: HOME HOSPICE | Facility: HOSPICE | Age: 89
End: 2024-10-31
Payer: MEDICARE

## 2024-10-31 DIAGNOSIS — Z51.5 HOSPICE CARE PATIENT: ICD-10-CM

## 2024-10-31 DIAGNOSIS — I13.0 HYPERTENSIVE HEART AND RENAL DISEASE WITH CONGESTIVE HEART FAILURE (HCC): ICD-10-CM

## 2024-10-31 RX ORDER — MORPHINE SULFATE 100 MG/5ML
2.5 SOLUTION, CONCENTRATE ORAL EVERY 4 HOURS PRN
Qty: 30 ML | Refills: 0 | Status: SHIPPED | OUTPATIENT
Start: 2024-10-31

## 2024-10-31 NOTE — TELEPHONE ENCOUNTER
Lisbeth called again & stated that she needs a 1 PAGE prescription for this order.  She got a 4 page order yesterday & that Is not correct

## 2024-10-31 NOTE — TELEPHONE ENCOUNTER
10/31/2024 1:39 PM  [facility] facility patient requests refill of CII medication.  Filled electronically via Epic as per [place] State Law.    Requested Prescriptions     Signed Prescriptions Disp Refills    Morphine Sulfate, Concentrate, 20 mg/mL concentrated solution 30 mL 0     Sig: Take 0.13 mL (2.6 mg total) by mouth every 4 (four) hours as needed (pain / SOB) Max Daily Amount: 15.6 mg     Authorizing Provider: GABRIELA PRIETO CRNP  [facility]  Hospice Answering Service: [phone]

## 2024-11-01 ENCOUNTER — HOME CARE VISIT (OUTPATIENT)
Dept: HOME HEALTH SERVICES | Facility: HOME HEALTHCARE | Age: 89
End: 2024-11-01
Payer: MEDICARE

## 2024-11-01 PROCEDURE — G0156 HHCP-SVS OF AIDE,EA 15 MIN: HCPCS

## 2024-11-04 ENCOUNTER — HOME CARE VISIT (OUTPATIENT)
Dept: HOME HEALTH SERVICES | Facility: HOME HEALTHCARE | Age: 89
End: 2024-11-04
Payer: MEDICARE

## 2024-11-04 VITALS — DIASTOLIC BLOOD PRESSURE: 98 MMHG | RESPIRATION RATE: 16 BRPM | HEART RATE: 76 BPM | SYSTOLIC BLOOD PRESSURE: 170 MMHG

## 2024-11-04 PROCEDURE — G0156 HHCP-SVS OF AIDE,EA 15 MIN: HCPCS

## 2024-11-04 PROCEDURE — G0299 HHS/HOSPICE OF RN EA 15 MIN: HCPCS

## 2024-11-05 ENCOUNTER — TELEPHONE (OUTPATIENT)
Age: 89
End: 2024-11-05

## 2024-11-05 ENCOUNTER — HOME CARE VISIT (OUTPATIENT)
Dept: HOME HOSPICE | Facility: HOSPICE | Age: 89
End: 2024-11-05
Payer: MEDICARE

## 2024-11-05 NOTE — TELEPHONE ENCOUNTER
Lisbeth from [facility] called back, stating she has not received the new order for the mastectomy bras that she requested on 10/30/24 and 10/31/24. She is in need of a 1 page prescription with a quantity of 3 and the diagnosis codes of Z90.12 and Z85.3. The orders can be faxed over to [phone]

## 2024-11-06 ENCOUNTER — HOME CARE VISIT (OUTPATIENT)
Dept: HOME HEALTH SERVICES | Facility: HOME HEALTHCARE | Age: 89
End: 2024-11-06
Payer: MEDICARE

## 2024-11-06 ENCOUNTER — HOME CARE VISIT (OUTPATIENT)
Dept: HOME HOSPICE | Facility: HOSPICE | Age: 89
End: 2024-11-06
Payer: MEDICARE

## 2024-11-06 PROCEDURE — G0156 HHCP-SVS OF AIDE,EA 15 MIN: HCPCS

## 2024-11-06 PROCEDURE — G0155 HHCP-SVS OF CSW,EA 15 MIN: HCPCS

## 2024-11-07 ENCOUNTER — HOME CARE VISIT (OUTPATIENT)
Dept: HOME HEALTH SERVICES | Facility: HOME HEALTHCARE | Age: 89
End: 2024-11-07
Payer: MEDICARE

## 2024-11-07 PROCEDURE — G0156 HHCP-SVS OF AIDE,EA 15 MIN: HCPCS

## 2024-11-08 ENCOUNTER — NURSING HOME VISIT (OUTPATIENT)
Dept: GERIATRICS | Facility: OTHER | Age: 89
End: 2024-11-08
Payer: MEDICARE

## 2024-11-08 ENCOUNTER — HOME CARE VISIT (OUTPATIENT)
Dept: HOME HOSPICE | Facility: HOSPICE | Age: 89
End: 2024-11-08
Payer: MEDICARE

## 2024-11-08 ENCOUNTER — HOME CARE VISIT (OUTPATIENT)
Dept: HOME HEALTH SERVICES | Facility: HOME HEALTHCARE | Age: 89
End: 2024-11-08
Payer: MEDICARE

## 2024-11-08 VITALS
DIASTOLIC BLOOD PRESSURE: 79 MMHG | SYSTOLIC BLOOD PRESSURE: 122 MMHG | HEART RATE: 68 BPM | WEIGHT: 135 LBS | OXYGEN SATURATION: 98 % | BODY MASS INDEX: 25.51 KG/M2 | TEMPERATURE: 97 F | RESPIRATION RATE: 16 BRPM

## 2024-11-08 DIAGNOSIS — I10 ESSENTIAL HYPERTENSION: ICD-10-CM

## 2024-11-08 DIAGNOSIS — R55 PRE-SYNCOPE: ICD-10-CM

## 2024-11-08 DIAGNOSIS — I48.19 PERSISTENT ATRIAL FIBRILLATION (HCC): ICD-10-CM

## 2024-11-08 DIAGNOSIS — F02.A0 MILD LATE ONSET ALZHEIMER'S DEMENTIA WITHOUT BEHAVIORAL DISTURBANCE, PSYCHOTIC DISTURBANCE, MOOD DISTURBANCE, OR ANXIETY (HCC): ICD-10-CM

## 2024-11-08 DIAGNOSIS — I50.32 CHRONIC DIASTOLIC HEART FAILURE (HCC): Primary | Chronic | ICD-10-CM

## 2024-11-08 DIAGNOSIS — G30.1 MILD LATE ONSET ALZHEIMER'S DEMENTIA WITHOUT BEHAVIORAL DISTURBANCE, PSYCHOTIC DISTURBANCE, MOOD DISTURBANCE, OR ANXIETY (HCC): ICD-10-CM

## 2024-11-08 PROCEDURE — 99309 SBSQ NF CARE MODERATE MDM 30: CPT | Performed by: NURSE PRACTITIONER

## 2024-11-08 NOTE — ASSESSMENT & PLAN NOTE
Wt Readings from Last 3 Encounters:   11/08/24 61.2 kg (135 lb)   05/02/24 61.4 kg (135 lb 5.8 oz)   01/21/24 65.8 kg (145 lb)     Last Echo July 2021 EF 65 % with G2DD  Continue to monitor weights- have been stable   Not currently on diuretics  Does not appear fluid overloaded on exam
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
## 2024-11-08 NOTE — ASSESSMENT & PLAN NOTE
Last BCAT 28/50 indicating mild dementia  She is AAOx2 on exam -forgetful  She is able to make her needs known  Clear coherent speech  Last speech cognitive eval:   Deficits noted with attention 5/7, problem solving 0/4, stm delayed recall 0/4, visuospatial 0/4, paragraph retention 1/2, delayed naming recall 1/3, delayed paragraph retention 0/2, choice 2/5  Delirum/Dementia Protocol:   Provide redirection, reorientation, distraction techniques  Fall Precautions  Assist with ADLs/IADLs  Avoid deliriogenic medications such as tramadol, benzodiazepines, anticholinergics, benadryl  Encourage Hydration/ Nutrition  Implement sleep hygiene, limit night time interuptions   Encourage participation in group activities when appropriate             Plan:   Provide redirection, reorientation, distraction techniques  Fall Precautions  Assist with ADLs/IADLs  Avoid deliriogenic medications such as tramadol, benzodiazepines, anticholinergics, benadryl  Encourage Hydration/ Nutrition  Implement sleep hygiene, limit night time interuptions   Encourage participation in group activities when appropriate

## 2024-11-08 NOTE — ASSESSMENT & PLAN NOTE
Vagal response while sitting on toilet  Notified patient became lethargic while on toilet, brought back to bed and within moments patient was back to baseline  VSS  Recommend to encourage fluids, will check labs Monday, check vitals daily x 3 days

## 2024-11-19 ENCOUNTER — TELEPHONE (OUTPATIENT)
Dept: OTHER | Facility: OTHER | Age: 89
End: 2024-11-19

## 2024-11-20 NOTE — TELEPHONE ENCOUNTER
Anahi MARTINI from [facility] called in to speak to the on call provider regarding the Patient's lab results.    Contacted the on call via secure chat.

## 2024-11-25 NOTE — TELEPHONE ENCOUNTER
LVM for pt to call the office  Orders mailed  Patient resting in bed. Respirations easy and unlabored. No distress noted. Call light within reach.

## 2025-01-01 ENCOUNTER — TELEPHONE (OUTPATIENT)
Dept: OTHER | Facility: OTHER | Age: OVER 89
End: 2025-01-01

## 2025-01-01 ENCOUNTER — HOME CARE VISIT (OUTPATIENT)
Dept: HOME HEALTH SERVICES | Facility: HOME HEALTHCARE | Age: OVER 89
End: 2025-01-01
Payer: MEDICARE

## 2025-01-01 VITALS — RESPIRATION RATE: 18 BRPM | HEART RATE: 80 BPM

## 2025-01-01 VITALS — RESPIRATION RATE: 18 BRPM | DIASTOLIC BLOOD PRESSURE: 72 MMHG | SYSTOLIC BLOOD PRESSURE: 126 MMHG | HEART RATE: 76 BPM

## 2025-01-01 DIAGNOSIS — Z51.5 HOSPICE CARE PATIENT: Primary | ICD-10-CM

## 2025-01-01 PROCEDURE — G0156 HHCP-SVS OF AIDE,EA 15 MIN: HCPCS

## 2025-01-01 PROCEDURE — G0299 HHS/HOSPICE OF RN EA 15 MIN: HCPCS

## 2025-01-01 RX ORDER — MORPHINE SULFATE 20 MG/ML
5 SOLUTION ORAL EVERY 8 HOURS
Qty: 30 ML | Refills: 0 | Status: SHIPPED | OUTPATIENT
Start: 2025-01-01 | End: 2025-06-27

## 2025-01-03 ENCOUNTER — NURSING HOME VISIT (OUTPATIENT)
Dept: GERIATRICS | Facility: OTHER | Age: OVER 89
End: 2025-01-03
Payer: MEDICARE

## 2025-01-03 ENCOUNTER — HOSPICE ADMISSION (OUTPATIENT)
Dept: HOME HOSPICE | Facility: HOSPICE | Age: OVER 89
End: 2025-01-03
Payer: MEDICARE

## 2025-01-03 VITALS
HEART RATE: 68 BPM | OXYGEN SATURATION: 95 % | WEIGHT: 125 LBS | DIASTOLIC BLOOD PRESSURE: 82 MMHG | TEMPERATURE: 96.8 F | SYSTOLIC BLOOD PRESSURE: 150 MMHG | BODY MASS INDEX: 23.62 KG/M2 | RESPIRATION RATE: 16 BRPM

## 2025-01-03 DIAGNOSIS — I50.32 CHRONIC DIASTOLIC HEART FAILURE (HCC): Primary | Chronic | ICD-10-CM

## 2025-01-03 DIAGNOSIS — R63.4 WEIGHT LOSS: ICD-10-CM

## 2025-01-03 DIAGNOSIS — G30.1 MILD LATE ONSET ALZHEIMER'S DEMENTIA WITHOUT BEHAVIORAL DISTURBANCE, PSYCHOTIC DISTURBANCE, MOOD DISTURBANCE, OR ANXIETY (HCC): ICD-10-CM

## 2025-01-03 DIAGNOSIS — R60.0 LEG EDEMA, LEFT: ICD-10-CM

## 2025-01-03 DIAGNOSIS — I48.19 PERSISTENT ATRIAL FIBRILLATION (HCC): ICD-10-CM

## 2025-01-03 DIAGNOSIS — F02.A0 MILD LATE ONSET ALZHEIMER'S DEMENTIA WITHOUT BEHAVIORAL DISTURBANCE, PSYCHOTIC DISTURBANCE, MOOD DISTURBANCE, OR ANXIETY (HCC): ICD-10-CM

## 2025-01-03 PROCEDURE — 99309 SBSQ NF CARE MODERATE MDM 30: CPT | Performed by: NURSE PRACTITIONER
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
## 2025-01-03 NOTE — ASSESSMENT & PLAN NOTE
Wt Readings from Last 3 Encounters:   01/03/25 56.7 kg (125 lb)   11/08/24 61.2 kg (135 lb)   05/02/24 61.4 kg (135 lb 5.8 oz)     Last Echo July 2021 EF 65 % with G2DD  Continue to monitor weights- have been stable --> in fact she has lost 10 lbs in one month   Not currently on diuretics  Does have +1 pitting edema to Left pedal and trace nonpitting to left lower leg  Start TEDS  Check Duplex

## 2025-01-03 NOTE — ASSESSMENT & PLAN NOTE
10 lbs weight loss in one month   Hx of Alzheimer's with slow decline  Previously on hospice but was taken off due to extended prognosis  At this point I think it's reasonable to re-consult hospice for evaluation   Spoke with daughter, Lillian who is in agreement and would prefer [facility]

## 2025-01-04 ENCOUNTER — HOME CARE VISIT (OUTPATIENT)
Dept: HOME HEALTH SERVICES | Facility: HOME HEALTHCARE | Age: OVER 89
End: 2025-01-04
Payer: MEDICARE

## 2025-01-07 ENCOUNTER — HOME CARE VISIT (OUTPATIENT)
Dept: HOME HOSPICE | Facility: HOSPICE | Age: OVER 89
End: 2025-01-07
Payer: MEDICARE

## 2025-01-07 ENCOUNTER — HOME CARE VISIT (OUTPATIENT)
Dept: HOME HEALTH SERVICES | Facility: HOME HEALTHCARE | Age: OVER 89
End: 2025-01-07
Payer: MEDICARE

## 2025-01-07 VITALS
SYSTOLIC BLOOD PRESSURE: 120 MMHG | RESPIRATION RATE: 18 BRPM | HEART RATE: 88 BPM | TEMPERATURE: 99.1 F | DIASTOLIC BLOOD PRESSURE: 80 MMHG

## 2025-01-07 DIAGNOSIS — Z51.5 HOSPICE CARE PATIENT: ICD-10-CM

## 2025-01-07 DIAGNOSIS — F41.9 ANXIETY: Primary | ICD-10-CM

## 2025-01-07 DIAGNOSIS — F41.9 ANXIETY: ICD-10-CM

## 2025-01-07 DIAGNOSIS — R06.00 DYSPNEA: ICD-10-CM

## 2025-01-07 DIAGNOSIS — R52 PAIN: ICD-10-CM

## 2025-01-07 PROCEDURE — 10330064 BRIEF, TAB CLSR ULTRA LG 45-58LG (18/BG

## 2025-01-07 PROCEDURE — G0299 HHS/HOSPICE OF RN EA 15 MIN: HCPCS

## 2025-01-07 PROCEDURE — G0155 HHCP-SVS OF CSW,EA 15 MIN: HCPCS

## 2025-01-07 RX ORDER — MORPHINE SULFATE 20 MG/ML
5 SOLUTION ORAL EVERY 6 HOURS PRN
Qty: 30 ML | Refills: 0 | Status: SHIPPED | OUTPATIENT
Start: 2025-01-07

## 2025-01-07 RX ORDER — LORAZEPAM 2 MG/ML
0.25 CONCENTRATE ORAL EVERY 6 HOURS PRN
Qty: 30 ML | Refills: 0 | Status: SHIPPED | OUTPATIENT
Start: 2025-01-07

## 2025-01-08 ENCOUNTER — HOME CARE VISIT (OUTPATIENT)
Dept: HOME HOSPICE | Facility: HOSPICE | Age: OVER 89
End: 2025-01-08
Payer: MEDICARE

## 2025-01-09 ENCOUNTER — HOME CARE VISIT (OUTPATIENT)
Dept: HOME HOSPICE | Facility: HOSPICE | Age: OVER 89
End: 2025-01-09
Payer: MEDICARE

## 2025-01-09 ENCOUNTER — HOME CARE VISIT (OUTPATIENT)
Dept: HOME HEALTH SERVICES | Facility: HOME HEALTHCARE | Age: OVER 89
End: 2025-01-09
Payer: MEDICARE

## 2025-01-09 PROCEDURE — G0156 HHCP-SVS OF AIDE,EA 15 MIN: HCPCS

## 2025-01-10 ENCOUNTER — HOME CARE VISIT (OUTPATIENT)
Dept: HOME HOSPICE | Facility: HOSPICE | Age: OVER 89
End: 2025-01-10
Payer: MEDICARE

## 2025-01-10 ENCOUNTER — HOME CARE VISIT (OUTPATIENT)
Dept: HOME HEALTH SERVICES | Facility: HOME HEALTHCARE | Age: OVER 89
End: 2025-01-10
Payer: MEDICARE

## 2025-01-10 PROCEDURE — G0156 HHCP-SVS OF AIDE,EA 15 MIN: HCPCS

## 2025-01-14 ENCOUNTER — HOME CARE VISIT (OUTPATIENT)
Dept: HOME HEALTH SERVICES | Facility: HOME HEALTHCARE | Age: OVER 89
End: 2025-01-14
Payer: MEDICARE

## 2025-01-14 VITALS — DIASTOLIC BLOOD PRESSURE: 80 MMHG | HEART RATE: 78 BPM | SYSTOLIC BLOOD PRESSURE: 130 MMHG

## 2025-01-14 PROCEDURE — G0299 HHS/HOSPICE OF RN EA 15 MIN: HCPCS

## 2025-01-14 PROCEDURE — G0156 HHCP-SVS OF AIDE,EA 15 MIN: HCPCS

## 2025-01-15 ENCOUNTER — HOME CARE VISIT (OUTPATIENT)
Dept: HOME HEALTH SERVICES | Facility: HOME HEALTHCARE | Age: OVER 89
End: 2025-01-15
Payer: MEDICARE

## 2025-01-15 ENCOUNTER — HOME CARE VISIT (OUTPATIENT)
Dept: HOME HOSPICE | Facility: HOSPICE | Age: OVER 89
End: 2025-01-15
Payer: MEDICARE

## 2025-01-15 PROCEDURE — G0156 HHCP-SVS OF AIDE,EA 15 MIN: HCPCS

## 2025-01-16 ENCOUNTER — HOME CARE VISIT (OUTPATIENT)
Dept: HOME HOSPICE | Facility: HOSPICE | Age: OVER 89
End: 2025-01-16
Payer: MEDICARE

## 2025-01-17 ENCOUNTER — HOME CARE VISIT (OUTPATIENT)
Dept: HOME HEALTH SERVICES | Facility: HOME HEALTHCARE | Age: OVER 89
End: 2025-01-17
Payer: MEDICARE

## 2025-01-17 PROCEDURE — G0156 HHCP-SVS OF AIDE,EA 15 MIN: HCPCS

## 2025-01-20 ENCOUNTER — HOME CARE VISIT (OUTPATIENT)
Dept: HOME HEALTH SERVICES | Facility: HOME HEALTHCARE | Age: OVER 89
End: 2025-01-20
Payer: MEDICARE

## 2025-01-20 PROCEDURE — G0156 HHCP-SVS OF AIDE,EA 15 MIN: HCPCS

## 2025-01-22 ENCOUNTER — HOME CARE VISIT (OUTPATIENT)
Dept: HOME HOSPICE | Facility: HOSPICE | Age: OVER 89
End: 2025-01-22
Payer: MEDICARE

## 2025-01-22 ENCOUNTER — HOME CARE VISIT (OUTPATIENT)
Dept: HOME HEALTH SERVICES | Facility: HOME HEALTHCARE | Age: OVER 89
End: 2025-01-22
Payer: MEDICARE

## 2025-01-22 VITALS
SYSTOLIC BLOOD PRESSURE: 132 MMHG | DIASTOLIC BLOOD PRESSURE: 80 MMHG | TEMPERATURE: 97.9 F | RESPIRATION RATE: 16 BRPM | HEART RATE: 72 BPM

## 2025-01-22 PROCEDURE — G0156 HHCP-SVS OF AIDE,EA 15 MIN: HCPCS

## 2025-01-22 PROCEDURE — G0299 HHS/HOSPICE OF RN EA 15 MIN: HCPCS

## 2025-01-24 ENCOUNTER — HOME CARE VISIT (OUTPATIENT)
Dept: HOME HEALTH SERVICES | Facility: HOME HEALTHCARE | Age: OVER 89
End: 2025-01-24
Payer: MEDICARE

## 2025-01-24 PROCEDURE — G0156 HHCP-SVS OF AIDE,EA 15 MIN: HCPCS

## 2025-01-27 ENCOUNTER — HOME CARE VISIT (OUTPATIENT)
Dept: HOME HEALTH SERVICES | Facility: HOME HEALTHCARE | Age: OVER 89
End: 2025-01-27
Payer: MEDICARE

## 2025-01-27 PROCEDURE — G0156 HHCP-SVS OF AIDE,EA 15 MIN: HCPCS

## 2025-01-28 ENCOUNTER — HOME CARE VISIT (OUTPATIENT)
Dept: HOME HEALTH SERVICES | Facility: HOME HEALTHCARE | Age: OVER 89
End: 2025-01-28
Payer: MEDICARE

## 2025-01-28 ENCOUNTER — HOME CARE VISIT (OUTPATIENT)
Dept: HOME HOSPICE | Facility: HOSPICE | Age: OVER 89
End: 2025-01-28
Payer: MEDICARE

## 2025-01-28 VITALS — HEART RATE: 80 BPM | RESPIRATION RATE: 20 BRPM

## 2025-01-28 PROCEDURE — G0299 HHS/HOSPICE OF RN EA 15 MIN: HCPCS

## 2025-01-29 ENCOUNTER — HOME CARE VISIT (OUTPATIENT)
Dept: HOME HEALTH SERVICES | Facility: HOME HEALTHCARE | Age: OVER 89
End: 2025-01-29
Payer: MEDICARE

## 2025-01-29 PROCEDURE — G0156 HHCP-SVS OF AIDE,EA 15 MIN: HCPCS

## 2025-01-30 ENCOUNTER — HOME CARE VISIT (OUTPATIENT)
Dept: HOME HOSPICE | Facility: HOSPICE | Age: OVER 89
End: 2025-01-30
Payer: MEDICARE

## 2025-01-30 DIAGNOSIS — R06.00 DYSPNEA: ICD-10-CM

## 2025-01-30 DIAGNOSIS — F41.9 ANXIETY: ICD-10-CM

## 2025-01-30 DIAGNOSIS — Z51.5 HOSPICE CARE PATIENT: ICD-10-CM

## 2025-01-30 RX ORDER — LORAZEPAM 2 MG/ML
0.5 CONCENTRATE ORAL EVERY 6 HOURS PRN
Qty: 30 ML | Refills: 0 | Status: SHIPPED | OUTPATIENT
Start: 2025-01-30 | End: 2025-02-03 | Stop reason: SDUPTHER

## 2025-01-30 NOTE — PROGRESS NOTES
Nursing called needing clarification of Lorazepam order. Ordered in SNF chart as 2mg/ml- give 0.25ml=0.5mg Q 6hrs PRN however the pharmacy [facility] has order to give 0.13 ml... I updated script for 0.25ml=0.5mg and sent new script to [facility] [place].

## 2025-01-31 ENCOUNTER — HOME CARE VISIT (OUTPATIENT)
Dept: HOME HEALTH SERVICES | Facility: HOME HEALTHCARE | Age: OVER 89
End: 2025-01-31
Payer: MEDICARE

## 2025-01-31 VITALS — RESPIRATION RATE: 20 BRPM | HEART RATE: 88 BPM

## 2025-01-31 PROCEDURE — G0299 HHS/HOSPICE OF RN EA 15 MIN: HCPCS

## 2025-01-31 PROCEDURE — G0156 HHCP-SVS OF AIDE,EA 15 MIN: HCPCS

## 2025-02-02 ENCOUNTER — HOME CARE VISIT (OUTPATIENT)
Dept: HOME HEALTH SERVICES | Facility: HOME HEALTHCARE | Age: OVER 89
End: 2025-02-02
Payer: MEDICARE

## 2025-02-02 ENCOUNTER — HOME CARE VISIT (OUTPATIENT)
Dept: HOME HOSPICE | Facility: HOSPICE | Age: OVER 89
End: 2025-02-02
Payer: MEDICARE

## 2025-02-02 VITALS — RESPIRATION RATE: 18 BRPM | HEART RATE: 62 BPM | DIASTOLIC BLOOD PRESSURE: 68 MMHG | SYSTOLIC BLOOD PRESSURE: 118 MMHG

## 2025-02-02 PROCEDURE — G0299 HHS/HOSPICE OF RN EA 15 MIN: HCPCS

## 2025-02-03 ENCOUNTER — HOME CARE VISIT (OUTPATIENT)
Dept: HOME HOSPICE | Facility: HOSPICE | Age: OVER 89
End: 2025-02-03
Payer: MEDICARE

## 2025-02-03 ENCOUNTER — HOME CARE VISIT (OUTPATIENT)
Dept: HOME HEALTH SERVICES | Facility: HOME HEALTHCARE | Age: OVER 89
End: 2025-02-03
Payer: MEDICARE

## 2025-02-03 DIAGNOSIS — F41.9 ANXIETY: ICD-10-CM

## 2025-02-03 DIAGNOSIS — R06.00 DYSPNEA: ICD-10-CM

## 2025-02-03 DIAGNOSIS — Z51.5 HOSPICE CARE PATIENT: ICD-10-CM

## 2025-02-03 PROCEDURE — G0299 HHS/HOSPICE OF RN EA 15 MIN: HCPCS

## 2025-02-03 PROCEDURE — G0155 HHCP-SVS OF CSW,EA 15 MIN: HCPCS

## 2025-02-03 PROCEDURE — G0156 HHCP-SVS OF AIDE,EA 15 MIN: HCPCS

## 2025-02-03 RX ORDER — LORAZEPAM 2 MG/ML
0.5 CONCENTRATE ORAL EVERY 6 HOURS PRN
Qty: 30 ML | Refills: 0 | Status: SHIPPED | OUTPATIENT
Start: 2025-02-03

## 2025-02-03 NOTE — PROGRESS NOTES
2/3/2025 1:16 PM  [facility] hospice facility patient requests facility hospice patient refill.  Filled electronically via Epic as per [place] State Law.    Requested Prescriptions     Signed Prescriptions Disp Refills    LORazepam (ATIVAN) 2 mg/mL concentrated solution 30 mL 0     Sig: Take 0.25 mL (0.5 mg total) by mouth every 6 (six) hours as needed for anxiety or sleep (SOB) Take 0.25mg by mouth Q6 hrs as needed       FLOYD Voss  [facility]  Hospice Answering Service: [phone]

## 2025-02-05 ENCOUNTER — HOME CARE VISIT (OUTPATIENT)
Dept: HOME HEALTH SERVICES | Facility: HOME HEALTHCARE | Age: OVER 89
End: 2025-02-05
Payer: MEDICARE

## 2025-02-05 PROCEDURE — G0156 HHCP-SVS OF AIDE,EA 15 MIN: HCPCS

## 2025-02-06 ENCOUNTER — HOME CARE VISIT (OUTPATIENT)
Dept: HOME HOSPICE | Facility: HOSPICE | Age: OVER 89
End: 2025-02-06
Payer: MEDICARE

## 2025-02-07 ENCOUNTER — HOME CARE VISIT (OUTPATIENT)
Dept: HOME HEALTH SERVICES | Facility: HOME HEALTHCARE | Age: OVER 89
End: 2025-02-07
Payer: MEDICARE

## 2025-02-07 PROCEDURE — G0156 HHCP-SVS OF AIDE,EA 15 MIN: HCPCS

## 2025-02-10 ENCOUNTER — HOME CARE VISIT (OUTPATIENT)
Dept: HOME HEALTH SERVICES | Facility: HOME HEALTHCARE | Age: OVER 89
End: 2025-02-10
Payer: MEDICARE

## 2025-02-10 PROCEDURE — G0156 HHCP-SVS OF AIDE,EA 15 MIN: HCPCS

## 2025-02-11 ENCOUNTER — HOME CARE VISIT (OUTPATIENT)
Dept: HOME HEALTH SERVICES | Facility: HOME HEALTHCARE | Age: OVER 89
End: 2025-02-11
Payer: MEDICARE

## 2025-02-11 PROCEDURE — G0299 HHS/HOSPICE OF RN EA 15 MIN: HCPCS

## 2025-02-11 PROCEDURE — G0155 HHCP-SVS OF CSW,EA 15 MIN: HCPCS

## 2025-02-12 ENCOUNTER — HOME CARE VISIT (OUTPATIENT)
Dept: HOME HEALTH SERVICES | Facility: HOME HEALTHCARE | Age: OVER 89
End: 2025-02-12
Payer: MEDICARE

## 2025-02-12 PROCEDURE — G0299 HHS/HOSPICE OF RN EA 15 MIN: HCPCS

## 2025-02-13 ENCOUNTER — HOME CARE VISIT (OUTPATIENT)
Dept: HOME HEALTH SERVICES | Facility: HOME HEALTHCARE | Age: OVER 89
End: 2025-02-13
Payer: MEDICARE

## 2025-02-13 PROCEDURE — G0156 HHCP-SVS OF AIDE,EA 15 MIN: HCPCS

## 2025-02-14 ENCOUNTER — HOME CARE VISIT (OUTPATIENT)
Dept: HOME HEALTH SERVICES | Facility: HOME HEALTHCARE | Age: OVER 89
End: 2025-02-14
Payer: MEDICARE

## 2025-02-14 PROCEDURE — G0156 HHCP-SVS OF AIDE,EA 15 MIN: HCPCS

## 2025-02-18 ENCOUNTER — HOME CARE VISIT (OUTPATIENT)
Dept: HOME HEALTH SERVICES | Facility: HOME HEALTHCARE | Age: OVER 89
End: 2025-02-18
Payer: MEDICARE

## 2025-02-18 VITALS — SYSTOLIC BLOOD PRESSURE: 112 MMHG | DIASTOLIC BLOOD PRESSURE: 68 MMHG | RESPIRATION RATE: 18 BRPM | HEART RATE: 80 BPM

## 2025-02-18 PROCEDURE — G0299 HHS/HOSPICE OF RN EA 15 MIN: HCPCS

## 2025-02-18 PROCEDURE — G0156 HHCP-SVS OF AIDE,EA 15 MIN: HCPCS

## 2025-02-20 ENCOUNTER — HOME CARE VISIT (OUTPATIENT)
Dept: HOME HEALTH SERVICES | Facility: HOME HEALTHCARE | Age: OVER 89
End: 2025-02-20
Payer: MEDICARE

## 2025-02-20 DIAGNOSIS — F41.9 ANXIETY: ICD-10-CM

## 2025-02-20 DIAGNOSIS — R06.00 DYSPNEA: ICD-10-CM

## 2025-02-20 DIAGNOSIS — Z51.5 HOSPICE CARE PATIENT: Primary | ICD-10-CM

## 2025-02-20 PROCEDURE — G0299 HHS/HOSPICE OF RN EA 15 MIN: HCPCS

## 2025-02-20 PROCEDURE — G0155 HHCP-SVS OF CSW,EA 15 MIN: HCPCS

## 2025-02-20 PROCEDURE — G0156 HHCP-SVS OF AIDE,EA 15 MIN: HCPCS

## 2025-02-20 RX ORDER — LORAZEPAM 2 MG/ML
0.5 CONCENTRATE ORAL EVERY 6 HOURS PRN
Qty: 30 ML | Refills: 0 | Status: SHIPPED | OUTPATIENT
Start: 2025-02-20

## 2025-02-20 NOTE — TELEPHONE ENCOUNTER
2/20/2025 10:08 AM  [facility] facility patient requests refill of CII medication.  Filled electronically via Epic as per [place] State Law.    Requested Prescriptions     Signed Prescriptions Disp Refills    LORazepam (ATIVAN) 2 mg/mL concentrated solution 30 mL 0     Sig: Take 0.25 mL (0.5 mg total) by mouth every 6 (six) hours as needed for anxiety or sleep (SOB)     Authorizing Provider: GABRIELA PRIETO CRNP  [facility]  Hospice Answering Service: [phone]

## 2025-02-21 ENCOUNTER — HOME CARE VISIT (OUTPATIENT)
Dept: HOME HEALTH SERVICES | Facility: HOME HEALTHCARE | Age: OVER 89
End: 2025-02-21
Payer: MEDICARE

## 2025-02-21 PROCEDURE — G0156 HHCP-SVS OF AIDE,EA 15 MIN: HCPCS

## 2025-02-24 ENCOUNTER — HOME CARE VISIT (OUTPATIENT)
Dept: HOME HEALTH SERVICES | Facility: HOME HEALTHCARE | Age: OVER 89
End: 2025-02-24
Payer: MEDICARE

## 2025-02-24 VITALS — RESPIRATION RATE: 16 BRPM | SYSTOLIC BLOOD PRESSURE: 122 MMHG | DIASTOLIC BLOOD PRESSURE: 78 MMHG | HEART RATE: 84 BPM

## 2025-02-24 PROCEDURE — G0156 HHCP-SVS OF AIDE,EA 15 MIN: HCPCS

## 2025-02-24 PROCEDURE — G0299 HHS/HOSPICE OF RN EA 15 MIN: HCPCS

## 2025-02-26 ENCOUNTER — HOME CARE VISIT (OUTPATIENT)
Dept: HOME HEALTH SERVICES | Facility: HOME HEALTHCARE | Age: OVER 89
End: 2025-02-26
Payer: MEDICARE

## 2025-02-26 PROCEDURE — G0156 HHCP-SVS OF AIDE,EA 15 MIN: HCPCS

## 2025-02-27 ENCOUNTER — HOME CARE VISIT (OUTPATIENT)
Dept: HOME HEALTH SERVICES | Facility: HOME HEALTHCARE | Age: OVER 89
End: 2025-02-27
Payer: MEDICARE

## 2025-02-27 VITALS
OXYGEN SATURATION: 97 % | SYSTOLIC BLOOD PRESSURE: 160 MMHG | HEART RATE: 86 BPM | RESPIRATION RATE: 16 BRPM | TEMPERATURE: 97.8 F | DIASTOLIC BLOOD PRESSURE: 82 MMHG

## 2025-02-27 PROCEDURE — G0300 HHS/HOSPICE OF LPN EA 15 MIN: HCPCS

## 2025-02-28 ENCOUNTER — NURSING HOME VISIT (OUTPATIENT)
Dept: GERIATRICS | Facility: OTHER | Age: OVER 89
End: 2025-02-28
Payer: MEDICARE

## 2025-02-28 ENCOUNTER — HOME CARE VISIT (OUTPATIENT)
Dept: HOME HEALTH SERVICES | Facility: HOME HEALTHCARE | Age: OVER 89
End: 2025-02-28
Payer: MEDICARE

## 2025-02-28 DIAGNOSIS — Z51.5 HOSPICE CARE PATIENT: ICD-10-CM

## 2025-02-28 DIAGNOSIS — R26.2 AMBULATORY DYSFUNCTION: ICD-10-CM

## 2025-02-28 DIAGNOSIS — G30.1 MILD LATE ONSET ALZHEIMER'S DEMENTIA WITHOUT BEHAVIORAL DISTURBANCE, PSYCHOTIC DISTURBANCE, MOOD DISTURBANCE, OR ANXIETY (HCC): ICD-10-CM

## 2025-02-28 DIAGNOSIS — E03.9 ACQUIRED HYPOTHYROIDISM: Primary | ICD-10-CM

## 2025-02-28 DIAGNOSIS — E44.0 MODERATE PROTEIN-CALORIE MALNUTRITION (HCC): ICD-10-CM

## 2025-02-28 DIAGNOSIS — F02.A0 MILD LATE ONSET ALZHEIMER'S DEMENTIA WITHOUT BEHAVIORAL DISTURBANCE, PSYCHOTIC DISTURBANCE, MOOD DISTURBANCE, OR ANXIETY (HCC): ICD-10-CM

## 2025-02-28 PROCEDURE — 99309 SBSQ NF CARE MODERATE MDM 30: CPT | Performed by: FAMILY MEDICINE

## 2025-02-28 PROCEDURE — G0156 HHCP-SVS OF AIDE,EA 15 MIN: HCPCS

## 2025-03-02 PROBLEM — Z51.5 HOSPICE CARE PATIENT: Status: ACTIVE | Noted: 2025-03-02

## 2025-03-02 RX ORDER — LEVOTHYROXINE SODIUM 100 UG/1
100 TABLET ORAL DAILY
COMMUNITY

## 2025-03-03 ENCOUNTER — HOME CARE VISIT (OUTPATIENT)
Dept: HOME HEALTH SERVICES | Facility: HOME HEALTHCARE | Age: OVER 89
End: 2025-03-03
Payer: MEDICARE

## 2025-03-03 PROCEDURE — G0156 HHCP-SVS OF AIDE,EA 15 MIN: HCPCS

## 2025-03-04 ENCOUNTER — HOME CARE VISIT (OUTPATIENT)
Dept: HOME HEALTH SERVICES | Facility: HOME HEALTHCARE | Age: OVER 89
End: 2025-03-04
Payer: MEDICARE

## 2025-03-04 VITALS — RESPIRATION RATE: 18 BRPM | HEART RATE: 76 BPM | DIASTOLIC BLOOD PRESSURE: 90 MMHG | SYSTOLIC BLOOD PRESSURE: 142 MMHG

## 2025-03-04 PROCEDURE — G0299 HHS/HOSPICE OF RN EA 15 MIN: HCPCS

## 2025-03-05 ENCOUNTER — HOME CARE VISIT (OUTPATIENT)
Dept: HOME HEALTH SERVICES | Facility: HOME HEALTHCARE | Age: OVER 89
End: 2025-03-05
Payer: MEDICARE

## 2025-03-05 PROCEDURE — G0156 HHCP-SVS OF AIDE,EA 15 MIN: HCPCS

## 2025-03-07 ENCOUNTER — HOME CARE VISIT (OUTPATIENT)
Dept: HOME HEALTH SERVICES | Facility: HOME HEALTHCARE | Age: OVER 89
End: 2025-03-07
Payer: MEDICARE

## 2025-03-07 VITALS — HEART RATE: 80 BPM | RESPIRATION RATE: 20 BRPM

## 2025-03-07 PROCEDURE — G0299 HHS/HOSPICE OF RN EA 15 MIN: HCPCS

## 2025-03-07 PROCEDURE — G0156 HHCP-SVS OF AIDE,EA 15 MIN: HCPCS

## 2025-03-10 ENCOUNTER — HOME CARE VISIT (OUTPATIENT)
Dept: HOME HEALTH SERVICES | Facility: HOME HEALTHCARE | Age: OVER 89
End: 2025-03-10
Payer: MEDICARE

## 2025-03-10 PROCEDURE — G0156 HHCP-SVS OF AIDE,EA 15 MIN: HCPCS

## 2025-03-11 ENCOUNTER — HOME CARE VISIT (OUTPATIENT)
Dept: HOME HEALTH SERVICES | Facility: HOME HEALTHCARE | Age: OVER 89
End: 2025-03-11
Payer: MEDICARE

## 2025-03-11 PROCEDURE — G0155 HHCP-SVS OF CSW,EA 15 MIN: HCPCS

## 2025-03-11 PROCEDURE — G0299 HHS/HOSPICE OF RN EA 15 MIN: HCPCS

## 2025-03-12 ENCOUNTER — HOME CARE VISIT (OUTPATIENT)
Dept: HOME HEALTH SERVICES | Facility: HOME HEALTHCARE | Age: OVER 89
End: 2025-03-12
Payer: MEDICARE

## 2025-03-12 PROCEDURE — G0156 HHCP-SVS OF AIDE,EA 15 MIN: HCPCS

## 2025-03-14 ENCOUNTER — HOME CARE VISIT (OUTPATIENT)
Dept: HOME HEALTH SERVICES | Facility: HOME HEALTHCARE | Age: OVER 89
End: 2025-03-14
Payer: MEDICARE

## 2025-03-14 VITALS — HEART RATE: 76 BPM | RESPIRATION RATE: 20 BRPM

## 2025-03-14 PROCEDURE — G0156 HHCP-SVS OF AIDE,EA 15 MIN: HCPCS

## 2025-03-14 PROCEDURE — G0299 HHS/HOSPICE OF RN EA 15 MIN: HCPCS

## 2025-03-17 ENCOUNTER — HOME CARE VISIT (OUTPATIENT)
Dept: HOME HEALTH SERVICES | Facility: HOME HEALTHCARE | Age: OVER 89
End: 2025-03-17
Payer: MEDICARE

## 2025-03-17 PROCEDURE — G0156 HHCP-SVS OF AIDE,EA 15 MIN: HCPCS

## 2025-03-18 ENCOUNTER — HOME CARE VISIT (OUTPATIENT)
Dept: HOME HEALTH SERVICES | Facility: HOME HEALTHCARE | Age: OVER 89
End: 2025-03-18
Payer: MEDICARE

## 2025-03-18 ENCOUNTER — HOME CARE VISIT (OUTPATIENT)
Dept: HOME HOSPICE | Facility: HOSPICE | Age: OVER 89
End: 2025-03-18
Payer: MEDICARE

## 2025-03-18 VITALS — RESPIRATION RATE: 20 BRPM | HEART RATE: 80 BPM

## 2025-03-18 PROCEDURE — G0299 HHS/HOSPICE OF RN EA 15 MIN: HCPCS

## 2025-03-18 PROCEDURE — G0156 HHCP-SVS OF AIDE,EA 15 MIN: HCPCS

## 2025-03-21 ENCOUNTER — HOME CARE VISIT (OUTPATIENT)
Dept: HOME HEALTH SERVICES | Facility: HOME HEALTHCARE | Age: OVER 89
End: 2025-03-21
Payer: MEDICARE

## 2025-03-21 VITALS — SYSTOLIC BLOOD PRESSURE: 128 MMHG | RESPIRATION RATE: 20 BRPM | DIASTOLIC BLOOD PRESSURE: 86 MMHG | HEART RATE: 72 BPM

## 2025-03-21 PROCEDURE — G0156 HHCP-SVS OF AIDE,EA 15 MIN: HCPCS

## 2025-03-21 PROCEDURE — G0299 HHS/HOSPICE OF RN EA 15 MIN: HCPCS

## 2025-03-23 ENCOUNTER — HOME CARE VISIT (OUTPATIENT)
Dept: HOME HEALTH SERVICES | Facility: HOME HEALTHCARE | Age: OVER 89
End: 2025-03-23
Payer: MEDICARE

## 2025-03-23 PROCEDURE — G0156 HHCP-SVS OF AIDE,EA 15 MIN: HCPCS

## 2025-03-26 ENCOUNTER — HOME CARE VISIT (OUTPATIENT)
Dept: HOME HEALTH SERVICES | Facility: HOME HEALTHCARE | Age: OVER 89
End: 2025-03-26
Payer: MEDICARE

## 2025-03-26 PROCEDURE — G0299 HHS/HOSPICE OF RN EA 15 MIN: HCPCS

## 2025-03-26 PROCEDURE — G0156 HHCP-SVS OF AIDE,EA 15 MIN: HCPCS

## 2025-03-28 ENCOUNTER — HOME CARE VISIT (OUTPATIENT)
Dept: HOME HEALTH SERVICES | Facility: HOME HEALTHCARE | Age: OVER 89
End: 2025-03-28
Payer: MEDICARE

## 2025-03-28 VITALS
RESPIRATION RATE: 16 BRPM | DIASTOLIC BLOOD PRESSURE: 64 MMHG | TEMPERATURE: 97.8 F | SYSTOLIC BLOOD PRESSURE: 136 MMHG | HEART RATE: 100 BPM

## 2025-03-28 PROCEDURE — G0156 HHCP-SVS OF AIDE,EA 15 MIN: HCPCS

## 2025-03-28 PROCEDURE — G0300 HHS/HOSPICE OF LPN EA 15 MIN: HCPCS

## 2025-03-31 ENCOUNTER — HOME CARE VISIT (OUTPATIENT)
Dept: HOME HEALTH SERVICES | Facility: HOME HEALTHCARE | Age: OVER 89
End: 2025-03-31
Payer: MEDICARE

## 2025-03-31 PROCEDURE — G0156 HHCP-SVS OF AIDE,EA 15 MIN: HCPCS

## 2025-04-01 ENCOUNTER — HOME CARE VISIT (OUTPATIENT)
Dept: HOME HEALTH SERVICES | Facility: HOME HEALTHCARE | Age: OVER 89
End: 2025-04-01
Payer: MEDICARE

## 2025-04-01 VITALS — RESPIRATION RATE: 18 BRPM | HEART RATE: 96 BPM

## 2025-04-01 PROCEDURE — G0299 HHS/HOSPICE OF RN EA 15 MIN: HCPCS

## 2025-04-01 PROCEDURE — G0155 HHCP-SVS OF CSW,EA 15 MIN: HCPCS

## 2025-04-02 ENCOUNTER — HOME CARE VISIT (OUTPATIENT)
Dept: HOME HEALTH SERVICES | Facility: HOME HEALTHCARE | Age: OVER 89
End: 2025-04-02
Payer: MEDICARE

## 2025-04-02 PROCEDURE — G0156 HHCP-SVS OF AIDE,EA 15 MIN: HCPCS

## 2025-04-04 ENCOUNTER — HOME CARE VISIT (OUTPATIENT)
Dept: HOME HEALTH SERVICES | Facility: HOME HEALTHCARE | Age: OVER 89
End: 2025-04-04
Payer: MEDICARE

## 2025-04-04 PROCEDURE — G0156 HHCP-SVS OF AIDE,EA 15 MIN: HCPCS

## 2025-04-07 ENCOUNTER — HOME CARE VISIT (OUTPATIENT)
Dept: HOME HEALTH SERVICES | Facility: HOME HEALTHCARE | Age: OVER 89
End: 2025-04-07
Payer: MEDICARE

## 2025-04-07 VITALS — DIASTOLIC BLOOD PRESSURE: 64 MMHG | HEART RATE: 88 BPM | SYSTOLIC BLOOD PRESSURE: 128 MMHG | RESPIRATION RATE: 18 BRPM

## 2025-04-07 PROCEDURE — G0299 HHS/HOSPICE OF RN EA 15 MIN: HCPCS

## 2025-04-07 PROCEDURE — G0156 HHCP-SVS OF AIDE,EA 15 MIN: HCPCS

## 2025-04-09 ENCOUNTER — HOME CARE VISIT (OUTPATIENT)
Dept: HOME HEALTH SERVICES | Facility: HOME HEALTHCARE | Age: OVER 89
End: 2025-04-09
Payer: MEDICARE

## 2025-04-09 PROCEDURE — G0156 HHCP-SVS OF AIDE,EA 15 MIN: HCPCS

## 2025-04-11 ENCOUNTER — NURSING HOME VISIT (OUTPATIENT)
Dept: GERIATRICS | Facility: OTHER | Age: OVER 89
End: 2025-04-11
Payer: MEDICARE

## 2025-04-11 ENCOUNTER — HOME CARE VISIT (OUTPATIENT)
Dept: HOME HEALTH SERVICES | Facility: HOME HEALTHCARE | Age: OVER 89
End: 2025-04-11
Payer: MEDICARE

## 2025-04-11 DIAGNOSIS — I48.19 PERSISTENT ATRIAL FIBRILLATION (HCC): Primary | ICD-10-CM

## 2025-04-11 DIAGNOSIS — Z51.5 HOSPICE CARE PATIENT: ICD-10-CM

## 2025-04-11 DIAGNOSIS — G30.1 MILD LATE ONSET ALZHEIMER'S DEMENTIA WITHOUT BEHAVIORAL DISTURBANCE, PSYCHOTIC DISTURBANCE, MOOD DISTURBANCE, OR ANXIETY (HCC): ICD-10-CM

## 2025-04-11 DIAGNOSIS — I50.32 CHRONIC DIASTOLIC HEART FAILURE (HCC): Chronic | ICD-10-CM

## 2025-04-11 DIAGNOSIS — F02.A0 MILD LATE ONSET ALZHEIMER'S DEMENTIA WITHOUT BEHAVIORAL DISTURBANCE, PSYCHOTIC DISTURBANCE, MOOD DISTURBANCE, OR ANXIETY (HCC): ICD-10-CM

## 2025-04-11 PROCEDURE — G0156 HHCP-SVS OF AIDE,EA 15 MIN: HCPCS

## 2025-04-11 PROCEDURE — 99309 SBSQ NF CARE MODERATE MDM 30: CPT | Performed by: NURSE PRACTITIONER
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
## 2025-04-11 NOTE — ASSESSMENT & PLAN NOTE
Wt Readings from Last 3 Encounters:   01/03/25 56.7 kg (125 lb)   11/08/24 61.2 kg (135 lb)   05/02/24 61.4 kg (135 lb 5.8 oz)     Last Echo July 2021 EF 65 % with G2DD  Not currently on diuretics  Does have +1 pitting edema to Left pedal and trace nonpitting to left lower leg  Continue TEDS  Continue comfort care as she is on hospice services

## 2025-04-11 NOTE — ASSESSMENT & PLAN NOTE
HR regular and controlled on exam   Denies dizziness on exam  Not on medications for this- she is on hospice services

## 2025-04-11 NOTE — ASSESSMENT & PLAN NOTE
On hospice services with [facility] hospice  Patient appears comfortable on exam   She offers no complaints

## 2025-04-14 ENCOUNTER — HOME CARE VISIT (OUTPATIENT)
Dept: HOME HEALTH SERVICES | Facility: HOME HEALTHCARE | Age: OVER 89
End: 2025-04-14
Payer: MEDICARE

## 2025-04-14 PROCEDURE — G0156 HHCP-SVS OF AIDE,EA 15 MIN: HCPCS

## 2025-04-15 ENCOUNTER — HOME CARE VISIT (OUTPATIENT)
Dept: HOME HEALTH SERVICES | Facility: HOME HEALTHCARE | Age: OVER 89
End: 2025-04-15
Payer: MEDICARE

## 2025-04-15 VITALS — SYSTOLIC BLOOD PRESSURE: 124 MMHG | DIASTOLIC BLOOD PRESSURE: 70 MMHG | RESPIRATION RATE: 18 BRPM | HEART RATE: 76 BPM

## 2025-04-15 PROCEDURE — G0299 HHS/HOSPICE OF RN EA 15 MIN: HCPCS

## 2025-04-16 ENCOUNTER — HOME CARE VISIT (OUTPATIENT)
Dept: HOME HEALTH SERVICES | Facility: HOME HEALTHCARE | Age: OVER 89
End: 2025-04-16
Payer: MEDICARE

## 2025-04-16 PROCEDURE — G0156 HHCP-SVS OF AIDE,EA 15 MIN: HCPCS

## 2025-04-18 ENCOUNTER — HOME CARE VISIT (OUTPATIENT)
Dept: HOME HEALTH SERVICES | Facility: HOME HEALTHCARE | Age: OVER 89
End: 2025-04-18
Payer: MEDICARE

## 2025-04-18 PROCEDURE — G0155 HHCP-SVS OF CSW,EA 15 MIN: HCPCS

## 2025-04-18 PROCEDURE — G0156 HHCP-SVS OF AIDE,EA 15 MIN: HCPCS

## 2025-04-21 ENCOUNTER — HOME CARE VISIT (OUTPATIENT)
Dept: HOME HEALTH SERVICES | Facility: HOME HEALTHCARE | Age: OVER 89
End: 2025-04-21
Payer: MEDICARE

## 2025-04-21 PROCEDURE — G0156 HHCP-SVS OF AIDE,EA 15 MIN: HCPCS

## 2025-04-23 ENCOUNTER — HOME CARE VISIT (OUTPATIENT)
Dept: HOME HEALTH SERVICES | Facility: HOME HEALTHCARE | Age: OVER 89
End: 2025-04-23
Payer: MEDICARE

## 2025-04-23 VITALS
SYSTOLIC BLOOD PRESSURE: 118 MMHG | BODY MASS INDEX: 19.65 KG/M2 | HEART RATE: 96 BPM | WEIGHT: 104 LBS | RESPIRATION RATE: 18 BRPM | DIASTOLIC BLOOD PRESSURE: 70 MMHG

## 2025-04-23 PROCEDURE — G0299 HHS/HOSPICE OF RN EA 15 MIN: HCPCS

## 2025-04-25 ENCOUNTER — HOME CARE VISIT (OUTPATIENT)
Dept: HOME HEALTH SERVICES | Facility: HOME HEALTHCARE | Age: OVER 89
End: 2025-04-25
Payer: MEDICARE

## 2025-04-25 PROCEDURE — G0156 HHCP-SVS OF AIDE,EA 15 MIN: HCPCS

## 2025-04-27 ENCOUNTER — HOME CARE VISIT (OUTPATIENT)
Dept: HOME HEALTH SERVICES | Facility: HOME HEALTHCARE | Age: OVER 89
End: 2025-04-27
Payer: MEDICARE

## 2025-04-27 PROCEDURE — G0156 HHCP-SVS OF AIDE,EA 15 MIN: HCPCS

## 2025-04-29 ENCOUNTER — HOME CARE VISIT (OUTPATIENT)
Dept: HOME HEALTH SERVICES | Facility: HOME HEALTHCARE | Age: OVER 89
End: 2025-04-29
Payer: MEDICARE

## 2025-04-30 ENCOUNTER — HOME CARE VISIT (OUTPATIENT)
Dept: HOME HEALTH SERVICES | Facility: HOME HEALTHCARE | Age: OVER 89
End: 2025-04-30
Payer: MEDICARE

## 2025-04-30 VITALS — DIASTOLIC BLOOD PRESSURE: 60 MMHG | RESPIRATION RATE: 18 BRPM | HEART RATE: 84 BPM | SYSTOLIC BLOOD PRESSURE: 120 MMHG

## 2025-04-30 PROCEDURE — G0299 HHS/HOSPICE OF RN EA 15 MIN: HCPCS

## 2025-04-30 PROCEDURE — G0156 HHCP-SVS OF AIDE,EA 15 MIN: HCPCS

## 2025-05-02 ENCOUNTER — HOME CARE VISIT (OUTPATIENT)
Dept: HOME HEALTH SERVICES | Facility: HOME HEALTHCARE | Age: OVER 89
End: 2025-05-02
Payer: MEDICARE

## 2025-05-02 PROCEDURE — G0156 HHCP-SVS OF AIDE,EA 15 MIN: HCPCS

## 2025-05-05 ENCOUNTER — HOME CARE VISIT (OUTPATIENT)
Dept: HOME HOSPICE | Facility: HOSPICE | Age: OVER 89
End: 2025-05-05
Payer: MEDICARE

## 2025-05-05 ENCOUNTER — HOME CARE VISIT (OUTPATIENT)
Dept: HOME HEALTH SERVICES | Facility: HOME HEALTHCARE | Age: OVER 89
End: 2025-05-05
Payer: MEDICARE

## 2025-05-05 VITALS — HEART RATE: 76 BPM | SYSTOLIC BLOOD PRESSURE: 128 MMHG | DIASTOLIC BLOOD PRESSURE: 74 MMHG

## 2025-05-05 PROCEDURE — G0299 HHS/HOSPICE OF RN EA 15 MIN: HCPCS

## 2025-05-05 PROCEDURE — G0155 HHCP-SVS OF CSW,EA 15 MIN: HCPCS

## 2025-05-05 PROCEDURE — G0156 HHCP-SVS OF AIDE,EA 15 MIN: HCPCS

## 2025-05-07 ENCOUNTER — HOME CARE VISIT (OUTPATIENT)
Dept: HOME HEALTH SERVICES | Facility: HOME HEALTHCARE | Age: OVER 89
End: 2025-05-07
Payer: MEDICARE

## 2025-05-07 PROCEDURE — G0156 HHCP-SVS OF AIDE,EA 15 MIN: HCPCS

## 2025-05-09 ENCOUNTER — HOME CARE VISIT (OUTPATIENT)
Dept: HOME HEALTH SERVICES | Facility: HOME HEALTHCARE | Age: OVER 89
End: 2025-05-09
Payer: MEDICARE

## 2025-05-09 PROCEDURE — G0156 HHCP-SVS OF AIDE,EA 15 MIN: HCPCS

## 2025-05-12 ENCOUNTER — HOME CARE VISIT (OUTPATIENT)
Dept: HOME HEALTH SERVICES | Facility: HOME HEALTHCARE | Age: OVER 89
End: 2025-05-12
Payer: MEDICARE

## 2025-05-12 PROCEDURE — G0156 HHCP-SVS OF AIDE,EA 15 MIN: HCPCS

## 2025-05-14 ENCOUNTER — HOME CARE VISIT (OUTPATIENT)
Dept: HOME HEALTH SERVICES | Facility: HOME HEALTHCARE | Age: OVER 89
End: 2025-05-14
Payer: MEDICARE

## 2025-05-14 PROCEDURE — G0156 HHCP-SVS OF AIDE,EA 15 MIN: HCPCS

## 2025-05-15 ENCOUNTER — HOME CARE VISIT (OUTPATIENT)
Dept: HOME HEALTH SERVICES | Facility: HOME HEALTHCARE | Age: OVER 89
End: 2025-05-15
Payer: MEDICARE

## 2025-05-15 VITALS — DIASTOLIC BLOOD PRESSURE: 80 MMHG | HEART RATE: 80 BPM | SYSTOLIC BLOOD PRESSURE: 132 MMHG | RESPIRATION RATE: 18 BRPM

## 2025-05-15 PROCEDURE — G0299 HHS/HOSPICE OF RN EA 15 MIN: HCPCS

## 2025-05-16 ENCOUNTER — HOME CARE VISIT (OUTPATIENT)
Dept: HOME HEALTH SERVICES | Facility: HOME HEALTHCARE | Age: OVER 89
End: 2025-05-16
Payer: MEDICARE

## 2025-05-16 PROCEDURE — G0156 HHCP-SVS OF AIDE,EA 15 MIN: HCPCS

## 2025-05-19 ENCOUNTER — HOME CARE VISIT (OUTPATIENT)
Dept: HOME HEALTH SERVICES | Facility: HOME HEALTHCARE | Age: OVER 89
End: 2025-05-19
Payer: MEDICARE

## 2025-05-19 PROCEDURE — G0156 HHCP-SVS OF AIDE,EA 15 MIN: HCPCS

## 2025-05-20 ENCOUNTER — HOME CARE VISIT (OUTPATIENT)
Dept: HOME HEALTH SERVICES | Facility: HOME HEALTHCARE | Age: OVER 89
End: 2025-05-20
Payer: MEDICARE

## 2025-05-20 VITALS
DIASTOLIC BLOOD PRESSURE: 78 MMHG | SYSTOLIC BLOOD PRESSURE: 128 MMHG | RESPIRATION RATE: 18 BRPM | HEART RATE: 74 BPM | OXYGEN SATURATION: 95 %

## 2025-05-20 PROCEDURE — G0299 HHS/HOSPICE OF RN EA 15 MIN: HCPCS

## 2025-05-21 ENCOUNTER — HOME CARE VISIT (OUTPATIENT)
Dept: HOME HOSPICE | Facility: HOSPICE | Age: OVER 89
End: 2025-05-21
Payer: MEDICARE

## 2025-05-21 PROCEDURE — G0155 HHCP-SVS OF CSW,EA 15 MIN: HCPCS

## 2025-05-23 ENCOUNTER — HOME CARE VISIT (OUTPATIENT)
Dept: HOME HEALTH SERVICES | Facility: HOME HEALTHCARE | Age: OVER 89
End: 2025-05-23
Payer: MEDICARE

## 2025-05-23 PROCEDURE — G0156 HHCP-SVS OF AIDE,EA 15 MIN: HCPCS

## 2025-05-27 ENCOUNTER — HOME CARE VISIT (OUTPATIENT)
Dept: HOME HEALTH SERVICES | Facility: HOME HEALTHCARE | Age: OVER 89
End: 2025-05-27
Payer: MEDICARE

## 2025-05-27 PROCEDURE — G0156 HHCP-SVS OF AIDE,EA 15 MIN: HCPCS

## 2025-05-28 ENCOUNTER — HOME CARE VISIT (OUTPATIENT)
Dept: HOME HEALTH SERVICES | Facility: HOME HEALTHCARE | Age: OVER 89
End: 2025-05-28
Payer: MEDICARE

## 2025-05-28 VITALS — SYSTOLIC BLOOD PRESSURE: 108 MMHG | DIASTOLIC BLOOD PRESSURE: 62 MMHG | HEART RATE: 64 BPM

## 2025-05-28 PROCEDURE — G0299 HHS/HOSPICE OF RN EA 15 MIN: HCPCS

## 2025-05-30 ENCOUNTER — HOME CARE VISIT (OUTPATIENT)
Dept: HOME HEALTH SERVICES | Facility: HOME HEALTHCARE | Age: OVER 89
End: 2025-05-30
Payer: MEDICARE

## 2025-05-30 PROCEDURE — G0156 HHCP-SVS OF AIDE,EA 15 MIN: HCPCS

## 2025-06-01 ENCOUNTER — HOME CARE VISIT (OUTPATIENT)
Dept: HOME HEALTH SERVICES | Facility: HOME HEALTHCARE | Age: OVER 89
End: 2025-06-01
Payer: MEDICARE

## 2025-06-01 VITALS
RESPIRATION RATE: 18 BRPM | SYSTOLIC BLOOD PRESSURE: 124 MMHG | DIASTOLIC BLOOD PRESSURE: 62 MMHG | HEART RATE: 78 BPM | TEMPERATURE: 98.3 F

## 2025-06-01 PROCEDURE — G0156 HHCP-SVS OF AIDE,EA 15 MIN: HCPCS

## 2025-06-01 PROCEDURE — G0299 HHS/HOSPICE OF RN EA 15 MIN: HCPCS

## 2025-06-05 ENCOUNTER — HOME CARE VISIT (OUTPATIENT)
Dept: HOME HEALTH SERVICES | Facility: HOME HEALTHCARE | Age: OVER 89
End: 2025-06-05
Payer: MEDICARE

## 2025-06-05 VITALS — SYSTOLIC BLOOD PRESSURE: 110 MMHG | DIASTOLIC BLOOD PRESSURE: 70 MMHG | HEART RATE: 82 BPM | RESPIRATION RATE: 18 BRPM

## 2025-06-05 PROCEDURE — G0299 HHS/HOSPICE OF RN EA 15 MIN: HCPCS

## 2025-06-05 PROCEDURE — G0156 HHCP-SVS OF AIDE,EA 15 MIN: HCPCS

## 2025-06-05 NOTE — HOSPICE
"Received SC from [facility], states pt had a \"fainting spell\" yesterday while on toilet and today reports feeling \"sore all over\".  PRN visit today by this SN.  Pt received sitting in bedside chair, agrees to feeling \"sore\" but unable to elaborate further. PAINAD score 0.  B/L ankle swelling noted, jaime hose in place and legs elevated.  Mild edema noted in right elbow and left hand, pt denies pain upon palpation.  Spoke with [facility] nurses, states pt did not fall off toilet or sustain any injury during \"fainting spell\". Pt getting Tylenol as ordered.  Pt in good spirits, gave a big smile when I entered room.  Offers no c/o and appears in no apparent distress.  Will see early next week or prn symptom mgmt.  Staff nurses made aware to please call with any changes or needs, verbalized understanding of same."

## 2025-06-09 ENCOUNTER — HOME CARE VISIT (OUTPATIENT)
Dept: HOME HEALTH SERVICES | Facility: HOME HEALTHCARE | Age: OVER 89
End: 2025-06-09
Payer: MEDICARE

## 2025-06-09 PROCEDURE — G0155 HHCP-SVS OF CSW,EA 15 MIN: HCPCS

## 2025-06-10 ENCOUNTER — HOME CARE VISIT (OUTPATIENT)
Dept: HOME HEALTH SERVICES | Facility: HOME HEALTHCARE | Age: OVER 89
End: 2025-06-10
Payer: MEDICARE

## 2025-06-10 DIAGNOSIS — Z51.5 HOSPICE CARE PATIENT: ICD-10-CM

## 2025-06-10 DIAGNOSIS — R06.00 DYSPNEA: ICD-10-CM

## 2025-06-10 DIAGNOSIS — R52 PAIN: ICD-10-CM

## 2025-06-10 PROCEDURE — G0156 HHCP-SVS OF AIDE,EA 15 MIN: HCPCS

## 2025-06-10 PROCEDURE — G0299 HHS/HOSPICE OF RN EA 15 MIN: HCPCS

## 2025-06-10 RX ORDER — MORPHINE SULFATE 20 MG/ML
5 SOLUTION ORAL EVERY 6 HOURS PRN
Qty: 30 ML | Refills: 0 | Status: SHIPPED | OUTPATIENT
Start: 2025-06-10 | End: 2025-06-20

## 2025-06-12 ENCOUNTER — HOME CARE VISIT (OUTPATIENT)
Dept: HOME HEALTH SERVICES | Facility: HOME HEALTHCARE | Age: OVER 89
End: 2025-06-12
Payer: MEDICARE

## 2025-06-12 PROCEDURE — G0156 HHCP-SVS OF AIDE,EA 15 MIN: HCPCS

## 2025-06-13 ENCOUNTER — HOME CARE VISIT (OUTPATIENT)
Dept: HOME HEALTH SERVICES | Facility: HOME HEALTHCARE | Age: OVER 89
End: 2025-06-13
Payer: MEDICARE

## 2025-06-13 PROCEDURE — G0156 HHCP-SVS OF AIDE,EA 15 MIN: HCPCS

## 2025-06-14 ENCOUNTER — HOME CARE VISIT (OUTPATIENT)
Dept: HOME HEALTH SERVICES | Facility: HOME HEALTHCARE | Age: OVER 89
End: 2025-06-14
Payer: MEDICARE

## 2025-06-14 ENCOUNTER — HOME CARE VISIT (OUTPATIENT)
Dept: HOME HOSPICE | Facility: HOSPICE | Age: OVER 89
End: 2025-06-14
Payer: MEDICARE

## 2025-06-14 PROCEDURE — G0155 HHCP-SVS OF CSW,EA 15 MIN: HCPCS

## 2025-06-15 ENCOUNTER — NURSING HOME VISIT (OUTPATIENT)
Dept: GERIATRICS | Facility: OTHER | Age: OVER 89
End: 2025-06-15
Payer: MEDICARE

## 2025-06-15 DIAGNOSIS — E44.0 MODERATE PROTEIN-CALORIE MALNUTRITION (HCC): ICD-10-CM

## 2025-06-15 DIAGNOSIS — R26.2 AMBULATORY DYSFUNCTION: Primary | ICD-10-CM

## 2025-06-15 DIAGNOSIS — F02.A0 MILD LATE ONSET ALZHEIMER'S DEMENTIA WITHOUT BEHAVIORAL DISTURBANCE, PSYCHOTIC DISTURBANCE, MOOD DISTURBANCE, OR ANXIETY (HCC): ICD-10-CM

## 2025-06-15 DIAGNOSIS — E03.9 ACQUIRED HYPOTHYROIDISM: ICD-10-CM

## 2025-06-15 DIAGNOSIS — Z51.5 HOSPICE CARE PATIENT: ICD-10-CM

## 2025-06-15 DIAGNOSIS — G30.1 MILD LATE ONSET ALZHEIMER'S DEMENTIA WITHOUT BEHAVIORAL DISTURBANCE, PSYCHOTIC DISTURBANCE, MOOD DISTURBANCE, OR ANXIETY (HCC): ICD-10-CM

## 2025-06-15 PROCEDURE — 99309 SBSQ NF CARE MODERATE MDM 30: CPT | Performed by: FAMILY MEDICINE

## 2025-06-15 NOTE — PROGRESS NOTES
[facility]  [address] [address]  [phone]  [facility]  POS 32      NAME: Kyra Christianson  AGE: 89 y.o. SEX: female [phone]    DATE OF ENCOUNTER: 6/21/2025    Assessment and Plan     Diagnoses and all orders for this visit:    Ambulatory dysfunction    Moderate protein-calorie malnutrition (HCC)    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 behavioral disturbance, psychotic disturbance, mood disturbance, or anxiety (HCC)    Hospice care patient    Acquired hypothyroidism         1. Ambulatory dysfunction  - wheelchair mobility/bed bound  - Fall precautions in place     2. Moderate protein-calorie malnutrition (HCC)  - encourage/assist with hydration/nutrition  - pleasure feeds     3. 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 Hospice care patient  - cont comfort measures  - cont Tylenol 1 gm po q6 asordered  - cont Lorazepam 0.25 ml po/sl q6h as needed  - cont Morphine sulfate 0.25 ml po/sl q6h as needed     5. Acquired hypothyroidism  - stable  - decrease Levothyroxine to 50 mcg po qam    - Counseling Documentation: patient was counseled regarding: risk factor reductions and prognosis    Chief Complaint     Routine Long term follow-up visit.    History of Present Illness     HPI  Kyra Christianson, a 89 y/o female is a long term resident at [facility], seen and examined, stable. She found laying in bed, comfortable. She is declinig, weight loss and fatigue noted. She is on hospice care.  She is unable to give any history due to memory loss.  Staff have no concerns at this time, needs total assistance with ADLs.   Hospice staff is following almost everyday and providing comfort care.     The following portions of the patient's history were reviewed and updated as appropriate: allergies, current medications, past family history, past medical  history, past social history, past surgical history and problem list.    Review of Systems     Review of Systems   Unable to perform ROS: Other       Active Problem List   Problem List[1]    Objective     Vitals: T: 97.6, P: 83, R:16, BP: 118/79, 97% on RA, Wt: 125 lbs    Physical Exam  Vitals and nursing note reviewed.   Constitutional:       General: She is not in acute distress.     Appearance: She is well-developed. She is not diaphoretic.      Comments: Frail elderly female, laying in bed, comfortable   HENT:      Head: Normocephalic and atraumatic.      Nose: Nose normal.      Mouth/Throat:      Mouth: Mucous membranes are dry.      Pharynx: Oropharynx is clear. No oropharyngeal exudate.     Eyes:      General: No scleral icterus.        Right eye: No discharge.         Left eye: No discharge.      Extraocular Movements: Extraocular movements intact.      Conjunctiva/sclera: Conjunctivae normal.       Cardiovascular:      Rate and Rhythm: Normal rate and regular rhythm.      Heart sounds: Normal heart sounds. No murmur heard.  Pulmonary:      Effort: Pulmonary effort is normal. No respiratory distress.      Breath sounds: Normal breath sounds. No wheezing.   Chest:      Chest wall: No tenderness.   Abdominal:      General: Bowel sounds are normal.      Palpations: Abdomen is soft.      Tenderness: There is no abdominal tenderness. There is no guarding.     Musculoskeletal:         General: No tenderness or deformity.      Right lower leg: No edema.      Left lower leg: No edema.      Comments: Impaired ROM due to frailty  Wasting of muscles     Skin:     General: Skin is warm and dry.     Neurological:      Mental Status: She is alert.      Cranial Nerves: No cranial nerve deficit.      Comments: Oriented to self  Slow response to questions     Psychiatric:         Mood and Affect: Mood normal.         Behavior: Behavior normal.      Comments: Fatigue and frail         Pertinent Laboratory/Diagnostic  Studies:  Refer to facility chart.    Current Medications   Medications reviewed and updated in facility chart.            [1]  Patient Active Problem List  Diagnosis   • Osteoporosis   • Essential hypertension   • GERD (gastroesophageal reflux disease)   • Persistent atrial fibrillation (HCC)   • S/P left mastectomy   • History of left breast cancer   • Aneurysm of ascending aorta (HCC)   • Constipation   • Dyslipidemia   • First degree AV block   • Acquired hypothyroidism   • Macular degeneration   • Vertigo   • Vitamin D deficiency   • Benign paroxysmal vertigo   • H. pylori infection   • Hemorrhoids   • Advance directive in chart   • Encounter for follow-up examination after completed treatment for malignant neoplasm   • Ambulatory dysfunction   • Chronic diastolic heart failure (HCC)   • Hiatal hernia   • Vitamin B12 deficiency   • Leg edema, left   • Stage 2 chronic kidney disease   • Interstitial cystitis   • Anxiety   • Pre-syncope   • Hypomagnesemia   • Hypokalemia   • Primary osteoarthritis of left knee   • Seasonal allergic rhinitis due to pollen   • Diarrhea   • Fall   • T12 compression fracture ([facility])   • Moderate protein-calorie malnutrition ([facility])   • Chronic pain syndrome   • 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 behavioral disturbance, psychotic disturbance, mood disturbance, or anxiety ([facility])   • Candidal intertrigo   • Fall at nursing home   • Acute midline low back pain without sciatica   • SOB (shortness of breath)   • Pain and swelling of right wrist   • Oral thrush   • Unresponsive episode   • Hypertensive heart and renal disease with congestive heart failure (HCC)   • Weight loss   • Hospice care patient

## 2025-06-16 ENCOUNTER — HOME CARE VISIT (OUTPATIENT)
Dept: HOME HEALTH SERVICES | Facility: HOME HEALTHCARE | Age: OVER 89
End: 2025-06-16
Payer: MEDICARE

## 2025-06-16 PROCEDURE — G0156 HHCP-SVS OF AIDE,EA 15 MIN: HCPCS

## 2025-06-17 ENCOUNTER — HOME CARE VISIT (OUTPATIENT)
Dept: HOME HEALTH SERVICES | Facility: HOME HEALTHCARE | Age: OVER 89
End: 2025-06-17
Payer: MEDICARE

## 2025-06-17 ENCOUNTER — HOME CARE VISIT (OUTPATIENT)
Dept: HOME HOSPICE | Facility: HOSPICE | Age: OVER 89
End: 2025-06-17
Payer: MEDICARE

## 2025-06-17 ENCOUNTER — TELEPHONE (OUTPATIENT)
Dept: OTHER | Facility: OTHER | Age: OVER 89
End: 2025-06-17

## 2025-06-17 VITALS
HEART RATE: 72 BPM | TEMPERATURE: 97.3 F | DIASTOLIC BLOOD PRESSURE: 66 MMHG | RESPIRATION RATE: 18 BRPM | SYSTOLIC BLOOD PRESSURE: 130 MMHG

## 2025-06-17 PROCEDURE — G0299 HHS/HOSPICE OF RN EA 15 MIN: HCPCS

## 2025-06-18 ENCOUNTER — HOME CARE VISIT (OUTPATIENT)
Dept: HOME HEALTH SERVICES | Facility: HOME HEALTHCARE | Age: OVER 89
End: 2025-06-18
Payer: MEDICARE

## 2025-06-18 PROCEDURE — G0156 HHCP-SVS OF AIDE,EA 15 MIN: HCPCS

## 2025-06-18 PROCEDURE — G0299 HHS/HOSPICE OF RN EA 15 MIN: HCPCS

## 2025-06-18 NOTE — TELEPHONE ENCOUNTER
Reason for Call: RN from [facility] requesting assessment because patient is declining meals more, has bilateral elbow fluid pooling, and bilateral soles of feet are blue.    Caller's Name: Lyndonellivier    Caller's Relationship to Patient: Nurse at [facility]    Caller's Callback Number: [phone]     Home Health OR Hospice Patient: Hospice    [facility] message sent to on-call nurse; verified receipt of message.

## 2025-06-19 ENCOUNTER — TELEPHONE (OUTPATIENT)
Dept: OTHER | Facility: OTHER | Age: OVER 89
End: 2025-06-19

## 2025-06-20 ENCOUNTER — HOME CARE VISIT (OUTPATIENT)
Dept: HOME HEALTH SERVICES | Facility: HOME HEALTHCARE | Age: OVER 89
End: 2025-06-20
Payer: MEDICARE

## 2025-06-20 PROCEDURE — G0299 HHS/HOSPICE OF RN EA 15 MIN: HCPCS

## 2025-06-20 PROCEDURE — G0156 HHCP-SVS OF AIDE,EA 15 MIN: HCPCS

## 2025-06-20 NOTE — TELEPHONE ENCOUNTER
"Natalia from [facility] called to report to clinical team, \"Patient is comfortable and stable, but she does not want to eat. There is also some discoloration on her feet. I would like for her to be evaluated tomorrow.\"     Natalia requested for the message to be sent to office as patient is stable.   "

## 2025-06-26 NOTE — TELEPHONE ENCOUNTER
Devyn from [facility] called in stating the pt  today at 6:30 am and he would like a call back from the on call provider.

## 2025-06-26 NOTE — TELEPHONE ENCOUNTER
Duplicate message.     [facility] calling ([phone]) calling to inform that Kyra passed away this morning at 630am    Family and  notified

## 2025-06-27 ENCOUNTER — HOME CARE VISIT (OUTPATIENT)
Dept: HOME HOSPICE | Facility: HOSPICE | Age: OVER 89
End: 2025-06-27
Payer: MEDICARE

## 2025-07-02 ENCOUNTER — HOME CARE VISIT (OUTPATIENT)
Dept: HOME HOSPICE | Facility: HOSPICE | Age: OVER 89
End: 2025-07-02
Payer: MEDICARE

## 2025-07-03 ENCOUNTER — TELEPHONE (OUTPATIENT)
Age: OVER 89
End: 2025-07-03

## (undated) DEVICE — SUT SILK 3-0 18 IN A184H

## (undated) DEVICE — REM POLYHESIVE ADULT PATIENT RETURN ELECTRODE: Brand: VALLEYLAB

## (undated) DEVICE — JP CHANNEL DRAIN 19FR, FULL FLUTES: Brand: JACKSON-PRATT

## (undated) DEVICE — STERILE UNIVERSAL BREAST PACK: Brand: CARDINAL HEALTH

## (undated) DEVICE — SCD SEQUENTIAL COMPRESSION COMFORT SLEEVE MEDIUM KNEE LENGTH: Brand: KENDALL SCD

## (undated) DEVICE — ACE WRAP 6 IN UNSTERILE

## (undated) DEVICE — ALL PURPOSE SPONGES,NON-WOVEN, 4 PLY: Brand: CURITY

## (undated) DEVICE — DRAPE PROBE NEO-PROBE/ULTRASOUND

## (undated) DEVICE — INSULATED BLADE ELECTRODE: Brand: EDGE

## (undated) DEVICE — SUT VICRYL 3-0 SH 27 IN J416H

## (undated) DEVICE — SUT SILK 2-0 SH 30 IN K833H

## (undated) DEVICE — SUT VICRYL 2-0 SH 27 IN UNDYED J417H

## (undated) DEVICE — GLOVE SRG BIOGEL 7

## (undated) DEVICE — TUBING SUCTION 5MM X 12 FT

## (undated) DEVICE — GAUZE SPONGES,USP TYPE VII GAUZE, 12 PLY: Brand: CURITY

## (undated) DEVICE — MEDI-VAC YANK SUCT HNDL W/TPRD BULBOUS TIP: Brand: CARDINAL HEALTH

## (undated) DEVICE — SMOKE EVACUATION TUBING WITH 8 IN INTEGRAL WAND AND SPONGE GUARD: Brand: BUFFALO FILTER

## (undated) DEVICE — INSULATED BLADE ELECTRODE;CAUTION: FOR MANUFACTURING, PROCESSING, OR REPACKING.: Brand: EDGE

## (undated) DEVICE — SUT MONOCRYL 4-0 PS-2 18 IN Y496G

## (undated) DEVICE — VIAL DECANTER

## (undated) DEVICE — JACKSON-PRATT 100CC BULB RESERVOIR: Brand: CARDINAL HEALTH

## (undated) DEVICE — NEEDLE 25G X 1 1/2

## (undated) DEVICE — CHEST/BREAST DRAPE: Brand: CONVERTORS

## (undated) DEVICE — CHLORAPREP HI-LITE 26ML ORANGE

## (undated) DEVICE — INTENDED FOR TISSUE SEPARATION, AND OTHER PROCEDURES THAT REQUIRE A SHARP SURGICAL BLADE TO PUNCTURE OR CUT.: Brand: BARD-PARKER SAFETY BLADES SIZE 15, STERILE

## (undated) DEVICE — 3M™ STERI-STRIP™ REINFORCED ADHESIVE SKIN CLOSURES, R1547, 1/2 IN X 4 IN (12 MM X 100 MM), 6 STRIPS/ENVELOPE: Brand: 3M™ STERI-STRIP™

## (undated) DEVICE — LIGHT HANDLE COVER SLEEVE DISP BLUE STELLAR

## (undated) DEVICE — SUT VICRYL 2-0 REEL 54 IN J286G

## (undated) DEVICE — STRL UNIVERSAL MINOR GENERAL: Brand: CARDINAL HEALTH